# Patient Record
Sex: MALE | Race: WHITE | Employment: OTHER | URBAN - METROPOLITAN AREA
[De-identification: names, ages, dates, MRNs, and addresses within clinical notes are randomized per-mention and may not be internally consistent; named-entity substitution may affect disease eponyms.]

---

## 2017-09-20 PROBLEM — R07.9 CHEST PAIN: Status: ACTIVE | Noted: 2017-09-20

## 2017-09-20 PROBLEM — J45.909 ASTHMA: Chronic | Status: ACTIVE | Noted: 2017-09-20

## 2018-08-28 ENCOUNTER — HOSPITAL ENCOUNTER (EMERGENCY)
Age: 83
Discharge: HOME | End: 2018-08-28
Payer: MEDICARE

## 2018-08-28 VITALS
SYSTOLIC BLOOD PRESSURE: 159 MMHG | BODY MASS INDEX: 33.86 KG/M2 | OXYGEN SATURATION: 92 % | TEMPERATURE: 97.6 F | RESPIRATION RATE: 18 BRPM | HEIGHT: 72 IN | HEART RATE: 88 BPM | DIASTOLIC BLOOD PRESSURE: 80 MMHG | WEIGHT: 250 LBS

## 2018-08-28 DIAGNOSIS — Z23 NEED FOR TDAP VACCINATION: ICD-10-CM

## 2018-08-28 DIAGNOSIS — S50.11XS: ICD-10-CM

## 2018-08-28 DIAGNOSIS — Z79.01 ANTICOAGULATED ON COUMADIN: ICD-10-CM

## 2018-08-28 DIAGNOSIS — S51.819A SKIN TEAR OF FOREARM WITHOUT COMPLICATION, INITIAL ENCOUNTER: Primary | ICD-10-CM

## 2018-08-28 LAB
APTT: 46.2 SEC (ref 24.5–35.1)
HCT VFR BLD CALC: 45.2 % (ref 37–54)
HEMOGLOBIN: 15.1 G/DL (ref 12.5–16.5)
INR BLD: 2.4
MCH RBC QN AUTO: 30 PG (ref 26–35)
MCHC RBC AUTO-ENTMCNC: 33.4 % (ref 32–34.5)
MCV RBC AUTO: 89.9 FL (ref 80–99.9)
PDW BLD-RTO: 14.9 FL (ref 11.5–15)
PLATELET # BLD: 146 E9/L (ref 130–450)
PMV BLD AUTO: 9.5 FL (ref 7–12)
PROTHROMBIN TIME: 27.7 SEC (ref 9.3–12.4)
RBC # BLD: 5.03 E12/L (ref 3.8–5.8)
WBC # BLD: 7.7 E9/L (ref 4.5–11.5)

## 2018-08-28 PROCEDURE — 90471 IMMUNIZATION ADMIN: CPT | Performed by: NURSE PRACTITIONER

## 2018-08-28 PROCEDURE — 85610 PROTHROMBIN TIME: CPT

## 2018-08-28 PROCEDURE — 99283 EMERGENCY DEPT VISIT LOW MDM: CPT

## 2018-08-28 PROCEDURE — 6370000000 HC RX 637 (ALT 250 FOR IP): Performed by: NURSE PRACTITIONER

## 2018-08-28 PROCEDURE — 85027 COMPLETE CBC AUTOMATED: CPT

## 2018-08-28 PROCEDURE — 6360000002 HC RX W HCPCS: Performed by: NURSE PRACTITIONER

## 2018-08-28 PROCEDURE — 90715 TDAP VACCINE 7 YRS/> IM: CPT | Performed by: NURSE PRACTITIONER

## 2018-08-28 PROCEDURE — 85730 THROMBOPLASTIN TIME PARTIAL: CPT

## 2018-08-28 RX ORDER — DIAPER,BRIEF,INFANT-TODD,DISP
EACH MISCELLANEOUS ONCE
Status: DISCONTINUED | OUTPATIENT
Start: 2018-08-28 | End: 2018-08-29 | Stop reason: HOSPADM

## 2018-08-28 RX ORDER — DIAPER,BRIEF,INFANT-TODD,DISP
EACH MISCELLANEOUS ONCE
Status: COMPLETED | OUTPATIENT
Start: 2018-08-28 | End: 2018-08-28

## 2018-08-28 RX ORDER — BACITRACIN ZINC AND POLYMYXIN B SULFATE 500; 1000 [USP'U]/G; [USP'U]/G
OINTMENT TOPICAL
Qty: 30 G | Refills: 0 | Status: SHIPPED | OUTPATIENT
Start: 2018-08-28 | End: 2018-09-04

## 2018-08-28 RX ORDER — CEPHALEXIN 500 MG/1
500 CAPSULE ORAL 4 TIMES DAILY
Qty: 20 CAPSULE | Refills: 0 | Status: SHIPPED | OUTPATIENT
Start: 2018-08-28 | End: 2018-09-02

## 2018-08-28 RX ADMIN — BACITRACIN ZINC: 500 OINTMENT TOPICAL at 22:27

## 2018-08-28 RX ADMIN — TETANUS TOXOID, REDUCED DIPHTHERIA TOXOID AND ACELLULAR PERTUSSIS VACCINE, ADSORBED 0.5 ML: 5; 2.5; 8; 8; 2.5 SUSPENSION INTRAMUSCULAR at 21:18

## 2019-03-23 ENCOUNTER — HOSPITAL ENCOUNTER (INPATIENT)
Age: 84
LOS: 4 days | Discharge: HOME HEALTH | DRG: 193 | End: 2019-03-27
Attending: EMERGENCY MEDICINE | Admitting: INTERNAL MEDICINE
Payer: MEDICARE

## 2019-03-23 ENCOUNTER — APPOINTMENT (OUTPATIENT)
Dept: GENERAL RADIOLOGY | Age: 84
DRG: 193 | End: 2019-03-23
Payer: MEDICARE

## 2019-03-23 DIAGNOSIS — J18.9 COMMUNITY ACQUIRED PNEUMONIA, UNSPECIFIED LATERALITY: Primary | ICD-10-CM

## 2019-03-23 DIAGNOSIS — I48.91 ATRIAL FIBRILLATION WITH RVR (HCC): ICD-10-CM

## 2019-03-23 DIAGNOSIS — J44.1 COPD EXACERBATION (HCC): ICD-10-CM

## 2019-03-23 PROBLEM — J16.0 CAP (COMMUNITY ACQUIRED PNEUMONIA) DUE TO CHLAMYDIA SPECIES: Status: ACTIVE | Noted: 2019-03-23

## 2019-03-23 LAB
ANION GAP SERPL CALCULATED.3IONS-SCNC: 9 MMOL/L (ref 7–16)
BASOPHILS ABSOLUTE: 0.05 E9/L (ref 0–0.2)
BASOPHILS RELATIVE PERCENT: 0.6 % (ref 0–2)
BUN BLDV-MCNC: 22 MG/DL (ref 8–23)
CALCIUM SERPL-MCNC: 9.1 MG/DL (ref 8.6–10.2)
CHLORIDE BLD-SCNC: 103 MMOL/L (ref 98–107)
CO2: 28 MMOL/L (ref 22–29)
CREAT SERPL-MCNC: 1.1 MG/DL (ref 0.7–1.2)
EOSINOPHILS ABSOLUTE: 0.52 E9/L (ref 0.05–0.5)
EOSINOPHILS RELATIVE PERCENT: 6.7 % (ref 0–6)
GFR AFRICAN AMERICAN: >60
GFR NON-AFRICAN AMERICAN: >60 ML/MIN/1.73
GLUCOSE BLD-MCNC: 113 MG/DL (ref 74–99)
HCT VFR BLD CALC: 51.8 % (ref 37–54)
HEMOGLOBIN: 16.2 G/DL (ref 12.5–16.5)
IMMATURE GRANULOCYTES #: 0.03 E9/L
IMMATURE GRANULOCYTES %: 0.4 % (ref 0–5)
INR BLD: 2.9
LACTIC ACID: 1.6 MMOL/L (ref 0.5–2.2)
LYMPHOCYTES ABSOLUTE: 2.44 E9/L (ref 1.5–4)
LYMPHOCYTES RELATIVE PERCENT: 31.3 % (ref 20–42)
MCH RBC QN AUTO: 29.1 PG (ref 26–35)
MCHC RBC AUTO-ENTMCNC: 31.3 % (ref 32–34.5)
MCV RBC AUTO: 93.2 FL (ref 80–99.9)
MONOCYTES ABSOLUTE: 0.66 E9/L (ref 0.1–0.95)
MONOCYTES RELATIVE PERCENT: 8.5 % (ref 2–12)
NEUTROPHILS ABSOLUTE: 4.1 E9/L (ref 1.8–7.3)
NEUTROPHILS RELATIVE PERCENT: 52.5 % (ref 43–80)
PDW BLD-RTO: 14.7 FL (ref 11.5–15)
PLATELET # BLD: 173 E9/L (ref 130–450)
PMV BLD AUTO: 10.1 FL (ref 7–12)
POTASSIUM SERPL-SCNC: 4.3 MMOL/L (ref 3.5–5)
PRO-BNP: 1141 PG/ML (ref 0–450)
PROCALCITONIN: 0.02 NG/ML (ref 0–0.08)
PROTHROMBIN TIME: 31.8 SEC (ref 9.3–12.4)
RBC # BLD: 5.56 E12/L (ref 3.8–5.8)
SODIUM BLD-SCNC: 140 MMOL/L (ref 132–146)
TROPONIN: <0.01 NG/ML (ref 0–0.03)
WBC # BLD: 7.8 E9/L (ref 4.5–11.5)

## 2019-03-23 PROCEDURE — 85610 PROTHROMBIN TIME: CPT

## 2019-03-23 PROCEDURE — 2580000003 HC RX 258: Performed by: EMERGENCY MEDICINE

## 2019-03-23 PROCEDURE — 96375 TX/PRO/DX INJ NEW DRUG ADDON: CPT

## 2019-03-23 PROCEDURE — 84484 ASSAY OF TROPONIN QUANT: CPT

## 2019-03-23 PROCEDURE — 71045 X-RAY EXAM CHEST 1 VIEW: CPT

## 2019-03-23 PROCEDURE — 6370000000 HC RX 637 (ALT 250 FOR IP): Performed by: INTERNAL MEDICINE

## 2019-03-23 PROCEDURE — 85025 COMPLETE CBC W/AUTO DIFF WBC: CPT

## 2019-03-23 PROCEDURE — 2060000000 HC ICU INTERMEDIATE R&B

## 2019-03-23 PROCEDURE — 6370000000 HC RX 637 (ALT 250 FOR IP): Performed by: EMERGENCY MEDICINE

## 2019-03-23 PROCEDURE — 36415 COLL VENOUS BLD VENIPUNCTURE: CPT

## 2019-03-23 PROCEDURE — 6360000002 HC RX W HCPCS: Performed by: INTERNAL MEDICINE

## 2019-03-23 PROCEDURE — 83605 ASSAY OF LACTIC ACID: CPT

## 2019-03-23 PROCEDURE — 96367 TX/PROPH/DG ADDL SEQ IV INF: CPT

## 2019-03-23 PROCEDURE — 80048 BASIC METABOLIC PNL TOTAL CA: CPT

## 2019-03-23 PROCEDURE — 94640 AIRWAY INHALATION TREATMENT: CPT

## 2019-03-23 PROCEDURE — 94664 DEMO&/EVAL PT USE INHALER: CPT

## 2019-03-23 PROCEDURE — 87040 BLOOD CULTURE FOR BACTERIA: CPT

## 2019-03-23 PROCEDURE — 96365 THER/PROPH/DIAG IV INF INIT: CPT

## 2019-03-23 PROCEDURE — 99285 EMERGENCY DEPT VISIT HI MDM: CPT

## 2019-03-23 PROCEDURE — 6360000002 HC RX W HCPCS: Performed by: EMERGENCY MEDICINE

## 2019-03-23 PROCEDURE — 84145 PROCALCITONIN (PCT): CPT

## 2019-03-23 PROCEDURE — 83880 ASSAY OF NATRIURETIC PEPTIDE: CPT

## 2019-03-23 RX ORDER — IPRATROPIUM BROMIDE AND ALBUTEROL SULFATE 2.5; .5 MG/3ML; MG/3ML
1 SOLUTION RESPIRATORY (INHALATION) ONCE
Status: COMPLETED | OUTPATIENT
Start: 2019-03-23 | End: 2019-03-23

## 2019-03-23 RX ORDER — DILTIAZEM HYDROCHLORIDE 5 MG/ML
10 INJECTION INTRAVENOUS ONCE
Status: DISCONTINUED | OUTPATIENT
Start: 2019-03-23 | End: 2019-03-27 | Stop reason: HOSPADM

## 2019-03-23 RX ORDER — GLIMEPIRIDE 2 MG/1
1 TABLET ORAL DAILY
Status: DISCONTINUED | OUTPATIENT
Start: 2019-03-23 | End: 2019-03-23

## 2019-03-23 RX ORDER — WARFARIN SODIUM 2.5 MG/1
2.5 TABLET ORAL
Status: DISCONTINUED | OUTPATIENT
Start: 2019-03-23 | End: 2019-03-23

## 2019-03-23 RX ORDER — WARFARIN SODIUM 1 MG/1
0.5 TABLET ORAL
Status: DISCONTINUED | OUTPATIENT
Start: 2019-03-26 | End: 2019-03-23

## 2019-03-23 RX ORDER — WARFARIN SODIUM 1 MG/1
0.5 TABLET ORAL
Status: DISCONTINUED | OUTPATIENT
Start: 2019-03-23 | End: 2019-03-25

## 2019-03-23 RX ORDER — METHYLPREDNISOLONE SODIUM SUCCINATE 125 MG/2ML
125 INJECTION, POWDER, LYOPHILIZED, FOR SOLUTION INTRAMUSCULAR; INTRAVENOUS ONCE
Status: COMPLETED | OUTPATIENT
Start: 2019-03-23 | End: 2019-03-23

## 2019-03-23 RX ORDER — METHYLPREDNISOLONE SODIUM SUCCINATE 40 MG/ML
40 INJECTION, POWDER, LYOPHILIZED, FOR SOLUTION INTRAMUSCULAR; INTRAVENOUS EVERY 8 HOURS
Status: DISCONTINUED | OUTPATIENT
Start: 2019-03-23 | End: 2019-03-25 | Stop reason: RX

## 2019-03-23 RX ORDER — ASPIRIN 81 MG/1
81 TABLET ORAL DAILY
Status: DISCONTINUED | OUTPATIENT
Start: 2019-03-23 | End: 2019-03-23

## 2019-03-23 RX ORDER — WARFARIN SODIUM 1 MG/1
1 TABLET ORAL
Status: DISCONTINUED | OUTPATIENT
Start: 2019-03-24 | End: 2019-03-25

## 2019-03-23 RX ORDER — TIMOLOL MALEATE 5 MG/ML
1 SOLUTION/ DROPS OPHTHALMIC 2 TIMES DAILY
Status: DISCONTINUED | OUTPATIENT
Start: 2019-03-23 | End: 2019-03-27 | Stop reason: HOSPADM

## 2019-03-23 RX ORDER — LATANOPROST 50 UG/ML
1 SOLUTION/ DROPS OPHTHALMIC DAILY
Status: ON HOLD | COMMUNITY
End: 2020-10-07 | Stop reason: ALTCHOICE

## 2019-03-23 RX ORDER — IPRATROPIUM BROMIDE AND ALBUTEROL SULFATE 2.5; .5 MG/3ML; MG/3ML
1 SOLUTION RESPIRATORY (INHALATION)
Status: DISCONTINUED | OUTPATIENT
Start: 2019-03-23 | End: 2019-03-25 | Stop reason: ALTCHOICE

## 2019-03-23 RX ORDER — VITS A,C,E/LUTEIN/MINERALS 300MCG-200
1 TABLET ORAL 2 TIMES DAILY
Status: DISCONTINUED | OUTPATIENT
Start: 2019-03-23 | End: 2019-03-27 | Stop reason: HOSPADM

## 2019-03-23 RX ORDER — LEVOFLOXACIN 5 MG/ML
750 INJECTION, SOLUTION INTRAVENOUS ONCE
Status: COMPLETED | OUTPATIENT
Start: 2019-03-23 | End: 2019-03-23

## 2019-03-23 RX ORDER — DOCUSATE SODIUM 100 MG/1
200 CAPSULE, LIQUID FILLED ORAL NIGHTLY
Status: DISCONTINUED | OUTPATIENT
Start: 2019-03-23 | End: 2019-03-27 | Stop reason: HOSPADM

## 2019-03-23 RX ORDER — TAMSULOSIN HYDROCHLORIDE 0.4 MG/1
0.4 CAPSULE ORAL DAILY
Status: DISCONTINUED | OUTPATIENT
Start: 2019-03-23 | End: 2019-03-27 | Stop reason: HOSPADM

## 2019-03-23 RX ORDER — LEVOFLOXACIN 5 MG/ML
500 INJECTION, SOLUTION INTRAVENOUS EVERY 24 HOURS
Status: DISCONTINUED | OUTPATIENT
Start: 2019-03-23 | End: 2019-03-26

## 2019-03-23 RX ORDER — LATANOPROST 50 UG/ML
1 SOLUTION/ DROPS OPHTHALMIC NIGHTLY
Status: DISCONTINUED | OUTPATIENT
Start: 2019-03-23 | End: 2019-03-23

## 2019-03-23 RX ORDER — WARFARIN SODIUM 1 MG/1
0.5 TABLET ORAL SEE ADMIN INSTRUCTIONS
Status: ON HOLD | COMMUNITY
End: 2020-10-07 | Stop reason: ALTCHOICE

## 2019-03-23 RX ORDER — MONTELUKAST SODIUM 10 MG/1
10 TABLET ORAL NIGHTLY
Status: DISCONTINUED | OUTPATIENT
Start: 2019-03-23 | End: 2019-03-27 | Stop reason: HOSPADM

## 2019-03-23 RX ORDER — IPRATROPIUM BROMIDE AND ALBUTEROL SULFATE 2.5; .5 MG/3ML; MG/3ML
1 SOLUTION RESPIRATORY (INHALATION)
Status: DISCONTINUED | OUTPATIENT
Start: 2019-03-23 | End: 2019-03-23

## 2019-03-23 RX ORDER — LATANOPROST 50 UG/ML
1 SOLUTION/ DROPS OPHTHALMIC DAILY
Status: DISCONTINUED | OUTPATIENT
Start: 2019-03-23 | End: 2019-03-27 | Stop reason: HOSPADM

## 2019-03-23 RX ORDER — 0.9 % SODIUM CHLORIDE 0.9 %
500 INTRAVENOUS SOLUTION INTRAVENOUS ONCE
Status: COMPLETED | OUTPATIENT
Start: 2019-03-23 | End: 2019-03-23

## 2019-03-23 RX ORDER — ISOSORBIDE MONONITRATE 30 MG/1
30 TABLET, EXTENDED RELEASE ORAL DAILY
Status: DISCONTINUED | OUTPATIENT
Start: 2019-03-23 | End: 2019-03-23

## 2019-03-23 RX ORDER — FLUTICASONE PROPIONATE 50 MCG
1 SPRAY, SUSPENSION (ML) NASAL DAILY
Status: DISCONTINUED | OUTPATIENT
Start: 2019-03-24 | End: 2019-03-24

## 2019-03-23 RX ORDER — WARFARIN SODIUM 1 MG/1
1 TABLET ORAL
Status: DISCONTINUED | OUTPATIENT
Start: 2019-03-23 | End: 2019-03-23

## 2019-03-23 RX ORDER — TIMOLOL MALEATE 6.8 MG/ML
1 SOLUTION/ DROPS OPHTHALMIC 2 TIMES DAILY
Status: ON HOLD | COMMUNITY
End: 2020-10-07 | Stop reason: ALTCHOICE

## 2019-03-23 RX ADMIN — LEVOFLOXACIN 750 MG: 5 INJECTION, SOLUTION INTRAVENOUS at 12:48

## 2019-03-23 RX ADMIN — IPRATROPIUM BROMIDE AND ALBUTEROL SULFATE 1 AMPULE: .5; 3 SOLUTION RESPIRATORY (INHALATION) at 12:10

## 2019-03-23 RX ADMIN — TAMSULOSIN HYDROCHLORIDE 0.4 MG: 0.4 CAPSULE ORAL at 20:18

## 2019-03-23 RX ADMIN — WARFARIN SODIUM 0.5 MG: 1 TABLET ORAL at 18:16

## 2019-03-23 RX ADMIN — METHYLPREDNISOLONE SODIUM SUCCINATE 125 MG: 125 INJECTION, POWDER, FOR SOLUTION INTRAMUSCULAR; INTRAVENOUS at 12:52

## 2019-03-23 RX ADMIN — IPRATROPIUM BROMIDE AND ALBUTEROL SULFATE 1 AMPULE: .5; 3 SOLUTION RESPIRATORY (INHALATION) at 22:32

## 2019-03-23 RX ADMIN — METHYLPREDNISOLONE SODIUM SUCCINATE 40 MG: 40 INJECTION, POWDER, FOR SOLUTION INTRAMUSCULAR; INTRAVENOUS at 18:17

## 2019-03-23 RX ADMIN — SODIUM CHLORIDE 500 ML: 9 INJECTION, SOLUTION INTRAVENOUS at 12:49

## 2019-03-23 RX ADMIN — CEFTRIAXONE 1 G: 1 INJECTION, POWDER, FOR SOLUTION INTRAMUSCULAR; INTRAVENOUS at 12:21

## 2019-03-23 RX ADMIN — LEVOFLOXACIN 500 MG: 5 INJECTION, SOLUTION INTRAVENOUS at 18:22

## 2019-03-23 RX ADMIN — MONTELUKAST SODIUM 10 MG: 10 TABLET, FILM COATED ORAL at 20:18

## 2019-03-23 RX ADMIN — Medication 1 TABLET: at 20:18

## 2019-03-23 RX ADMIN — TIMOLOL MALEATE 1 DROP: 5 SOLUTION OPHTHALMIC at 20:21

## 2019-03-23 RX ADMIN — DOCUSATE SODIUM 200 MG: 100 CAPSULE, LIQUID FILLED ORAL at 20:18

## 2019-03-23 ASSESSMENT — PAIN SCALES - GENERAL
PAINLEVEL_OUTOF10: 0
PAINLEVEL_OUTOF10: 0

## 2019-03-23 NOTE — ED PROVIDER NOTES
secretions, no trismus, no asymmetry of the posterior oropharynx or uvular edema  Neck: Supple, full ROM, no stridor, no meningeal signs  Respiratory: wheezes bilaterally. Not in respiratory distress  Cardiovascular:  Regular rate. Regular rhythm. No murmurs, no aortic murmurs, no gallops, or rubs. 2+ distal pulses. Equal extremity pulses. Chest: No chest wall tenderness  GI:  Abdomen Soft, Non tender, Non distended. +BS. No rebound, guarding, or rigidity. No pulsatile masses. Musculoskeletal: Moves all extremities x 4. Warm and well perfused, no clubbing, cyanosis, or edema. Capillary refill <3 seconds  Integument: skin warm and dry. No rashes. Neurologic: GCS 15, no focal deficits, symmetric strength 5/5 in the upper and lower extremities bilaterally  Psychiatric: Normal Affect          -------------------------------------------------- RESULTS -------------------------------------------------  I have personally reviewed all laboratory and imaging results for this patient. Results are listed below.      LABS:  Results for orders placed or performed during the hospital encounter of 03/23/19   Protime-INR   Result Value Ref Range    Protime 31.8 (H) 9.3 - 12.4 sec    INR 2.9    CBC Auto Differential   Result Value Ref Range    WBC 7.8 4.5 - 11.5 E9/L    RBC 5.56 3.80 - 5.80 E12/L    Hemoglobin 16.2 12.5 - 16.5 g/dL    Hematocrit 51.8 37.0 - 54.0 %    MCV 93.2 80.0 - 99.9 fL    MCH 29.1 26.0 - 35.0 pg    MCHC 31.3 (L) 32.0 - 34.5 %    RDW 14.7 11.5 - 15.0 fL    Platelets 312 095 - 644 E9/L    MPV 10.1 7.0 - 12.0 fL    Neutrophils % 52.5 43.0 - 80.0 %    Immature Granulocytes % 0.4 0.0 - 5.0 %    Lymphocytes % 31.3 20.0 - 42.0 %    Monocytes % 8.5 2.0 - 12.0 %    Eosinophils % 6.7 (H) 0.0 - 6.0 %    Basophils % 0.6 0.0 - 2.0 %    Neutrophils # 4.10 1.80 - 7.30 E9/L    Immature Granulocytes # 0.03 E9/L    Lymphocytes # 2.44 1.50 - 4.00 E9/L    Monocytes # 0.66 0.10 - 0.95 E9/L    Eosinophils # 0.52 (H) 0.05 - 0.50 E9/L    Basophils # 0.05 0.00 - 0.20 Q0/J   Basic Metabolic Panel   Result Value Ref Range    Sodium 140 132 - 146 mmol/L    Potassium 4.3 3.5 - 5.0 mmol/L    Chloride 103 98 - 107 mmol/L    CO2 28 22 - 29 mmol/L    Anion Gap 9 7 - 16 mmol/L    Glucose 113 (H) 74 - 99 mg/dL    BUN 22 8 - 23 mg/dL    CREATININE 1.1 0.7 - 1.2 mg/dL    GFR Non-African American >60 >=60 mL/min/1.73    GFR African American >60     Calcium 9.1 8.6 - 10.2 mg/dL   Brain Natriuretic Peptide   Result Value Ref Range    Pro-BNP 1,141 (H) 0 - 450 pg/mL   Troponin   Result Value Ref Range    Troponin <0.01 0.00 - 0.03 ng/mL   Lactic Acid, Plasma   Result Value Ref Range    Lactic Acid 1.6 0.5 - 2.2 mmol/L   EKG 12 Lead   Result Value Ref Range    Ventricular Rate 121 BPM    Atrial Rate 131 BPM    P-R Interval 132 ms    QRS Duration 144 ms    Q-T Interval 358 ms    QTc Calculation (Bazett) 508 ms    R Axis -72 degrees    T Axis 24 degrees   EKG 12 Lead   Result Value Ref Range    Ventricular Rate 76 BPM    Atrial Rate 76 BPM    P-R Interval 272 ms    QRS Duration 146 ms    Q-T Interval 420 ms    QTc Calculation (Bazett) 472 ms    P Axis 43 degrees    R Axis -62 degrees    T Axis 15 degrees       RADIOLOGY:  Interpreted by Radiologist unless otherwise specified  XR CHEST PORTABLE   Final Result   Tortuous aorta    There are patchy infiltrates seen throughout both the lung fields. Pneumonia could give this appearance. The chest appears to be worse in the interval                        EKG Interpretation  Interpreted by emergency department physician, Dr. Quang Bernal     Date of EKG: 3/23/19  Time: 121    Rhythm: atrial fibrillation - rapid  Rate: tachycardia  Axis: left  Conduction: right bundle branch block (incomplete) and left anterior fasciclar block  ST Segments: no acute change    Clinical Impression: afib rvr with bifasicular block. Comparison to prior EKG: changes compared to previous EKG    EKG:   This EKG is signed by emergency department physician. Rate: 76  Rhythm: Sinus  Interpretation: First-degree AV block, right bundle-branch block, left anterior fascicular block  Comparison: stable as compared to patient's most recent EKG 09/20/17        ------------------------- NURSING NOTES AND VITALS REVIEWED ---------------------------   The nursing notes within the ED encounter and vital signs as below have been reviewed by myself. BP (!) 150/70   Pulse 77   Temp 98.1 °F (36.7 °C) (Oral)   Resp 18   Ht 6' (1.829 m)   Wt 211 lb 12.8 oz (96.1 kg)   SpO2 93%   BMI 28.73 kg/m²   Oxygen Saturation Interpretation: Abnormal    The patients available past medical records and past encounters were reviewed.         ------------------------------ ED COURSE/MEDICAL DECISION MAKING----------------------  Medications   diltiazem injection 10 mg (10 mg Intravenous Not Given 3/23/19 1351)   docusate sodium (COLACE) capsule 200 mg (has no administration in time range)   montelukast (SINGULAIR) tablet 10 mg (has no administration in time range)   antioxidant multivitamin (OCUVITE) tablet (has no administration in time range)   tamsulosin (FLOMAX) capsule 0.4 mg (has no administration in time range)   levofloxacin (LEVAQUIN) 500 MG/100ML infusion 500 mg (0 mg Intravenous Stopped 3/23/19 1959)   ipratropium-albuterol (DUONEB) nebulizer solution 1 ampule (has no administration in time range)   methylPREDNISolone sodium (SOLU-MEDROL) injection 40 mg (40 mg Intravenous Given 3/23/19 1817)   latanoprost (XALATAN) 0.005 % ophthalmic solution 1 drop (1 drop Both Eyes Not Given 3/23/19 1903)   timolol (TIMOPTIC) 0.5 % ophthalmic solution 1 drop (has no administration in time range)   fluticasone (FLONASE) 50 MCG/ACT nasal spray 1 spray (has no administration in time range)   warfarin (COUMADIN) tablet 1 mg (has no administration in time range)   warfarin (COUMADIN) tablet 0.5 mg (0.5 mg Oral Not Given 3/23/19 1901)   ipratropium-albuterol (DUONEB) nebulizer solution 1 ampule (1 ampule Inhalation Given 3/23/19 1210)   cefTRIAXone (ROCEPHIN) 1 g in dextrose 5 % 50 mL IVPB (vial-mate) (0 g Intravenous Stopped 3/23/19 1254)   levofloxacin (LEVAQUIN) 750 MG/150ML infusion 750 mg (750 mg Intravenous New Bag 3/23/19 1248)   methylPREDNISolone sodium (SOLU-MEDROL) injection 125 mg (125 mg Intravenous Given 3/23/19 1252)   0.9 % sodium chloride bolus (0 mLs Intravenous Stopped 3/23/19 1429)              Medical Decision Making:    Patient with history of COPD who presents for evaluation of shortness of breath and chest tightness. He is tachycardic but significant wheezing on exam. Patient found to have bilateral infiltrates on chest x-ray consistent with pneumonia and antibiotics were ordered. Patient giving breathing treatment with some improvement. Patient with history of A. fib on Coumadin but not rate controlled with heart rate bouncing around between 80 and 120, Cardizem was ordered however before administration patient apparently converted back to sinus rhythm. Re-Evaluations:             ED Course as of Mar 23 2013   Sat Mar 23, 2019   1211 Patient with tachycardia, but no other sirs criteria, and pneumonia. Antibiotics ordered. Pt is afib, HR ranging from 80s-120s. [MF]   1310 No change in symptoms or exam.     [MF]   9617 Patient spontaneously converted prior to Cardizem administration. Cardizem held. [MF]   1409 Discussed case with Dr. Justus Gonzales - he was admitted patient. [MF]      ED Course User Index  [MF] Ro Pérez,          This patient's ED course included: a personal history and physicial examination, multiple bedside re-evaluations, IV medications, cardiac monitoring, continuous pulse oximetry and complex medical decision making and emergency management    This patient has remained hemodynamically stable and improved during their ED course. Counseling:    The emergency provider has spoken with the patient and discussed todays results, in addition to providing specific details for the plan of care and counseling regarding the diagnosis and prognosis. Questions are answered at this time and they are agreeable with the plan.       --------------------------------- IMPRESSION AND DISPOSITION ---------------------------------    IMPRESSION  1. Community acquired pneumonia, unspecified laterality New Problem   2. COPD exacerbation ([facility] [address]    3. Atrial fibrillation with RVR ([facility]) Stable       DISPOSITION  Disposition: Admit to telemetry  Patient condition is stable        NOTE: This report was transcribed using voice recognition software.  Every effort was made to ensure accuracy; however, inadvertent computerized transcription errors may be present       Osbaldo West DO  Resident  03/23/19 2013

## 2019-03-24 LAB
ANION GAP SERPL CALCULATED.3IONS-SCNC: 9 MMOL/L (ref 7–16)
BUN BLDV-MCNC: 28 MG/DL (ref 8–23)
CALCIUM SERPL-MCNC: 9 MG/DL (ref 8.6–10.2)
CHLORIDE BLD-SCNC: 108 MMOL/L (ref 98–107)
CO2: 22 MMOL/L (ref 22–29)
CREAT SERPL-MCNC: 1.2 MG/DL (ref 0.7–1.2)
GFR AFRICAN AMERICAN: >60
GFR NON-AFRICAN AMERICAN: 57 ML/MIN/1.73
GLUCOSE BLD-MCNC: 166 MG/DL (ref 74–99)
HCT VFR BLD CALC: 46 % (ref 37–54)
HEMOGLOBIN: 14.7 G/DL (ref 12.5–16.5)
INR BLD: 3.2
MCH RBC QN AUTO: 29.4 PG (ref 26–35)
MCHC RBC AUTO-ENTMCNC: 32 % (ref 32–34.5)
MCV RBC AUTO: 92 FL (ref 80–99.9)
PDW BLD-RTO: 14.5 FL (ref 11.5–15)
PLATELET # BLD: 148 E9/L (ref 130–450)
PMV BLD AUTO: 10.2 FL (ref 7–12)
POTASSIUM SERPL-SCNC: 5 MMOL/L (ref 3.5–5)
PROTHROMBIN TIME: 35.9 SEC (ref 9.3–12.4)
RBC # BLD: 5 E12/L (ref 3.8–5.8)
SODIUM BLD-SCNC: 139 MMOL/L (ref 132–146)
T4 TOTAL: 4.7 MCG/DL (ref 4.5–11.7)
TSH SERPL DL<=0.05 MIU/L-ACNC: 1.31 UIU/ML (ref 0.27–4.2)
WBC # BLD: 5.8 E9/L (ref 4.5–11.5)

## 2019-03-24 PROCEDURE — 94640 AIRWAY INHALATION TREATMENT: CPT

## 2019-03-24 PROCEDURE — 85027 COMPLETE CBC AUTOMATED: CPT

## 2019-03-24 PROCEDURE — 84436 ASSAY OF TOTAL THYROXINE: CPT

## 2019-03-24 PROCEDURE — 6370000000 HC RX 637 (ALT 250 FOR IP): Performed by: INTERNAL MEDICINE

## 2019-03-24 PROCEDURE — 2060000000 HC ICU INTERMEDIATE R&B

## 2019-03-24 PROCEDURE — 6360000002 HC RX W HCPCS: Performed by: INTERNAL MEDICINE

## 2019-03-24 PROCEDURE — 84443 ASSAY THYROID STIM HORMONE: CPT

## 2019-03-24 PROCEDURE — 85610 PROTHROMBIN TIME: CPT

## 2019-03-24 PROCEDURE — 36415 COLL VENOUS BLD VENIPUNCTURE: CPT

## 2019-03-24 PROCEDURE — 80048 BASIC METABOLIC PNL TOTAL CA: CPT

## 2019-03-24 PROCEDURE — 2700000000 HC OXYGEN THERAPY PER DAY

## 2019-03-24 RX ORDER — FLUTICASONE PROPIONATE 50 MCG
1 SPRAY, SUSPENSION (ML) NASAL NIGHTLY
Status: DISCONTINUED | OUTPATIENT
Start: 2019-03-25 | End: 2019-03-27 | Stop reason: HOSPADM

## 2019-03-24 RX ORDER — DOXYCYCLINE HYCLATE 100 MG/1
100 CAPSULE ORAL EVERY 12 HOURS SCHEDULED
Status: DISCONTINUED | OUTPATIENT
Start: 2019-03-24 | End: 2019-03-24

## 2019-03-24 RX ORDER — FUROSEMIDE 10 MG/ML
20 INJECTION INTRAMUSCULAR; INTRAVENOUS ONCE
Status: COMPLETED | OUTPATIENT
Start: 2019-03-24 | End: 2019-03-24

## 2019-03-24 RX ADMIN — IPRATROPIUM BROMIDE AND ALBUTEROL SULFATE 1 AMPULE: .5; 3 SOLUTION RESPIRATORY (INHALATION) at 10:30

## 2019-03-24 RX ADMIN — METHYLPREDNISOLONE SODIUM SUCCINATE 40 MG: 40 INJECTION, POWDER, FOR SOLUTION INTRAMUSCULAR; INTRAVENOUS at 17:37

## 2019-03-24 RX ADMIN — DOCUSATE SODIUM 200 MG: 100 CAPSULE, LIQUID FILLED ORAL at 20:49

## 2019-03-24 RX ADMIN — Medication 1 TABLET: at 20:49

## 2019-03-24 RX ADMIN — TIMOLOL MALEATE 1 DROP: 5 SOLUTION OPHTHALMIC at 20:49

## 2019-03-24 RX ADMIN — FUROSEMIDE 20 MG: 10 INJECTION, SOLUTION INTRAVENOUS at 13:18

## 2019-03-24 RX ADMIN — LEVOFLOXACIN 500 MG: 5 INJECTION, SOLUTION INTRAVENOUS at 17:36

## 2019-03-24 RX ADMIN — IPRATROPIUM BROMIDE AND ALBUTEROL SULFATE 1 AMPULE: .5; 3 SOLUTION RESPIRATORY (INHALATION) at 21:39

## 2019-03-24 RX ADMIN — WARFARIN SODIUM 1 MG: 1 TABLET ORAL at 17:37

## 2019-03-24 RX ADMIN — FLUTICASONE PROPIONATE 1 SPRAY: 50 SPRAY, METERED NASAL at 09:00

## 2019-03-24 RX ADMIN — MONTELUKAST SODIUM 10 MG: 10 TABLET, FILM COATED ORAL at 20:49

## 2019-03-24 RX ADMIN — IPRATROPIUM BROMIDE AND ALBUTEROL SULFATE 1 AMPULE: .5; 3 SOLUTION RESPIRATORY (INHALATION) at 17:13

## 2019-03-24 RX ADMIN — METHYLPREDNISOLONE SODIUM SUCCINATE 40 MG: 40 INJECTION, POWDER, FOR SOLUTION INTRAMUSCULAR; INTRAVENOUS at 01:10

## 2019-03-24 RX ADMIN — TIMOLOL MALEATE 1 DROP: 5 SOLUTION OPHTHALMIC at 09:00

## 2019-03-24 RX ADMIN — IPRATROPIUM BROMIDE AND ALBUTEROL SULFATE 1 AMPULE: .5; 3 SOLUTION RESPIRATORY (INHALATION) at 14:42

## 2019-03-24 RX ADMIN — Medication 1 TABLET: at 08:57

## 2019-03-24 RX ADMIN — LATANOPROST 1 DROP: 50 SOLUTION OPHTHALMIC at 09:00

## 2019-03-24 RX ADMIN — TAMSULOSIN HYDROCHLORIDE 0.4 MG: 0.4 CAPSULE ORAL at 20:49

## 2019-03-24 RX ADMIN — METHYLPREDNISOLONE SODIUM SUCCINATE 40 MG: 40 INJECTION, POWDER, FOR SOLUTION INTRAMUSCULAR; INTRAVENOUS at 08:57

## 2019-03-24 ASSESSMENT — PAIN SCALES - GENERAL
PAINLEVEL_OUTOF10: 0
PAINLEVEL_OUTOF10: 0

## 2019-03-24 NOTE — PROGRESS NOTES
[facility] Quality Flow/Interdisciplinary Rounds Progress Note        Quality Flow Rounds held on March 24, 2019    Disciplines Attending:  Bedside Nurse, ,  and Nursing Unit Leadership    Alexander Valera was admitted on 3/23/2019 11:15 AM    Anticipated Discharge Date:  Expected Discharge Date: 03/26/19    Disposition:    Dwayne Score:  Dwayne Scale Score: 19    Readmission Risk              Risk of Unplanned Readmission:        12           Discussed patient goal for the day, patient clinical progression, and barriers to discharge.   The following Goal(s) of the Day/Commitment(s) have been identified:  new admit      Patrick Torres  March 24, 2019

## 2019-03-24 NOTE — PLAN OF CARE
Problem: Respiratory  Goal: No pulmonary complications  Outcome: Met This Shift  Goal: O2 Sat > 90%  Outcome: Met This Shift     Problem: Discharge Planning:  Goal: Participates in care planning  Description  Participates in care planning  Outcome: Met This Shift     Problem: Airway Clearance - Ineffective:  Goal: Ability to maintain a clear airway will improve  Description  Ability to maintain a clear airway will improve  Outcome: Met This Shift     Problem: Breathing Pattern - Ineffective:  Goal: Ability to achieve and maintain a regular respiratory rate will improve  Description  Ability to achieve and maintain a regular respiratory rate will improve  Outcome: Met This Shift     Problem: Airway Clearance - Ineffective:  Goal: Ability to maintain a clear airway will improve  Description  Ability to maintain a clear airway will improve  Outcome: Met This Shift     Problem: Falls - Risk of:  Goal: Will remain free from falls  Description  Will remain free from falls  Outcome: Met This Shift  Goal: Absence of physical injury  Description  Absence of physical injury  Outcome: Met This Shift

## 2019-03-25 LAB
ANION GAP SERPL CALCULATED.3IONS-SCNC: 8 MMOL/L (ref 7–16)
BUN BLDV-MCNC: 33 MG/DL (ref 8–23)
CALCIUM SERPL-MCNC: 8.8 MG/DL (ref 8.6–10.2)
CHLORIDE BLD-SCNC: 104 MMOL/L (ref 98–107)
CO2: 25 MMOL/L (ref 22–29)
CREAT SERPL-MCNC: 1.2 MG/DL (ref 0.7–1.2)
FILM ARRAY ADENOVIRUS: NORMAL
FILM ARRAY BORDETELLA PERTUSSIS: NORMAL
FILM ARRAY CHLAMYDOPHILIA PNEUMONIAE: NORMAL
FILM ARRAY CORONAVIRUS 229E: NORMAL
FILM ARRAY CORONAVIRUS HKU1: NORMAL
FILM ARRAY CORONAVIRUS NL63: NORMAL
FILM ARRAY CORONAVIRUS OC43: NORMAL
FILM ARRAY INFLUENZA A VIRUS 09H1: NORMAL
FILM ARRAY INFLUENZA A VIRUS H1: NORMAL
FILM ARRAY INFLUENZA A VIRUS H3: NORMAL
FILM ARRAY INFLUENZA A VIRUS: NORMAL
FILM ARRAY INFLUENZA B: NORMAL
FILM ARRAY METAPNEUMOVIRUS: NORMAL
FILM ARRAY MYCOPLASMA PNEUMONIAE: NORMAL
FILM ARRAY PARAINFLUENZA VIRUS 1: NORMAL
FILM ARRAY PARAINFLUENZA VIRUS 2: NORMAL
FILM ARRAY PARAINFLUENZA VIRUS 3: NORMAL
FILM ARRAY PARAINFLUENZA VIRUS 4: NORMAL
FILM ARRAY RESPIRATORY SYNCITIAL VIRUS: NORMAL
FILM ARRAY RHINOVIRUS/ENTEROVIRUS: NORMAL
GFR AFRICAN AMERICAN: >60
GFR NON-AFRICAN AMERICAN: 57 ML/MIN/1.73
GLUCOSE BLD-MCNC: 148 MG/DL (ref 74–99)
HCT VFR BLD CALC: 43.2 % (ref 37–54)
HEMOGLOBIN: 14 G/DL (ref 12.5–16.5)
INR BLD: 3.8
MCH RBC QN AUTO: 29.9 PG (ref 26–35)
MCHC RBC AUTO-ENTMCNC: 32.4 % (ref 32–34.5)
MCV RBC AUTO: 92.1 FL (ref 80–99.9)
PDW BLD-RTO: 14.5 FL (ref 11.5–15)
PLATELET # BLD: 145 E9/L (ref 130–450)
PMV BLD AUTO: 10 FL (ref 7–12)
POTASSIUM SERPL-SCNC: 4.9 MMOL/L (ref 3.5–5)
PROTHROMBIN TIME: 43 SEC (ref 9.3–12.4)
RBC # BLD: 4.69 E12/L (ref 3.8–5.8)
SODIUM BLD-SCNC: 137 MMOL/L (ref 132–146)
WBC # BLD: 11.4 E9/L (ref 4.5–11.5)

## 2019-03-25 PROCEDURE — 87581 M.PNEUMON DNA AMP PROBE: CPT

## 2019-03-25 PROCEDURE — 97165 OT EVAL LOW COMPLEX 30 MIN: CPT

## 2019-03-25 PROCEDURE — 97161 PT EVAL LOW COMPLEX 20 MIN: CPT

## 2019-03-25 PROCEDURE — 87486 CHLMYD PNEUM DNA AMP PROBE: CPT

## 2019-03-25 PROCEDURE — 85027 COMPLETE CBC AUTOMATED: CPT

## 2019-03-25 PROCEDURE — 87798 DETECT AGENT NOS DNA AMP: CPT

## 2019-03-25 PROCEDURE — 6360000002 HC RX W HCPCS: Performed by: INTERNAL MEDICINE

## 2019-03-25 PROCEDURE — 85610 PROTHROMBIN TIME: CPT

## 2019-03-25 PROCEDURE — 6370000000 HC RX 637 (ALT 250 FOR IP): Performed by: INTERNAL MEDICINE

## 2019-03-25 PROCEDURE — 94640 AIRWAY INHALATION TREATMENT: CPT

## 2019-03-25 PROCEDURE — 2700000000 HC OXYGEN THERAPY PER DAY

## 2019-03-25 PROCEDURE — 94760 N-INVAS EAR/PLS OXIMETRY 1: CPT

## 2019-03-25 PROCEDURE — 80048 BASIC METABOLIC PNL TOTAL CA: CPT

## 2019-03-25 PROCEDURE — 36415 COLL VENOUS BLD VENIPUNCTURE: CPT

## 2019-03-25 PROCEDURE — 87633 RESP VIRUS 12-25 TARGETS: CPT

## 2019-03-25 PROCEDURE — 6360000002 HC RX W HCPCS

## 2019-03-25 PROCEDURE — 2060000000 HC ICU INTERMEDIATE R&B

## 2019-03-25 RX ORDER — METHYLPREDNISOLONE SODIUM SUCCINATE 125 MG/2ML
40 INJECTION, POWDER, LYOPHILIZED, FOR SOLUTION INTRAMUSCULAR; INTRAVENOUS EVERY 8 HOURS
Status: DISCONTINUED | OUTPATIENT
Start: 2019-03-25 | End: 2019-03-25

## 2019-03-25 RX ORDER — METHYLPREDNISOLONE SODIUM SUCCINATE 125 MG/2ML
INJECTION, POWDER, LYOPHILIZED, FOR SOLUTION INTRAMUSCULAR; INTRAVENOUS
Status: COMPLETED
Start: 2019-03-25 | End: 2019-03-25

## 2019-03-25 RX ORDER — BISACODYL 10 MG
10 SUPPOSITORY, RECTAL RECTAL DAILY PRN
Status: DISCONTINUED | OUTPATIENT
Start: 2019-03-25 | End: 2019-03-27 | Stop reason: HOSPADM

## 2019-03-25 RX ORDER — LEVALBUTEROL INHALATION SOLUTION 0.63 MG/3ML
0.63 SOLUTION RESPIRATORY (INHALATION) 3 TIMES DAILY
Status: DISCONTINUED | OUTPATIENT
Start: 2019-03-25 | End: 2019-03-27 | Stop reason: HOSPADM

## 2019-03-25 RX ORDER — METHYLPREDNISOLONE SODIUM SUCCINATE 40 MG/ML
40 INJECTION, POWDER, LYOPHILIZED, FOR SOLUTION INTRAMUSCULAR; INTRAVENOUS EVERY 8 HOURS
Status: DISCONTINUED | OUTPATIENT
Start: 2019-03-25 | End: 2019-03-26

## 2019-03-25 RX ADMIN — LEVALBUTEROL HYDROCHLORIDE 0.63 MG: 0.63 SOLUTION RESPIRATORY (INHALATION) at 12:43

## 2019-03-25 RX ADMIN — METHYLPREDNISOLONE SODIUM SUCCINATE 40 MG: 125 INJECTION, POWDER, FOR SOLUTION INTRAMUSCULAR; INTRAVENOUS at 19:59

## 2019-03-25 RX ADMIN — BISACODYL 10 MG: 10 SUPPOSITORY RECTAL at 10:05

## 2019-03-25 RX ADMIN — MONTELUKAST SODIUM 10 MG: 10 TABLET, FILM COATED ORAL at 19:59

## 2019-03-25 RX ADMIN — TAMSULOSIN HYDROCHLORIDE 0.4 MG: 0.4 CAPSULE ORAL at 19:59

## 2019-03-25 RX ADMIN — LEVALBUTEROL HYDROCHLORIDE 0.63 MG: 0.63 SOLUTION RESPIRATORY (INHALATION) at 22:37

## 2019-03-25 RX ADMIN — Medication 1 TABLET: at 09:17

## 2019-03-25 RX ADMIN — TIMOLOL MALEATE 1 DROP: 5 SOLUTION OPHTHALMIC at 09:54

## 2019-03-25 RX ADMIN — Medication 1 SPRAY: at 20:00

## 2019-03-25 RX ADMIN — IPRATROPIUM BROMIDE 0.5 MG: 0.5 SOLUTION RESPIRATORY (INHALATION) at 22:37

## 2019-03-25 RX ADMIN — LEVOFLOXACIN 500 MG: 5 INJECTION, SOLUTION INTRAVENOUS at 18:45

## 2019-03-25 RX ADMIN — METHYLPREDNISOLONE SODIUM SUCCINATE 40 MG: 40 INJECTION, POWDER, LYOPHILIZED, FOR SOLUTION INTRAMUSCULAR; INTRAVENOUS at 11:13

## 2019-03-25 RX ADMIN — DOCUSATE SODIUM 200 MG: 100 CAPSULE, LIQUID FILLED ORAL at 20:00

## 2019-03-25 RX ADMIN — Medication 1 TABLET: at 19:59

## 2019-03-25 RX ADMIN — TIMOLOL MALEATE 1 DROP: 5 SOLUTION OPHTHALMIC at 20:00

## 2019-03-25 RX ADMIN — LEVALBUTEROL HYDROCHLORIDE 0.63 MG: 0.63 SOLUTION RESPIRATORY (INHALATION) at 08:48

## 2019-03-25 RX ADMIN — IPRATROPIUM BROMIDE 0.5 MG: 0.5 SOLUTION RESPIRATORY (INHALATION) at 12:43

## 2019-03-25 RX ADMIN — LATANOPROST 1 DROP: 50 SOLUTION OPHTHALMIC at 09:17

## 2019-03-25 RX ADMIN — METHYLPREDNISOLONE SODIUM SUCCINATE 40 MG: 125 INJECTION, POWDER, FOR SOLUTION INTRAMUSCULAR; INTRAVENOUS at 01:47

## 2019-03-25 RX ADMIN — METHYLPREDNISOLONE SODIUM SUCCINATE 40 MG: 40 INJECTION, POWDER, LYOPHILIZED, FOR SOLUTION INTRAMUSCULAR; INTRAVENOUS at 19:59

## 2019-03-25 ASSESSMENT — PAIN SCALES - GENERAL
PAINLEVEL_OUTOF10: 0

## 2019-03-25 NOTE — PROGRESS NOTES
Subjective: The patient is awake and alert. No problems overnight. Denies chest pain, angina, and dyspnea. Denies abdominal pain. Tolerating diet. No nausea or vomiting. Objective:    BP (!) 150/81   Pulse 70   Temp 97.6 °F (36.4 °C) (Oral)   Resp 16   Ht 6' (1.829 m)   Wt 214 lb 6.4 oz (97.3 kg)   SpO2 96%   BMI 29.08 kg/m²     Heart:  RRR, no murmurs, gallops, or rubs.   Lungs:  CTA bilaterally, no wheeze, rales or rhonchi  Abd: bowel sounds present, nontender, nondistended, no masses  Extrem:  No clubbing, cyanosis, or edema    CBC with Differential:    Lab Results   Component Value Date    WBC 11.4 03/25/2019    RBC 4.69 03/25/2019    HGB 14.0 03/25/2019    HCT 43.2 03/25/2019     03/25/2019    MCV 92.1 03/25/2019    MCH 29.9 03/25/2019    MCHC 32.4 03/25/2019    RDW 14.5 03/25/2019    SEGSPCT 89 03/16/2013    LYMPHOPCT 31.3 03/23/2019    MONOPCT 8.5 03/23/2019    BASOPCT 0.6 03/23/2019    MONOSABS 0.66 03/23/2019    LYMPHSABS 2.44 03/23/2019    EOSABS 0.52 03/23/2019    BASOSABS 0.05 03/23/2019     CMP:    Lab Results   Component Value Date     03/24/2019    K 5.0 03/24/2019     03/24/2019    CO2 22 03/24/2019    BUN 28 03/24/2019    CREATININE 1.2 03/24/2019    GFRAA >60 03/24/2019    LABGLOM 57 03/24/2019    GLUCOSE 166 03/24/2019    GLUCOSE 98 10/14/2010    PROT 6.5 01/03/2015    LABALBU 3.8 01/03/2015    LABALBU 3.6 10/14/2010    CALCIUM 9.0 03/24/2019    BILITOT 0.8 01/03/2015    ALKPHOS 72 01/03/2015    AST 17 01/03/2015    ALT 17 01/03/2015     PT/INR:    Lab Results   Component Value Date    PROTIME 43.0 03/25/2019    PROTIME 20.5 10/14/2010    INR 3.8 03/25/2019     @cktotal:3,ckmb:3,ckmbindex:3,troponini:3@  U/A:    Lab Results   Component Value Date    NITRU Negative 01/03/2015    COLORU Yellow 01/03/2015    PHUR 6.0 01/03/2015    WBCUA NONE 03/15/2013    RBCUA NONE 03/15/2013    BACTERIA NONE 03/15/2013    CLARITYU Clear 01/03/2015    SPECGRAV 1.015 01/03/2015 UROBILINOGEN 0.2 01/03/2015    BILIRUBINUR Negative 01/03/2015    BLOODU Negative 01/03/2015    GLUCOSEU Negative 01/03/2015    KETUA Negative 01/03/2015        Assessment:    Patient Active Problem List   Diagnosis    Atrial fibrillation ([facility] [address]    COPD with exacerbation ([facility] [address]    Chest pain    Asthma    CAP (community acquired pneumonia) due to Chlamydia species       Plan:    Pneumonia-  pulm to see  Respiratory culture  Continue ABT    asthma exacerbation-  Steroids and aerosols      Zenobia Hernandez DO  6:26 AM  3/25/2019

## 2019-03-25 NOTE — PROGRESS NOTES
Pharmacy Consultation Note  (Warfarin Dosing and Monitoring)    Initial consult date: 3/25/19  Consulting physician: Dr. Starlet Osgood    Allergies:  Penicillins    80 y.o. male    Ht Readings from Last 1 Encounters:   03/23/19 6' (1.829 m)     Wt Readings from Last 1 Encounters:   03/25/19 214 lb 6.4 oz (97.3 kg)         Warfarin Indication Target   INR Range Home Dose  (if applicable) Diet/Feeding Tube   (Enteral feeds, nutritional drinks, increased Vitamin K in diet can decrease INR)   Afib 2-3 Warfarin 0.5mg TRS, 1mg SMWF Low sodium diet       x Home Med? Meds Increasing INR x Home Med? Meds Decreasing INR     Allopurinol    Azathioprine     Amiodarone/Propafenone/Dronedarone   Carbamazepine     Androgens   Cholestyramine     Chemotherapy (BBW: Capecitabine)   Estrogen   X  Ciprofloxacin/Levofloxacin   Nafcillin/Dicloxacillin     Clarithromycin/Erythromycin/Azithromycin   Barbiturates      Fluconazole/Itraconazole/Voriconazole/Ketoconazole   Phenytoin (Variable)     Metronidazole   Rifampin     Phenytoin (Variable) X  Steroids (Variable/Dose Dependent)      Statins/Fenofibrate/Gemfibrozil   Sucralfate   X  Steroids (Variable/Dose Dependent)   Other:     Sulfamethoxazole/Trimethoprim        Tramadol         Other:        Comments regarding medication interactions:      x Diseases Affecting INR x Increased Bleeding Risk   X? CHF Exacerbation (Increases)  History GI Bleed/PUD    Liver Disease (Increases)  Chronic NSAID Use    Thyroid: Hyper (Increases)  Hypo (Decreases)  Chronic ASA/Antiplatelet Use (Clopidogrel/ Dipyridamole/Prasugrel/Ticagrelor)      Malignancy (Increases)  Abnormal Renal Function (dialysis, renal transplant, SCr ? 2.3 mg/dL)     History of EtOH Abuse: Acute (Increases)   Chronic (Decreases)  Liver Function (cirrhosis, bilirubin >2x ULN with AST/ALT/AP >3x ULN)    Fever (Increases)  Age > 65 years   X?  Acute infection (Increases)  Hypertension/Uncontrolled BP    Diarrhea/Dehydration (Increases) X History of stroke    Other: __________________  Other:___________________       Vitamin K or Blood product  Administration Date                    TSH:    Lab Results   Component Value Date    TSH 1.310 03/24/2019        Hepatic Function Panel:                            Lab Results   Component Value Date    ALKPHOS 72 01/03/2015    ALT 17 01/03/2015    AST 17 01/03/2015    PROT 6.5 01/03/2015    BILITOT 0.8 01/03/2015    LABALBU 3.8 01/03/2015    LABALBU 3.6 10/14/2010       Date Warfarin Dose INR Heparin or LMWH HGB/HCT PLT Comment   3/23 0.5mg 2.9 -- 16.2/51.8 173    3/24 1mg 3.2 -- 14.7/46 148    3/25 HOLD 3.8 -- 14.0/43.2 145 **pharmacy consulted**                       Assessment and Plan:  · Pt is a 81 yo male with history of 2 prior strokes and Afib. Pt on warfarin PTA (0.5mg TRS, 1mg SMWF).  INR upon admission 2.9 on 3/23  · Patient presents with possible CHF vs COPD vs CAP (can secondarily lead to increase in INR)  · Patient has been initiated on Levaquin which interacts with warfarin and leads to an increased INR  · Goal INR 2-3  · INR 3.8 today; will most likely continue to increase tomorrow 2/2 medications, illness  · HOLD warfarin tonight  · Daily PT/INR until the INR is stable within the therapeutic range  · Pharmacist will follow and monitor/adjust dosing as necessary    Thank you for this consult,    Gabrielle Whitney, [facility] PharmD 3/25/2019 1:30 PM

## 2019-03-25 NOTE — PROGRESS NOTES
bathroom. At end of session, patient seated in chair with call light and phone within reach, all lines and tubes intact. Low Evaluation     Evaluation time includes thorough review of current medical information, gathering information on past medical history/social history and prior level of function, completion of standardized testing/informal observation of tasks and assessment of data.     Radha Tristan OTR/L  [id number]

## 2019-03-25 NOTE — PROGRESS NOTES
P/C to Dr. Ken Medeiros to question coumadin dosing as INR was 3.8. L/M with office nurse. Awaiting response.   Electronically signed by Danial Dunn RN on 3/25/2019 at 1:33 PM

## 2019-03-25 NOTE — PROGRESS NOTES
Dr. Amadou Catalan notified of pt requesting suppository and not having a bowel movement x2 days. Awaiting orders.

## 2019-03-25 NOTE — H&P
not assessed, but he reports  no difficulties. He has 5/5 strength throughout. ASSESSMENT AND PLAN:  1. COPD exacerbation. The patient will continue on O2, aerosols, and  IV Solu-Medrol. Overall, he states he feels much better on _____ O2.  2.  Community acquired pneumonia. Continues on IV Levaquin. The  patient does not have any significant elevation in his white count or  hypoxia. He does admit to some coughing not productive of any _____  sputum. Chest x-ray showed some bilateral patchy infiltrates. 3.  Paroxysmal atrial fibrillation. His INR is 2.9. Will continue on  his usual home dosing of Coumadin. He is presently in normal sinus  rhythm. No CVA or TIA symptoms.         Alena Lopez MD    D: 03/24/2019 13:09:42       T: 03/24/2019 13:58:55     [email or web address]  Job#: [id number]     Doc#: [id number]    CC:  Yennifer Machado DO

## 2019-03-25 NOTE — PROGRESS NOTES
P Quality Flow/Interdisciplinary Rounds Progress Note        Quality Flow Rounds held on March 25, 2019    Disciplines Attending:  Bedside Nurse, ,  and Nursing Unit Leadership    Joni Mackenzie was admitted on 3/23/2019 11:15 AM    Anticipated Discharge Date:  Expected Discharge Date: 03/26/19    Disposition:    Dwayne Score:  Dwayne Scale Score: 20    Readmission Risk              Risk of Unplanned Readmission:        12           Discussed patient goal for the day, patient clinical progression, and barriers to discharge. The following Goal(s) of the Day/Commitment(s) have been identified:  Wean oxygen, PT.OT to see, wean IV steroids, check Pulmonary plan.       Dhara Thacker  March 25, 2019

## 2019-03-25 NOTE — PROGRESS NOTES
Physical Therapy    Facility/Department: [facility]  Initial Assessment    NAME: Adrián Pitts  : 1929  MRN: [id number]    Date of Service: 3/25/2019       REQUIRES PT FOLLOW UP: Yes       Patient Diagnosis(es): The primary encounter diagnosis was Community acquired pneumonia, unspecified laterality. Diagnoses of COPD exacerbation ([facility] [address] and Atrial fibrillation with RVR ([facility] [address] were also pertinent to this visit. has a past medical history of Asthma, COPD (chronic obstructive pulmonary disease) ([facility] [address] and Unspecified cerebral artery occlusion with cerebral infarction. has a past surgical history that includes Appendectomy; hernia repair; joint replacement (12); ECHO Compl W Dop Color Flow (3/16/2013); and Abdomen surgery. Evaluating Therapist: Paulina Sanabria PT        Room #: 450  DIAGNOSIS: CAP   PRECAUTIONS: falls     Social:  Pt lives with wife  in a  1  floor plan  no steps  to enter. Uses basement   Prior to admission pt walked with  [email or web address]      Initial Evaluation  Date:  3/25/19 Treatment      Short Term/ Long Term   Goals   Was pt agreeable to Eval/treatment? yes      Does pt have pain?  none reported      Bed Mobility  Rolling:  Nt   Supine to sit:  NT   Sit to supine:  NT   Scooting:  Independent in sit    independent    Transfers Sit to stand:  Supervision  Stand to sit:  Supervision  Stand pivot:  supervision  Independent    Ambulation     120  feet with cane  with  Supervision   200 feet with  Cane  with  Independent        Stair negotiation: ascended and descended 12  steps with 1 rail with  SBA   12  steps with 1 rail with  Independent    LE ROM  WFL     LE strength  4/5      AM- PAC RAW score   20/ 24            Pt is alert and Oriented x  3      Balance: SBA     Chair alarm: no      ASSESSMENT  Pt displays functional ability as noted in the objective portion of this evaluation.       Comments/Treatment:  Pulse ox on RA 93% at  Rest, 89% with gait, recovered to 91%   RN informed and

## 2019-03-25 NOTE — CONSULTS
[address] [address]                                  CONSULTATION    PATIENT NAME: Jeffry Alvarado                         :        1929  MED REC NO:   [id number]                            ROOM:       3543  ACCOUNT NO:   [de-identified]                           ADMIT DATE: 2019  PROVIDER:     Monae Baez MD    CONSULT DATE:  2019    REASON FOR CONSULTATION:  Pneumonia. IMPRESSION:  1. Mostly right lower lobe pneumonia. The changes on the left base  seem to be fairly chronic in nature, but there is clearly a right lower  lobe infiltrate present. For now,continue the levofloxacin IV, which can  be switched over to p.o. Tomorrow. We need to watch the INR, which is already up  to 3.8 on the Levaquin. Await the respiratory film panel. 2.  COPD acute exacerbation, for which I agree with the Solu-Medrol 40  IV q. 8, Atrovent aerosols, and Xopenex aerosols. He is minimally  hypoxic and can likely come off his oxygen at this point. HISTORY:  This is a 27-year-old white male with known COPD, followed  previously by Dr. Luz Elena Wing, who has been short of breath for the last week  or two. He is short of breath with activity, unrelieved by aerosolized  bronchodilators, but he had no cough or fevers or chills. There was  associated wheezing present. He came into the ED for shortness of  breath and was found to have bilateral lower lobe infiltrates. Again, I  think the left base is old. The new one was clearly present on the  right. He was started on Levaquin, IV steroids, and aerosols and is  starting to feel better. He is known to have COPD. PAST MEDICAL HISTORY:  Otherwise includes emphysema, constipation, AFib,  and stroke. He is blind in the right eye.     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 FAMILY HISTORY:  Father  from stomach cancer. Mother  from  congestive heart failure. SOCIAL HISTORY:  Former 1-pack-a-day smoker, quitting 40 years ago. Most recently worked as a .    MEDICATIONS:  Currently are Dulcolax 10 per rectum daily p.r.n.,  docusate 200 nightly, Flonase one spray in each nostril nightly,  Atrovent aerosols t.i.d., Xalatan eyedrops one drop in both eyes  nightly, Xopenex 0.63 mg t.i.d., Levaquin 500 IV daily, Solu-Medrol 40  IV q. 8, Singulair 10 nightly, Flomax 0.4 daily, Timolol one drop in  each eye b.i.d., and warfarin 0.5 and 1 mg alternating. At home, it  looks like he was on Xopenex inhalers and nebulizer therapy. REVIEW OF SYSTEMS:  REVEALS AN ALLERGY TO PENICILLIN, BUT SHE DID NOT  REPORT. No fevers. He is blind in the right eye. No hearing  difficulties. Had AFib. He is short of breath. He is constipated. No  dysuria. He had itching of his skin. No arthritic complaints  currently. Review of systems is otherwise negative except he is  anticoagulated. PHYSICAL EXAMINATION:  GENERAL:  He is in no acute distress. VITAL SIGNS:  Temp is 36.6, pulse 79, respiratory rate 18, blood  pressure 135/67, 3-liter pulse ox 92%. SKIN:  Had no rashes. HEENT:  Eyes had clear sclerae. Remaining teeth in fair repair. Palate  and gums are normal.  Mucous membranes are moist.  NECK:  Without masses or adenopathy. CHEST:  Symmetric. There was no accessory muscle use. HEART:  Actually fairly regular with a 3/6 systolic murmur heard at the  apex. LUNGS:  Had crackles at the right base with diminished breath sounds at  both bases. No wheezing. ABDOMEN:  Soft and nontender without masses or organomegaly. EXTREMITIES:  Showed no cyanosis or lymphedema. NEUROLOGIC:  He was alert and appropriate. DATA:  White count is 11.4, hemoglobin is 14, platelets of 614,660. INR  is 3.8.   Chemistries:  BUN is 33, creatinine is 1.2, glucose was

## 2019-03-26 LAB
ANION GAP SERPL CALCULATED.3IONS-SCNC: 8 MMOL/L (ref 7–16)
BUN BLDV-MCNC: 41 MG/DL (ref 8–23)
CALCIUM SERPL-MCNC: 9 MG/DL (ref 8.6–10.2)
CHLORIDE BLD-SCNC: 106 MMOL/L (ref 98–107)
CO2: 27 MMOL/L (ref 22–29)
CREAT SERPL-MCNC: 1.1 MG/DL (ref 0.7–1.2)
GFR AFRICAN AMERICAN: >60
GFR NON-AFRICAN AMERICAN: >60 ML/MIN/1.73
GLUCOSE BLD-MCNC: 156 MG/DL (ref 74–99)
HCT VFR BLD CALC: 44.8 % (ref 37–54)
HEMOGLOBIN: 14.3 G/DL (ref 12.5–16.5)
INR BLD: 3.9
MCH RBC QN AUTO: 29.3 PG (ref 26–35)
MCHC RBC AUTO-ENTMCNC: 31.9 % (ref 32–34.5)
MCV RBC AUTO: 91.8 FL (ref 80–99.9)
PDW BLD-RTO: 14.8 FL (ref 11.5–15)
PLATELET # BLD: 162 E9/L (ref 130–450)
PMV BLD AUTO: 10.3 FL (ref 7–12)
POTASSIUM SERPL-SCNC: 4.8 MMOL/L (ref 3.5–5)
PRO-BNP: 369 PG/ML (ref 0–450)
PROCALCITONIN: 0.03 NG/ML (ref 0–0.08)
PROTHROMBIN TIME: 44.5 SEC (ref 9.3–12.4)
RBC # BLD: 4.88 E12/L (ref 3.8–5.8)
SODIUM BLD-SCNC: 141 MMOL/L (ref 132–146)
WBC # BLD: 10.5 E9/L (ref 4.5–11.5)

## 2019-03-26 PROCEDURE — 6360000002 HC RX W HCPCS: Performed by: INTERNAL MEDICINE

## 2019-03-26 PROCEDURE — 6370000000 HC RX 637 (ALT 250 FOR IP): Performed by: INTERNAL MEDICINE

## 2019-03-26 PROCEDURE — 84145 PROCALCITONIN (PCT): CPT

## 2019-03-26 PROCEDURE — 94640 AIRWAY INHALATION TREATMENT: CPT

## 2019-03-26 PROCEDURE — 83880 ASSAY OF NATRIURETIC PEPTIDE: CPT

## 2019-03-26 PROCEDURE — 6360000002 HC RX W HCPCS

## 2019-03-26 PROCEDURE — 80048 BASIC METABOLIC PNL TOTAL CA: CPT

## 2019-03-26 PROCEDURE — 2700000000 HC OXYGEN THERAPY PER DAY

## 2019-03-26 PROCEDURE — 2580000003 HC RX 258

## 2019-03-26 PROCEDURE — 85027 COMPLETE CBC AUTOMATED: CPT

## 2019-03-26 PROCEDURE — 36415 COLL VENOUS BLD VENIPUNCTURE: CPT

## 2019-03-26 PROCEDURE — 2060000000 HC ICU INTERMEDIATE R&B

## 2019-03-26 PROCEDURE — 97530 THERAPEUTIC ACTIVITIES: CPT

## 2019-03-26 PROCEDURE — 85610 PROTHROMBIN TIME: CPT

## 2019-03-26 RX ORDER — METHYLPREDNISOLONE SODIUM SUCCINATE 125 MG/2ML
INJECTION, POWDER, LYOPHILIZED, FOR SOLUTION INTRAMUSCULAR; INTRAVENOUS
Status: COMPLETED
Start: 2019-03-26 | End: 2019-03-26

## 2019-03-26 RX ORDER — LEVOFLOXACIN 500 MG/1
500 TABLET, FILM COATED ORAL DAILY
Status: DISCONTINUED | OUTPATIENT
Start: 2019-03-26 | End: 2019-03-27 | Stop reason: HOSPADM

## 2019-03-26 RX ORDER — METHYLPREDNISOLONE SODIUM SUCCINATE 40 MG/ML
40 INJECTION, POWDER, LYOPHILIZED, FOR SOLUTION INTRAMUSCULAR; INTRAVENOUS EVERY 12 HOURS
Status: COMPLETED | OUTPATIENT
Start: 2019-03-26 | End: 2019-03-27

## 2019-03-26 RX ADMIN — TAMSULOSIN HYDROCHLORIDE 0.4 MG: 0.4 CAPSULE ORAL at 20:20

## 2019-03-26 RX ADMIN — WATER 1 ML: 1 INJECTION INTRAMUSCULAR; INTRAVENOUS; SUBCUTANEOUS at 10:46

## 2019-03-26 RX ADMIN — IPRATROPIUM BROMIDE 0.5 MG: 0.5 SOLUTION RESPIRATORY (INHALATION) at 21:16

## 2019-03-26 RX ADMIN — METHYLPREDNISOLONE SODIUM SUCCINATE 40 MG: 125 INJECTION, POWDER, FOR SOLUTION INTRAMUSCULAR; INTRAVENOUS at 03:24

## 2019-03-26 RX ADMIN — Medication 1 TABLET: at 20:19

## 2019-03-26 RX ADMIN — METHYLPREDNISOLONE SODIUM SUCCINATE 40 MG: 125 INJECTION, POWDER, FOR SOLUTION INTRAMUSCULAR; INTRAVENOUS at 22:15

## 2019-03-26 RX ADMIN — DOCUSATE SODIUM 200 MG: 100 CAPSULE, LIQUID FILLED ORAL at 20:19

## 2019-03-26 RX ADMIN — LEVALBUTEROL HYDROCHLORIDE 0.63 MG: 0.63 SOLUTION RESPIRATORY (INHALATION) at 12:57

## 2019-03-26 RX ADMIN — Medication 1 TABLET: at 08:18

## 2019-03-26 RX ADMIN — BISACODYL 10 MG: 10 SUPPOSITORY RECTAL at 10:53

## 2019-03-26 RX ADMIN — LEVALBUTEROL HYDROCHLORIDE 0.63 MG: 0.63 SOLUTION RESPIRATORY (INHALATION) at 21:16

## 2019-03-26 RX ADMIN — Medication 1 SPRAY: at 22:12

## 2019-03-26 RX ADMIN — TIMOLOL MALEATE 1 DROP: 5 SOLUTION OPHTHALMIC at 08:18

## 2019-03-26 RX ADMIN — TIMOLOL MALEATE 1 DROP: 5 SOLUTION OPHTHALMIC at 20:20

## 2019-03-26 RX ADMIN — METHYLPREDNISOLONE SODIUM SUCCINATE 40 MG: 40 INJECTION, POWDER, LYOPHILIZED, FOR SOLUTION INTRAMUSCULAR; INTRAVENOUS at 10:46

## 2019-03-26 RX ADMIN — LEVOFLOXACIN 500 MG: 500 TABLET, FILM COATED ORAL at 18:31

## 2019-03-26 RX ADMIN — MONTELUKAST SODIUM 10 MG: 10 TABLET, FILM COATED ORAL at 20:19

## 2019-03-26 RX ADMIN — IPRATROPIUM BROMIDE 0.5 MG: 0.5 SOLUTION RESPIRATORY (INHALATION) at 12:57

## 2019-03-26 RX ADMIN — METHYLPREDNISOLONE SODIUM SUCCINATE 40 MG: 40 INJECTION, POWDER, LYOPHILIZED, FOR SOLUTION INTRAMUSCULAR; INTRAVENOUS at 03:24

## 2019-03-26 RX ADMIN — LATANOPROST 1 DROP: 50 SOLUTION OPHTHALMIC at 08:18

## 2019-03-26 ASSESSMENT — PAIN SCALES - GENERAL
PAINLEVEL_OUTOF10: 0
PAINLEVEL_OUTOF10: 0

## 2019-03-26 NOTE — PROGRESS NOTES
Physical Therapy    Facility/Department: [facility] MED SURG  Treatment note    NAME: Alyson Garcia  : 1929  MRN: [id number]    Date of Service: 3/26/2019               Patient Diagnosis(es): The primary encounter diagnosis was Community acquired pneumonia, unspecified laterality. Diagnoses of COPD exacerbation ([facility] [address] and Atrial fibrillation with RVR ([facility] [address] were also pertinent to this visit. has a past medical history of Asthma, COPD (chronic obstructive pulmonary disease) ([facility] [address] and Unspecified cerebral artery occlusion with cerebral infarction. has a past surgical history that includes Appendectomy; hernia repair; joint replacement (12); ECHO Compl W Dop Color Flow (3/16/2013); and Abdomen surgery. Evaluating Therapist: Cruzito Aguiar PT        Room #: 556  DIAGNOSIS: CAP   PRECAUTIONS: falls     Social:  Pt lives with wife  in a  1  floor plan  no steps  to enter. Uses basement   Prior to admission pt walked with  [email or web address]      Initial Evaluation  Date:  3/25/19 Treatment  3/26/19    Short Term/ Long Term   Goals   Was pt agreeable to Eval/treatment? yes  yes    Does pt have pain?  none reported  No complaints    Bed Mobility  Rolling:  Nt   Supine to sit:  NT   Sit to supine:  NT   Scooting:  Independent in sit  Rolling: Independent  Supine to sit:  Independent  Scooting: Independent to EOB    independent    Transfers Sit to stand:  Supervision  Stand to sit:  Supervision  Stand pivot:  supervision Sit to stand: SBA  Stand to sit: SBA  Stand Pivot: NT Independent    Ambulation     120  feet with cane  with  Supervision  350 feet x 1 using standard cane for support SBA/Supervision 200 feet with  Cane  with  Independent        Stair negotiation: ascended and descended 12  steps with 1 rail with  SBA  6 stairs with 1 rail for support SBA, reciprocal pattern used 12  steps with 1 rail with  Independent    LE ROM  WFL     LE strength  4/5      AM- PAC RAW score           Balance: good dynamic using standard cane for support    Additional Comments: Nurse ok with rx. Pt states unhappy wearing 4 L 02 NC today, states did not wear 02 yesterday, agreed to continue wearing 02 for Rx. after encouragement. Zaira consistent, noted good 2 point gait pattern using cane L UE, no loss of balance or shortness of breath noted. 02 saturation 95% after activity. Pt was left in a bedside chair with call light in reach    Treatment time: 20 minutes  Time out: 1007    Pt is making good progress toward established Physical Therapy goals as per increased functional mobility performed. Continue with physical therapy current plan of care.     Chan Romero PTA   License Number: [id number]

## 2019-03-26 NOTE — PLAN OF CARE
Problem: Falls - Risk of:  Goal: Will remain free from falls  Description  Will remain free from falls  3/26/2019 0936 by Alvin Izaguirre RN  Outcome: Ongoing  3/26/2019 0044 by Freda Davis RN  Outcome: Ongoing

## 2019-03-26 NOTE — PROGRESS NOTES
Pharmacy Consultation Note  (Warfarin Dosing and Monitoring)    Initial consult date: 3/25/19  Consulting physician: Dr. Candie Self    Allergies:  Penicillins    80 y.o. male    Ht Readings from Last 1 Encounters:   03/23/19 6' (1.829 m)     Wt Readings from Last 1 Encounters:   03/26/19 211 lb 6.4 oz (95.9 kg)         Warfarin Indication Target   INR [place] Dose  (if applicable) Diet/Feeding Tube   (Enteral feeds, nutritional drinks, increased Vitamin K in diet can decrease INR)   Afib 2-3 Warfarin 0.5mg TRS, 1mg SMWF Low sodium diet       x Home Med? Meds Increasing INR x Home Med? Meds Decreasing INR     Allopurinol    Azathioprine     Amiodarone/Propafenone/Dronedarone   Carbamazepine     Androgens   Cholestyramine     Chemotherapy (BBW: Capecitabine)   Estrogen   X  Ciprofloxacin/Levofloxacin   Nafcillin/Dicloxacillin     Clarithromycin/Erythromycin/Azithromycin   Barbiturates      Fluconazole/Itraconazole/Voriconazole/Ketoconazole   Phenytoin (Variable)     Metronidazole   Rifampin     Phenytoin (Variable) X  Steroids (Variable/Dose Dependent)      Statins/Fenofibrate/Gemfibrozil   Sucralfate   X  Steroids (Variable/Dose Dependent)   Other:     Sulfamethoxazole/Trimethoprim        Tramadol         Other:        Comments regarding medication interactions:      x Diseases Affecting INR x Increased Bleeding Risk   X? CHF Exacerbation (Increases)  History GI Bleed/PUD    Liver Disease (Increases)  Chronic NSAID Use    Thyroid: Hyper (Increases)  Hypo (Decreases)  Chronic ASA/Antiplatelet Use (Clopidogrel/ Dipyridamole/Prasugrel/Ticagrelor)      Malignancy (Increases)  Abnormal Renal Function (dialysis, renal transplant, SCr ? 2.3 mg/dL)     History of EtOH Abuse: Acute (Increases)   Chronic (Decreases)  Liver Function (cirrhosis, bilirubin >2x ULN with AST/ALT/AP >3x ULN)    Fever (Increases)  Age > 65 years   X?  Acute infection (Increases)  Hypertension/Uncontrolled BP    Diarrhea/Dehydration (Increases) X History of stroke    Other: __________________  Other:___________________       Vitamin K or Blood product  Administration Date                    TSH:    Lab Results   Component Value Date    TSH 1.310 03/24/2019        Hepatic Function Panel:                            Lab Results   Component Value Date    ALKPHOS 72 01/03/2015    ALT 17 01/03/2015    AST 17 01/03/2015    PROT 6.5 01/03/2015    BILITOT 0.8 01/03/2015    LABALBU 3.8 01/03/2015    LABALBU 3.6 10/14/2010       Date Warfarin Dose INR Heparin or LMWH HGB/HCT PLT Comment   3/23 0.5mg 2.9 -- 16.2/51.8 173    3/24 1mg 3.2 -- 14.7/46 148    3/25 HOLD 3.8 -- 14.0/43.2 145 **pharmacy consulted**   3/26 HOLD 3.9 -- 14.3/44.8 162               Assessment and Plan:  · Pt is a 81 yo male with history of 2 prior strokes and Afib. Pt on warfarin PTA (0.5mg TRS, 1mg SMWF).  INR upon admission 2.9 on 3/23  · Patient presents with possible CHF vs COPD vs CAP (can secondarily lead to increase in INR)  · Patient has been initiated on Levaquin which interacts with warfarin and leads to an increased INR  · Goal INR 2-3  · INR 3.9 today; will most likely continue to increase tomorrow 2/2 medications, illness  · HOLD warfarin tonight  · Daily PT/INR until the INR is stable within the therapeutic range  · Pharmacist will follow and monitor/adjust dosing as necessary    Thank you for this consult,    FLETCHER Varghese [facility] PharmD 3/26/2019 10:54 AM

## 2019-03-26 NOTE — PROGRESS NOTES
Wheezy earlier better now. Breathing okay. Minimal cough.      Additional Respiratory  Assessments  Pulse: 66  Resp: 20  SpO2: 94 %  Oral Care: Teeth brushed      Current Facility-Administered Medications:     levofloxacin (LEVAQUIN) tablet 500 mg, 500 mg, Oral, Daily, Reddy Canada DO    mometasone-formoterol (DULERA) 200-5 MCG/ACT inhaler 2 puff, 2 puff, Inhalation, BID, Governor Flaquita Canada DO    bisacodyl (DULCOLAX) suppository 10 mg, 10 mg, Rectal, Daily PRN, Red Moses DO, 10 mg at 03/26/19 1053    levalbuterol (XOPENEX) nebulization 0.63 mg, 0.63 mg, Nebulization, TID, Red Moses DO, 0.63 mg at 03/25/19 2237    ipratropium (ATROVENT) 0.02 % nebulizer solution 0.5 mg, 0.5 mg, Nebulization, TID, Red Moses DO, 0.5 mg at 03/25/19 2237    methylPREDNISolone sodium (SOLU-MEDROL) injection 40 mg, 40 mg, Intravenous, Q8H, Maynor Hill MD, 40 mg at 03/26/19 1046    warfarin (COUMADIN) daily dosing (placeholder), , Other, RX Placeholder, Red Moses DO    fluticasone (FLONASE) 50 MCG/ACT nasal spray 1 spray, 1 spray, Each Nare, Nightly, Red Moses DO, 1 spray at 03/25/19 2000    diltiazem injection 10 mg, 10 mg, Intravenous, Once, [facility]    docusate sodium (COLACE) capsule 200 mg, 200 mg, Oral, Nightly, Maynor Hill MD, 200 mg at 03/25/19 2000    montelukast (SINGULAIR) tablet 10 mg, 10 mg, Oral, Nightly, Maynor Hill MD, 10 mg at 03/25/19 1959    antioxidant multivitamin (OCUVITE) tablet, 1 tablet, Oral, BID, Maynor Hill MD, 1 tablet at 03/26/19 0818    tamsulosin (FLOMAX) capsule 0.4 mg, 0.4 mg, Oral, Daily, Maynor Hill MD, 0.4 mg at 03/25/19 1959    latanoprost (XALATAN) 0.005 % ophthalmic solution 1 drop, 1 drop, Both Eyes, Daily, Maynor Hill MD, 1 drop at 03/26/19 0818    timolol (TIMOPTIC) 0.5 % ophthalmic solution 1 drop, 1 drop, Both Eyes, BID, Maynor Hill MD, 1 drop at 03/26/19 0818  BP (!) 128/54   Pulse 66   Temp 97.8 °F (36.6 °C) (Oral)   Resp 20   Ht 6' (1.829 m) Wt 211 lb 6.4 oz (95.9 kg)   SpO2 94%   BMI 28.67 kg/m²     General: No distress  Chest: Symmetric, no accessory use  Heart: Normal S1S2  Lungs: Rales right base, scattered wheezes-mild  Abdomen: Soft, nt  Extremities: No edema    Intake/Output Summary (Last 24 hours) at 3/26/2019 1238  Last data filed at 3/26/2019 1100  Gross per 24 hour   Intake 240 ml   Output 850 ml   Net -610 ml     CBC with Differential:    Lab Results   Component Value Date    WBC 10.5 03/26/2019    RBC 4.88 03/26/2019    HGB 14.3 03/26/2019    HCT 44.8 03/26/2019     03/26/2019    MCV 91.8 03/26/2019    MCH 29.3 03/26/2019    MCHC 31.9 03/26/2019    RDW 14.8 03/26/2019    SEGSPCT 89 03/16/2013    LYMPHOPCT 31.3 03/23/2019    MONOPCT 8.5 03/23/2019    BASOPCT 0.6 03/23/2019    MONOSABS 0.66 03/23/2019    LYMPHSABS 2.44 03/23/2019    EOSABS 0.52 03/23/2019    BASOSABS 0.05 03/23/2019     BMP:    Lab Results   Component Value Date     03/26/2019    K 4.8 03/26/2019     03/26/2019    CO2 27 03/26/2019    BUN 41 03/26/2019    LABALBU 3.8 01/03/2015    LABALBU 3.6 10/14/2010    CREATININE 1.1 03/26/2019    CALCIUM 9.0 03/26/2019    GFRAA >60 03/26/2019    LABGLOM >60 03/26/2019    GLUCOSE 156 03/26/2019    GLUCOSE 98 10/14/2010   procalcitonin 0.03    IMPRESSION:   Patient Active Problem List   Diagnosis    Atrial fibrillation ([address]    COPD with exacerbation (HCC)    Chest pain    Asthma    CAP (community acquired pneumonia) due to Chlamydia species   Improving with just mild wheezing now, will drop steroids to q12. Not convinced of a bacterial pneumonia on Levaquin day 4 will give 5 days then stop. Mild acute hypoxic respiratory failure on low flow O2. Hopefully home tomorrow.     Jamas Neat  3/26/2019  12:38 PM

## 2019-03-26 NOTE — PROGRESS NOTES
Subjective: The patient is awake and alert. No problems overnight. Denies chest pain, angina, and dyspnea. Denies abdominal pain. Tolerating diet. No nausea or vomiting. Objective:    BP (!) 144/73   Pulse 74   Temp 98.1 °F (36.7 °C) (Oral)   Resp 16   Ht 6' (1.829 m)   Wt 211 lb 6.4 oz (95.9 kg)   SpO2 (!) 86%   BMI 28.67 kg/m²     Heart:  RRR, no murmurs, gallops, or rubs.   Lungs:  CTA bilaterally, no wheeze, rales or rhonchi  Abd: bowel sounds present, nontender, nondistended, no masses  Extrem:  No clubbing, cyanosis, or edema    CBC with Differential:    Lab Results   Component Value Date    WBC 10.5 03/26/2019    RBC 4.88 03/26/2019    HGB 14.3 03/26/2019    HCT 44.8 03/26/2019     03/26/2019    MCV 91.8 03/26/2019    MCH 29.3 03/26/2019    MCHC 31.9 03/26/2019    RDW 14.8 03/26/2019    SEGSPCT 89 03/16/2013    LYMPHOPCT 31.3 03/23/2019    MONOPCT 8.5 03/23/2019    BASOPCT 0.6 03/23/2019    MONOSABS 0.66 03/23/2019    LYMPHSABS 2.44 03/23/2019    EOSABS 0.52 03/23/2019    BASOSABS 0.05 03/23/2019     CMP:    Lab Results   Component Value Date     03/26/2019    K 4.8 03/26/2019     03/26/2019    CO2 27 03/26/2019    BUN 41 03/26/2019    CREATININE 1.1 03/26/2019    GFRAA >60 03/26/2019    LABGLOM >60 03/26/2019    GLUCOSE 156 03/26/2019    GLUCOSE 98 10/14/2010    PROT 6.5 01/03/2015    LABALBU 3.8 01/03/2015    LABALBU 3.6 10/14/2010    CALCIUM 9.0 03/26/2019    BILITOT 0.8 01/03/2015    ALKPHOS 72 01/03/2015    AST 17 01/03/2015    ALT 17 01/03/2015     PT/INR:    Lab Results   Component Value Date    PROTIME 44.5 03/26/2019    PROTIME 20.5 10/14/2010    INR 3.9 03/26/2019     @cktotal:3,ckmb:3,ckmbindex:3,troponini:3@  U/A:    Lab Results   Component Value Date    NITRU Negative 01/03/2015    COLORU Yellow 01/03/2015    PHUR 6.0 01/03/2015    WBCUA NONE 03/15/2013    RBCUA NONE 03/15/2013    BACTERIA NONE 03/15/2013    CLARITYU Clear 01/03/2015    SPECGRAV 1.015 01/03/2015    UROBILINOGEN 0.2 01/03/2015    BILIRUBINUR Negative 01/03/2015    BLOODU Negative 01/03/2015    GLUCOSEU Negative 01/03/2015    KETUA Negative 01/03/2015        Assessment:    Patient Active Problem List   Diagnosis    Atrial fibrillation ([facility] [address]    COPD with exacerbation ([facility] [address] [facility]    Chest pain    Asthma    CAP (community acquired pneumonia) due to Chlamydia species       Plan:    Acute exacerbation of COPD/Asthma-  Wheezing today  Hold discharge  Ambulate patient    CAP-  Switch to po levaquin    Atrial fib-  Rate controlled  Check bnp this am with significant wheeze  INR per pharmacy    Acute diastolic CHF-  Repeat bnp may need more [phone] [facility]  6:41 AM  3/26/2019

## 2019-03-26 NOTE — PROGRESS NOTES
[facility] Quality Flow/Interdisciplinary Rounds Progress Note        Quality Flow Rounds held on March 26, 2019    Disciplines Attending:  Bedside Nurse, ,  and Nursing Unit Leadership    Batsheva Tavares was admitted on 3/23/2019 11:15 AM    Anticipated Discharge Date:  Expected Discharge Date: 03/26/19    Disposition:    Dwayne Score:  Dwayne Scale Score: 19    Readmission Risk              Risk of Unplanned Readmission:        12           Discussed patient goal for the day, patient clinical progression, and barriers to discharge. The following Goal(s) of the Day/Commitment(s) have been identified:  Wean IV steroids, transition to PO antibiotics.       [place] [facility]  March 26, 2019

## 2019-03-26 NOTE — FLOWSHEET NOTE
Patient able to make frequent,adequate and  purposeful movements in bed as well as ambulate in room with wheeled walker.  Educated on skin precautions

## 2019-03-27 VITALS
OXYGEN SATURATION: 92 % | WEIGHT: 212.3 LBS | TEMPERATURE: 97.6 F | HEIGHT: 72 IN | RESPIRATION RATE: 18 BRPM | HEART RATE: 69 BPM | SYSTOLIC BLOOD PRESSURE: 154 MMHG | DIASTOLIC BLOOD PRESSURE: 78 MMHG | BODY MASS INDEX: 28.76 KG/M2

## 2019-03-27 LAB
ANION GAP SERPL CALCULATED.3IONS-SCNC: 6 MMOL/L (ref 7–16)
BUN BLDV-MCNC: 37 MG/DL (ref 8–23)
CALCIUM SERPL-MCNC: 8.8 MG/DL (ref 8.6–10.2)
CHLORIDE BLD-SCNC: 105 MMOL/L (ref 98–107)
CO2: 26 MMOL/L (ref 22–29)
CREAT SERPL-MCNC: 1.1 MG/DL (ref 0.7–1.2)
GFR AFRICAN AMERICAN: >60
GFR NON-AFRICAN AMERICAN: >60 ML/MIN/1.73
GLUCOSE BLD-MCNC: 157 MG/DL (ref 74–99)
HCT VFR BLD CALC: 46.4 % (ref 37–54)
HEMOGLOBIN: 14.5 G/DL (ref 12.5–16.5)
INR BLD: 3.5
MCH RBC QN AUTO: 28.9 PG (ref 26–35)
MCHC RBC AUTO-ENTMCNC: 31.3 % (ref 32–34.5)
MCV RBC AUTO: 92.6 FL (ref 80–99.9)
PDW BLD-RTO: 14.8 FL (ref 11.5–15)
PLATELET # BLD: 148 E9/L (ref 130–450)
PMV BLD AUTO: 10 FL (ref 7–12)
POTASSIUM SERPL-SCNC: 5.1 MMOL/L (ref 3.5–5)
PROTHROMBIN TIME: 39.5 SEC (ref 9.3–12.4)
RBC # BLD: 5.01 E12/L (ref 3.8–5.8)
SODIUM BLD-SCNC: 137 MMOL/L (ref 132–146)
WBC # BLD: 9.3 E9/L (ref 4.5–11.5)

## 2019-03-27 PROCEDURE — 6360000002 HC RX W HCPCS: Performed by: INTERNAL MEDICINE

## 2019-03-27 PROCEDURE — 94640 AIRWAY INHALATION TREATMENT: CPT

## 2019-03-27 PROCEDURE — 36415 COLL VENOUS BLD VENIPUNCTURE: CPT

## 2019-03-27 PROCEDURE — 80048 BASIC METABOLIC PNL TOTAL CA: CPT

## 2019-03-27 PROCEDURE — 85027 COMPLETE CBC AUTOMATED: CPT

## 2019-03-27 PROCEDURE — 6370000000 HC RX 637 (ALT 250 FOR IP): Performed by: INTERNAL MEDICINE

## 2019-03-27 PROCEDURE — 6360000002 HC RX W HCPCS: Performed by: CLINICAL NURSE SPECIALIST

## 2019-03-27 PROCEDURE — 85610 PROTHROMBIN TIME: CPT

## 2019-03-27 RX ORDER — PREDNISONE 20 MG/1
TABLET ORAL
Qty: 11 TABLET | Refills: 0 | Status: SHIPPED | OUTPATIENT
Start: 2019-03-27 | End: 2019-04-02

## 2019-03-27 RX ORDER — PREDNISONE 10 MG/1
10 TABLET ORAL DAILY
Qty: 10 TABLET | Refills: 0 | Status: SHIPPED | OUTPATIENT
Start: 2019-03-27 | End: 2019-03-27 | Stop reason: HOSPADM

## 2019-03-27 RX ORDER — PREDNISONE 20 MG/1
TABLET ORAL
Qty: 15 TABLET | Refills: 0 | Status: SHIPPED | OUTPATIENT
Start: 2019-03-27 | End: 2019-03-27 | Stop reason: SDUPTHER

## 2019-03-27 RX ORDER — PREDNISONE 20 MG/1
TABLET ORAL
Qty: 10 TABLET | Refills: 0 | Status: SHIPPED | OUTPATIENT
Start: 2019-04-02 | End: 2019-04-08

## 2019-03-27 RX ADMIN — IPRATROPIUM BROMIDE 0.5 MG: 0.5 SOLUTION RESPIRATORY (INHALATION) at 10:13

## 2019-03-27 RX ADMIN — LATANOPROST 1 DROP: 50 SOLUTION OPHTHALMIC at 07:56

## 2019-03-27 RX ADMIN — LEVALBUTEROL HYDROCHLORIDE 0.63 MG: 0.63 SOLUTION RESPIRATORY (INHALATION) at 10:12

## 2019-03-27 RX ADMIN — Medication 1 TABLET: at 08:55

## 2019-03-27 RX ADMIN — METHYLPREDNISOLONE SODIUM SUCCINATE 40 MG: 125 INJECTION, POWDER, FOR SOLUTION INTRAMUSCULAR; INTRAVENOUS at 10:58

## 2019-03-27 RX ADMIN — LEVOFLOXACIN 500 MG: 500 TABLET, FILM COATED ORAL at 12:56

## 2019-03-27 RX ADMIN — TIMOLOL MALEATE 1 DROP: 5 SOLUTION OPHTHALMIC at 07:56

## 2019-03-27 ASSESSMENT — PAIN SCALES - GENERAL: PAINLEVEL_OUTOF10: 0

## 2019-03-27 NOTE — PLAN OF CARE
Problem: Respiratory  Goal: No pulmonary complications  Outcome: Met This Shift  Goal: O2 Sat > 90%  Outcome: Met This Shift     Problem: Airway Clearance - Ineffective:  Goal: Clear lung sounds  Description  Clear lung sounds  Outcome: Met This Shift     Problem: Fluid Volume - Deficit:  Goal: Achieves intake and output within specified parameters  Description  Achieves intake and output within specified parameters  Outcome: Met This Shift     Problem: Breathing Pattern - Ineffective:  Goal: Ability to achieve and maintain a regular respiratory rate will improve  Description  Ability to achieve and maintain a regular respiratory rate will improve  Outcome: Met This Shift

## 2019-03-27 NOTE — CARE COORDINATION
Social work / Discharge Planning:       Per [facility], patient has been accepted and they will see following discharge.   Electronically signed by SASHA Mayfield on 3/27/2019 at 1:35 PM

## 2019-03-27 NOTE — PROGRESS NOTES
Pulmonary Progress Note    Admit Date: 3/23/2019                            PCP: Stanley Gallegos DO  Active Problems:    CAP (community acquired pneumonia) due to Chlamydia species  Resolved Problems:    * No resolved hospital problems. *      Subjective:  Resting in bed on room air. For dc today. Denies shortness of breath states breathing is Improved    Medications:       levofloxacin  500 mg Oral Daily    mometasone-formoterol  2 puff Inhalation BID    methylPREDNISolone  40 mg Intravenous Q12H    levalbuterol  0.63 mg Nebulization TID    ipratropium  0.5 mg Nebulization TID    warfarin (COUMADIN) daily dosing (placeholder)   Other RX Placeholder    fluticasone  1 spray Each Nare Nightly    diltiazem  10 mg Intravenous Once    docusate sodium  200 mg Oral Nightly    montelukast  10 mg Oral Nightly    ocuvite-lutein  1 tablet Oral BID    tamsulosin  0.4 mg Oral Daily    latanoprost  1 drop Both Eyes Daily    timolol  1 drop Both Eyes BID       Vitals:  VITALS:  BP (!) 154/78   Pulse 69   Temp 97.6 °F (36.4 °C) (Oral)   Resp 18   Ht 6' (1.829 m)   Wt 212 lb 4.8 oz (96.3 kg)   SpO2 92%   BMI 28.79 kg/m²   24HR INTAKE/OUTPUT:      Intake/Output Summary (Last 24 hours) at 3/27/2019 1027  Last data filed at 3/27/2019 0458  Gross per 24 hour   Intake 180 ml   Output 1175 ml   Net -995 ml     CURRENT PULSE OXIMETRY:  SpO2: 92 %  24HR PULSE OXIMETRY RANGE:  SpO2  Av.6 %  Min: 92 %  Max: 97 %  CVP:    VENT SETTINGS:      Additional Respiratory  Assessments  Pulse: 69  Resp: 18  SpO2: 92 %  Oral Care: Teeth brushed      EXAM:  General: No distress. Alert. ENT: No discharge. Pharynx clear. Neck: Supple, Trachea midline. Resp: No accessory muscle use. lungs diminished with few scattered wheezes heard   CV: Regular rate. Regular rhythm. No mumur  No edema. ABD: Non-tender. Non-distended. Soft round. Normal bowel sounds. Skin: Warm and dry. .  M/S: No cyanosis. No joint deformity.  No clubbing. Neuro: Awake. Follows commands. O x 3    I/O: I/O last 3 completed shifts: In: 180 [P.O.:180]  Out: 1175 [Urine:1175]  No intake/output data recorded. Results:  CBC:   Recent Labs     19   WBC 11.4 10.5 9.3   HGB 14.0 14.3 14.5   HCT 43.2 44.8 46.4   MCV 92.1 91.8 92.6    162 148     BMP:   Recent Labs     19    141 137   K 4.9 4.8 5.1*    106 105   CO2 25    BUN 33* 41* 37*   CREATININE 1.2 1.1 1.1     LFT: No results for input(s): ALKPHOS, ALT, AST, PROT, BILITOT, BILIDIR, LABALBU in the last 72 hours. PT/INR:   Recent Labs     19   PROTIME 43.0* 44.5* 39.5*   INR 3.8 3.9 3.5     Cultures:  Respiratory panel-Negative    ABG:   No results for input(s): PH, PO2, PCO2, HCO3, BE, O2SAT in the last 72 hours. Films:  Xr Chest Portable     Result Date: 3/23/2019  Patient MRN: [id number] : 1929 Age:  80 years Gender: Male Order Date: 3/23/2019 11:30 AM Exam: XR CHEST PORTABLE Number of Images: 1 view Indication:   cp cp Comparison: 17 Findings: The heart is unremarkable Lung fields demonstrate patchy bilateral infiltrates which could be related to pneumonia. The aorta is tortuous and ectatic     Tortuous aorta There are patchy infiltrates seen throughout both the lung fields. Pneumonia could give this appearance. The chest appears to be worse in the interval       Assessment:  · CAP-RLL  · AE COPD      Plan:  · Will need to continue oral Levaquin  · IV steroid this am then Oral taper starting tomorrow, 40mg x 3 days, 30,g x 3 days, 20mg x 3 days, 10mg x 3 days  · Can use nebulizer at home every 4-6 hours as needed  · F/U at [facility] in 1-2 weeks with Dr. Jagruti Gallagher        Electronically signed by PABLO Silvestre on 3/27/2019 at 10:27 AM    Seen and examined, agree with above.  Pneumonia and copd exacerbation are better and he is okay to discharge on prednisone taper, Dulera and albuterol as needed. FU with Dr. Ramos Laguerre.

## 2019-03-27 NOTE — PROGRESS NOTES
Subjective: The patient is awake and alert. No problems overnight. Denies chest pain, angina, and dyspnea. Denies abdominal pain. Tolerating diet. No nausea or vomiting. Objective:    BP (!) 146/72   Pulse 78   Temp 97.6 °F (36.4 °C)   Resp 16   Ht 6' (1.829 m)   Wt 212 lb 4.8 oz (96.3 kg)   SpO2 92%   BMI 28.79 kg/m²     Heart:  RRR, no murmurs, gallops, or rubs.   Lungs:  CTA bilaterally, no wheeze, rales or rhonchi  Abd: bowel sounds present, nontender, nondistended, no masses  Extrem:  No clubbing, cyanosis, or edema    CBC with Differential:    Lab Results   Component Value Date    WBC 9.3 03/27/2019    RBC 5.01 03/27/2019    HGB 14.5 03/27/2019    HCT 46.4 03/27/2019     03/27/2019    MCV 92.6 03/27/2019    MCH 28.9 03/27/2019    MCHC 31.3 03/27/2019    RDW 14.8 03/27/2019    SEGSPCT 89 03/16/2013    LYMPHOPCT 31.3 03/23/2019    MONOPCT 8.5 03/23/2019    BASOPCT 0.6 03/23/2019    MONOSABS 0.66 03/23/2019    LYMPHSABS 2.44 03/23/2019    EOSABS 0.52 03/23/2019    BASOSABS 0.05 03/23/2019     CMP:    Lab Results   Component Value Date     03/27/2019    K 5.1 03/27/2019     03/27/2019    CO2 26 03/27/2019    BUN 37 03/27/2019    CREATININE 1.1 03/27/2019    GFRAA >60 03/27/2019    LABGLOM >60 03/27/2019    GLUCOSE 157 03/27/2019    GLUCOSE 98 10/14/2010    PROT 6.5 01/03/2015    LABALBU 3.8 01/03/2015    LABALBU 3.6 10/14/2010    CALCIUM 8.8 03/27/2019    BILITOT 0.8 01/03/2015    ALKPHOS 72 01/03/2015    AST 17 01/03/2015    ALT 17 01/03/2015     PT/INR:    Lab Results   Component Value Date    PROTIME 39.5 03/27/2019    PROTIME 20.5 10/14/2010    INR 3.5 03/27/2019     @cktotal:3,ckmb:3,ckmbindex:3,troponini:3@  U/A:    Lab Results   Component Value Date    NITRU Negative 01/03/2015    COLORU Yellow 01/03/2015    PHUR 6.0 01/03/2015    WBCUA NONE 03/15/2013    RBCUA NONE 03/15/2013    BACTERIA NONE 03/15/2013    CLARITYU Clear 01/03/2015    SPECGRAV 1.015 01/03/2015 UROBILINOGEN 0.2 01/03/2015    BILIRUBINUR Negative 01/03/2015    BLOODU Negative 01/03/2015    GLUCOSEU Negative 01/03/2015    KETUA Negative 01/03/2015        Assessment:    Patient Active Problem List   Diagnosis    Atrial fibrillation ([facility] [address]    COPD with exacerbation ([facility] [address]    Chest pain    Asthma    CAP (community acquired pneumonia) due to Chlamydia species       Plan:    CAP with asthma exacerbation and acute respiratory failure(hypoxic)-    Home today on steroids  Last dose of ABT here  Will need to follow INR      Renee Arias DO  6:33 AM  3/27/2019

## 2019-03-27 NOTE — PLAN OF CARE
Problem: Respiratory  Goal: No pulmonary complications  0/99/6454 0932 by Johanna Garcia RN  Outcome: Met This Shift  3/27/2019 0023 by Melo Dozier RN  Outcome: Met This Shift  Goal: O2 Sat > 90%  3/27/2019 0932 by Johanna Garcia RN  Outcome: Met This Shift  3/27/2019 0023 by Melo Dozier RN  Outcome: Met This Shift     Problem: Airway Clearance - Ineffective:  Goal: Clear lung sounds  Description  Clear lung sounds  3/27/2019 0023 by Melo Dozier RN  Outcome: Met This Shift     Problem: Fluid Volume - Deficit:  Goal: Achieves intake and output within specified parameters  Description  Achieves intake and output within specified parameters  3/27/2019 0023 by Melo Dozier RN  Outcome: Met This Shift     Problem: Breathing Pattern - Ineffective:  Goal: Ability to achieve and maintain a regular respiratory rate will improve  Description  Ability to achieve and maintain a regular respiratory rate will improve  3/27/2019 0023 by Melo Dozier RN  Outcome: Met This Shift     Problem: Falls - Risk of:  Goal: Will remain free from falls  Description  Will remain free from falls  Outcome: Met This Shift  Goal: Absence of physical injury  Description  Absence of physical injury  Outcome: Met This Shift

## 2019-03-27 NOTE — PROGRESS NOTES
Pharmacy Consultation Note  (Warfarin Dosing and Monitoring)    Initial consult date: 3/25/19  Consulting physician: Dr. Ken Medeiros    Allergies:  Penicillins    80 y.o. male    Ht Readings from Last 1 Encounters:   03/23/19 6' (1.829 m)     Wt Readings from Last 1 Encounters:   03/27/19 212 lb 4.8 oz (96.3 kg)         Warfarin Indication Target   INR Range Home Dose  (if applicable) Diet/Feeding Tube   (Enteral feeds, nutritional drinks, increased Vitamin K in diet can decrease INR)   Afib 2-3 Warfarin 0.5mg TRS, 1mg SMWF Low sodium diet       x Home Med? Meds Increasing INR x Home Med? Meds Decreasing INR     Allopurinol    Azathioprine     Amiodarone/Propafenone/Dronedarone   Carbamazepine     Androgens   Cholestyramine     Chemotherapy (BBW: Capecitabine)   Estrogen   X  Ciprofloxacin/Levofloxacin   Nafcillin/Dicloxacillin     Clarithromycin/Erythromycin/Azithromycin   Barbiturates      Fluconazole/Itraconazole/Voriconazole/Ketoconazole   Phenytoin (Variable)     Metronidazole   Rifampin     Phenytoin (Variable) X  Steroids (Variable/Dose Dependent)      Statins/Fenofibrate/Gemfibrozil   Sucralfate   X  Steroids (Variable/Dose Dependent)   Other:     Sulfamethoxazole/Trimethoprim        Tramadol         Other:        Comments regarding medication interactions:      x Diseases Affecting INR x Increased Bleeding Risk   X? CHF Exacerbation (Increases)  History GI Bleed/PUD    Liver Disease (Increases)  Chronic NSAID Use    Thyroid: Hyper (Increases)  Hypo (Decreases)  Chronic ASA/Antiplatelet Use (Clopidogrel/ Dipyridamole/Prasugrel/Ticagrelor)      Malignancy (Increases)  Abnormal Renal Function (dialysis, renal transplant, SCr ? 2.3 mg/dL)     History of EtOH Abuse: Acute (Increases)   Chronic (Decreases)  Liver Function (cirrhosis, bilirubin >2x ULN with AST/ALT/AP >3x ULN)    Fever (Increases)  Age > 65 years   X?  Acute infection (Increases)  Hypertension/Uncontrolled BP    Diarrhea/Dehydration (Increases) X History of stroke    Other: __________________  Other:___________________       Vitamin K or Blood product  Administration Date                    TSH:    Lab Results   Component Value Date    TSH 1.310 03/24/2019        Hepatic Function Panel:                            Lab Results   Component Value Date    ALKPHOS 72 01/03/2015    ALT 17 01/03/2015    AST 17 01/03/2015    PROT 6.5 01/03/2015    BILITOT 0.8 01/03/2015    LABALBU 3.8 01/03/2015    LABALBU 3.6 10/14/2010       Date Warfarin Dose INR Heparin or LMWH HGB/HCT PLT Comment   3/23 0.5mg 2.9 -- 16.2/51.8 173    3/24 1mg 3.2 -- 14.7/46 148    3/25 HOLD 3.8 -- 14.0/43.2 145 **pharmacy consulted**   3/26 HOLD 3.9 -- 14.3/44.8 162    3/27 HOLD 3.5 -- 14.5/46.4 148      Assessment and Plan:  · Pt is a 79 yo male with history of 2 prior strokes and Afib. Pt on warfarin PTA (0.5mg TRS, 1mg SMWF).  INR upon admission 2.9 on 3/23  · Patient presents with possible CHF vs COPD vs CAP (can secondarily lead to increase in INR)  · Patient has been initiated on Levaquin which interacts with warfarin and leads to an increased INR  · Goal INR 2-3  · INR 3.5 today, INR starting to come down  · HOLD warfarin tonight, would recommend re-initiating warfarin therapy tomorrow  · Daily PT/INR until the INR is stable within the therapeutic range  · Pharmacist will follow and monitor/adjust dosing as necessary    Thank you for this consult,    Calbe Cat, [facility] PharmD 3/27/2019 12:36 PM

## 2019-03-27 NOTE — CARE COORDINATION
Social work / Discharge planning:       Social work met with patient and his wife regarding discharge plan. The option of home care was discussed. They are agreeable to use home care but cannot recall which agency they used in the past and prefer to use the agency that Dr. Sharda Hyde recommends. RN contacted Dr. Sharda Hyde and he requested [facility]. Referral called to liaison.    Electronically signed by SASHA Narayan on 3/27/2019 at 11:25 AM

## 2019-03-27 NOTE — PROGRESS NOTES
OK to give Levaquin early around 13:00 per pharmacist.  Dr. Alejandro Carnes would like the patient to have his last dose prior to discharge.

## 2019-03-28 LAB
BLOOD CULTURE, ROUTINE: NORMAL
CULTURE, BLOOD 2: NORMAL
EKG ATRIAL RATE: 131 BPM
EKG ATRIAL RATE: 76 BPM
EKG P AXIS: 43 DEGREES
EKG P-R INTERVAL: 132 MS
EKG P-R INTERVAL: 272 MS
EKG Q-T INTERVAL: 358 MS
EKG Q-T INTERVAL: 420 MS
EKG QRS DURATION: 144 MS
EKG QRS DURATION: 146 MS
EKG QTC CALCULATION (BAZETT): 472 MS
EKG QTC CALCULATION (BAZETT): 508 MS
EKG R AXIS: -62 DEGREES
EKG R AXIS: -72 DEGREES
EKG T AXIS: 15 DEGREES
EKG T AXIS: 24 DEGREES
EKG VENTRICULAR RATE: 121 BPM
EKG VENTRICULAR RATE: 76 BPM

## 2019-04-30 PROBLEM — J18.9 CAP (COMMUNITY ACQUIRED PNEUMONIA): Status: ACTIVE | Noted: 2019-03-23

## 2019-04-30 NOTE — DISCHARGE SUMMARY
Patient ID:  Yolanda Harris  [id number]  30 y.o.  2/17/1929    Admit date: 3/23/2019    Discharge date and time: 3/27/2019  1:40 PM     Admission Diagnoses: CAP (community acquired pneumonia) due to Chlamydia species [J16.0]  CAP (community acquired pneumonia) due to Chlamydia species [J16.0]    Discharge Diagnoses:Principal Problem:    CAP (community acquired pneumonia)  Active Problems:    COPD with exacerbation ([address]  Resolved Problems:    * No resolved hospital problems. *          Consults:pulmonary/intensive care    Procedures: none    Hospital Course: Admitted with community acquired left lower lobe pneumonia. He has an exacerbation of his asthma. He was started on steroids and levofloxacin. He slowly improved. Steroids and Levaquin changed to oral med's. He was discharged to home in stable condition. Discharge Exam:  See progress note from today    Disposition: home    Patient Instructions:   Discharge Medication List as of 3/27/2019 12:47 PM      START taking these medications    Details   predniSONE (DELTASONE) 20 MG tablet Take 2 tablets by mouth daily for 3 days, THEN 1.5 tablets daily for 3 days, THEN 1 tablet daily for 3 days, THEN 0.5 tablets daily for 3 days. , Disp-15 tablet, R-0Normal         CONTINUE these medications which have NOT CHANGED    Details   latanoprost (XALATAN) 0.005 % ophthalmic solution Place 1 drop into both eyes dailyHistorical Med      !! warfarin (COUMADIN) 1 MG tablet Take 0.5 mg by mouth See Admin Instructions Indications: Cerebral Infarction Take 0.5 mg every Tuesday, Thursday and SaturdayHistorical Med      Timolol (TIMOPTIC) 0.5 % (DAILY) SOLN ophthalmic solution 1 drop 2 times daily Give 1 hour after Lantanoprost eye drops in the morning and give at bedtimeHistorical Med      !! warfarin (COUMADIN) 1 MG tablet Skip today's dose, resume 9/21/2017, Disp-30 tablet, R-3Normal      Multiple Vitamins-Minerals (PRESERVISION AREDS 2) CAPS Take 1 capsule by mouth 2 times dailyHistorical Med      docusate sodium (COLACE) 100 MG capsule Take 200 mg by mouth nightlyHistorical Med      tamsulosin (FLOMAX) 0.4 MG capsule Take 0.4 mg by mouth daily      montelukast (SINGULAIR) 10 MG tablet Take 10 mg by mouth nightly. fluticasone-salmeterol (ADVAIR) 500-50 MCG/DOSE diskus inhaler Inhale 1 puff into the lungs 2 times daily. !! - Potential duplicate medications found. Please discuss with provider. STOP taking these medications       levalbuterol (XOPENEX) 0.63 MG/3ML nebulization Comments:   Reason for Stopping:         travoprost, benzalkonium, (TRAVATAN) 0.004 % ophthalmic solution Comments:   Reason for Stopping:             Activity: activity as tolerated  Diet: cardiac diet    Follow-up with [facility] in 1 week.     Note that over 30 minutes was spent in preparing discharge papers, discussing discharge with patient, medication review, etc.    Signed:  Mohinder Rankin  4/30/2019  12:32 PM

## 2019-06-22 ENCOUNTER — OFFICE VISIT (OUTPATIENT)
Dept: FAMILY MEDICINE CLINIC | Age: 84
End: 2019-06-22
Payer: MEDICARE

## 2019-06-22 VITALS
WEIGHT: 214 LBS | HEART RATE: 73 BPM | DIASTOLIC BLOOD PRESSURE: 80 MMHG | SYSTOLIC BLOOD PRESSURE: 124 MMHG | HEIGHT: 72 IN | BODY MASS INDEX: 28.99 KG/M2 | TEMPERATURE: 97.1 F | OXYGEN SATURATION: 100 %

## 2019-06-22 DIAGNOSIS — H61.21 IMPACTED CERUMEN OF RIGHT EAR: ICD-10-CM

## 2019-06-22 DIAGNOSIS — H92.01 ACUTE OTALGIA, RIGHT: Primary | ICD-10-CM

## 2019-06-22 PROCEDURE — 69210 REMOVE IMPACTED EAR WAX UNI: CPT | Performed by: INTERNAL MEDICINE

## 2019-06-22 PROCEDURE — 99213 OFFICE O/P EST LOW 20 MIN: CPT | Performed by: INTERNAL MEDICINE

## 2019-06-22 RX ORDER — DEXAMETHASONE 4 MG/1
TABLET ORAL
Refills: 3 | Status: ON HOLD | COMMUNITY
Start: 2019-05-14 | End: 2020-10-11 | Stop reason: HOSPADM

## 2019-06-22 RX ORDER — FLUTICASONE PROPIONATE 50 MCG
SPRAY, SUSPENSION (ML) NASAL
Refills: 3 | Status: ON HOLD | COMMUNITY
Start: 2019-05-31 | End: 2021-05-07

## 2019-06-22 RX ORDER — TIMOLOL MALEATE 5 MG/ML
SOLUTION/ DROPS OPHTHALMIC
Status: ON HOLD | COMMUNITY
Start: 2019-06-18 | End: 2020-10-07

## 2019-06-22 RX ORDER — BRIMONIDINE TARTRATE 0.1 %
1 DROPS OPHTHALMIC (EYE) 2 TIMES DAILY
Refills: 4 | Status: ON HOLD | COMMUNITY
Start: 2019-06-12 | End: 2021-05-07

## 2019-06-22 RX ORDER — PREDNISOLONE ACETATE 10 MG/ML
SUSPENSION/ DROPS OPHTHALMIC
Status: ON HOLD | COMMUNITY
Start: 2019-06-21 | End: 2020-10-07 | Stop reason: ALTCHOICE

## 2019-06-22 RX ORDER — PREDNISONE 10 MG/1
TABLET ORAL
Refills: 0 | Status: ON HOLD | COMMUNITY
Start: 2019-06-07 | End: 2020-10-07 | Stop reason: ALTCHOICE

## 2019-06-22 RX ORDER — DORZOLAMIDE HCL 20 MG/ML
SOLUTION/ DROPS OPHTHALMIC
Status: ON HOLD | COMMUNITY
Start: 2019-06-21 | End: 2020-10-07 | Stop reason: ALTCHOICE

## 2019-06-22 NOTE — PROGRESS NOTES
19  Erin Shepherd : 1929 Sex: male  Age: 80 y.o. Chief Complaint   Patient presents with    Otalgia     R ear - has been on going a few days - sharp pain, accidentally put eye glasses  in his ear this morning        HPI:  Pt presents with right ear pain for the past 3-4 days. Patient states pain is a 6/10 on the pain scale. States pain is sharp in nature for 2-3 seconds then eases off. States happens 3-4 times per day. The patient denies any trauma or injury to the ear. Denies any discharge from the ear. Patient denies fever, chills. States has chronic cough and runny nose and nasal congestion - not new. Denies any rashes, tinnitus, dizziness, vomiting, diarrhea, abdominal pain, HA and or lethargy. Wife states saw patient trying to put drops in ear. Patient has vision impairment and was placing eye  in ear and she is concerned. Also because patient is to have eye procedure this upcoming week. ROS:  Const: Positives and pertinent negatives as per HPI. All others reviewed and are negative.         Current Outpatient Medications:     neomycin-polymyxin-hydrocortisone (CORTISPORIN) 3.5-19044-7 otic solution, Place 4 drops into the right ear 3 times daily for 7 days Instill into right Ear, Disp: 1 Bottle, Rfl: 0    latanoprost (XALATAN) 0.005 % ophthalmic solution, Place 1 drop into both eyes daily, Disp: , Rfl:     warfarin (COUMADIN) 1 MG tablet, Take 0.5 mg by mouth See Admin Instructions Indications: Cerebral Infarction Take 0.5 mg every Tuesday, Thursday and Saturday, Disp: , Rfl:     Timolol (TIMOPTIC) 0.5 % (DAILY) SOLN ophthalmic solution, 1 drop 2 times daily Give 1 hour after Lantanoprost eye drops in the morning and give at bedtime, Disp: , Rfl:     warfarin (COUMADIN) 1 MG tablet, Skip today's dose, resume 2017 (Patient taking differently: Take 1 mg by mouth See Admin Instructions Indications: Cerebral Infarction take 1 mg every , Monday, Wednesday and Friday), Disp: 30 tablet, Rfl: 3    Multiple Vitamins-Minerals (PRESERVISION AREDS 2) CAPS, Take 1 capsule by mouth 2 times daily, Disp: , Rfl:     docusate sodium (COLACE) 100 MG capsule, Take 200 mg by mouth nightly, Disp: , Rfl:     tamsulosin (FLOMAX) 0.4 MG capsule, Take 0.4 mg by mouth daily, Disp: , Rfl:     montelukast (SINGULAIR) 10 MG tablet, Take 10 mg by mouth nightly., Disp: , Rfl:     fluticasone-salmeterol (ADVAIR) 500-50 MCG/DOSE diskus inhaler, Inhale 1 puff into the lungs 2 times daily. , Disp: , Rfl:     ALPHAGAN P 0.1 % SOLN, INSTILL 1 DROP INTO LEFT EYE 3 TIMES A DAY, Disp: , Rfl: 4    dorzolamide (TRUSOPT) 2 % ophthalmic solution, , Disp: , Rfl:     fluticasone (FLONASE) 50 MCG/ACT nasal spray, INSTILL 2 PUFFS IN EACH NOSTRIL DAILY, Disp: , Rfl: 3    FLOVENT  MCG/ACT inhaler, TAKE 1 PUFF BY MOUTH TWICE A DAY, Disp: , Rfl: 3    prednisoLONE acetate (PRED FORTE) 1 % ophthalmic suspension, , Disp: , Rfl:     predniSONE (DELTASONE) 10 MG tablet, PLEASE SEE ATTACHED FOR DETAILED DIRECTIONS, Disp: , Rfl: 0    timolol (TIMOPTIC) 0.5 % ophthalmic solution, , Disp: , Rfl:     TOBRADEX 0.3-0.1 % ophthalmic ointment, , Disp: , Rfl:   No Known Allergies    Past Medical History:   Diagnosis Date    Asthma     COPD (chronic obstructive pulmonary disease) (HCC)     Unspecified cerebral artery occlusion with cerebral infarction      Past Surgical History:   Procedure Laterality Date    ABDOMEN SURGERY      multiple; intestinal abscess; hernia repair;     APPENDECTOMY      ECHO COMPL W DOP COLOR FLOW  3/16/2013         HERNIA REPAIR      JOINT REPLACEMENT  6-11-12     History reviewed. No pertinent family history.   Social History     Socioeconomic History    Marital status:      Spouse name: Not on file    Number of children: Not on file    Years of education: Not on file    Highest education level: Not on file   Occupational History    Not on file   Social Needs    murmur heard. Pulmonary/Chest: Effort normal and breath sounds normal. He has no wheezes. He has no rales. Abdominal: Soft. Bowel sounds are normal. There is no tenderness. There is no rebound and no guarding. Musculoskeletal: Normal range of motion. He exhibits no edema. Lymphadenopathy:     He has no cervical adenopathy. Neurological: He is alert and oriented to person, place, and time. No cranial nerve deficit. Skin: Skin is warm and dry. Psychiatric: He has a normal mood and affect. Judgment normal.       Assessment and Plan:   Ramos Golden was seen today for otalgia. Diagnoses and all orders for this visit:    Acute otalgia, right  Comments:  uncertain etiology at present   cerumen removed - mild truama noted post removal   otic drops   follow up with pcp next week   Orders:  -     neomycin-polymyxin-hydrocortisone (CORTISPORIN) 3.5-60418-6 otic solution; Place 4 drops into the right ear 3 times daily for 7 days Instill into right Ear    Impacted cerumen of right ear  -     neomycin-polymyxin-hydrocortisone (CORTISPORIN) 3.5-25657-9 otic solution; Place 4 drops into the right ear 3 times daily for 7 days Instill into right Ear  -     ID REMOVAL IMPACTED CERUMEN INSTRUMENTATION UNILAT    Follow-up with your primary care provider in 7-10 days for re-evaluation and further management. Return  sooner or go to the Emergency Department immediately if your condition changes or worsens. Return in about 1 week (around 6/29/2019) for check up and review.       Shruthi Tovar MD

## 2019-07-29 ENCOUNTER — APPOINTMENT (OUTPATIENT)
Dept: GENERAL RADIOLOGY | Age: 84
End: 2019-07-29
Payer: MEDICARE

## 2019-07-29 ENCOUNTER — HOSPITAL ENCOUNTER (EMERGENCY)
Age: 84
Discharge: HOME | End: 2019-07-29
Attending: EMERGENCY MEDICINE
Payer: MEDICARE

## 2019-07-29 VITALS
RESPIRATION RATE: 16 BRPM | DIASTOLIC BLOOD PRESSURE: 66 MMHG | WEIGHT: 217 LBS | OXYGEN SATURATION: 95 % | HEIGHT: 72 IN | SYSTOLIC BLOOD PRESSURE: 116 MMHG | TEMPERATURE: 98.4 F | BODY MASS INDEX: 29.39 KG/M2 | HEART RATE: 67 BPM

## 2019-07-29 DIAGNOSIS — R06.00 DYSPNEA, UNSPECIFIED TYPE: Primary | ICD-10-CM

## 2019-07-29 LAB
ALBUMIN SERPL-MCNC: 3.5 G/DL (ref 3.5–5.2)
ALP BLD-CCNC: 75 U/L (ref 40–129)
ALT SERPL-CCNC: 15 U/L (ref 0–40)
ANION GAP SERPL CALCULATED.3IONS-SCNC: 10 MMOL/L (ref 7–16)
AST SERPL-CCNC: 16 U/L (ref 0–39)
BACTERIA: ABNORMAL /HPF
BASOPHILS ABSOLUTE: 0.04 E9/L (ref 0–0.2)
BASOPHILS RELATIVE PERCENT: 0.5 % (ref 0–2)
BILIRUB SERPL-MCNC: 0.7 MG/DL (ref 0–1.2)
BILIRUBIN URINE: NEGATIVE
BLOOD, URINE: NEGATIVE
BUN BLDV-MCNC: 27 MG/DL (ref 8–23)
CALCIUM SERPL-MCNC: 9 MG/DL (ref 8.6–10.2)
CHLORIDE BLD-SCNC: 104 MMOL/L (ref 98–107)
CLARITY: CLEAR
CO2: 24 MMOL/L (ref 22–29)
COLOR: YELLOW
CREAT SERPL-MCNC: 1.1 MG/DL (ref 0.7–1.2)
EOSINOPHILS ABSOLUTE: 0.36 E9/L (ref 0.05–0.5)
EOSINOPHILS RELATIVE PERCENT: 4.1 % (ref 0–6)
GFR AFRICAN AMERICAN: >60
GFR NON-AFRICAN AMERICAN: >60 ML/MIN/1.73
GLUCOSE BLD-MCNC: 173 MG/DL (ref 74–99)
GLUCOSE URINE: NEGATIVE MG/DL
HCT VFR BLD CALC: 49.1 % (ref 37–54)
HEMOGLOBIN: 15.6 G/DL (ref 12.5–16.5)
IMMATURE GRANULOCYTES #: 0.03 E9/L
IMMATURE GRANULOCYTES %: 0.3 % (ref 0–5)
INR BLD: 2.8
KETONES, URINE: NEGATIVE MG/DL
LACTIC ACID: 2.2 MMOL/L (ref 0.5–2.2)
LEUKOCYTE ESTERASE, URINE: ABNORMAL
LYMPHOCYTES ABSOLUTE: 2.29 E9/L (ref 1.5–4)
LYMPHOCYTES RELATIVE PERCENT: 25.9 % (ref 20–42)
MCH RBC QN AUTO: 29.4 PG (ref 26–35)
MCHC RBC AUTO-ENTMCNC: 31.8 % (ref 32–34.5)
MCV RBC AUTO: 92.6 FL (ref 80–99.9)
MONOCYTES ABSOLUTE: 0.86 E9/L (ref 0.1–0.95)
MONOCYTES RELATIVE PERCENT: 9.7 % (ref 2–12)
NEUTROPHILS ABSOLUTE: 5.27 E9/L (ref 1.8–7.3)
NEUTROPHILS RELATIVE PERCENT: 59.5 % (ref 43–80)
NITRITE, URINE: NEGATIVE
PDW BLD-RTO: 15.2 FL (ref 11.5–15)
PH UA: 6 (ref 5–9)
PLATELET # BLD: 163 E9/L (ref 130–450)
PMV BLD AUTO: 10.1 FL (ref 7–12)
POTASSIUM SERPL-SCNC: 3.9 MMOL/L (ref 3.5–5)
PRO-BNP: 668 PG/ML (ref 0–450)
PROTEIN UA: NEGATIVE MG/DL
PROTHROMBIN TIME: 30.8 SEC (ref 9.3–12.4)
RBC # BLD: 5.3 E12/L (ref 3.8–5.8)
RBC UA: ABNORMAL /HPF (ref 0–2)
SODIUM BLD-SCNC: 138 MMOL/L (ref 132–146)
SPECIFIC GRAVITY UA: 1.01 (ref 1–1.03)
TOTAL PROTEIN: 6.2 G/DL (ref 6.4–8.3)
TROPONIN: <0.01 NG/ML (ref 0–0.03)
UROBILINOGEN, URINE: 1 E.U./DL
WBC # BLD: 8.9 E9/L (ref 4.5–11.5)
WBC UA: ABNORMAL /HPF (ref 0–5)

## 2019-07-29 PROCEDURE — 71045 X-RAY EXAM CHEST 1 VIEW: CPT

## 2019-07-29 PROCEDURE — 80053 COMPREHEN METABOLIC PANEL: CPT

## 2019-07-29 PROCEDURE — 85025 COMPLETE CBC W/AUTO DIFF WBC: CPT

## 2019-07-29 PROCEDURE — 81001 URINALYSIS AUTO W/SCOPE: CPT

## 2019-07-29 PROCEDURE — 6370000000 HC RX 637 (ALT 250 FOR IP): Performed by: EMERGENCY MEDICINE

## 2019-07-29 PROCEDURE — 99285 EMERGENCY DEPT VISIT HI MDM: CPT

## 2019-07-29 PROCEDURE — 93005 ELECTROCARDIOGRAM TRACING: CPT | Performed by: EMERGENCY MEDICINE

## 2019-07-29 PROCEDURE — 2580000003 HC RX 258: Performed by: EMERGENCY MEDICINE

## 2019-07-29 PROCEDURE — 87088 URINE BACTERIA CULTURE: CPT

## 2019-07-29 PROCEDURE — 94640 AIRWAY INHALATION TREATMENT: CPT

## 2019-07-29 PROCEDURE — 83880 ASSAY OF NATRIURETIC PEPTIDE: CPT

## 2019-07-29 PROCEDURE — 85610 PROTHROMBIN TIME: CPT

## 2019-07-29 PROCEDURE — 36415 COLL VENOUS BLD VENIPUNCTURE: CPT

## 2019-07-29 PROCEDURE — 83605 ASSAY OF LACTIC ACID: CPT

## 2019-07-29 PROCEDURE — 84484 ASSAY OF TROPONIN QUANT: CPT

## 2019-07-29 RX ORDER — 0.9 % SODIUM CHLORIDE 0.9 %
500 INTRAVENOUS SOLUTION INTRAVENOUS ONCE
Status: COMPLETED | OUTPATIENT
Start: 2019-07-29 | End: 2019-07-29

## 2019-07-29 RX ORDER — IPRATROPIUM BROMIDE AND ALBUTEROL SULFATE 2.5; .5 MG/3ML; MG/3ML
1 SOLUTION RESPIRATORY (INHALATION) ONCE
Status: COMPLETED | OUTPATIENT
Start: 2019-07-29 | End: 2019-07-29

## 2019-07-29 RX ADMIN — IPRATROPIUM BROMIDE AND ALBUTEROL SULFATE 1 AMPULE: .5; 3 SOLUTION RESPIRATORY (INHALATION) at 11:29

## 2019-07-29 RX ADMIN — SODIUM CHLORIDE 500 ML: 9 INJECTION, SOLUTION INTRAVENOUS at 13:30

## 2019-07-29 ASSESSMENT — ENCOUNTER SYMPTOMS
SINUS PRESSURE: 0
SINUS PAIN: 0
SHORTNESS OF BREATH: 1
ABDOMINAL PAIN: 0
NAUSEA: 0
BACK PAIN: 0

## 2019-07-29 NOTE — ED PROVIDER NOTES
distress. He has no wheezes. Abdominal: Soft. He exhibits no mass. There is no tenderness. There is no guarding. Musculoskeletal:   Trace pitting edema bilaterally   Neurological: He is alert and oriented to person, place, and time. No cranial nerve deficit. Skin: Skin is warm and dry. Capillary refill takes less than 2 seconds. Psychiatric: He has a normal mood and affect. His behavior is normal.   Nursing note and vitals reviewed. Procedures    MDM  Number of Diagnoses or Management Options  Dyspnea, unspecified type:   Diagnosis management comments: This is a 22-year-old male who presented to the ED for evaluation of ability to catch his breath when he awoke this morning. Patient was in no distress whatsoever treated with 1 DuoNeb treatment here in the department. 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 Denies any chest pain on repeat examination the patient stated that he felt well and wanted to go home. I had a long discussion with the patient and did offer inpatient admission for further evaluation of this vised him to follow-up first thing tomorrow with his primary care provider. He was walked in the department without any difficulties and had normal oxygen saturation. Patient was advised to follow-up first thing in the morning and return to the ED if he develop any recent shortness of breath, chest pain or any concerns whatsoever.             --------------------------------------------- PAST HISTORY ---------------------------------------------  Past Medical History:  has a past medical history of Asthma, COPD (chronic obstructive pulmonary disease) ([facility] [address] and Unspecified cerebral artery occlusion with cerebral infarction. Past Surgical History:  has a past surgical history that includes Appendectomy; hernia repair; joint replacement (6-11-12); ECHO Compl W Dop Color Flow (3/16/2013); and Abdomen surgery.     Social History:  reports that he quit smoking about 37 years ago. He started smoking about 73 years ago. He has never used smokeless tobacco. He reports that he does not drink alcohol or use drugs. Family History: family history is not on file. The patients home medications have been reviewed. Allergies: Patient has no known allergies.     -------------------------------------------------- RESULTS -------------------------------------------------  Labs:  Results for orders placed or performed during the hospital encounter of 07/29/19   Comprehensive Metabolic Panel   Result Value Ref Range    Sodium 138 132 - 146 mmol/L    Potassium 3.9 3.5 - 5.0 mmol/L    Chloride 104 98 - 107 mmol/L    CO2 24 22 - 29 mmol/L    Anion Gap 10 7 - 16 mmol/L    Glucose 173 (H) 74 - 99 mg/dL    BUN 27 (H) 8 - 23 mg/dL    CREATININE 1.1 0.7 - 1.2 mg/dL    GFR Non-African American >60 >=60 mL/min/1.73    GFR African American >60     Calcium 9.0 8.6 - 10.2 mg/dL    Total Protein 6.2 (L) 6.4 - 8.3 g/dL    Alb 3.5 3.5 - 5.2 g/dL    Total Bilirubin 0.7 0.0 - 1.2 mg/dL    Alkaline Phosphatase 75 40 - 129 U/L    ALT 15 0 - 40 U/L    AST 16 0 - 39 U/L   CBC Auto Differential   Result Value Ref Range    WBC 8.9 4.5 - 11.5 E9/L    RBC 5.30 3.80 - 5.80 E12/L    Hemoglobin 15.6 12.5 - 16.5 g/dL    Hematocrit 49.1 37.0 - 54.0 %    MCV 92.6 80.0 - 99.9 fL    MCH 29.4 26.0 - 35.0 pg    MCHC 31.8 (L) 32.0 - 34.5 %    RDW 15.2 (H) 11.5 - 15.0 fL    Platelets 247 127 - 765 E9/L    MPV 10.1 7.0 - 12.0 fL    Neutrophils % 59.5 43.0 - 80.0 %    Immature Granulocytes % 0.3 0.0 - 5.0 %    Lymphocytes % 25.9 20.0 - 42.0 %    Monocytes % 9.7 2.0 - 12.0 %    Eosinophils % 4.1 0.0 - 6.0 %    Basophils % 0.5 0.0 - 2.0 %    Neutrophils # 5.27 1.80 - 7.30 E9/L    Immature Granulocytes # 0.03 E9/L    Lymphocytes # 2.29 1.50 - 4.00 E9/L    Monocytes # 0.86 0.10 - 0.95 E9/L    Eosinophils # 0.36 0.05 - 0.50 E9/L    Basophils # 0.04 0.00 - 0.20 E9/L   Troponin   Result Value Ref Range Resp 16   Ht 6' (1.829 m)   Wt 217 lb (98.4 kg)   SpO2 95%   BMI 29.43 kg/m²   Oxygen Saturation Interpretation: Normal      ------------------------------------------ PROGRESS NOTES ------------------------------------------  8:24 PM  I have spoken with the patient and discussed todays results, in addition to providing specific details for the plan of care and counseling regarding the diagnosis and prognosis. Their questions are answered at this time and they are agreeable with the plan. I discussed at length with them reasons for immediate return here for re evaluation. They will followup with their and the on call primary care physician, general surgeon and orthopedic physician by calling their office tomorrow and on Monday.      --------------------------------- ADDITIONAL PROVIDER NOTES ---------------------------------  At this time the patient is without objective evidence of an acute process requiring hospitalization or inpatient management. They have remained hemodynamically stable throughout their entire ED visit and are stable for discharge with outpatient follow-up. The plan has been discussed in detail and they are aware of the specific conditions for emergent return, as well as the importance of follow-up. Discharge Medication List as of 7/29/2019  1:38 PM          Diagnosis:  1. Dyspnea, unspecified type        Disposition:  Patient's disposition: Discharge to home  Patient's condition is stable.           Raimundo Chavez DO  Resident  07/29/19 2026

## 2019-07-30 LAB
EKG ATRIAL RATE: 100 BPM
EKG P AXIS: 67 DEGREES
EKG Q-T INTERVAL: 392 MS
EKG QRS DURATION: 146 MS
EKG QTC CALCULATION (BAZETT): 500 MS
EKG R AXIS: -63 DEGREES
EKG T AXIS: 25 DEGREES
EKG VENTRICULAR RATE: 98 BPM

## 2019-07-30 PROCEDURE — 93010 ELECTROCARDIOGRAM REPORT: CPT | Performed by: INTERNAL MEDICINE

## 2019-07-31 LAB — URINE CULTURE, ROUTINE: NORMAL

## 2019-10-29 ENCOUNTER — HOSPITAL ENCOUNTER (OUTPATIENT)
Age: 84
Discharge: HOME | End: 2019-10-31
Payer: MEDICARE

## 2019-10-29 PROCEDURE — 87088 URINE BACTERIA CULTURE: CPT

## 2019-11-01 LAB — URINE CULTURE, ROUTINE: NORMAL

## 2019-11-29 ENCOUNTER — APPOINTMENT (OUTPATIENT)
Dept: CT IMAGING | Age: 84
End: 2019-11-29
Payer: MEDICARE

## 2019-11-29 ENCOUNTER — HOSPITAL ENCOUNTER (EMERGENCY)
Age: 84
Discharge: HOME | End: 2019-11-30
Attending: EMERGENCY MEDICINE
Payer: MEDICARE

## 2019-11-29 ENCOUNTER — APPOINTMENT (OUTPATIENT)
Dept: GENERAL RADIOLOGY | Age: 84
End: 2019-11-29
Payer: MEDICARE
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
## 2019-11-29 DIAGNOSIS — S42.214A CLOSED NONDISPLACED FRACTURE OF SURGICAL NECK OF RIGHT HUMERUS, UNSPECIFIED FRACTURE MORPHOLOGY, INITIAL ENCOUNTER: Primary | ICD-10-CM

## 2019-11-29 LAB
BASOPHILS ABSOLUTE: 0.01 E9/L (ref 0–0.2)
BASOPHILS RELATIVE PERCENT: 0.1 % (ref 0–2)
EOSINOPHILS ABSOLUTE: 0 E9/L (ref 0.05–0.5)
EOSINOPHILS RELATIVE PERCENT: 0 % (ref 0–6)
HCT VFR BLD CALC: 45 % (ref 37–54)
HEMOGLOBIN: 14.1 G/DL (ref 12.5–16.5)
IMMATURE GRANULOCYTES #: 0.05 E9/L
IMMATURE GRANULOCYTES %: 0.6 % (ref 0–5)
LYMPHOCYTES ABSOLUTE: 1.29 E9/L (ref 1.5–4)
LYMPHOCYTES RELATIVE PERCENT: 14.8 % (ref 20–42)
MCH RBC QN AUTO: 29.3 PG (ref 26–35)
MCHC RBC AUTO-ENTMCNC: 31.3 % (ref 32–34.5)
MCV RBC AUTO: 93.6 FL (ref 80–99.9)
MONOCYTES ABSOLUTE: 0.89 E9/L (ref 0.1–0.95)
MONOCYTES RELATIVE PERCENT: 10.2 % (ref 2–12)
NEUTROPHILS ABSOLUTE: 6.48 E9/L (ref 1.8–7.3)
NEUTROPHILS RELATIVE PERCENT: 74.3 % (ref 43–80)
PDW BLD-RTO: 15.5 FL (ref 11.5–15)
PLATELET # BLD: 163 E9/L (ref 130–450)
PMV BLD AUTO: 10.4 FL (ref 7–12)
RBC # BLD: 4.81 E12/L (ref 3.8–5.8)
WBC # BLD: 8.7 E9/L (ref 4.5–11.5)

## 2019-11-29 PROCEDURE — 6370000000 HC RX 637 (ALT 250 FOR IP): Performed by: EMERGENCY MEDICINE

## 2019-11-29 PROCEDURE — 85610 PROTHROMBIN TIME: CPT

## 2019-11-29 PROCEDURE — 85025 COMPLETE CBC W/AUTO DIFF WBC: CPT

## 2019-11-29 PROCEDURE — 80053 COMPREHEN METABOLIC PANEL: CPT

## 2019-11-29 PROCEDURE — 99284 EMERGENCY DEPT VISIT MOD MDM: CPT

## 2019-11-29 PROCEDURE — 93005 ELECTROCARDIOGRAM TRACING: CPT | Performed by: EMERGENCY MEDICINE

## 2019-11-29 PROCEDURE — 70450 CT HEAD/BRAIN W/O DYE: CPT

## 2019-11-29 PROCEDURE — 73030 X-RAY EXAM OF SHOULDER: CPT

## 2019-11-29 RX ORDER — SODIUM CHLORIDE 0.9 % (FLUSH) 0.9 %
SYRINGE (ML) INJECTION
Status: DISCONTINUED
Start: 2019-11-29 | End: 2019-11-30 | Stop reason: HOSPADM

## 2019-11-29 RX ORDER — HYDROCODONE BITARTRATE AND ACETAMINOPHEN 5; 325 MG/1; MG/1
1 TABLET ORAL ONCE
Status: COMPLETED | OUTPATIENT
Start: 2019-11-29 | End: 2019-11-29

## 2019-11-29 RX ADMIN — HYDROCODONE BITARTRATE AND ACETAMINOPHEN 1 TABLET: 5; 325 TABLET ORAL at 23:56

## 2019-11-29 ASSESSMENT — PAIN DESCRIPTION - PAIN TYPE: TYPE: ACUTE PAIN

## 2019-11-29 ASSESSMENT — PAIN SCALES - GENERAL
PAINLEVEL_OUTOF10: 9
PAINLEVEL_OUTOF10: 4

## 2019-11-29 ASSESSMENT — PAIN DESCRIPTION - LOCATION: LOCATION: ARM

## 2019-11-29 ASSESSMENT — PAIN DESCRIPTION - ORIENTATION: ORIENTATION: RIGHT

## 2019-11-30 VITALS
RESPIRATION RATE: 16 BRPM | TEMPERATURE: 96.7 F | HEIGHT: 72 IN | WEIGHT: 215 LBS | HEART RATE: 59 BPM | BODY MASS INDEX: 29.12 KG/M2 | OXYGEN SATURATION: 95 % | SYSTOLIC BLOOD PRESSURE: 180 MMHG | DIASTOLIC BLOOD PRESSURE: 83 MMHG

## 2019-11-30 LAB
ALBUMIN SERPL-MCNC: 3.9 G/DL (ref 3.5–5.2)
ALP BLD-CCNC: 100 U/L (ref 40–129)
ALT SERPL-CCNC: 20 U/L (ref 0–40)
ANION GAP SERPL CALCULATED.3IONS-SCNC: 8 MMOL/L (ref 7–16)
AST SERPL-CCNC: 15 U/L (ref 0–39)
BILIRUB SERPL-MCNC: 0.4 MG/DL (ref 0–1.2)
BUN BLDV-MCNC: 36 MG/DL (ref 8–23)
CALCIUM SERPL-MCNC: 9 MG/DL (ref 8.6–10.2)
CHLORIDE BLD-SCNC: 106 MMOL/L (ref 98–107)
CHP ED QC CHECK: NORMAL
CO2: 26 MMOL/L (ref 22–29)
CREAT SERPL-MCNC: 1.7 MG/DL (ref 0.7–1.2)
EKG ATRIAL RATE: 62 BPM
EKG P AXIS: 30 DEGREES
EKG P-R INTERVAL: 224 MS
EKG Q-T INTERVAL: 452 MS
EKG QRS DURATION: 150 MS
EKG QTC CALCULATION (BAZETT): 458 MS
EKG R AXIS: -55 DEGREES
EKG T AXIS: 17 DEGREES
EKG VENTRICULAR RATE: 62 BPM
GFR AFRICAN AMERICAN: 46
GFR NON-AFRICAN AMERICAN: 38 ML/MIN/1.73
GLUCOSE BLD-MCNC: 103 MG/DL
GLUCOSE BLD-MCNC: 134 MG/DL (ref 74–99)
INR BLD: 3.3
METER GLUCOSE: 103 MG/DL (ref 74–99)
POTASSIUM REFLEX MAGNESIUM: 4.3 MMOL/L (ref 3.5–5)
PROTHROMBIN TIME: 40.3 SEC (ref 9.3–12.4)
SODIUM BLD-SCNC: 140 MMOL/L (ref 132–146)
TOTAL PROTEIN: 6.5 G/DL (ref 6.4–8.3)

## 2019-11-30 PROCEDURE — 96374 THER/PROPH/DIAG INJ IV PUSH: CPT

## 2019-11-30 PROCEDURE — 82962 GLUCOSE BLOOD TEST: CPT

## 2019-11-30 PROCEDURE — 6360000002 HC RX W HCPCS: Performed by: EMERGENCY MEDICINE

## 2019-11-30 PROCEDURE — 93010 ELECTROCARDIOGRAM REPORT: CPT | Performed by: INTERNAL MEDICINE

## 2019-11-30 RX ORDER — HYDROCODONE BITARTRATE AND ACETAMINOPHEN 5; 325 MG/1; MG/1
1 TABLET ORAL EVERY 6 HOURS PRN
Qty: 12 TABLET | Refills: 0 | Status: SHIPPED | OUTPATIENT
Start: 2019-11-30 | End: 2019-12-03

## 2019-11-30 RX ORDER — FENTANYL CITRATE 50 UG/ML
50 INJECTION, SOLUTION INTRAMUSCULAR; INTRAVENOUS ONCE
Status: COMPLETED | OUTPATIENT
Start: 2019-11-30 | End: 2019-11-30

## 2019-11-30 RX ADMIN — FENTANYL CITRATE 50 MCG: 50 INJECTION, SOLUTION INTRAMUSCULAR; INTRAVENOUS at 00:35

## 2019-11-30 ASSESSMENT — PAIN DESCRIPTION - LOCATION: LOCATION: SHOULDER

## 2019-11-30 ASSESSMENT — ENCOUNTER SYMPTOMS
DIARRHEA: 0
VOMITING: 0
COUGH: 0
COLOR CHANGE: 0
BACK PAIN: 0
SORE THROAT: 0
RHINORRHEA: 0
SHORTNESS OF BREATH: 0
WHEEZING: 0
SINUS PRESSURE: 0
NAUSEA: 0
ABDOMINAL PAIN: 0

## 2019-11-30 ASSESSMENT — PAIN DESCRIPTION - ORIENTATION: ORIENTATION: RIGHT

## 2019-11-30 ASSESSMENT — PAIN SCALES - GENERAL
PAINLEVEL_OUTOF10: 5
PAINLEVEL_OUTOF10: 6

## 2019-11-30 ASSESSMENT — PAIN - FUNCTIONAL ASSESSMENT: PAIN_FUNCTIONAL_ASSESSMENT: PREVENTS OR INTERFERES SOME ACTIVE ACTIVITIES AND ADLS

## 2020-10-07 ENCOUNTER — APPOINTMENT (OUTPATIENT)
Dept: CT IMAGING | Age: 85
DRG: 378 | End: 2020-10-07
Payer: MEDICARE

## 2020-10-07 ENCOUNTER — HOSPITAL ENCOUNTER (INPATIENT)
Age: 85
LOS: 4 days | Discharge: HOME | DRG: 378 | End: 2020-10-11
Attending: EMERGENCY MEDICINE | Admitting: INTERNAL MEDICINE
Payer: MEDICARE

## 2020-10-07 ENCOUNTER — APPOINTMENT (OUTPATIENT)
Dept: GENERAL RADIOLOGY | Age: 85
DRG: 378 | End: 2020-10-07
Payer: MEDICARE

## 2020-10-07 PROBLEM — K92.2 GI BLEEDING: Status: ACTIVE | Noted: 2020-10-07

## 2020-10-07 LAB
ABO/RH: NORMAL
ALBUMIN SERPL-MCNC: 3.3 G/DL (ref 3.5–5.2)
ALP BLD-CCNC: 83 U/L (ref 40–129)
ALT SERPL-CCNC: 10 U/L (ref 0–40)
ANION GAP SERPL CALCULATED.3IONS-SCNC: 9 MMOL/L (ref 7–16)
ANTIBODY SCREEN: NORMAL
APTT: 38.2 SEC (ref 24.5–35.1)
AST SERPL-CCNC: 12 U/L (ref 0–39)
BASOPHILS ABSOLUTE: 0.03 E9/L (ref 0–0.2)
BASOPHILS RELATIVE PERCENT: 0.3 % (ref 0–2)
BILIRUB SERPL-MCNC: 0.4 MG/DL (ref 0–1.2)
BUN BLDV-MCNC: 68 MG/DL (ref 8–23)
CALCIUM SERPL-MCNC: 8.9 MG/DL (ref 8.6–10.2)
CHLORIDE BLD-SCNC: 109 MMOL/L (ref 98–107)
CO2: 22 MMOL/L (ref 22–29)
CREAT SERPL-MCNC: 1.2 MG/DL (ref 0.7–1.2)
EKG ATRIAL RATE: 100 BPM
EKG P AXIS: 8 DEGREES
EKG P-R INTERVAL: 224 MS
EKG Q-T INTERVAL: 360 MS
EKG QRS DURATION: 148 MS
EKG QTC CALCULATION (BAZETT): 464 MS
EKG R AXIS: -62 DEGREES
EKG T AXIS: 46 DEGREES
EKG VENTRICULAR RATE: 100 BPM
EOSINOPHILS ABSOLUTE: 0.1 E9/L (ref 0.05–0.5)
EOSINOPHILS RELATIVE PERCENT: 1.1 % (ref 0–6)
GFR AFRICAN AMERICAN: >60
GFR NON-AFRICAN AMERICAN: 57 ML/MIN/1.73
GLUCOSE BLD-MCNC: 143 MG/DL (ref 74–99)
HCT VFR BLD CALC: 33.4 % (ref 37–54)
HCT VFR BLD CALC: 35.1 % (ref 37–54)
HEMOGLOBIN: 10.7 G/DL (ref 12.5–16.5)
HEMOGLOBIN: 10.9 G/DL (ref 12.5–16.5)
IMMATURE GRANULOCYTES #: 0.03 E9/L
IMMATURE GRANULOCYTES %: 0.3 % (ref 0–5)
INR BLD: 2.5
LACTIC ACID: 3.2 MMOL/L (ref 0.5–2.2)
LYMPHOCYTES ABSOLUTE: 2.33 E9/L (ref 1.5–4)
LYMPHOCYTES RELATIVE PERCENT: 26.3 % (ref 20–42)
MCH RBC QN AUTO: 29.4 PG (ref 26–35)
MCHC RBC AUTO-ENTMCNC: 31.1 % (ref 32–34.5)
MCV RBC AUTO: 94.6 FL (ref 80–99.9)
MONOCYTES ABSOLUTE: 0.73 E9/L (ref 0.1–0.95)
MONOCYTES RELATIVE PERCENT: 8.2 % (ref 2–12)
NEUTROPHILS ABSOLUTE: 5.64 E9/L (ref 1.8–7.3)
NEUTROPHILS RELATIVE PERCENT: 63.8 % (ref 43–80)
PDW BLD-RTO: 15 FL (ref 11.5–15)
PLATELET # BLD: 164 E9/L (ref 130–450)
PMV BLD AUTO: 10.3 FL (ref 7–12)
POTASSIUM SERPL-SCNC: 5.8 MMOL/L (ref 3.5–5)
PROTHROMBIN TIME: 28.7 SEC (ref 9.3–12.4)
RBC # BLD: 3.71 E12/L (ref 3.8–5.8)
SODIUM BLD-SCNC: 140 MMOL/L (ref 132–146)
TOTAL PROTEIN: 5.8 G/DL (ref 6.4–8.3)
TROPONIN: <0.01 NG/ML (ref 0–0.03)
WBC # BLD: 8.9 E9/L (ref 4.5–11.5)

## 2020-10-07 PROCEDURE — 94664 DEMO&/EVAL PT USE INHALER: CPT

## 2020-10-07 PROCEDURE — 85610 PROTHROMBIN TIME: CPT

## 2020-10-07 PROCEDURE — 83605 ASSAY OF LACTIC ACID: CPT

## 2020-10-07 PROCEDURE — 85018 HEMOGLOBIN: CPT

## 2020-10-07 PROCEDURE — 6370000000 HC RX 637 (ALT 250 FOR IP): Performed by: INTERNAL MEDICINE

## 2020-10-07 PROCEDURE — 6360000002 HC RX W HCPCS: Performed by: STUDENT IN AN ORGANIZED HEALTH CARE EDUCATION/TRAINING PROGRAM

## 2020-10-07 PROCEDURE — 6360000004 HC RX CONTRAST MEDICATION: Performed by: RADIOLOGY

## 2020-10-07 PROCEDURE — 86901 BLOOD TYPING SEROLOGIC RH(D): CPT

## 2020-10-07 PROCEDURE — 99285 EMERGENCY DEPT VISIT HI MDM: CPT

## 2020-10-07 PROCEDURE — 85025 COMPLETE CBC W/AUTO DIFF WBC: CPT

## 2020-10-07 PROCEDURE — 71045 X-RAY EXAM CHEST 1 VIEW: CPT

## 2020-10-07 PROCEDURE — 99284 EMERGENCY DEPT VISIT MOD MDM: CPT

## 2020-10-07 PROCEDURE — 86900 BLOOD TYPING SEROLOGIC ABO: CPT

## 2020-10-07 PROCEDURE — 74177 CT ABD & PELVIS W/CONTRAST: CPT

## 2020-10-07 PROCEDURE — 85730 THROMBOPLASTIN TIME PARTIAL: CPT

## 2020-10-07 PROCEDURE — 86850 RBC ANTIBODY SCREEN: CPT

## 2020-10-07 PROCEDURE — 96374 THER/PROPH/DIAG INJ IV PUSH: CPT

## 2020-10-07 PROCEDURE — 84484 ASSAY OF TROPONIN QUANT: CPT

## 2020-10-07 PROCEDURE — 85014 HEMATOCRIT: CPT

## 2020-10-07 PROCEDURE — 2580000003 HC RX 258: Performed by: STUDENT IN AN ORGANIZED HEALTH CARE EDUCATION/TRAINING PROGRAM

## 2020-10-07 PROCEDURE — 93005 ELECTROCARDIOGRAM TRACING: CPT | Performed by: EMERGENCY MEDICINE

## 2020-10-07 PROCEDURE — 80053 COMPREHEN METABOLIC PANEL: CPT

## 2020-10-07 PROCEDURE — 2060000000 HC ICU INTERMEDIATE R&B

## 2020-10-07 PROCEDURE — 36415 COLL VENOUS BLD VENIPUNCTURE: CPT

## 2020-10-07 RX ORDER — BRIMONIDINE TARTRATE 2 MG/ML
1 SOLUTION/ DROPS OPHTHALMIC 2 TIMES DAILY
Status: DISCONTINUED | OUTPATIENT
Start: 2020-10-07 | End: 2020-10-11 | Stop reason: HOSPADM

## 2020-10-07 RX ORDER — 0.9 % SODIUM CHLORIDE 0.9 %
1000 INTRAVENOUS SOLUTION INTRAVENOUS ONCE
Status: COMPLETED | OUTPATIENT
Start: 2020-10-07 | End: 2020-10-07

## 2020-10-07 RX ORDER — FLUTICASONE PROPIONATE 50 MCG
2 SPRAY, SUSPENSION (ML) NASAL DAILY
Status: DISCONTINUED | OUTPATIENT
Start: 2020-10-07 | End: 2020-10-11 | Stop reason: HOSPADM

## 2020-10-07 RX ORDER — VITS A,C,E/LUTEIN/MINERALS 300MCG-200
1 TABLET ORAL 2 TIMES DAILY
Status: DISCONTINUED | OUTPATIENT
Start: 2020-10-07 | End: 2020-10-11 | Stop reason: HOSPADM

## 2020-10-07 RX ORDER — TAMSULOSIN HYDROCHLORIDE 0.4 MG/1
0.4 CAPSULE ORAL DAILY
Status: DISCONTINUED | OUTPATIENT
Start: 2020-10-07 | End: 2020-10-11 | Stop reason: HOSPADM

## 2020-10-07 RX ORDER — FLUTICASONE PROPIONATE 110 UG/1
1 AEROSOL, METERED RESPIRATORY (INHALATION) 2 TIMES DAILY
Status: DISCONTINUED | OUTPATIENT
Start: 2020-10-07 | End: 2020-10-11 | Stop reason: HOSPADM

## 2020-10-07 RX ORDER — MONTELUKAST SODIUM 10 MG/1
10 TABLET ORAL NIGHTLY
Status: DISCONTINUED | OUTPATIENT
Start: 2020-10-07 | End: 2020-10-11 | Stop reason: HOSPADM

## 2020-10-07 RX ORDER — GLYCERIN .002; .002; .01 MG/MG; MG/MG; MG/MG
1 SOLUTION/ DROPS OPHTHALMIC 2 TIMES DAILY PRN
COMMUNITY

## 2020-10-07 RX ORDER — DOCUSATE SODIUM 100 MG/1
200 CAPSULE, LIQUID FILLED ORAL NIGHTLY
Status: DISCONTINUED | OUTPATIENT
Start: 2020-10-07 | End: 2020-10-11 | Stop reason: HOSPADM

## 2020-10-07 RX ORDER — CALCIUM GLUCONATE 94 MG/ML
1 INJECTION, SOLUTION INTRAVENOUS ONCE
Status: COMPLETED | OUTPATIENT
Start: 2020-10-07 | End: 2020-10-07

## 2020-10-07 RX ADMIN — IOPAMIDOL 80 ML: 755 INJECTION, SOLUTION INTRAVENOUS at 12:14

## 2020-10-07 RX ADMIN — SODIUM CHLORIDE 1000 ML: 9 INJECTION, SOLUTION INTRAVENOUS at 10:23

## 2020-10-07 RX ADMIN — CALCIUM GLUCONATE 1 G: 98 INJECTION, SOLUTION INTRAVENOUS at 10:56

## 2020-10-07 RX ADMIN — Medication 1 TABLET: at 21:08

## 2020-10-07 RX ADMIN — DOCUSATE SODIUM 200 MG: 100 CAPSULE, LIQUID FILLED ORAL at 21:08

## 2020-10-07 RX ADMIN — FLUTICASONE PROPIONATE 1 PUFF: 110 AEROSOL, METERED RESPIRATORY (INHALATION) at 21:15

## 2020-10-07 RX ADMIN — BRIMONIDINE TARTRATE 1 DROP: 2 SOLUTION OPHTHALMIC at 21:08

## 2020-10-07 RX ADMIN — MONTELUKAST SODIUM 10 MG: 10 TABLET, FILM COATED ORAL at 21:08

## 2020-10-07 ASSESSMENT — ENCOUNTER SYMPTOMS
CONSTIPATION: 0
VOMITING: 1
NAUSEA: 0
ABDOMINAL PAIN: 0
ABDOMINAL DISTENTION: 0
TROUBLE SWALLOWING: 0
DIARRHEA: 0
COUGH: 0
BACK PAIN: 0
BLOOD IN STOOL: 1
SHORTNESS OF BREATH: 0
RECTAL PAIN: 0

## 2020-10-07 ASSESSMENT — PAIN SCALES - GENERAL: PAINLEVEL_OUTOF10: 0

## 2020-10-07 NOTE — CONSULTS
GENERAL SURGERY  CONSULT NOTE  10/7/2020    Physician Consulted: Dr. Arvind Patel  Reason for Consult: Hematochezia  Referring Physician: Dr. Naveen Lutz    HPI  Oracio Quintanilla is a 80 y.o. male with past medical history of A. fib on Coumadin, last dose 10/6, COPD, CVA, and a past surgical history of an appendectomy, hernia repair, small bowel resection in 1974, and a partial colectomy for colo-vesicular fistula presents with 1 day of hematochezia. Patient has been constipated over the past 2 days and took a laxative. He started using the restroom multiple times which culminated in large-volume bright red blood per rectum. Denies any preceding melena. Denies any hematemesis. Minimal abdominal pain. Denies significant NSAID usage. No EGD history. Last colonoscopy was 5 to 6 years ago which showed some polyps. Patient was borderline hypotensive in the emergency department upon presentation- blood pressures 96/57. Patient came up nicely after 1 L fluid and has been maintaining his systolic pressures in the 120s. INR was 2.5. Lactic acid 3.2. Past Medical History:   Diagnosis Date    Asthma     COPD (chronic obstructive pulmonary disease) ([facility] [address]     Unspecified cerebral artery occlusion with cerebral infarction        Past Surgical History:   Procedure Laterality Date    ABDOMEN SURGERY      multiple; intestinal abscess; hernia repair;     APPENDECTOMY      ECHO COMPL W DOP COLOR FLOW  3/16/2013         HERNIA REPAIR      JOINT REPLACEMENT  6-11-12       Medications Prior to Admission:    Prior to Admission medications    Medication Sig Start Date End Date Taking?  Authorizing Provider   glycerin-hypromellose- (ARTIFICIAL TEARS) 0.2-0.2-1 % SOLN opthalmic solution Place 1 drop into both eyes 2 times daily   Yes Historical Provider, MD   ALPHAGAN P 0.1 % SOLN 1 drop 2 times daily BOTH EYES bid 6/12/19  Yes Historical Provider, MD   fluticasone (FLONASE) 50 MCG/ACT nasal spray INSTILL 2 PUFFS IN [facility] abnormalities. Lungs:  No chest wall tenderness. Heart:  Heart regular rate and rhythm  Abdomen:  Soft, non-tendernon disnteded  Extremities: Extremities warm to touch, pink, with no edema. Male Rectal:  Stool: Guiac positive    LABS:    CBC  Recent Labs     10/07/20  1010   WBC 8.9   HGB 10.9*   HCT 35.1*        BMP  Recent Labs     10/07/20  1010      K 5.8*   *   CO2 22   BUN 68*   CREATININE 1.2   CALCIUM 8.9     Liver Function  Recent Labs     10/07/20  1010   BILITOT 0.4   AST 12   ALT 10   ALKPHOS 83   PROT 5.8*   LABALBU 3.3*     No results for input(s): LACTATE in the last 72 hours. Recent Labs     10/07/20  1010   INR 2.5       RADIOLOGY    Ct Abdomen Pelvis W Iv Contrast Additional Contrast? None    Result Date: 10/7/2020  EXAMINATION: CT OF THE ABDOMEN AND PELVIS WITH CONTRAST 10/7/2020 12:15 pm TECHNIQUE: CT of the abdomen and pelvis was performed with the administration of intravenous contrast. Multiplanar reformatted images are provided for review. Dose modulation, iterative reconstruction, and/or weight based adjustment of the mA/kV was utilized to reduce the radiation dose to as low as reasonably achievable. COMPARISON: 03/14/2008 HISTORY: ORDERING SYSTEM PROVIDED HISTORY: rectal bleeding, vomiting, on anticoagulation. evaluate for perforation TECHNOLOGIST PROVIDED HISTORY: Reason for exam:->rectal bleeding, vomiting, on anticoagulation. evaluate for perforation Additional Contrast?->None Rectal bleeding FINDINGS: Lower Chest: There is a small patchy infiltrate seen within the lingula. There are emphysematous changes noted within the lung bases. There is very minimal dependent atelectasis seen within the lung bases. Organs:  Liver enhances normally without evidence of intrahepatic biliary ductal dilatation. The spleen is not enlarged. There is a 1 cm cyst seen within the spleen. There is no pancreatic mass. Bilateral renal cortical thinning is noted.   There are bilateral

## 2020-10-07 NOTE — ED PROVIDER NOTES
This is a 57-year-old male with history of atrial fibrillation, COPD, presenting to the emergency department for weakness, lightheadedness, hematochezia. He states last night he had an episode of emesis, and this morning at 6 AM he had a grossly bloody bowel movement. He is not sure whether he has had melena prior to this as he is colorblind. He is not complaining of any abdominal pain, but is complaining of severe lightheadedness without syncope, generalized weakness and fatigue. He is on anticoagulation, takes warfarin. He is denying any shortness of breath above his baseline related to COPD, denies any chest pain, fever, chills. He has a remote history of abdominal surgery, hemicolectomy. He has no fever, chills. Review of Systems   Constitutional: Positive for fatigue. Negative for chills and fever. HENT: Negative for trouble swallowing. Eyes: Negative for visual disturbance. Respiratory: Negative for cough and shortness of breath. Cardiovascular: Negative for chest pain and leg swelling. Gastrointestinal: Positive for blood in stool and vomiting. Negative for abdominal distention, abdominal pain, constipation, diarrhea, nausea and rectal pain. Genitourinary: Negative for dysuria. Musculoskeletal: Negative for back pain and neck pain. Skin: Negative for rash. Neurological: Positive for light-headedness. Negative for syncope, weakness and numbness. Physical Exam  Vitals signs and nursing note reviewed. Constitutional:       Appearance: He is not diaphoretic. Comments: Elderly male sitting in bed comfortably   HENT:      Head: Normocephalic and atraumatic. Right Ear: External ear normal.      Left Ear: External ear normal.      Nose: Nose normal.      Mouth/Throat:      Mouth: Mucous membranes are moist.      Pharynx: Oropharynx is clear. Eyes:      General:         Right eye: No discharge. Left eye: No discharge.       Extraocular Movements: Extraocular movements intact. Comments: Left pupil irregular, 4 mm, right pupil 3 mm. Patient states he is blind in his right eye. Conjunctivae mildly pale   Neck:      Musculoskeletal: No neck rigidity. Cardiovascular:      Rate and Rhythm: Regular rhythm. Tachycardia present. Pulses: Normal pulses. Heart sounds: Normal heart sounds. Pulmonary:      Effort: Pulmonary effort is normal. No respiratory distress. Breath sounds: Wheezing present. No rhonchi or rales. Comments: Scattered wheezes throughout bilateral lung fields  Abdominal:      Tenderness: There is no guarding. Comments: Round, soft, very mildly tender to deep palpation in the periumbilical region. Midline surgical scar noted   Genitourinary:     Rectum: Guaiac result positive. Comments: Grossly bloody, Hemoccult positive  Musculoskeletal:      Right lower leg: No edema. Left lower leg: No edema. Skin:     General: Skin is warm and dry. Capillary Refill: Capillary refill takes less than 2 seconds. Coloration: Skin is not jaundiced. Neurological:      General: No focal deficit present. Mental Status: He is alert. Comments: 5 out of 5 strength bilateral UE   Psychiatric:         Mood and Affect: Mood normal.          Procedures     MDM  Number of Diagnoses or Management Options  Anemia due to acute blood loss:   Fatigue, unspecified type:   Hyperkalemia:   Lower GI bleed:   Diagnosis management comments: This is a 66-year-old male presents emergency department for fatigue, lightheadedness, bleeding. Patient is initially mildly hypotensive, mildly tachycardic. Jennifer Valdes Patient is on anticoagulation, will obtain coags, type and screen, and give initial fluid resuscitation, obtain lab work to evaluate for severity of anemia. Given generalized fatigue, lightheadedness will also obtain EKG, troponin. Work-up remarkable for drop in hemoglobin from previous of 14.1 to 10.9 today.   Patient's INR is therapeutic at 2.5. Patient was initially mildly hypotensive, mildly tachycardic, this improved with 1 L of fluid. Patient also had mild hyperkalemia, he was given calcium to prevent any adverse cardiac effects. Patient has elevated BUN consistent with GI bleed, patient has not had any additional bloody stools here in the emergency department. Given patient's symptomatic anemia, extreme fatigue and lightheadedness, the fact that he is on anticoagulation with an unknown site of GI bleed, patient will be admitted to the hospital for further monitoring, observation, possible work-up into the etiology of the GI bleed. Patient was initially hesitant to be admitted to the hospital, after in-depth discussion with the patient, his wife, he is agreeable to admission. ED Course as of Oct 07 1437   Wed Oct 07, 2020   1121 Elevated lactate, will obtain CT imaging of the abdomen. Patient's INR is therapeutic, blood pressure is improved with fluids    [RH]   1330 Patient reevaluated, is lying in bed comfortably. After in-depth discussion patient is agreeable to admission to the hospital. He is still feeling weaker than his baseline, but feels well at this time. He has not had any additional episodes of bloody bowel movement here in the emergency department. [RH]   4828 Discussed case with Dr. Deidre Adame, he agreed to admit the patient to intermediate floor with telemetry. [RH]      ED Course User Index  [RH] [address],         ED Course as of Oct 07 1437   Wed Oct 07, 2020   1121 Elevated lactate, will obtain CT imaging of the abdomen. Patient's INR is therapeutic, blood pressure is improved with fluids    [RH]   1330 Patient reevaluated, is lying in bed comfortably. After in-depth discussion patient is agreeable to admission to the hospital. He is still feeling weaker than his baseline, but feels well at this time.   He has not had any additional episodes of bloody bowel movement here in the emergency department. [RH]   6008 Discussed case with Dr. Eliane Loera, he agreed to admit the patient to intermediate floor with telemetry. [RH]      ED Course User Index  [RH] [address], DO       --------------------------------------------- PAST HISTORY ---------------------------------------------  Past Medical History:  has a past medical history of Asthma, COPD (chronic obstructive pulmonary disease) ([facility] [address] and Unspecified cerebral artery occlusion with cerebral infarction. Past Surgical History:  has a past surgical history that includes Appendectomy; hernia repair; joint replacement (6-11-12); ECHO Compl W Dop Color Flow (3/16/2013); and Abdomen surgery. Social History:  reports that he quit smoking about 38 years ago. He started smoking about 75 years ago. He has never used smokeless tobacco. He reports that he does not drink alcohol or use drugs. Family History: family history is not on file. The patients home medications have been reviewed. Allergies: Patient has no known allergies.     -------------------------------------------------- RESULTS -------------------------------------------------    LABS:  Results for orders placed or performed during the hospital encounter of 10/07/20   CBC Auto Differential   Result Value Ref Range    WBC 8.9 4.5 - 11.5 E9/L    RBC 3.71 (L) 3.80 - 5.80 E12/L    Hemoglobin 10.9 (L) 12.5 - 16.5 g/dL    Hematocrit 35.1 (L) 37.0 - 54.0 %    MCV 94.6 80.0 - 99.9 fL    MCH 29.4 26.0 - 35.0 pg    MCHC 31.1 (L) 32.0 - 34.5 %    RDW 15.0 11.5 - 15.0 fL    Platelets 773 348 - 296 E9/L    MPV 10.3 7.0 - 12.0 fL    Neutrophils % 63.8 43.0 - 80.0 %    Immature Granulocytes % 0.3 0.0 - 5.0 %    Lymphocytes % 26.3 20.0 - 42.0 %    Monocytes % 8.2 2.0 - 12.0 %    Eosinophils % 1.1 0.0 - 6.0 %    Basophils % 0.3 0.0 - 2.0 %    Neutrophils Absolute 5.64 1.80 - 7.30 E9/L    Immature Granulocytes # 0.03 E9/L    Lymphocytes Absolute 2.33 1.50 - 4.00 E9/L changes  Comparison: stable as compared to patient's most recent EKG, no change from 11/29/19      ------------------------- NURSING NOTES AND VITALS REVIEWED ---------------------------  Date / Time Roomed:  10/7/2020  9:25 AM  ED Bed Assignment:  21/21    The nursing notes within the ED encounter and vital signs as below have been reviewed. Patient Vitals for the past 24 hrs:   BP Temp Temp src Pulse Resp SpO2 Height Weight   10/07/20 1434 (!) 113/56 -- -- 70 18 94 % -- --   10/07/20 1059 (!) 125/56 -- -- 95 16 92 % -- --   10/07/20 0951 (!) 96/57 98 °F (36.7 °C) Oral 104 16 93 % 6' (1.829 m) 215 lb (97.5 kg)       Oxygen Saturation Interpretation: Abnormal - but at baseline    ------------------------------------------ PROGRESS NOTES ------------------------------------------  Re-evaluation(s):  See ED Course    Counseling:  I have spoken with the patient and wife and discussed todays results, in addition to providing specific details for the plan of care and counseling regarding the diagnosis and prognosis. Their questions are answered at this time and they are agreeable with the plan of admission.    --------------------------------- ADDITIONAL PROVIDER NOTES ---------------------------------  Consultations:  See ED Course   This patient's ED course included: a personal history and physicial examination, re-evaluation prior to disposition, multiple bedside re-evaluations, IV medications, cardiac monitoring and continuous pulse oximetry    This patient has remained hemodynamically stable during their ED course. Diagnosis:  1. Lower GI bleed    2. Anemia due to acute blood loss    3. Fatigue, unspecified type    4. Hyperkalemia        Disposition:  Patient's disposition: Admit to intermediate with telemetry  Patient's condition is stable.          [address] DO  Resident  10/07/20 8619

## 2020-10-07 NOTE — PROGRESS NOTES
Call returned from Dr. Kenny Martin, aware of consult. Message sent to gen surg resident via [employer] regarding new consult per Dr. Audi Kern request. Maria E Carranza at [phone].

## 2020-10-08 LAB
HCT VFR BLD CALC: 27.8 % (ref 37–54)
HCT VFR BLD CALC: 29.6 % (ref 37–54)
HEMOGLOBIN: 9.1 G/DL (ref 12.5–16.5)
HEMOGLOBIN: 9.3 G/DL (ref 12.5–16.5)
INR BLD: 2.4
PROTHROMBIN TIME: 28.4 SEC (ref 9.3–12.4)

## 2020-10-08 PROCEDURE — 36415 COLL VENOUS BLD VENIPUNCTURE: CPT

## 2020-10-08 PROCEDURE — 97165 OT EVAL LOW COMPLEX 30 MIN: CPT

## 2020-10-08 PROCEDURE — 6370000000 HC RX 637 (ALT 250 FOR IP): Performed by: INTERNAL MEDICINE

## 2020-10-08 PROCEDURE — 97161 PT EVAL LOW COMPLEX 20 MIN: CPT

## 2020-10-08 PROCEDURE — 6360000002 HC RX W HCPCS: Performed by: INTERNAL MEDICINE

## 2020-10-08 PROCEDURE — 85018 HEMOGLOBIN: CPT

## 2020-10-08 PROCEDURE — 94664 DEMO&/EVAL PT USE INHALER: CPT

## 2020-10-08 PROCEDURE — 85014 HEMATOCRIT: CPT

## 2020-10-08 PROCEDURE — 94640 AIRWAY INHALATION TREATMENT: CPT

## 2020-10-08 PROCEDURE — 85610 PROTHROMBIN TIME: CPT

## 2020-10-08 PROCEDURE — 2060000000 HC ICU INTERMEDIATE R&B

## 2020-10-08 RX ORDER — ALBUTEROL SULFATE 2.5 MG/3ML
2.5 SOLUTION RESPIRATORY (INHALATION) 4 TIMES DAILY
Status: DISCONTINUED | OUTPATIENT
Start: 2020-10-08 | End: 2020-10-11 | Stop reason: HOSPADM

## 2020-10-08 RX ADMIN — FLUTICASONE PROPIONATE 1 PUFF: 110 AEROSOL, METERED RESPIRATORY (INHALATION) at 20:40

## 2020-10-08 RX ADMIN — TAMSULOSIN HYDROCHLORIDE 0.4 MG: 0.4 CAPSULE ORAL at 09:23

## 2020-10-08 RX ADMIN — ALBUTEROL SULFATE 2.5 MG: 2.5 SOLUTION RESPIRATORY (INHALATION) at 09:28

## 2020-10-08 RX ADMIN — MONTELUKAST SODIUM 10 MG: 10 TABLET, FILM COATED ORAL at 21:49

## 2020-10-08 RX ADMIN — ALBUTEROL SULFATE 2.5 MG: 2.5 SOLUTION RESPIRATORY (INHALATION) at 17:00

## 2020-10-08 RX ADMIN — Medication 1 TABLET: at 09:23

## 2020-10-08 RX ADMIN — ALBUTEROL SULFATE 2.5 MG: 2.5 SOLUTION RESPIRATORY (INHALATION) at 13:04

## 2020-10-08 RX ADMIN — BRIMONIDINE TARTRATE 1 DROP: 2 SOLUTION OPHTHALMIC at 21:45

## 2020-10-08 RX ADMIN — FLUTICASONE PROPIONATE 1 PUFF: 110 AEROSOL, METERED RESPIRATORY (INHALATION) at 09:28

## 2020-10-08 RX ADMIN — DOCUSATE SODIUM 200 MG: 100 CAPSULE, LIQUID FILLED ORAL at 21:50

## 2020-10-08 RX ADMIN — ALBUTEROL SULFATE 2.5 MG: 2.5 SOLUTION RESPIRATORY (INHALATION) at 20:30

## 2020-10-08 RX ADMIN — BRIMONIDINE TARTRATE 1 DROP: 2 SOLUTION OPHTHALMIC at 09:23

## 2020-10-08 RX ADMIN — Medication 1 TABLET: at 21:42

## 2020-10-08 ASSESSMENT — PAIN SCALES - GENERAL: PAINLEVEL_OUTOF10: 0

## 2020-10-08 NOTE — PROGRESS NOTES
Physical Therapy    Facility/Department: [address] INTERMEDIATE  Initial Assessment    NAME: Tamela Mathew  : 1929  MRN: [id number]    Date of Service: 10/8/2020       REQUIRES PT FOLLOW UP: Yes       Patient Diagnosis(es): The primary encounter diagnosis was Lower GI bleed. Diagnoses of Anemia due to acute blood loss, Fatigue, unspecified type, and Hyperkalemia were also pertinent to this visit. has a past medical history of Asthma, COPD (chronic obstructive pulmonary disease) ([address] and Unspecified cerebral artery occlusion with cerebral infarction. has a past surgical history that includes Appendectomy; hernia repair; joint replacement (12); ECHO Compl W Dop Color Flow (3/16/2013); and Abdomen surgery. Evaluating Therapist: Jasmin Oshea PT         Room #: 985  DIAGNOSIS: GIB   PRECAUTIONS: falls , decreased vision      Social:  Pt lives with wife  in a  1  floor plan  no steps  to enter. Uses basement   Prior to admission pt walked with  Bobbye Brace or ww        Initial Evaluation  Date:  10/8/2020 Treatment      Short Term/ Long Term   Goals   Was pt agreeable to Eval/treatment? yes        Does pt have pain?  none reported        Bed Mobility  Rolling:  Nt   Supine to sit:  SBA   Sit to supine:  NT   Scooting:  Independent in sit     independent    Transfers Sit to stand:  SBA  Stand to sit:  SBA  Stand pivot:  SBA    Independent    Ambulation     140  feet with ww  with  Supervision /SBA   200 feet with  Cane/ww   with  Independent          Stair negotiation: ascended and descended NT   12  steps with 1 rail with  Independent    LE ROM  WFL       LE strength  4/5        AM- PAC RAW score   18/ 24                 Pt is alert and Oriented x  3       Balance: SBA . Fall risk due to decreased vision      Chair alarm: no      ASSESSMENT    Pt displays functional ability as noted in the objective portion of this evaluation.       Comments/Treatment:  Mobility as above.    Pt left sitting in chair with call light in reach       Patient education  Pt educated on falls, safe and proper technique with all mobiltiy and ww use     Patient response to education:   Pt verbalized understanding Pt demonstrated skill Pt requires further education in this area   x   x      Rehab potential is Good for reaching above PT goals.       Pts/ family goals     1. Home      Patient and or family understand(s) diagnosis, prognosis, and plan of care. -  Yes      PLAN    PT care will be provided in accordance with the objectives noted above. Whenever appropriate, clear delegation orders will be provided for nursing staff. Exercises and functional mobility practice will be used as well as appropriate assistive devices or modalities to obtain goals.  Patient and family education will also be administered as needed.     Frequency of treatments will be 2-5 x/week x  5 days.     Time in:  1019   Time out: [address] [place] [facility] number:  PT [phone]

## 2020-10-08 NOTE — PROGRESS NOTES
Paged Dr Yolis Vásquez to see if patient is able to have a diet today.  Awaiting call back    [phone] Caitlyn Neal for a diet per surgery if okay with Dr Brooke Walter

## 2020-10-08 NOTE — PROGRESS NOTES
Occupational Therapy  OCCUPATIONAL THERAPY INITIAL EVALUATION      Date:10/8/2020  Patient Name: Marianna Deleon  MRN: [id number]  : 1929  Room: [address] [place] [address]    Referring Provider: Enrique Patel DO     Evaluating OT: Stefan Tripp OTR/L [id number]    AM-PAC Daily Activity Raw Score:     Recommended Adaptive Equipment: TBD    Diagnosis: GI bleed. Pertinent Medical History: CVA, COPD, asthma   Precautions:  Falls, legally blind     Home Living: Pt lives with wife in a single story home with useable basement. Bathroom setup: walk in shower in basement, standard commode     Prior Level of Function: Independent with ADLs, wife assists with IADLs; completed functional mobility with SPC on 1st floor, WW in basement    Pain Level: no reported pain    Cognition: A&O: 4/4. Pleasant and cooperative throughout   Problem solving:  WFL   Judgement/safety:  WFL     Functional Assessment:   Initial Eval Status  Date: 10/8/20 Treatment session:  Short Term Goals  Treatment frequency: 2-5x/wk PRN x1-3 wks     Feeding Independent     Grooming Set up  Independent   UB Dressing Set up  Independent   LB Dressing SBA  Donning/doffing socks  Mod I    Bathing SBA  Mod I   Toileting SBA   Mod I   Bed Mobility  Supine to sit: Independent     Functional Transfers STS: SBA  Independent   Functional Mobility SBA with WW  Hallway distance  Mod I during ADLs   Balance Sitting: fair plus    Standing: fair at [employer]     Activity Tolerance Fair plus  standing rubi x6-7 min with fair plus balance during self care tasks             Treatment: Patient educated on techniques for completion of ADL, safe functional transfers and functional mobility. Patient required cues for follow through with proper hand/foot placement, pacing, safety and technique in bed mobility, functional transfers, functional mobility and LB dressing in preparation for maximum independence in all self care tasks.      Hand Dominance: Right []  Left []   Strength ROM Additional Info:    RUE  4/5 WFL good  and FMC/dexterity noted during ADL tasks     LUE 4/5 WFL good  and FMC/dexterity noted during ADL tasks         Hearing: [place]  Vision: limited vision  Sensation:  No c/o numbness or tingling   Tone: WFL   Edema: mild B LEs                             Long Term Goal (1-3 wks): Pt will maximize functional performance in all self care tasks/functional transfers with good follow through of all trained techniques for safe transition to next level of care    Eval Complexity: Low    Assessment of current deficits   Functional mobility [x]  ADLs [x] Strength [x]  Cognition []  Functional transfers  [x] IADLs [x] Safety Awareness [x]  Endurance [x]  Fine Motor Coordination [] Balance [x] Vision/perception [] Sensation []   Gross Motor Coordination [] ROM [] Delirium []                  Motor Control []    Plan of Care:   [x] ADL retraining/AE recommendations specific to decreased vision, weakness  [x] Energy Conservation Techniques/Strategies      [] Neuromuscular Re-Education      [x] Functional Transfer Training         [x] Functional Mobility Training          [] Cognitive Re-Training         [] Splinting/Positioning Needs           [x] Therapeutic Activity   [x]Therapeutic Exercise   [] Visual/Perceptual  [x] Delirium Prevention/Treatment   [x] Positioning to Improve Functional Prentiss, Safety, and Skin Integrity   [x] Patient and/or Family Education to Increase Safety and Functional Prentiss   [] Other:     Rehab Potential: Good for established goals     Patient / Family Goal: To get home. Patient and/or family were instructed on functional diagnosis, prognosis/goals and OT plan of care. Pt and wife verbalized understanding. Upon arrival, patient supine in bed. At end of session, patient seated in armchair with call light and phone within reach, all lines and tubes intact.   Pt would benefit from continued skilled OT to increase safety and independence with completion of ADL/IADL tasks for functional independence and quality of life.      Low Evaluation  Time In: 1020   Time Out: 1031      Evaluation time includes thorough review of current medical information, gathering information on past medical history/social history and prior level of function, completion of standardized testing/informal observation of tasks, assessment of data, and development of POC/Goals    Daja Hazard OTR/L  [id number]

## 2020-10-08 NOTE — PROGRESS NOTES
[facility] Quality Flow/Interdisciplinary Rounds Progress Note        Quality Flow Rounds held on October 8, 2020    Disciplines Attending:  Bedside Nurse,  and     Beth Reeder was admitted on 10/7/2020  9:25 AM    Anticipated Discharge Date:  Expected Discharge Date: 10/09/20    Disposition:    Dwayne Score:  Dwayne Scale Score: 19    Readmission Risk              Risk of Unplanned Readmission:        13           Discussed patient goal for the day, patient clinical progression, and barriers to discharge. The following Goal(s) of the Day/Commitment(s) have been identified:  monitor H&H values.       Ambrosio Quiñonez  October 8, 2020

## 2020-10-08 NOTE — PROGRESS NOTES
GENERAL SURGERY  DAILY PROGRESS NOTE  10/8/2020    Subjective:  Admits to bloody BM overnight. Denies abdominal pain, nausea, vomiting    Objective:  BP (!) 130/58   Pulse 88   Temp 97.7 °F (36.5 °C) (Oral)   Resp 18   Ht 6' (1.829 m)   Wt 200 lb 8 oz (90.9 kg)   SpO2 93%   BMI 27.19 kg/m²     GENERAL:  Laying in bed, awake, alert, cooperative, no apparent distress  LUNGS:  No increased work of breathing  CARDIOVASCULAR:  RR  ABDOMEN:  Soft, non-tender, non-distended  EXTREMITIES: No edema or swelling    Assessment/Plan:  80 y.o. male with hematochezia    - Q8 hr H/H, transfuse prn hgb <7  - Continue to monitor for bleeding  - May need colonoscopy tomorrow if he continues to bleed    Electronically signed by Radha Roberts MD on 10/8/2020 at 5:04 AM     ATTENDING PHYSICIAN PROGRESS NOTE      I have examined the patient, reviewed the record, and discussed the case with the Resident. I have reviewed all relevant labs and imaging data. Please refer to the resident's note. I agree with the assessment and plan with the following corrections/ additions. The following summarizes my clinical findings and independent assessment.      Alpa Walters

## 2020-10-08 NOTE — H&P
CHIEF COMPLAINT:  Blood in stool      HISTORY OF PRESENT ILLNESS:      The patient is a 80 y.o. male patient presents with increased rectal bleeding. He has no abdominal pain nor fever. 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 He is elderly but in relative good shape, except extremely hard of hearing. He is on chronic anticoagulation due to atrial fib with prior CVA. Past Medical History:    Past Medical History:   Diagnosis Date    Asthma     COPD (chronic obstructive pulmonary disease) ([address]     Unspecified cerebral artery occlusion with cerebral infarction        Past Surgical History:    Past Surgical History:   Procedure Laterality Date    ABDOMEN SURGERY      multiple; intestinal abscess; hernia repair;     APPENDECTOMY      ECHO COMPL W DOP COLOR FLOW  3/16/2013         HERNIA REPAIR      JOINT REPLACEMENT  6-11-12       Medications Prior to Admission:    Medications Prior to Admission: glycerin-hypromellose- (ARTIFICIAL TEARS) 0.2-0.2-1 % SOLN opthalmic solution, Place 1 drop into both eyes 2 times daily  ALPHAGAN P 0.1 % SOLN, 1 drop 2 times daily BOTH EYES bid  fluticasone (FLONASE) 50 MCG/ACT nasal spray, INSTILL 2 PUFFS IN EACH NOSTRIL DAILY  FLOVENT  MCG/ACT inhaler, TAKE 1 PUFF BY MOUTH TWICE A DAY  warfarin (COUMADIN) 1 MG tablet, Skip today's dose, resume 9/21/2017 (Patient taking differently: Take 1 mg by mouth See Admin Instructions Indications: Cerebral Infarction )  Multiple Vitamins-Minerals (PRESERVISION AREDS 2) CAPS, Take 1 capsule by mouth 2 times daily  docusate sodium (COLACE) 100 MG capsule, Take 200 mg by mouth nightly  tamsulosin (FLOMAX) 0.4 MG capsule, Take 0.4 mg by mouth daily  montelukast (SINGULAIR) 10 MG tablet, Take 10 mg by mouth nightly. fluticasone-salmeterol (ADVAIR) 500-50 MCG/DOSE diskus inhaler, Inhale 1 puff into the lungs 2 times daily.   [DISCONTINUED] timolol (TIMOPTIC) 0.5 % ophthalmic solution,     Allergies:    Patient has no known allergies. Social History:    reports that he quit smoking about 38 years ago. He started smoking about 75 years ago. He has never used smokeless tobacco. He reports that he does not drink alcohol or use drugs. Family History:   family history is not on file. REVIEW OF SYSTEMS:  As above in the HPI, otherwise negative    PHYSICAL EXAM:    Vitals:  BP (!) 130/58   Pulse 88   Temp 97.7 °F (36.5 °C) (Oral)   Resp 18   Ht 6' (1.829 m)   Wt 200 lb 8 oz (90.9 kg)   SpO2 93%   BMI 27.19 kg/m²     General:  Awake, alert, oriented X 3. Well developed, well nourished, well groomed. No apparent distress. HEENT:  Normocephalic, atraumatic. Pupils equal, round, reactive to light. No scleral icterus. No conjunctival injection. Normal lips, teeth, and gums. No nasal discharge. Neck:  Supple  Heart:  RRR, no murmurs, gallops, rubs  Lungs:  CTA bilaterally, bilat symmetrical expansion, no wheeze, rales, or rhonchi  Abdomen:   Bowel sounds present, soft, nontender, no masses, no organomegaly, no peritoneal signs  Extremities:  No clubbing, cyanosis, or edema  Skin:  Warm and dry, no open lesions or rash  Neuro:  Cranial nerves 2-12 intact, no focal deficits  Breast: deferred  Rectal: deferred  Genitalia:  deferred    LABS:  CBC with Differential:    Lab Results   Component Value Date    WBC 8.9 10/07/2020    RBC 3.71 10/07/2020    HGB 9.1 10/08/2020    HCT 27.8 10/08/2020     10/07/2020    MCV 94.6 10/07/2020    MCH 29.4 10/07/2020    MCHC 31.1 10/07/2020    RDW 15.0 10/07/2020    SEGSPCT 89 03/16/2013    LYMPHOPCT 26.3 10/07/2020    MONOPCT 8.2 10/07/2020    BASOPCT 0.3 10/07/2020    MONOSABS 0.73 10/07/2020    LYMPHSABS 2.33 10/07/2020    EOSABS 0.10 10/07/2020    BASOSABS 0.03 10/07/2020     CMP:    Lab Results   Component Value Date     10/07/2020    K 5.8 10/07/2020    K 4.3 11/29/2019     10/07/2020    CO2 22 10/07/2020    BUN 68 10/07/2020    CREATININE 1.2 10/07/2020    GFRAA >60 10/07/2020    LABGLOM 57 10/07/2020    GLUCOSE 143 10/07/2020    GLUCOSE 98 10/14/2010    PROT 5.8 10/07/2020    LABALBU 3.3 10/07/2020    LABALBU 3.6 10/14/2010    CALCIUM 8.9 10/07/2020    BILITOT 0.4 10/07/2020    ALKPHOS 83 10/07/2020    AST 12 10/07/2020    ALT 10 10/07/2020     PT/INR:    Lab Results   Component Value Date    PROTIME 28.7 10/07/2020    PROTIME 20.5 10/14/2010    INR 2.5 10/07/2020     @cktotal:3,ckmb:3,ckmbindex:3,troponini:3@  U/A:    Lab Results   Component Value Date    NITRU Negative 07/29/2019    COLORU Yellow 07/29/2019    PHUR 6.0 07/29/2019    WBCUA 1-3 07/29/2019    RBCUA NONE 07/29/2019    RBCUA NONE 03/15/2013    BACTERIA RARE 07/29/2019    CLARITYU Clear 07/29/2019    SPECGRAV 1.015 07/29/2019    UROBILINOGEN 1.0 07/29/2019    BILIRUBINUR Negative 07/29/2019    BLOODU Negative 07/29/2019    GLUCOSEU Negative 07/29/2019    KETUA Negative 07/29/2019          ASSESSMENT:      Acute blood loss anemia      PLAN:    Acute blood loss anemia-  Hb down 3 grams or more from baseline  It appears stable on last draw   no BM this am  Family elects conservative approach, this is reasonable if bleeding self limited  He needs coumadin-(prior CVA off in fact)  Will hold today and monitor    Asthma  Wheezing this am  Add aerosols    Atrial fib with old CVA  Hold coumadin today   Follow counts   Will d/w surgery      Alice Lawrence DO  6:46 AM  10/8/2020

## 2020-10-08 NOTE — CARE COORDINATION
CM NOTE: Per QFR--- admitted with GIB. States having dark tarry stools & intermittent bright red stools. +IVF. NPO. Pt is from home with wife. Plan is colonoscopy 10/9/20. Therapy evals pending.

## 2020-10-09 LAB
ANION GAP SERPL CALCULATED.3IONS-SCNC: 8 MMOL/L (ref 7–16)
BUN BLDV-MCNC: 51 MG/DL (ref 8–23)
CALCIUM SERPL-MCNC: 8.5 MG/DL (ref 8.6–10.2)
CHLORIDE BLD-SCNC: 112 MMOL/L (ref 98–107)
CO2: 23 MMOL/L (ref 22–29)
CREAT SERPL-MCNC: 1.3 MG/DL (ref 0.7–1.2)
GFR AFRICAN AMERICAN: >60
GFR NON-AFRICAN AMERICAN: 52 ML/MIN/1.73
GLUCOSE BLD-MCNC: 127 MG/DL (ref 74–99)
HCT VFR BLD CALC: 26.4 % (ref 37–54)
HEMOGLOBIN: 8.4 G/DL (ref 12.5–16.5)
MCH RBC QN AUTO: 29.6 PG (ref 26–35)
MCHC RBC AUTO-ENTMCNC: 31.8 % (ref 32–34.5)
MCV RBC AUTO: 93 FL (ref 80–99.9)
PDW BLD-RTO: 15.7 FL (ref 11.5–15)
PLATELET # BLD: 132 E9/L (ref 130–450)
PMV BLD AUTO: 9.6 FL (ref 7–12)
POTASSIUM SERPL-SCNC: 4 MMOL/L (ref 3.5–5)
RBC # BLD: 2.84 E12/L (ref 3.8–5.8)
SODIUM BLD-SCNC: 143 MMOL/L (ref 132–146)
WBC # BLD: 6.2 E9/L (ref 4.5–11.5)

## 2020-10-09 PROCEDURE — 2580000003 HC RX 258: Performed by: INTERNAL MEDICINE

## 2020-10-09 PROCEDURE — 2060000000 HC ICU INTERMEDIATE R&B

## 2020-10-09 PROCEDURE — 6370000000 HC RX 637 (ALT 250 FOR IP): Performed by: INTERNAL MEDICINE

## 2020-10-09 PROCEDURE — 80048 BASIC METABOLIC PNL TOTAL CA: CPT

## 2020-10-09 PROCEDURE — 6360000002 HC RX W HCPCS: Performed by: INTERNAL MEDICINE

## 2020-10-09 PROCEDURE — 97530 THERAPEUTIC ACTIVITIES: CPT

## 2020-10-09 PROCEDURE — 36415 COLL VENOUS BLD VENIPUNCTURE: CPT

## 2020-10-09 PROCEDURE — 97535 SELF CARE MNGMENT TRAINING: CPT

## 2020-10-09 PROCEDURE — 85027 COMPLETE CBC AUTOMATED: CPT

## 2020-10-09 PROCEDURE — 94640 AIRWAY INHALATION TREATMENT: CPT

## 2020-10-09 RX ORDER — SODIUM CHLORIDE 450 MG/100ML
INJECTION, SOLUTION INTRAVENOUS CONTINUOUS
Status: DISCONTINUED | OUTPATIENT
Start: 2020-10-09 | End: 2020-10-11 | Stop reason: HOSPADM

## 2020-10-09 RX ADMIN — ALBUTEROL SULFATE 2.5 MG: 2.5 SOLUTION RESPIRATORY (INHALATION) at 12:26

## 2020-10-09 RX ADMIN — FLUTICASONE PROPIONATE 1 PUFF: 110 AEROSOL, METERED RESPIRATORY (INHALATION) at 20:26

## 2020-10-09 RX ADMIN — FLUTICASONE PROPIONATE 2 SPRAY: 50 SPRAY, METERED NASAL at 10:41

## 2020-10-09 RX ADMIN — SODIUM CHLORIDE: 4.5 INJECTION, SOLUTION INTRAVENOUS at 08:48

## 2020-10-09 RX ADMIN — BRIMONIDINE TARTRATE 1 DROP: 2 SOLUTION OPHTHALMIC at 08:47

## 2020-10-09 RX ADMIN — Medication 1 TABLET: at 08:46

## 2020-10-09 RX ADMIN — ALBUTEROL SULFATE 2.5 MG: 2.5 SOLUTION RESPIRATORY (INHALATION) at 20:26

## 2020-10-09 RX ADMIN — BRIMONIDINE TARTRATE 1 DROP: 2 SOLUTION OPHTHALMIC at 22:44

## 2020-10-09 RX ADMIN — SODIUM CHLORIDE: 4.5 INJECTION, SOLUTION INTRAVENOUS at 19:51

## 2020-10-09 RX ADMIN — FLUTICASONE PROPIONATE 1 PUFF: 110 AEROSOL, METERED RESPIRATORY (INHALATION) at 09:35

## 2020-10-09 RX ADMIN — ALBUTEROL SULFATE 2.5 MG: 2.5 SOLUTION RESPIRATORY (INHALATION) at 16:37

## 2020-10-09 RX ADMIN — MONTELUKAST SODIUM 10 MG: 10 TABLET, FILM COATED ORAL at 22:44

## 2020-10-09 RX ADMIN — TAMSULOSIN HYDROCHLORIDE 0.4 MG: 0.4 CAPSULE ORAL at 08:46

## 2020-10-09 RX ADMIN — ALBUTEROL SULFATE 2.5 MG: 2.5 SOLUTION RESPIRATORY (INHALATION) at 09:35

## 2020-10-09 ASSESSMENT — PAIN - FUNCTIONAL ASSESSMENT: PAIN_FUNCTIONAL_ASSESSMENT: ACTIVITIES ARE NOT PREVENTED

## 2020-10-09 ASSESSMENT — PAIN SCALES - GENERAL: PAINLEVEL_OUTOF10: 0

## 2020-10-09 NOTE — PLAN OF CARE
Problem: Falls - Risk of:  Goal: Will remain free from falls  Description: Will remain free from falls  10/9/2020 1032 by Yvonne Pineda RN  Outcome: Met This Shift  10/9/2020 0701 by Cherelle Groves RN  Outcome: Met This Shift  Goal: Absence of physical injury  Description: Absence of physical injury  10/9/2020 1032 by Yvonne Pineda RN  Outcome: Met This Shift  10/9/2020 0701 by Cherelle Groves RN  Outcome: Met This Shift

## 2020-10-09 NOTE — PROGRESS NOTES
Occupational Therapy  OT BEDSIDE TREATMENT NOTE      Date:10/9/2020  Patient Name: Yonas Peterson  MRN: [id number]  : 1929  Room: [address] [place] [address]     Referring Provider: Bam Jorge DO      Evaluating OT: Miguelito Mcdermott OTR/L [id number]     AM-PAC Daily Activity Raw Score:      Recommended Adaptive Equipment: TBD     Diagnosis: GI bleed. Pertinent Medical History: CVA, COPD, asthma   Precautions:  Falls, legally blind     Home Living: Pt lives with wife in a single story home with useable basement. Bathroom setup: walk in shower in basement, standard commode      Prior Level of Function: Independent with ADLs, wife assists with IADLs; completed functional mobility with SPC on 1st floor, WW in basement     Pain Level: No c/o pain     Cognition: A&O: 4/4 with ability to follow multi- step commands. Functional Assessment:    Initial Eval Status  Date: 10/8/20 Treatment session: 10/9/20 Short Term Goals  Treatment frequency: 2-5x/wk PRN x1-3 wks      Feeding Independent       Grooming Set up   Independent   UB Dressing Set up   Independent   LB Dressing SBA  Donning/doffing socks SBA to manage B socks  Mod I    Bathing SBA   Mod I   Toileting SBA  SBA standing with use of  Mod I   Bed Mobility  Supine to sit: Independent       Functional Transfers STS: SBA STS: SBA from arm chair  Independent   Functional Mobility SBA with [employer]  Hallway distance  SBA using ww household plus distance. Mod I during ADLs   Balance Sitting: fair plus     Standing: fair at [employer]  Sitting: Independent  Standing: SBA     Activity Tolerance Fair plus Fair+  standing rubi x6-7 min with fair plus balance during self care tasks                    B UE were WFL during tasks. Comments: Upon arrival pt was sitting in arm chair. At end of session was transferred to chair with wife present, alarm on, all lines and tubes intact and call light within reach.      Treatment: Instructed pt on safe transfer training along with AD

## 2020-10-09 NOTE — PROGRESS NOTES
[facility] Quality Flow/Interdisciplinary Rounds Progress Note        Quality Flow Rounds held on October 9, 2020    Disciplines Attending:  Bedside Nurse,  and     Bunny Stein was admitted on 10/7/2020  9:25 AM    Anticipated Discharge Date:  Expected Discharge Date: 10/09/20    Disposition:    Dwayne Score:  Dwayne Scale Score: 20    Readmission Risk              Risk of Unplanned Readmission:        16           Discussed patient goal for the day, patient clinical progression, and barriers to discharge. The following Goal(s) of the Day/Commitment(s) have been identified:  monitor H/H levels.       Lola Dozier  October 9, 2020

## 2020-10-09 NOTE — PROGRESS NOTES
07/29/2019    BLOODU Negative 07/29/2019    GLUCOSEU Negative 07/29/2019    KETUA Negative 07/29/2019        Assessment:    Patient Active Problem List   Diagnosis    [facility]    COPD with exacerbation ([facility]    Chest pain    Asthma    CAP (community acquired pneumonia)    GI bleeding       Plan:    Acute GI blood loss  Down to 8.4 this am  No further bleeding with him   He is dry  Will give gentle liter of fluid  Hold coumadin one more day  If HB remains stable , restart warfarin  If not will have to revisit scope  Expect with fluid to have further drop      Autumn Walton DO  6:39 AM  10/9/2020

## 2020-10-09 NOTE — CARE COORDINATION
CM NOTE: Per QFR--- admitted with GIB having dark tarry stools & now bright red bloody stools. Decreased H&H on IVF. Uses cane & WW to get around at home as needed. Does not use O2 & is not diabetic. Hx [facility] following knee surgery. HX HHC through ortho office---unable to recall name of agency. Discussed dx, testing, possible scope. . Pt states plan is \"absolutely home\" when ready for discharge. Declines HHC unless \"it's absolutely necessary\". CM & SW will continue to follow pt.

## 2020-10-09 NOTE — PROGRESS NOTES
GENERAL SURGERY  DAILY PROGRESS NOTE  10/9/2020    Subjective:  Denies abdominal pain, nausea, vomiting. No further bloody BMs O/N.      Objective:  BP (!) 121/58   Pulse 94   Temp 97.8 °F (36.6 °C) (Oral)   Resp 18   Ht 6' (1.829 m)   Wt 200 lb 8 oz (90.9 kg)   SpO2 96%   BMI 27.19 kg/m²     GENERAL:  Laying in bed, awake, alert, cooperative, no apparent distress  LUNGS:  No increased work of breathing  CARDIOVASCULAR:  RR  ABDOMEN:  Soft, non-tender, non-distended  EXTREMITIES: No edema or swelling    Assessment/Plan:  80 y.o. male with hematochezia    - hgb daily, transfuse prn hgb <7  - Bleeding seems to have stopped  - Can discharge if H/H stable    Electronically signed by Catrachito Pantoja MD on 10/9/2020 at 5:50 AM

## 2020-10-10 LAB
ANION GAP SERPL CALCULATED.3IONS-SCNC: 5 MMOL/L (ref 7–16)
BUN BLDV-MCNC: 35 MG/DL (ref 8–23)
CALCIUM SERPL-MCNC: 8 MG/DL (ref 8.6–10.2)
CHLORIDE BLD-SCNC: 111 MMOL/L (ref 98–107)
CO2: 22 MMOL/L (ref 22–29)
CREAT SERPL-MCNC: 1.1 MG/DL (ref 0.7–1.2)
GFR AFRICAN AMERICAN: >60
GFR NON-AFRICAN AMERICAN: >60 ML/MIN/1.73
GLUCOSE BLD-MCNC: 125 MG/DL (ref 74–99)
HCT VFR BLD CALC: 24.4 % (ref 37–54)
HEMOGLOBIN: 7.9 G/DL (ref 12.5–16.5)
HEMOGLOBIN: 8.8 G/DL (ref 12.5–16.5)
MCH RBC QN AUTO: 29.9 PG (ref 26–35)
MCHC RBC AUTO-ENTMCNC: 32.4 % (ref 32–34.5)
MCV RBC AUTO: 92.4 FL (ref 80–99.9)
PDW BLD-RTO: 15.6 FL (ref 11.5–15)
PLATELET # BLD: 120 E9/L (ref 130–450)
PMV BLD AUTO: 9.5 FL (ref 7–12)
POTASSIUM SERPL-SCNC: 3.8 MMOL/L (ref 3.5–5)
RBC # BLD: 2.64 E12/L (ref 3.8–5.8)
SODIUM BLD-SCNC: 138 MMOL/L (ref 132–146)
WBC # BLD: 6.3 E9/L (ref 4.5–11.5)

## 2020-10-10 PROCEDURE — 36415 COLL VENOUS BLD VENIPUNCTURE: CPT

## 2020-10-10 PROCEDURE — 85027 COMPLETE CBC AUTOMATED: CPT

## 2020-10-10 PROCEDURE — 80048 BASIC METABOLIC PNL TOTAL CA: CPT

## 2020-10-10 PROCEDURE — 2060000000 HC ICU INTERMEDIATE R&B

## 2020-10-10 PROCEDURE — 94640 AIRWAY INHALATION TREATMENT: CPT

## 2020-10-10 PROCEDURE — 2580000003 HC RX 258: Performed by: INTERNAL MEDICINE

## 2020-10-10 PROCEDURE — 6360000002 HC RX W HCPCS: Performed by: INTERNAL MEDICINE

## 2020-10-10 PROCEDURE — 85018 HEMOGLOBIN: CPT

## 2020-10-10 PROCEDURE — 6370000000 HC RX 637 (ALT 250 FOR IP): Performed by: INTERNAL MEDICINE

## 2020-10-10 RX ADMIN — ALBUTEROL SULFATE 2.5 MG: 2.5 SOLUTION RESPIRATORY (INHALATION) at 19:50

## 2020-10-10 RX ADMIN — BRIMONIDINE TARTRATE 1 DROP: 2 SOLUTION OPHTHALMIC at 21:06

## 2020-10-10 RX ADMIN — Medication 1 TABLET: at 21:05

## 2020-10-10 RX ADMIN — Medication 1 TABLET: at 09:37

## 2020-10-10 RX ADMIN — DOCUSATE SODIUM 200 MG: 100 CAPSULE, LIQUID FILLED ORAL at 21:05

## 2020-10-10 RX ADMIN — SODIUM CHLORIDE: 4.5 INJECTION, SOLUTION INTRAVENOUS at 12:31

## 2020-10-10 RX ADMIN — FLUTICASONE PROPIONATE 1 PUFF: 110 AEROSOL, METERED RESPIRATORY (INHALATION) at 09:22

## 2020-10-10 RX ADMIN — MONTELUKAST SODIUM 10 MG: 10 TABLET, FILM COATED ORAL at 21:05

## 2020-10-10 RX ADMIN — TAMSULOSIN HYDROCHLORIDE 0.4 MG: 0.4 CAPSULE ORAL at 09:37

## 2020-10-10 RX ADMIN — ALBUTEROL SULFATE 2.5 MG: 2.5 SOLUTION RESPIRATORY (INHALATION) at 12:22

## 2020-10-10 RX ADMIN — FLUTICASONE PROPIONATE 2 SPRAY: 50 SPRAY, METERED NASAL at 09:37

## 2020-10-10 RX ADMIN — ALBUTEROL SULFATE 2.5 MG: 2.5 SOLUTION RESPIRATORY (INHALATION) at 16:24

## 2020-10-10 RX ADMIN — FLUTICASONE PROPIONATE 1 PUFF: 110 AEROSOL, METERED RESPIRATORY (INHALATION) at 19:50

## 2020-10-10 RX ADMIN — ALBUTEROL SULFATE 2.5 MG: 2.5 SOLUTION RESPIRATORY (INHALATION) at 09:22

## 2020-10-10 RX ADMIN — BRIMONIDINE TARTRATE 1 DROP: 2 SOLUTION OPHTHALMIC at 09:37

## 2020-10-10 ASSESSMENT — PAIN SCALES - GENERAL: PAINLEVEL_OUTOF10: 0

## 2020-10-10 NOTE — PLAN OF CARE
Problem: Falls - Risk of:  Goal: Will remain free from falls  Description: Will remain free from falls  Outcome: Met This Shift  Goal: Absence of physical injury  Description: Absence of physical injury  Outcome: Met This Shift     Problem: Bleeding:  Goal: Will show no signs and symptoms of excessive bleeding  Description: Will show no signs and symptoms of excessive bleeding  Outcome: Met This Shift

## 2020-10-10 NOTE — PROGRESS NOTES
GENERAL SURGERY  DAILY PROGRESS NOTE  10/10/2020    Subjective:  Yessi Ruba yesterday per RN staff.     Objective:  /60   Pulse 68   Temp 97.7 °F (36.5 °C) (Oral)   Resp 20   Ht 6' (1.829 m)   Wt 200 lb 8 oz (90.9 kg)   SpO2 93%   BMI 27.19 kg/m²     GENERAL:  Laying in bed, awake, alert, cooperative, no apparent distress  LUNGS:  No increased work of breathing  CARDIOVASCULAR:  RR  ABDOMEN:  Soft, non-tender, non-distended  EXTREMITIES: No edema or swelling    Hgb 7.9 from 8.4    Assessment/Plan:  80 y.o. male with hematochezia    - hgb daily, transfuse prn hgb <7  - Repeat Hgb later today  - Can discharge if H/H stable    Electronically signed by Hero Pacheco MD on 10/10/2020 at 7:43 AM

## 2020-10-11 VITALS
SYSTOLIC BLOOD PRESSURE: 132 MMHG | HEIGHT: 72 IN | BODY MASS INDEX: 28.5 KG/M2 | TEMPERATURE: 97.6 F | RESPIRATION RATE: 16 BRPM | HEART RATE: 76 BPM | WEIGHT: 210.4 LBS | DIASTOLIC BLOOD PRESSURE: 60 MMHG | OXYGEN SATURATION: 93 %

## 2020-10-11 LAB
ANION GAP SERPL CALCULATED.3IONS-SCNC: 8 MMOL/L (ref 7–16)
BUN BLDV-MCNC: 25 MG/DL (ref 8–23)
CALCIUM SERPL-MCNC: 8.6 MG/DL (ref 8.6–10.2)
CHLORIDE BLD-SCNC: 106 MMOL/L (ref 98–107)
CO2: 21 MMOL/L (ref 22–29)
CREAT SERPL-MCNC: 1.2 MG/DL (ref 0.7–1.2)
GFR AFRICAN AMERICAN: >60
GFR NON-AFRICAN AMERICAN: 57 ML/MIN/1.73
GLUCOSE BLD-MCNC: 120 MG/DL (ref 74–99)
HCT VFR BLD CALC: 27.4 % (ref 37–54)
HEMOGLOBIN: 8.7 G/DL (ref 12.5–16.5)
INR BLD: 2
MCH RBC QN AUTO: 30 PG (ref 26–35)
MCHC RBC AUTO-ENTMCNC: 31.8 % (ref 32–34.5)
MCV RBC AUTO: 94.5 FL (ref 80–99.9)
PDW BLD-RTO: 15.6 FL (ref 11.5–15)
PLATELET # BLD: 149 E9/L (ref 130–450)
PMV BLD AUTO: 10 FL (ref 7–12)
POTASSIUM SERPL-SCNC: 3.8 MMOL/L (ref 3.5–5)
PROTHROMBIN TIME: 24.4 SEC (ref 9.3–12.4)
RBC # BLD: 2.9 E12/L (ref 3.8–5.8)
SODIUM BLD-SCNC: 135 MMOL/L (ref 132–146)
WBC # BLD: 6.8 E9/L (ref 4.5–11.5)

## 2020-10-11 PROCEDURE — 80048 BASIC METABOLIC PNL TOTAL CA: CPT

## 2020-10-11 PROCEDURE — 94640 AIRWAY INHALATION TREATMENT: CPT

## 2020-10-11 PROCEDURE — 36415 COLL VENOUS BLD VENIPUNCTURE: CPT

## 2020-10-11 PROCEDURE — 6370000000 HC RX 637 (ALT 250 FOR IP): Performed by: INTERNAL MEDICINE

## 2020-10-11 PROCEDURE — 85610 PROTHROMBIN TIME: CPT

## 2020-10-11 PROCEDURE — 85027 COMPLETE CBC AUTOMATED: CPT

## 2020-10-11 PROCEDURE — 6360000002 HC RX W HCPCS: Performed by: INTERNAL MEDICINE

## 2020-10-11 RX ADMIN — FLUTICASONE PROPIONATE 1 PUFF: 110 AEROSOL, METERED RESPIRATORY (INHALATION) at 08:54

## 2020-10-11 RX ADMIN — ALBUTEROL SULFATE 2.5 MG: 2.5 SOLUTION RESPIRATORY (INHALATION) at 08:54

## 2020-10-11 RX ADMIN — BRIMONIDINE TARTRATE 1 DROP: 2 SOLUTION OPHTHALMIC at 09:38

## 2020-10-11 RX ADMIN — Medication 1 TABLET: at 09:37

## 2020-10-11 RX ADMIN — TAMSULOSIN HYDROCHLORIDE 0.4 MG: 0.4 CAPSULE ORAL at 09:37

## 2020-10-11 RX ADMIN — FLUTICASONE PROPIONATE 2 SPRAY: 50 SPRAY, METERED NASAL at 09:38

## 2020-10-11 ASSESSMENT — PAIN SCALES - GENERAL: PAINLEVEL_OUTOF10: 0

## 2020-10-11 NOTE — DISCHARGE SUMMARY
[address] [address]                               DISCHARGE SUMMARY    PATIENT NAME: Ras Dooley                         :        1929  MED REC NO:   [id number]                            ROOM:       6460  ACCOUNT NO:   [de-identified]                           ADMIT DATE: 10/07/2020  PROVIDER:     Rashad Carranza MD                  [address] [facility] DATE: 10/11/2020    ADMISSION DATE:  10/07/2020    DISCHARGE DATE:  10/11/2020    FINAL DIAGNOSES:  1. Acute blood loss anemia. 2.  GI bleed. 3.  Acute renal failure. 4.  Paroxysmal atrial fibrillation. 5.  Asthma. COURSE OF ILLNESS:  This is a 35-year-old gentleman who was admitted  with a what appeared to be lower GI rectal bleeding. This was thought  to be likely diverticular. Surgery was consulted, but urgent  colonoscopy was not pursued. The patient was on Coumadin for his  underlying atrial fibrillation and this was held throughout the  admission. The patient ultimately dropped his hemoglobin down to a  nikos of 7.4. Subsequent to that, he did not drop any further. There  was no evidence of any ongoing GI bleed for the last 48 hours of the  admission. His hemoglobin at the time of discharge had improved up to  8.7. The patient did not require any blood transfusions during this  admission. He did have a little bit of rise in his creatinine that was  treated with IV fluid. His asthma was adequately controlled with only  some faint expiratory wheezes at times and this was managed with the  aerosol treatments. At this point, there was no further intervention  planned. His hemoglobin has been stable and actually improving. There  has been no evidence of any ongoing GI bleed for at least 48 hours. He  is otherwise stable and thought to be ready to be discharged. He will  require close followup with Dr. Sky Marroquin. He will resume his Coumadin  starting today.   His discharge medications include Advair puffer one  puff b.i.d., Singulair 10 mg daily, Coumadin 1 mg daily, tamsulosin 0.4  mg q. p.m., and is on some eye drops for his glaucoma.         Daisy Gutiérrez MD    D: 10/11/2020 9:49:55       T: 10/11/2020 9:52:45     [id number]  Job#: [id number]     Doc#: [id number]    CC:  Marita Willis DO

## 2020-10-11 NOTE — PROGRESS NOTES
GENERAL SURGERY  DAILY PROGRESS NOTE  10/11/2020    Subjective:  No issues overnight    Objective:  /60   Pulse 76   Temp 97.6 °F (36.4 °C) (Oral)   Resp 16   Ht 6' (1.829 m)   Wt 210 lb 6.4 oz (95.4 kg)   SpO2 93%   BMI 28.54 kg/m²     GENERAL:  Laying in bed, awake, alert, cooperative, no apparent distress  LUNGS:  No increased work of breathing  CARDIOVASCULAR:  RR  ABDOMEN:  Soft, non-tender, non-distended  EXTREMITIES: No edema or swelling    Hgb 8.7 from 8.8    Assessment/Plan:  80 y.o. male with hematochezia    - hgb stable  - No scope needed at this time    Electronically signed by Sang Sanabria MD on 10/11/2020 at 11:07 AM

## 2020-10-11 NOTE — PROGRESS NOTES
Subjective: The patient is awake and alert. No problems overnight. Denies chest pain, angina, and dyspnea. Denies abdominal pain. Tolerating diet. No nausea or vomiting. No further evidence of bleeding. Hb increased to 8.7. Objective:    /64   Pulse 65   Temp 97 °F (36.1 °C) (Oral)   Resp 14   Ht 6' (1.829 m)   Wt 210 lb 6.4 oz (95.4 kg)   SpO2 96%   BMI 28.54 kg/m²   Neck: No goiter, bruit, or LA  Heart:  RRR, no murmurs, gallops, or rubs.   Lungs:  CTA bilaterally, no wheeze, rales or rhonchi  Abd: bowel sounds present, nontender, nondistended, no masses  Extrem:  No clubbing, cyanosis, trace lower leg edema b/l, 2+ peripheral pulses, FROM    CBC:   Lab Results   Component Value Date    WBC 6.8 10/11/2020    RBC 2.90 10/11/2020    HGB 8.7 10/11/2020    HCT 27.4 10/11/2020    MCV 94.5 10/11/2020    MCH 30.0 10/11/2020    MCHC 31.8 10/11/2020    RDW 15.6 10/11/2020     10/11/2020    MPV 10.0 10/11/2020     CMP:    Lab Results   Component Value Date     10/11/2020    K 3.8 10/11/2020    K 4.3 11/29/2019     10/11/2020    CO2 21 10/11/2020    BUN 25 10/11/2020    CREATININE 1.2 10/11/2020    GFRAA >60 10/11/2020    LABGLOM 57 10/11/2020    GLUCOSE 120 10/11/2020    GLUCOSE 98 10/14/2010    PROT 5.8 10/07/2020    LABALBU 3.3 10/07/2020    LABALBU 3.6 10/14/2010    CALCIUM 8.6 10/11/2020    BILITOT 0.4 10/07/2020    ALKPHOS 83 10/07/2020    AST 12 10/07/2020    ALT 10 10/07/2020          Current Facility-Administered Medications:     0.45 % sodium chloride infusion, , Intravenous, Continuous, Enrique Patel DO, Last Rate: 75 mL/hr at 10/10/20 1231    albuterol (PROVENTIL) nebulizer solution 2.5 mg, 2.5 mg, Nebulization, 4x daily, Enrique Figueroat, DO, 2.5 mg at 10/11/20 0854    docusate sodium (COLACE) capsule 200 mg, 200 mg, Oral, Nightly, [place] Dread, DO, 200 mg at 10/10/20 2105    fluticasone (FLOVENT HFA) 110 MCG/ACT inhaler 1 puff, 1 puff, Inhalation, BID, [place] Dread, DO, 1 puff at 10/11/20 0854    fluticasone (FLONASE) 50 MCG/ACT nasal spray 2 spray, 2 spray, Each Nostril, Daily, [place], DO, 2 spray at 10/10/20 0937    glycerin-hypromellose- 0.2-0.2-1 % opthalmic solution 1 drop, 1 drop, Both Eyes, BID, [place], DO, 1 drop at 10/10/20 6179    montelukast (SINGULAIR) tablet 10 mg, 10 mg, Oral, Nightly, [place], DO, 10 mg at 10/10/20 2105    antioxidant multivitamin (OCUVITE) tablet, 1 tablet, Oral, BID, [place], DO, 1 tablet at 10/10/20 2105    tamsulosin (FLOMAX) capsule 0.4 mg, 0.4 mg, Oral, Daily, [place], DO, 0.4 mg at 10/10/20 2460    brimonidine (ALPHAGAN) 0.2 % ophthalmic solution 1 drop, 1 drop, Both Eyes, BID, [place], DO, 1 drop at 10/10/20 2106    Assessment:    Patient Active Problem List   Diagnosis    At[facility] ([facility]    COPD with exacerbation ([facility]    Chest pain    Asthma    CAP (community acquired pneumonia)    GI bleeding       Plan:  1. Acute blood loss anemia- stable to improved. No intervention planned  2. GI bleed- appears resolved  3. PAF- resume coumadin, currently in NSR  4. Acute renal insuff- improved with IVF. 5. Asthma - stable  6. D/c home.  Close f/u with Dr. Feroz Maurice  9:01 AM  10/11/2020

## 2021-01-20 ENCOUNTER — IMMUNIZATION (OUTPATIENT)
Dept: PRIMARY CARE CLINIC | Age: 86
End: 2021-01-20
Payer: MEDICARE

## 2021-01-20 PROCEDURE — 0001A COVID-19, PFIZER VACCINE 30MCG/0.3ML DOSE: CPT | Performed by: NURSE PRACTITIONER

## 2021-01-20 PROCEDURE — 91300 COVID-19, PFIZER VACCINE 30MCG/0.3ML DOSE: CPT | Performed by: NURSE PRACTITIONER

## 2021-02-10 ENCOUNTER — IMMUNIZATION (OUTPATIENT)
Dept: PRIMARY CARE CLINIC | Age: 86
End: 2021-02-10
Payer: MEDICARE

## 2021-02-10 PROCEDURE — 0002A COVID-19, PFIZER VACCINE 30MCG/0.3ML DOSE: CPT | Performed by: NURSE PRACTITIONER

## 2021-02-10 PROCEDURE — 91300 COVID-19, PFIZER VACCINE 30MCG/0.3ML DOSE: CPT | Performed by: NURSE PRACTITIONER

## 2021-04-27 ENCOUNTER — HOSPITAL ENCOUNTER (EMERGENCY)
Age: 86
Discharge: HOME | End: 2021-04-27
Payer: MEDICARE

## 2021-04-27 VITALS
SYSTOLIC BLOOD PRESSURE: 135 MMHG | WEIGHT: 217 LBS | OXYGEN SATURATION: 94 % | HEART RATE: 73 BPM | TEMPERATURE: 96.7 F | BODY MASS INDEX: 29.39 KG/M2 | DIASTOLIC BLOOD PRESSURE: 63 MMHG | RESPIRATION RATE: 16 BRPM | HEIGHT: 72 IN

## 2021-04-27 DIAGNOSIS — S61.419A SKIN TEAR OF HAND WITHOUT COMPLICATION, INITIAL ENCOUNTER: ICD-10-CM

## 2021-04-27 DIAGNOSIS — S51.019A SKIN TEAR OF ELBOW WITHOUT COMPLICATION, INITIAL ENCOUNTER: Primary | ICD-10-CM

## 2021-04-27 PROCEDURE — 99283 EMERGENCY DEPT VISIT LOW MDM: CPT

## 2021-04-28 NOTE — ED NOTES
Pt given d/c instructions. Pt to f.u with pcp in 3 days. Pt given instructions on when to return to ED. Pt verbalized understanding of instructions. Pt left in stable and ambulatory condition.      Wilda Conte RN  04/27/21 [id number]

## 2021-04-28 NOTE — ED PROVIDER NOTES
[facility]  Department of Emergency Medicine   ED  Encounter Note  Admit Date/RoomTime: 2021  9:30 PM  ED Room: /    NAME: Katie Jamil  : 1929  MRN: [id number]     Chief Complaint:  Elbow Injury (large skin tear; denies blood thinners), Hand Injury (skin tear), and Fall (denies hitting head/LOC)    History of Present Illness       Katie Jamil is a 80 y.o. old male presenting to the emergency department by private vehicle, for a laceration to the Right hand and upper arm, caused by falling, which occurred at home approximately 1 hour(s) prior to arrival.  There is not a possibility of retained foreign body in the affected area. Bleeding is  controlled. He takes no blood thinning agents. There is no pain at injury site. Patient denies he just lost his balance as he is a little unstable on his feet and he he toppled over and hitting his arm on the edge of furniture. Denies hitting his head. Tetanus Status:  up to date. ROS   Pertinent positives and negatives are stated within HPI, all other systems reviewed and are negative. Past Medical History:  has a past medical history of Asthma, COPD (chronic obstructive pulmonary disease) ([address] and Unspecified cerebral artery occlusion with cerebral infarction. Surgical History:  has a past surgical history that includes Appendectomy; hernia repair; joint replacement (12); ECHO Compl W Dop Color Flow (3/16/2013); and Abdomen surgery. Social History:  reports that he quit smoking about 39 years ago. He started smoking about 75 years ago. He has never used smokeless tobacco. He reports that he does not drink alcohol or use drugs. Family History: family history is not on file. Allergies: Patient has no known allergies.     Physical Exam   Oxygen Saturation Interpretation: Normal.        ED Triage Vitals [21]   BP Temp Temp Source Pulse Resp SpO2 Height Weight   135/63 96.7 °F (35.9 °C) Temporal 73 16 94 % 6' (1.829 m) 217 lb (98.4 kg)         Constitutional:  Alert, development consistent with age. Neck:  Normal ROM. Supple. Extremity(s):  Right: posterior distal upper arm. Tenderness:  none. Swelling: None. Deformity: No.               ROM: full range of motion. Skin: skin tear ( large multidirectional superficial, minimal bleeding,); small curvilinear skin tear over the distal first metacarpal area. .       Neurovascular: Motor deficit: none. Sensory deficit: none. Pulse deficit: none. Capillary refill: normal.  Lymphatics: No lymphangitis or adenopathy noted. Neurological:  Oriented. Motor functions intact. Lab / Imaging Results   (All laboratory and radiology results have been personally reviewed by myself)  Labs:  No results found for this visit on 04/27/21. Imaging: All Radiology results interpreted by Radiologist unless otherwise noted. No orders to display       ED Course / Medical Decision Making   Medications - No data to display     Consult(s):   None    Procedure(s):   SEe Ed note     MDM:   Imaging was not obtained based on low suspicion for fracture / bony abnormalityas per history/physical findings. Patient had superficial skin tears which were unable to be sewn with sutures due to the fragile nature of patient's skin. Flaps were realigned as best possible and repaired with Steri-Strips and dressed in department. I did talk with his wife about dressing and wound care and provided extra dressings for home. Follow-up with primary care for wound check 2 to 3 days. Plan of Care/Counseling:  I reviewed today's visit with the patient and wife  in addition to providing specific details for the plan of care and counseling regarding the diagnosis and prognosis. Questions are answered at this time and are agreeable with the plan. Assessment      1.  Skin tear of elbow without complication, initial encounter    2. Skin tear of hand without complication, initial encounter      Plan   Discharge home. Patient condition is good    New Medications     Discharge Medication List as of 4/27/2021 10:07 PM        Electronically signed by Kyrie Goldstein PA-C   DD: 4/28/21  **This report was transcribed using voice recognition software. Every effort was made to ensure accuracy; however, inadvertent computerized transcription errors may be present.   END OF ED PROVIDER NOTE       Kyrie Goldstein PA-C  04/28/21 0153

## 2021-04-28 NOTE — ED NOTES
PROCEDURE NOTE  4/28/21         LACERATION REPAIR  Risks, benefits and alternatives (for applicable procedures below) described. Performed By: Santino Davila PA-C. Laceration #: 1. Location: posterior upper arm distal  Length:  Several cm multidirectional.  The wound area was irrigated with sterile saline and draped in a sterile fashion. Local Anesthesia:  not required. .  Debridement: None. Undermining: None. Wound Margins Revised: None. Flaps Aligned: yes. The wound was closed with steri strips. Dressing: Nonstick dressing with gauze roll. .    There was a second laceration of hand, measuring  3 cm . The wound area was irrigated with sterile saline and draped in a sterile fashion. Local anesthesia not required. The wound as closed with steri strips. Total number steri-strips:  8.             Santino Davila PA-C  04/28/21 0155

## 2021-05-07 ENCOUNTER — APPOINTMENT (OUTPATIENT)
Dept: GENERAL RADIOLOGY | Age: 86
DRG: 190 | End: 2021-05-07
Payer: MEDICARE

## 2021-05-07 ENCOUNTER — HOSPITAL ENCOUNTER (INPATIENT)
Age: 86
LOS: 4 days | Discharge: NURSING, REHAB OR LONG-TERM CARE | DRG: 190 | End: 2021-05-13
Attending: EMERGENCY MEDICINE | Admitting: INTERNAL MEDICINE
Payer: MEDICARE

## 2021-05-07 ENCOUNTER — APPOINTMENT (OUTPATIENT)
Dept: CT IMAGING | Age: 86
DRG: 190 | End: 2021-05-07
Payer: MEDICARE

## 2021-05-07 ENCOUNTER — APPOINTMENT (OUTPATIENT)
Dept: ULTRASOUND IMAGING | Age: 86
DRG: 190 | End: 2021-05-07
Payer: MEDICARE

## 2021-05-07 DIAGNOSIS — R60.0 LOWER EXTREMITY EDEMA: ICD-10-CM

## 2021-05-07 DIAGNOSIS — J44.1 COPD EXACERBATION (HCC): Primary | ICD-10-CM
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
## 2021-05-07 LAB
ALBUMIN SERPL-MCNC: 3.9 G/DL (ref 3.5–5.2)
ALP BLD-CCNC: 89 U/L (ref 40–129)
ALT SERPL-CCNC: 12 U/L (ref 0–40)
ANION GAP SERPL CALCULATED.3IONS-SCNC: 9 MMOL/L (ref 7–16)
APTT: 39.9 SEC (ref 24.5–35.1)
AST SERPL-CCNC: 16 U/L (ref 0–39)
B.E.: 1.7 MMOL/L (ref -3–3)
BACTERIA: ABNORMAL /HPF
BASOPHILS ABSOLUTE: 0.05 E9/L (ref 0–0.2)
BASOPHILS RELATIVE PERCENT: 0.6 % (ref 0–2)
BILIRUB SERPL-MCNC: 0.6 MG/DL (ref 0–1.2)
BILIRUBIN URINE: NEGATIVE
BLOOD, URINE: ABNORMAL
BUN BLDV-MCNC: 29 MG/DL (ref 6–23)
CALCIUM SERPL-MCNC: 9 MG/DL (ref 8.6–10.2)
CHLORIDE BLD-SCNC: 104 MMOL/L (ref 98–107)
CLARITY: CLEAR
CO2: 30 MMOL/L (ref 22–29)
COHB: 1.1 % (ref 0–1.5)
COLOR: YELLOW
CREAT SERPL-MCNC: 1.5 MG/DL (ref 0.7–1.2)
CRITICAL: ABNORMAL
DATE ANALYZED: ABNORMAL
DATE OF COLLECTION: ABNORMAL
EOSINOPHILS ABSOLUTE: 0.42 E9/L (ref 0.05–0.5)
EOSINOPHILS RELATIVE PERCENT: 5.1 % (ref 0–6)
GFR AFRICAN AMERICAN: 53
GFR NON-AFRICAN AMERICAN: 44 ML/MIN/1.73
GLUCOSE BLD-MCNC: 172 MG/DL (ref 74–99)
GLUCOSE URINE: NEGATIVE MG/DL
HCO3: 26.3 MMOL/L (ref 22–26)
HCT VFR BLD CALC: 44 % (ref 37–54)
HEMOGLOBIN: 13.7 G/DL (ref 12.5–16.5)
HHB: 4.2 % (ref 0–5)
IMMATURE GRANULOCYTES #: 0.03 E9/L
IMMATURE GRANULOCYTES %: 0.4 % (ref 0–5)
INR BLD: 2.7
KETONES, URINE: NEGATIVE MG/DL
LAB: ABNORMAL
LEUKOCYTE ESTERASE, URINE: NEGATIVE
LYMPHOCYTES ABSOLUTE: 2.78 E9/L (ref 1.5–4)
LYMPHOCYTES RELATIVE PERCENT: 34 % (ref 20–42)
Lab: ABNORMAL
MCH RBC QN AUTO: 28.4 PG (ref 26–35)
MCHC RBC AUTO-ENTMCNC: 31.1 % (ref 32–34.5)
MCV RBC AUTO: 91.3 FL (ref 80–99.9)
METHB: 0.1 % (ref 0–1.5)
MODE: ABNORMAL
MONOCYTES ABSOLUTE: 0.78 E9/L (ref 0.1–0.95)
MONOCYTES RELATIVE PERCENT: 9.5 % (ref 2–12)
NEUTROPHILS ABSOLUTE: 4.11 E9/L (ref 1.8–7.3)
NEUTROPHILS RELATIVE PERCENT: 50.4 % (ref 43–80)
NITRITE, URINE: NEGATIVE
O2 CONTENT: 19 ML/DL
O2 SATURATION: 95.7 % (ref 92–98.5)
O2HB: 94.6 % (ref 94–97)
OPERATOR ID: 1893
PATIENT TEMP: 37 C
PCO2: 41.1 MMHG (ref 35–45)
PDW BLD-RTO: 16.8 FL (ref 11.5–15)
PH BLOOD GAS: 7.42 (ref 7.35–7.45)
PH UA: 6 (ref 5–9)
PLATELET # BLD: 196 E9/L (ref 130–450)
PMV BLD AUTO: 9.6 FL (ref 7–12)
PO2: 78.2 MMHG (ref 75–100)
POTASSIUM REFLEX MAGNESIUM: 3.9 MMOL/L (ref 3.5–5)
PRO-BNP: 524 PG/ML (ref 0–450)
PROTEIN UA: NEGATIVE MG/DL
PROTHROMBIN TIME: 29.5 SEC (ref 9.3–12.4)
RBC # BLD: 4.82 E12/L (ref 3.8–5.8)
RBC UA: ABNORMAL /HPF (ref 0–2)
SARS-COV-2, NAAT: NOT DETECTED
SODIUM BLD-SCNC: 143 MMOL/L (ref 132–146)
SOURCE, BLOOD GAS: ABNORMAL
SPECIFIC GRAVITY UA: 1.02 (ref 1–1.03)
THB: 14.3 G/DL (ref 11.5–16.5)
TIME ANALYZED: 1349
TOTAL PROTEIN: 6.6 G/DL (ref 6.4–8.3)
TROPONIN: 0.01 NG/ML (ref 0–0.03)
UROBILINOGEN, URINE: 0.2 E.U./DL
WBC # BLD: 8.2 E9/L (ref 4.5–11.5)
WBC UA: ABNORMAL /HPF (ref 0–5)

## 2021-05-07 PROCEDURE — 96374 THER/PROPH/DIAG INJ IV PUSH: CPT

## 2021-05-07 PROCEDURE — 6360000002 HC RX W HCPCS: Performed by: INTERNAL MEDICINE

## 2021-05-07 PROCEDURE — 6360000004 HC RX CONTRAST MEDICATION: Performed by: RADIOLOGY

## 2021-05-07 PROCEDURE — 94664 DEMO&/EVAL PT USE INHALER: CPT

## 2021-05-07 PROCEDURE — 2580000003 HC RX 258: Performed by: INTERNAL MEDICINE

## 2021-05-07 PROCEDURE — G0378 HOSPITAL OBSERVATION PER HR: HCPCS

## 2021-05-07 PROCEDURE — 93970 EXTREMITY STUDY: CPT

## 2021-05-07 PROCEDURE — 2500000003 HC RX 250 WO HCPCS: Performed by: INTERNAL MEDICINE

## 2021-05-07 PROCEDURE — 36415 COLL VENOUS BLD VENIPUNCTURE: CPT

## 2021-05-07 PROCEDURE — 71045 X-RAY EXAM CHEST 1 VIEW: CPT

## 2021-05-07 PROCEDURE — 6370000000 HC RX 637 (ALT 250 FOR IP): Performed by: STUDENT IN AN ORGANIZED HEALTH CARE EDUCATION/TRAINING PROGRAM

## 2021-05-07 PROCEDURE — 82805 BLOOD GASES W/O2 SATURATION: CPT

## 2021-05-07 PROCEDURE — 73070 X-RAY EXAM OF ELBOW: CPT

## 2021-05-07 PROCEDURE — 93005 ELECTROCARDIOGRAM TRACING: CPT | Performed by: STUDENT IN AN ORGANIZED HEALTH CARE EDUCATION/TRAINING PROGRAM

## 2021-05-07 PROCEDURE — 85610 PROTHROMBIN TIME: CPT

## 2021-05-07 PROCEDURE — 84484 ASSAY OF TROPONIN QUANT: CPT

## 2021-05-07 PROCEDURE — 85025 COMPLETE CBC W/AUTO DIFF WBC: CPT

## 2021-05-07 PROCEDURE — 99284 EMERGENCY DEPT VISIT MOD MDM: CPT

## 2021-05-07 PROCEDURE — 72125 CT NECK SPINE W/O DYE: CPT

## 2021-05-07 PROCEDURE — 81001 URINALYSIS AUTO W/SCOPE: CPT

## 2021-05-07 PROCEDURE — 70450 CT HEAD/BRAIN W/O DYE: CPT

## 2021-05-07 PROCEDURE — 94640 AIRWAY INHALATION TREATMENT: CPT

## 2021-05-07 PROCEDURE — 74177 CT ABD & PELVIS W/CONTRAST: CPT

## 2021-05-07 PROCEDURE — 87635 SARS-COV-2 COVID-19 AMP PRB: CPT

## 2021-05-07 PROCEDURE — 85730 THROMBOPLASTIN TIME PARTIAL: CPT

## 2021-05-07 PROCEDURE — 6370000000 HC RX 637 (ALT 250 FOR IP): Performed by: INTERNAL MEDICINE

## 2021-05-07 PROCEDURE — 80053 COMPREHEN METABOLIC PANEL: CPT

## 2021-05-07 PROCEDURE — 71275 CT ANGIOGRAPHY CHEST: CPT

## 2021-05-07 PROCEDURE — 6360000002 HC RX W HCPCS: Performed by: STUDENT IN AN ORGANIZED HEALTH CARE EDUCATION/TRAINING PROGRAM

## 2021-05-07 PROCEDURE — 83880 ASSAY OF NATRIURETIC PEPTIDE: CPT

## 2021-05-07 RX ORDER — ACETAMINOPHEN 650 MG/1
650 SUPPOSITORY RECTAL EVERY 6 HOURS PRN
Status: DISCONTINUED | OUTPATIENT
Start: 2021-05-07 | End: 2021-05-13 | Stop reason: HOSPADM

## 2021-05-07 RX ORDER — PROMETHAZINE HYDROCHLORIDE 25 MG/1
12.5 TABLET ORAL EVERY 6 HOURS PRN
Status: DISCONTINUED | OUTPATIENT
Start: 2021-05-07 | End: 2021-05-13 | Stop reason: HOSPADM

## 2021-05-07 RX ORDER — SODIUM CHLORIDE 0.9 % (FLUSH) 0.9 %
5-40 SYRINGE (ML) INJECTION EVERY 12 HOURS SCHEDULED
Status: DISCONTINUED | OUTPATIENT
Start: 2021-05-07 | End: 2021-05-13 | Stop reason: HOSPADM

## 2021-05-07 RX ORDER — WARFARIN SODIUM 1 MG/1
1 TABLET ORAL DAILY
Status: DISCONTINUED | OUTPATIENT
Start: 2021-05-07 | End: 2021-05-09

## 2021-05-07 RX ORDER — FUROSEMIDE 40 MG/1
40 TABLET ORAL DAILY PRN
Status: ON HOLD | COMMUNITY
End: 2021-07-14 | Stop reason: HOSPADM

## 2021-05-07 RX ORDER — METHYLPREDNISOLONE SODIUM SUCCINATE 40 MG/ML
40 INJECTION, POWDER, LYOPHILIZED, FOR SOLUTION INTRAMUSCULAR; INTRAVENOUS EVERY 12 HOURS
Status: DISCONTINUED | OUTPATIENT
Start: 2021-05-08 | End: 2021-05-11

## 2021-05-07 RX ORDER — IPRATROPIUM BROMIDE AND ALBUTEROL SULFATE 2.5; .5 MG/3ML; MG/3ML
1 SOLUTION RESPIRATORY (INHALATION)
Status: DISCONTINUED | OUTPATIENT
Start: 2021-05-07 | End: 2021-05-13 | Stop reason: HOSPADM

## 2021-05-07 RX ORDER — IPRATROPIUM BROMIDE AND ALBUTEROL SULFATE 2.5; .5 MG/3ML; MG/3ML
3 SOLUTION RESPIRATORY (INHALATION) ONCE
Status: COMPLETED | OUTPATIENT
Start: 2021-05-07 | End: 2021-05-07

## 2021-05-07 RX ORDER — METHYLPREDNISOLONE SODIUM SUCCINATE 125 MG/2ML
125 INJECTION, POWDER, LYOPHILIZED, FOR SOLUTION INTRAMUSCULAR; INTRAVENOUS ONCE
Status: COMPLETED | OUTPATIENT
Start: 2021-05-07 | End: 2021-05-07

## 2021-05-07 RX ORDER — ACETAMINOPHEN 325 MG/1
650 TABLET ORAL EVERY 6 HOURS PRN
Status: DISCONTINUED | OUTPATIENT
Start: 2021-05-07 | End: 2021-05-13 | Stop reason: HOSPADM

## 2021-05-07 RX ORDER — ONDANSETRON 2 MG/ML
4 INJECTION INTRAMUSCULAR; INTRAVENOUS EVERY 6 HOURS PRN
Status: DISCONTINUED | OUTPATIENT
Start: 2021-05-07 | End: 2021-05-13 | Stop reason: HOSPADM

## 2021-05-07 RX ORDER — DOCUSATE SODIUM 100 MG/1
200 CAPSULE, LIQUID FILLED ORAL NIGHTLY
Status: DISCONTINUED | OUTPATIENT
Start: 2021-05-07 | End: 2021-05-13 | Stop reason: HOSPADM

## 2021-05-07 RX ORDER — FLUTICASONE PROPIONATE 110 UG/1
1 AEROSOL, METERED RESPIRATORY (INHALATION) DAILY PRN
COMMUNITY

## 2021-05-07 RX ORDER — BUDESONIDE 0.5 MG/2ML
0.5 INHALANT ORAL 2 TIMES DAILY
Status: DISCONTINUED | OUTPATIENT
Start: 2021-05-07 | End: 2021-05-13 | Stop reason: HOSPADM

## 2021-05-07 RX ORDER — BUDESONIDE AND FORMOTEROL FUMARATE DIHYDRATE 160; 4.5 UG/1; UG/1
2 AEROSOL RESPIRATORY (INHALATION) 2 TIMES DAILY
Status: DISCONTINUED | OUTPATIENT
Start: 2021-05-07 | End: 2021-05-07 | Stop reason: CLARIF

## 2021-05-07 RX ORDER — TAMSULOSIN HYDROCHLORIDE 0.4 MG/1
0.4 CAPSULE ORAL DAILY
Status: DISCONTINUED | OUTPATIENT
Start: 2021-05-07 | End: 2021-05-13 | Stop reason: HOSPADM

## 2021-05-07 RX ORDER — ARFORMOTEROL TARTRATE 15 UG/2ML
15 SOLUTION RESPIRATORY (INHALATION) 2 TIMES DAILY
Status: DISCONTINUED | OUTPATIENT
Start: 2021-05-07 | End: 2021-05-13 | Stop reason: HOSPADM

## 2021-05-07 RX ORDER — CEPHALEXIN 500 MG/1
500 CAPSULE ORAL 2 TIMES DAILY
Status: ON HOLD | COMMUNITY
Start: 2021-05-03 | End: 2021-07-14 | Stop reason: HOSPADM

## 2021-05-07 RX ORDER — SODIUM CHLORIDE 9 MG/ML
25 INJECTION, SOLUTION INTRAVENOUS PRN
Status: DISCONTINUED | OUTPATIENT
Start: 2021-05-07 | End: 2021-05-13 | Stop reason: HOSPADM

## 2021-05-07 RX ORDER — SODIUM CHLORIDE 0.9 % (FLUSH) 0.9 %
5-40 SYRINGE (ML) INJECTION PRN
Status: DISCONTINUED | OUTPATIENT
Start: 2021-05-07 | End: 2021-05-13 | Stop reason: HOSPADM

## 2021-05-07 RX ORDER — POLYETHYLENE GLYCOL 3350 17 G/17G
17 POWDER, FOR SOLUTION ORAL DAILY PRN
Status: DISCONTINUED | OUTPATIENT
Start: 2021-05-07 | End: 2021-05-13 | Stop reason: HOSPADM

## 2021-05-07 RX ORDER — BRIMONIDINE TARTRATE 2 MG/ML
1 SOLUTION/ DROPS OPHTHALMIC 2 TIMES DAILY
Status: DISCONTINUED | OUTPATIENT
Start: 2021-05-07 | End: 2021-05-13 | Stop reason: HOSPADM

## 2021-05-07 RX ORDER — POLYVINYL ALCOHOL 14 MG/ML
1 SOLUTION/ DROPS OPHTHALMIC 2 TIMES DAILY
Status: DISCONTINUED | OUTPATIENT
Start: 2021-05-07 | End: 2021-05-13 | Stop reason: HOSPADM

## 2021-05-07 RX ORDER — MONTELUKAST SODIUM 10 MG/1
10 TABLET ORAL NIGHTLY
Status: DISCONTINUED | OUTPATIENT
Start: 2021-05-07 | End: 2021-05-13 | Stop reason: HOSPADM

## 2021-05-07 RX ORDER — SODIUM CHLORIDE 9 MG/ML
INJECTION, SOLUTION INTRAVENOUS CONTINUOUS
Status: DISCONTINUED | OUTPATIENT
Start: 2021-05-07 | End: 2021-05-10

## 2021-05-07 RX ORDER — POTASSIUM CHLORIDE 750 MG/1
20 TABLET, EXTENDED RELEASE ORAL DAILY PRN
COMMUNITY

## 2021-05-07 RX ADMIN — BRIMONIDINE TARTRATE 1 DROP: 2 SOLUTION OPHTHALMIC at 19:50

## 2021-05-07 RX ADMIN — DOCUSATE SODIUM 200 MG: 100 CAPSULE ORAL at 19:50

## 2021-05-07 RX ADMIN — IPRATROPIUM BROMIDE AND ALBUTEROL SULFATE 3 AMPULE: .5; 3 SOLUTION RESPIRATORY (INHALATION) at 13:55

## 2021-05-07 RX ADMIN — WARFARIN SODIUM 1 MG: 1 TABLET ORAL at 19:50

## 2021-05-07 RX ADMIN — ARFORMOTEROL TARTRATE 15 MCG: 15 SOLUTION RESPIRATORY (INHALATION) at 21:27

## 2021-05-07 RX ADMIN — METHYLPREDNISOLONE SODIUM SUCCINATE 125 MG: 125 INJECTION, POWDER, FOR SOLUTION INTRAMUSCULAR; INTRAVENOUS at 14:08

## 2021-05-07 RX ADMIN — IOPAMIDOL 75 ML: 755 INJECTION, SOLUTION INTRAVENOUS at 14:44

## 2021-05-07 RX ADMIN — MONTELUKAST SODIUM 10 MG: 10 TABLET, FILM COATED ORAL at 19:50

## 2021-05-07 RX ADMIN — Medication 10 ML: at 19:51

## 2021-05-07 RX ADMIN — MICONAZOLE NITRATE: 20 POWDER TOPICAL at 19:50

## 2021-05-07 RX ADMIN — TAMSULOSIN HYDROCHLORIDE 0.4 MG: 0.4 CAPSULE ORAL at 19:50

## 2021-05-07 RX ADMIN — BUDESONIDE 500 MCG: 0.5 SUSPENSION RESPIRATORY (INHALATION) at 21:27

## 2021-05-07 RX ADMIN — SODIUM CHLORIDE: 9 INJECTION, SOLUTION INTRAVENOUS at 19:51

## 2021-05-07 RX ADMIN — IPRATROPIUM BROMIDE AND ALBUTEROL SULFATE 1 AMPULE: 2.5; .5 SOLUTION RESPIRATORY (INHALATION) at 21:26

## 2021-05-07 RX ADMIN — POLYVINYL ALCOHOL 1 DROP: 14 SOLUTION/ DROPS OPHTHALMIC at 19:50

## 2021-05-07 ASSESSMENT — PAIN DESCRIPTION - LOCATION: LOCATION: RIB CAGE

## 2021-05-07 ASSESSMENT — PAIN DESCRIPTION - ORIENTATION: ORIENTATION: LEFT

## 2021-05-07 ASSESSMENT — ENCOUNTER SYMPTOMS
NAUSEA: 0
COUGH: 0
ABDOMINAL DISTENTION: 1
SINUS PRESSURE: 0
BACK PAIN: 0
VOMITING: 0
DIARRHEA: 0
ABDOMINAL PAIN: 0
SORE THROAT: 0
WHEEZING: 1
SHORTNESS OF BREATH: 1

## 2021-05-07 ASSESSMENT — PAIN SCALES - GENERAL: PAINLEVEL_OUTOF10: 2

## 2021-05-07 ASSESSMENT — PAIN DESCRIPTION - DESCRIPTORS: DESCRIPTORS: DISCOMFORT

## 2021-05-07 ASSESSMENT — PAIN DESCRIPTION - PAIN TYPE: TYPE: ACUTE PAIN

## 2021-05-07 ASSESSMENT — PAIN - FUNCTIONAL ASSESSMENT: PAIN_FUNCTIONAL_ASSESSMENT: PREVENTS OR INTERFERES SOME ACTIVE ACTIVITIES AND ADLS

## 2021-05-07 NOTE — PROGRESS NOTES
Database complete. Medications reconciled. Care plans and education initiated. Blind in right eye. Hearing impaired. Uses cane for mobility. Has [facility] coming into the home(PT/OT/Nurse). 2 falls in past 2 weeks. Wounds to both arms from falls. Dental soft diet please. Cardiologist is Dr. Vladimir Eller. Pulmonologist was Dr. Marques Nicholson. Urologist is Dr. Fausto Whitaker. Payam. Pt currently unable to afford Budesonide or Formoterol. Also pt states he has taken his prn lasix for past 5 days in a row.

## 2021-05-07 NOTE — ED NOTES
Bed: 17  Expected date:   Expected time:   Means of arrival:   Comments:  EMS     Jens Suazo RN  05/07/21 7312

## 2021-05-07 NOTE — ED PROVIDER NOTES
[facility]  Department of Emergency Medicine     Written by: Jasmin Gan DO  Patient Name: Yolis Giordano  Attending Provider: Alice Lawrence DO  Admit Date: 2021  1:22 PM  MRN: [id number]                   : 1929        Chief Complaint   Patient presents with   [place] Self Fall     fell hit corner of dresser, laceration to left rib area, skin tear on left arm    Shortness of Breath    - Chief complaint    Patient is a 70-year-old male past medical history of CVA and COPD. Patient presents after suffering fall at home. Per EMS patient had a ground-level fall and he landed onto his left side, he states his left side struck a dresser. He notes a small wound to his left flank area bleeding well controlled, in addition he notes a skin tear to his left arm just above his elbow. Patient states that pain is a sharp aching sensation currently rates pain a 3 out of 10. He denies any exacerbating or relieving factors. In addition patient notes gradually increasing shortness of breath for the last couple of days. He also notes that he has been wheezing. Patient notes that symptoms have been constant since onset. Patient states that he has been taking his warfarin. He denies hitting his head, he denies any loss of consciousness. Patient denies any fevers, chills, nausea, vomiting, chest pain, cough, nausea or vomiting. The history is provided by the patient. No  was used. Review of Systems   Constitutional: Negative for chills and fever. HENT: Negative for ear pain, sinus pressure and sore throat. Respiratory: Positive for shortness of breath and wheezing. Negative for cough. Cardiovascular: Negative for chest pain. Gastrointestinal: Positive for abdominal distention. Negative for abdominal pain, diarrhea, nausea and vomiting. Genitourinary: Negative for dysuria and frequency. Musculoskeletal: Positive for arthralgias (Mild left elbow pain). infarction. Past Surgical History:  has a past surgical history that includes Appendectomy; hernia repair; joint replacement (6-11-12); ECHO Compl W Dop Color Flow (3/16/2013); and Abdomen surgery. Social History:  reports that he quit smoking about 39 years ago. His smoking use included cigarettes. He started smoking about 75 years ago. He smoked 1.00 pack per day. He has never used smokeless tobacco. He reports that he does not drink alcohol or use drugs. Family History: family history includes Cancer in his father and mother; No Known Problems in his sister. The patients home medications have been reviewed. Allergies: Patient has no known allergies.     -------------------------------------------------- RESULTS -------------------------------------------------    LABS:  Results for orders placed or performed during the hospital encounter of 05/07/21   COVID-19, Rapid    Specimen: Nasopharyngeal Swab   Result Value Ref Range    SARS-CoV-2, NAAT Not Detected Not Detected   CBC Auto Differential   Result Value Ref Range    WBC 8.2 4.5 - 11.5 E9/L    RBC 4.82 3.80 - 5.80 E12/L    Hemoglobin 13.7 12.5 - 16.5 g/dL    Hematocrit 44.0 37.0 - 54.0 %    MCV 91.3 80.0 - 99.9 fL    MCH 28.4 26.0 - 35.0 pg    MCHC 31.1 (L) 32.0 - 34.5 %    RDW 16.8 (H) 11.5 - 15.0 fL    Platelets 105 612 - 150 E9/L    MPV 9.6 7.0 - 12.0 fL    Neutrophils % 50.4 43.0 - 80.0 %    Immature Granulocytes % 0.4 0.0 - 5.0 %    Lymphocytes % 34.0 20.0 - 42.0 %    Monocytes % 9.5 2.0 - 12.0 %    Eosinophils % 5.1 0.0 - 6.0 %    Basophils % 0.6 0.0 - 2.0 %    Neutrophils Absolute 4.11 1.80 - 7.30 E9/L    Immature Granulocytes # 0.03 E9/L    Lymphocytes Absolute 2.78 1.50 - 4.00 E9/L    Monocytes Absolute 0.78 0.10 - 0.95 E9/L    Eosinophils Absolute 0.42 0.05 - 0.50 E9/L    Basophils Absolute 0.05 0.00 - 0.20 E9/L   Comprehensive Metabolic Panel w/ Reflex to MG   Result Value Ref Range    Sodium 143 132 - 146 mmol/L    Potassium reflex Magnesium 3.9 3.5 - 5.0 mmol/L    Chloride 104 98 - 107 mmol/L    CO2 30 (H) 22 - 29 mmol/L    Anion Gap 9 7 - 16 mmol/L    Glucose 172 (H) 74 - 99 mg/dL    BUN 29 (H) 6 - 23 mg/dL    CREATININE 1.5 (H) 0.7 - 1.2 mg/dL    GFR Non-African American 44 >=60 mL/min/1.73    GFR African American 53     Calcium 9.0 8.6 - 10.2 mg/dL    Total Protein 6.6 6.4 - 8.3 g/dL    Albumin 3.9 3.5 - 5.2 g/dL    Total Bilirubin 0.6 0.0 - 1.2 mg/dL    Alkaline Phosphatase 89 40 - 129 U/L    ALT 12 0 - 40 U/L    AST 16 0 - 39 U/L   Troponin   Result Value Ref Range    Troponin 0.01 0.00 - 0.03 ng/mL   Brain Natriuretic Peptide   Result Value Ref Range    Pro- (H) 0 - 450 pg/mL   Urinalysis, reflex to microscopic   Result Value Ref Range    Color, UA Yellow Straw/Yellow    Clarity, UA Clear Clear    Glucose, Ur Negative Negative mg/dL    Bilirubin Urine Negative Negative    Ketones, Urine Negative Negative mg/dL    Specific Gravity, UA 1.020 1.005 - 1.030    Blood, Urine MODERATE (A) Negative    pH, UA 6.0 5.0 - 9.0    Protein, UA Negative Negative mg/dL    Urobilinogen, Urine 0.2 <2.0 E.U./dL    Nitrite, Urine Negative Negative    Leukocyte Esterase, Urine Negative Negative   APTT   Result Value Ref Range    aPTT 39.9 (H) 24.5 - 35.1 sec   Protime-INR   Result Value Ref Range    Protime 29.5 (H) 9.3 - 12.4 sec    INR 2.7    Blood Gas, Arterial   Result Value Ref Range    Date Analyzed 20210507     Time Analyzed 1349     Source: Blood Arterial     pH, Blood Gas 7.424 7.350 - 7.450    PCO2 41.1 35.0 - 45.0 mmHg    PO2 78.2 75.0 - 100.0 mmHg    HCO3 26.3 (H) 22.0 - 26.0 mmol/L    B.E. 1.7 -3.0 - 3.0 mmol/L    O2 Sat 95.7 92.0 - 98.5 %    O2Hb 94.6 94.0 - 97.0 %    COHb 1.1 0.0 - 1.5 %    MetHb 0.1 0.0 - 1.5 %    O2 Content 19.0 mL/dL    HHb 4.2 0.0 - 5.0 %    tHb (est) 14.3 11.5 - 16.5 g/dL    Mode RA     Date Of Collection      Time Collected      Pt Temp 37.0 C     ID 1893     Lab [id number]     Critical(s) Notified .  No Critical Values    Microscopic Urinalysis   Result Value Ref Range    WBC, UA 2-5 0 - 5 /HPF    RBC, UA 10-20 (A) 0 - 2 /HPF    Bacteria, UA RARE (A) None Seen /HPF   EKG 12 Lead   Result Value Ref Range    Ventricular Rate 72 BPM    Atrial Rate 72 BPM    P-R Interval 236 ms    QRS Duration 154 ms    Q-T Interval 434 ms    QTc Calculation (Bazett) 475 ms    P Axis 56 degrees    R Axis -65 degrees    T Axis 32 degrees       RADIOLOGY:  US DUP LOWER EXTREMITIES BILATERAL VENOUS   Final Result   No evidence of DVT in either lower extremity. XR ELBOW LEFT (2 VIEWS)   Final Result   1. There is no fracture or dislocation of the left elbow   2. No soft tissue foreign bodies noted. CT Head WO Contrast   Final Result   No acute intracranial abnormality. Bilateral maxillary and ethmoid sinusitis. Stable old lacunar infarct, right thalamus. CT Cervical Spine WO Contrast   Final Result   No acute abnormality of the cervical spine. Severe multilevel degenerative changes. CTA PULMONARY W CONTRAST   Final Result   No evidence of pulmonary embolism or acute pulmonary abnormality. CT ABDOMEN PELVIS W IV CONTRAST Additional Contrast? None   Final Result   No evidence of acute intra-abdominal or pelvic injury. XR CHEST PORTABLE   Final Result   Bibasilar airspace opacities which may be on the basis of chronic fibrotic   changes, similar appearance to the examination from October 2020. EKG:  This EKG is signed and interpreted by me. Rate: 72  Rhythm: Sinus  Interpretation: KG reviewed demonstrated normal sinus rhythm first-degree AV block with PVCs, rate 72, left axis, , no acute ST segment changes.   Comparison: stable as compared to patient's most recent EKG      ------------------------- NURSING NOTES AND VITALS REVIEWED ---------------------------  Date / Time Roomed:  5/7/2021  1:22 PM  ED Bed Assignment:  [id number]    The nursing notes within the ED encounter and vital signs as below have been reviewed. Patient Vitals for the past 24 hrs:   BP Temp Temp src Pulse Resp SpO2 Height Weight   05/07/21 1941 137/86 98.5 °F (36.9 °C) Oral 92 18 92 % -- --   05/07/21 1808 (!) 145/83 97.5 °F (36.4 °C) Oral 86 19 93 % 6' (1.829 m) 210 lb 1.6 oz (95.3 kg)   05/07/21 1626 (!) 160/76 97.8 °F (36.6 °C) Oral 91 20 99 % -- --   05/07/21 1428 (!) 149/67 -- -- 81 24 100 % -- --   05/07/21 1356 -- -- -- -- (!) 32 99 % -- --   05/07/21 1355 -- -- -- -- 29 97 % -- --   05/07/21 1354 -- -- -- -- 25 98 % -- --   05/07/21 1328 (!) 156/73 97.9 °F (36.6 °C) Oral 74 (!) 32 94 % 6' (1.829 m) 217 lb (98.4 kg)       Oxygen Saturation Interpretation: Abnormal and Improved after treatment    ------------------------------------------ PROGRESS NOTES ------------------------------------------  Re-evaluation(s):  Time: 0221  Patients symptoms show no change  Repeat physical examination is not changed    Counseling:  I have spoken with the patient and discussed todays results, in addition to providing specific details for the plan of care and counseling regarding the diagnosis and prognosis. Their questions are answered at this time and they are agreeable with the plan of admission.    --------------------------------- ADDITIONAL PROVIDER NOTES ---------------------------------  Consultations:  Time: 3582. Spoke with hospitalist.  Discussed case. They will admit the patient. This patient's ED course included: a personal history and physicial examination, re-evaluation prior to disposition, cardiac monitoring and continuous pulse oximetry    This patient has remained hemodynamically stable during their ED course. Diagnosis:  1. COPD exacerbation ([address]    2. Fall, initial encounter    3. Lower extremity edema        Disposition:  Patient's disposition: Admit to telemetry  Patient's condition is stable.         Patient was seen and evaluated by myself and my attending Leno Vivas

## 2021-05-07 NOTE — ED NOTES
Pt arrived with skin tear left arm inner aspect at elbow. Also skin tear right arm back above elbow.  adrian Quiñonez RN  05/07/21 1121

## 2021-05-08 LAB
ANION GAP SERPL CALCULATED.3IONS-SCNC: 10 MMOL/L (ref 7–16)
BUN BLDV-MCNC: 32 MG/DL (ref 6–23)
CALCIUM SERPL-MCNC: 8.6 MG/DL (ref 8.6–10.2)
CHLORIDE BLD-SCNC: 102 MMOL/L (ref 98–107)
CO2: 26 MMOL/L (ref 22–29)
CREAT SERPL-MCNC: 1.4 MG/DL (ref 0.7–1.2)
EKG ATRIAL RATE: 72 BPM
EKG P AXIS: 56 DEGREES
EKG P-R INTERVAL: 236 MS
EKG Q-T INTERVAL: 434 MS
EKG QRS DURATION: 154 MS
EKG QTC CALCULATION (BAZETT): 475 MS
EKG R AXIS: -65 DEGREES
EKG T AXIS: 32 DEGREES
EKG VENTRICULAR RATE: 72 BPM
GFR AFRICAN AMERICAN: 57
GFR NON-AFRICAN AMERICAN: 47 ML/MIN/1.73
GLUCOSE BLD-MCNC: 241 MG/DL (ref 74–99)
HCT VFR BLD CALC: 40.9 % (ref 37–54)
HEMOGLOBIN: 13 G/DL (ref 12.5–16.5)
INR BLD: 3
MCH RBC QN AUTO: 28.7 PG (ref 26–35)
MCHC RBC AUTO-ENTMCNC: 31.8 % (ref 32–34.5)
MCV RBC AUTO: 90.3 FL (ref 80–99.9)
METER GLUCOSE: 163 MG/DL (ref 74–99)
METER GLUCOSE: 176 MG/DL (ref 74–99)
METER GLUCOSE: 182 MG/DL (ref 74–99)
METER GLUCOSE: 323 MG/DL (ref 74–99)
PDW BLD-RTO: 16.7 FL (ref 11.5–15)
PLATELET # BLD: 186 E9/L (ref 130–450)
PMV BLD AUTO: 9.8 FL (ref 7–12)
POTASSIUM SERPL-SCNC: 4.1 MMOL/L (ref 3.5–5)
PROTHROMBIN TIME: 32.7 SEC (ref 9.3–12.4)
RBC # BLD: 4.53 E12/L (ref 3.8–5.8)
SODIUM BLD-SCNC: 138 MMOL/L (ref 132–146)
WBC # BLD: 5.9 E9/L (ref 4.5–11.5)

## 2021-05-08 PROCEDURE — 2580000003 HC RX 258: Performed by: INTERNAL MEDICINE

## 2021-05-08 PROCEDURE — 85027 COMPLETE CBC AUTOMATED: CPT

## 2021-05-08 PROCEDURE — 6370000000 HC RX 637 (ALT 250 FOR IP): Performed by: INTERNAL MEDICINE

## 2021-05-08 PROCEDURE — 6360000002 HC RX W HCPCS: Performed by: INTERNAL MEDICINE

## 2021-05-08 PROCEDURE — 85610 PROTHROMBIN TIME: CPT

## 2021-05-08 PROCEDURE — 94640 AIRWAY INHALATION TREATMENT: CPT

## 2021-05-08 PROCEDURE — G0378 HOSPITAL OBSERVATION PER HR: HCPCS

## 2021-05-08 PROCEDURE — 96376 TX/PRO/DX INJ SAME DRUG ADON: CPT

## 2021-05-08 PROCEDURE — 36415 COLL VENOUS BLD VENIPUNCTURE: CPT

## 2021-05-08 PROCEDURE — 97165 OT EVAL LOW COMPLEX 30 MIN: CPT

## 2021-05-08 PROCEDURE — 2700000000 HC OXYGEN THERAPY PER DAY

## 2021-05-08 PROCEDURE — 82962 GLUCOSE BLOOD TEST: CPT

## 2021-05-08 PROCEDURE — 97161 PT EVAL LOW COMPLEX 20 MIN: CPT

## 2021-05-08 PROCEDURE — 80048 BASIC METABOLIC PNL TOTAL CA: CPT

## 2021-05-08 RX ORDER — DEXTROSE MONOHYDRATE 25 G/50ML
12.5 INJECTION, SOLUTION INTRAVENOUS PRN
Status: DISCONTINUED | OUTPATIENT
Start: 2021-05-08 | End: 2021-05-13 | Stop reason: HOSPADM

## 2021-05-08 RX ORDER — DEXTROSE MONOHYDRATE 50 MG/ML
100 INJECTION, SOLUTION INTRAVENOUS PRN
Status: DISCONTINUED | OUTPATIENT
Start: 2021-05-08 | End: 2021-05-13 | Stop reason: HOSPADM

## 2021-05-08 RX ORDER — NICOTINE POLACRILEX 4 MG
15 LOZENGE BUCCAL PRN
Status: DISCONTINUED | OUTPATIENT
Start: 2021-05-08 | End: 2021-05-13 | Stop reason: HOSPADM

## 2021-05-08 RX ADMIN — METHYLPREDNISOLONE SODIUM SUCCINATE 40 MG: 40 INJECTION, POWDER, LYOPHILIZED, FOR SOLUTION INTRAMUSCULAR; INTRAVENOUS at 14:06

## 2021-05-08 RX ADMIN — ARFORMOTEROL TARTRATE 15 MCG: 15 SOLUTION RESPIRATORY (INHALATION) at 08:10

## 2021-05-08 RX ADMIN — POLYVINYL ALCOHOL 1 DROP: 14 SOLUTION/ DROPS OPHTHALMIC at 21:32

## 2021-05-08 RX ADMIN — TAMSULOSIN HYDROCHLORIDE 0.4 MG: 0.4 CAPSULE ORAL at 08:30

## 2021-05-08 RX ADMIN — ACETAMINOPHEN 650 MG: 325 TABLET ORAL at 08:30

## 2021-05-08 RX ADMIN — BUDESONIDE 500 MCG: 0.5 SUSPENSION RESPIRATORY (INHALATION) at 19:45

## 2021-05-08 RX ADMIN — DOCUSATE SODIUM 200 MG: 100 CAPSULE ORAL at 21:32

## 2021-05-08 RX ADMIN — WARFARIN SODIUM 1 MG: 1 TABLET ORAL at 16:54

## 2021-05-08 RX ADMIN — ACETAMINOPHEN 650 MG: 325 TABLET ORAL at 16:56

## 2021-05-08 RX ADMIN — BRIMONIDINE TARTRATE 1 DROP: 2 SOLUTION OPHTHALMIC at 21:32

## 2021-05-08 RX ADMIN — IPRATROPIUM BROMIDE AND ALBUTEROL SULFATE 1 AMPULE: 2.5; .5 SOLUTION RESPIRATORY (INHALATION) at 19:45

## 2021-05-08 RX ADMIN — METHYLPREDNISOLONE SODIUM SUCCINATE 40 MG: 40 INJECTION, POWDER, LYOPHILIZED, FOR SOLUTION INTRAMUSCULAR; INTRAVENOUS at 02:19

## 2021-05-08 RX ADMIN — SODIUM CHLORIDE, PRESERVATIVE FREE 10 ML: 5 INJECTION INTRAVENOUS at 14:06

## 2021-05-08 RX ADMIN — BRIMONIDINE TARTRATE 1 DROP: 2 SOLUTION OPHTHALMIC at 08:30

## 2021-05-08 RX ADMIN — INSULIN LISPRO 1 UNITS: 100 INJECTION, SOLUTION INTRAVENOUS; SUBCUTANEOUS at 21:42

## 2021-05-08 RX ADMIN — INSULIN LISPRO 4 UNITS: 100 INJECTION, SOLUTION INTRAVENOUS; SUBCUTANEOUS at 11:24

## 2021-05-08 RX ADMIN — IPRATROPIUM BROMIDE AND ALBUTEROL SULFATE 1 AMPULE: 2.5; .5 SOLUTION RESPIRATORY (INHALATION) at 16:30

## 2021-05-08 RX ADMIN — IPRATROPIUM BROMIDE AND ALBUTEROL SULFATE 1 AMPULE: 2.5; .5 SOLUTION RESPIRATORY (INHALATION) at 08:10

## 2021-05-08 RX ADMIN — MICONAZOLE NITRATE: 20 POWDER TOPICAL at 21:32

## 2021-05-08 RX ADMIN — Medication 10 ML: at 08:37

## 2021-05-08 RX ADMIN — BUDESONIDE 500 MCG: 0.5 SUSPENSION RESPIRATORY (INHALATION) at 08:10

## 2021-05-08 RX ADMIN — MONTELUKAST SODIUM 10 MG: 10 TABLET, FILM COATED ORAL at 21:32

## 2021-05-08 RX ADMIN — ARFORMOTEROL TARTRATE 15 MCG: 15 SOLUTION RESPIRATORY (INHALATION) at 19:45

## 2021-05-08 RX ADMIN — POLYVINYL ALCOHOL 1 DROP: 14 SOLUTION/ DROPS OPHTHALMIC at 08:30

## 2021-05-08 RX ADMIN — IPRATROPIUM BROMIDE AND ALBUTEROL SULFATE 1 AMPULE: 2.5; .5 SOLUTION RESPIRATORY (INHALATION) at 12:04

## 2021-05-08 RX ADMIN — MICONAZOLE NITRATE: 20 POWDER TOPICAL at 08:37

## 2021-05-08 RX ADMIN — INSULIN LISPRO 1 UNITS: 100 INJECTION, SOLUTION INTRAVENOUS; SUBCUTANEOUS at 08:31

## 2021-05-08 RX ADMIN — SODIUM CHLORIDE: 9 INJECTION, SOLUTION INTRAVENOUS at 11:24

## 2021-05-08 RX ADMIN — INSULIN LISPRO 1 UNITS: 100 INJECTION, SOLUTION INTRAVENOUS; SUBCUTANEOUS at 16:54

## 2021-05-08 ASSESSMENT — PAIN DESCRIPTION - LOCATION: LOCATION: RIB CAGE

## 2021-05-08 ASSESSMENT — PAIN SCALES - GENERAL
PAINLEVEL_OUTOF10: 3
PAINLEVEL_OUTOF10: 2
PAINLEVEL_OUTOF10: 0
PAINLEVEL_OUTOF10: 3
PAINLEVEL_OUTOF10: 0

## 2021-05-08 ASSESSMENT — PAIN DESCRIPTION - ORIENTATION: ORIENTATION: LEFT

## 2021-05-08 NOTE — H&P
[id number] [address]                              HISTORY AND PHYSICAL    PATIENT NAME: Mohinder Gresham                         :        1929  MED REC NO:   [id number]                            ROOM:       5317  ACCOUNT NO:   [de-identified]                           ADMIT DATE: 2021  PROVIDER:     Jaz Harrison MD    CHIEF COMPLAINT:  Fall. HISTORY OF PRESENT ILLNESS:  This is a 59-year-old gentleman, who has  actually had several falls recently. He was in the emergency room  approximately one week ago with a fall. He had some skin tears, but no  major injuries and was sent home. He had another fall on this occasion. The patient states that he had his left side of his chest on an end  table and is complaining of some of left-sided chest wall pain. He was  thoroughly scanned in the emergency room with a series of x-rays and CAT  scans that did not reveal any significant abnormalities, injuries, or  fractures. He was noted to have increasing shortness of breath and was  wheezing. He was felt to be in COPD exacerbation and was admitted for  that reason. At the time of my evaluation, the patient was lying in bed  comfortably. He was in no acute distress. He did complain of some left  lateral chest wall pain. His breathing was not labored, though he did  have some audible expiratory wheezes. PAST MEDICAL HISTORY:  Significant for asthma, COPD, paroxysmal atrial  fibrillation, chronic kidney disease stage III-A, and BPH. MEDICATIONS:  Include Advair 500/50 one puff b.i.d., Singulair 10 mg  daily, Coumadin 1 mg daily, and tamsulosin 0.4 mg at bedtime. PAST SURGICAL HISTORY:  Includes appendectomy, hernia repair, and joint  replacement. FAMILY MEDICAL HISTORY:  Noncontributory. SOCIAL HISTORY:  He is an ex-smoker having quit many years ago. Does  not drink any significant amounts of alcohol. He lives at home with his  wife. REVIEW OF SYSTEMS:  SKIN:  Reveals no new or changing moles, rashes or lesions, though he  does have couple of skin tears from recent falls including one over his  left elbow. lungs:  As above. CARDIOVASCULAR:  No chest pain, palpitations, or worsening leg swelling. GI:  No nausea, vomiting, diarrhea, constipation, melena, on  hematochezia. :  No dysuria, hematuria, frequency, or problems recurrent UTIs. MUSCULOSKELETAL:  He does have a history of osteoarthritis with previous  joint replacement. No vascular claudication. No recent fractures. NEUROLOGIC:  No CVA or TIA symptoms. No paresthesias. No headaches. PHYSICAL EXAMINATION:  GENERAL:  This is an elderly gentleman lying in bed comfortably, in no  acute distress. VITAL SIGNS:  Stable. He is afebrile. HEENT:  Head:  Normocephalic and atraumatic. Eyes:  PERRLA. Extraocular movements intact without nystagmus. Throat:  Oral and  buccal mucosa are moist without oral ulcer, exudate, or lesion. NECK:  No goiter, bruits, or lymphadenopathy. CHEST:  Symmetrical excursions with inspiration. BACK:  No CVA or spine tenderness. HEART:  Has a regular rate and rhythm without murmur, rub, gallop, or  JVD. LUNGS:  Have some scattered expiratory wheezes. No use of accessory  respiratory muscles. No rales or rhonchi appreciated. ABDOMEN:  Soft and nontender. Positive bowel sounds in all four  quadrants with no hepatosplenomegaly or other masses appreciated. EXTREMITIES:  No clubbing or cyanosis. He has trace to 1+ lower leg  edema bilaterally. He has a full range of motion of all extremities. NEUROLOGIC:  Cranial nerves II through XII are grossly intact. He has a  grossly normal sensory exam and 5/5 strength throughout. His gait was  not assessed at this time. ASSESSMENT AND PLAN:  1. COPD exacerbation:  The patient has been placed on aerosols, oxygen,  and steroids.   We will follow him and see how he

## 2021-05-08 NOTE — PLAN OF CARE
Problem: Falls - Risk of:  Goal: Will remain free from falls  Description: Will remain free from falls  Outcome: Ongoing  Goal: Absence of physical injury  Description: Absence of physical injury  Outcome: Ongoing     Problem: Pain:  Goal: Pain level will decrease  Description: Pain level will decrease  Outcome: Ongoing  Goal: Control of acute pain  Description: Control of acute pain  Outcome: Ongoing  Goal: Control of chronic pain  Description: Control of chronic pain  Outcome: Ongoing     Problem: FLUID AND ELECTROLYTE IMBALANCE  Goal: Fluid and electrolyte balance are achieved/maintained  Outcome: Ongoing     Problem: HH FLUID RETENTION-CHF  Goal: Absence of fluid overload signs and symptoms  Outcome: Ongoing

## 2021-05-08 NOTE — PROGRESS NOTES
Physical Therapy    Facility/Department: [address] MED SURG/TELE  Initial Assessment    NAME: Jazlyn Robins  : 1929  MRN: [id number]    Date of Service: 2021      Patient Diagnosis(es): The primary encounter diagnosis was COPD exacerbation ([facility] [address] Diagnoses of Fall, initial encounter and Lower extremity edema were also pertinent to this visit. has a past medical history of Asthma, COPD (chronic obstructive pulmonary disease) ([facility] [address] and Unspecified cerebral artery occlusion with cerebral infarction. has a past surgical history that includes Appendectomy; hernia repair; joint replacement (12); ECHO Compl W Dop Color Flow (3/16/2013); and Abdomen surgery. Referring Provider:  Denia Wolff MD      Evaluating Therapist: Leticia PT      Room #:528  DIAGNOSIS: COPD  S/p falls at [facility]  Additional Pertinent History:CVA, COPD  PRECAUTIONS: falls, alarm, O2    Social:  Pt lives with wife in a 1 floor plan 0 steps  to enter. Uses basement to walk for exercise  Prior to admission independent with cane or ww     Initial Evaluation  Date: 21 Treatment      Short Term/ Long Term   Goals   Was pt agreeable to Eval/treatment? yes     Does pt have pain? L side ribs     Bed Mobility  Rolling: SBA  Supine to sit: SBA  Sit to supine: NT  Scooting: SBA  independent   Transfers Sit to stand: min assist  Stand to sit: min assist  Stand pivot: min assist  supervision   Ambulation    25 feet with ww with min assist  75 feet with ww with supervison   Stair Negotiation  Ascended and descended  NT   N/A   LE strength     4-/5    4/5   balance      Fair with ww     AM-PAC Raw score               16/24         Pt is alert and Oriented   LE ROM: WFL  Sensation: intact  Edema: B LEs  Endurance: fair-  Chair alarm: yes     ASSESSMENT  Pt displays functional ability as noted in the objective portion of this evaluation.       Patient education  Pt educated on PT objectives    Patient response to education:   Pt verbalized understanding Pt demonstrated skill Pt requires further education in this area   yes         ASSESSMENT:    Comments:  Pt instructed on safety with transfers and walker safety. Pt short of breath after ,mobility. SpO2 on 3L after ambulation 85%. Recovered to 97  % with seated rest.         Patient and or family understand(s) diagnosis, prognosis, and plan of care. PLAN OF CARE:    Current Treatment Recommendations     [x] Strengthening     [] ROM   [x] Balance Training   [x] Endurance Training   [x] Transfer Training   [x] Gait Training   [] Stair Training   [] Positioning   [x] Safety and Education Training   [x] Patient/Caregiver Education   [] HEP  [] Other     Frequency of treatments: 2-5x/week x 5.days    Time in  1050  Time out  1105        Evaluation Time includes thorough review of current medical information, gathering information on past medical history/social history and prior level of function, completion of standardized testing/informal observation of tasks, assessment of data and education on plan of care and goals.     CPT codes:  [x] Low Complexity PT evaluation 03677  [] Moderate Complexity PT evaluation 41340  [] High Complexity PT evaluation 39481  [] PT Re-evaluation 16460  [] Gait training 32027 minutes  [] Manual therapy 88464 minutes  [] Therapeutic activities 45092 minutes  [] Therapeutic exercises 26942 minutes  [] Neuromuscular reeducation 41685 minutes     [facility] PT [id number]

## 2021-05-08 NOTE — CARE COORDINATION
Social work / Discharge Planning:       Social work consult noted regarding \"d/c planning, possible ECF/[facility]\"       Social work met with patient for initial assessment. He lives with his wife and uses a cane and ww at home. Patient has a home nebulizer. Currently on room air. He had a past stay at [facility] following knee surgery and is active with [facility]. Per patient, services started with [facility] on the day of admission. Awaiting PT/OT evaluations to assist in determining discharge needs. Electronically signed by SASHA Tavarez on 5/8/2021 at 9:34 AM          RN contacted social work stating patient's son is now visiting and requested referral to [facility] [place]. Message sent to liaison requesting call if she is able to take a referral today. Electronically signed by SASHA Tavarez on 5/8/2021 at 9:38 AM          Return call from [facility] EM[facility]. Facility cannot accept weekend referral.   The assigned SW / CM will need to make referral on Monday. RN updated.   Electronically signed by SASHA Tavarez on 5/8/2021 at 10:07 AM

## 2021-05-08 NOTE — PROGRESS NOTES
Supine-to-Sit: SBA      Functional Transfers Sit-to-Stand: Min A   from EOB  Independent   Functional Mobility Min A   (with walker) within patient's room. Mod I with functional mobility (with device, as needed/appropriate) in order to maximize independence with ADLs/IADLs and other functional tasks. Balance Sitting: Good  (at EOB)  Standing: Fair-  (with walker)  Fair+ dynamic standing balance during completion of ADLs/IADLs and other functional tasks. Activity Tolerance Fair-  Mild shortness of breath noted with functional mobility within patient's room; nursing notified that patient's O2 saturations decreased to 85% (on 2L of O2 via nasal cannula) with this activity. Patient's O2 saturations recovered quickly above 90% with seated rest.  Patient will demonstrate Good understanding and consistent implementation of energy conservation techniques and work simplification techniques into ADL routines. 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 N/A   B UE Strength 3+/5  Patient will demonstrate 4/5 B UE strength in order to maximize independence with ADLs and functional transfers. Long-Term Goal: Patient will increase functional independence to PLOF in order to allow patient to live in least restrictive environment. ROM: Additional Information:    R UE  AROM WFL    L UE AROM WFL    Hand Dominance: Right    Hearing: WFL grossly  Sensation: Patient denied experiencing numbness/tingling in B UEs. Tone: WFL  Edema: No    Comments: RN approved patient's participation in [address] [place] [address] activities. Upon arrival, patient supine in bed. At end of session, patient seated in bedside chair with call light and phone within reach, chair alarm activated, waffle cushion in place, and all lines and tubes intact.  Patient would benefit from continued skilled OT to increase safety and independence with completion of ADL/IADL tasks for functional independence and quality of life. Assessment of Current Deficits:   Functional Mobility [x]  ADLs [x] Strength [x]  Cognition []  Functional Transfers  [x] IADLs [x] Safety Awareness [x]  Endurance [x]  Fine Motor Coordination [] Balance [x] Vision/Perception [] Sensation []   Gross Motor Coordination [] ROM [] Delirium []                  Motor Control []    Plan of Care: 2-5 days/week for 1-2 weeks PRN  [x]ADL retraining/adaptive techniques and AE recommendations to increase functional independence within precautions  [x]Energy conservation techniques to improve tolerance for ADLs/IADLs  [x]Functional transfer/mobility training/DME recommendations for increased independence, safety and fall prevention  [x]Patient/family education to increase safety and functional independence  [x]Environmental modifications for safe mobility and completion of ADLs/IADLs  []Cognitive retraining exercises to improve problem solving skills & safe participation in ADLs/IADLs   []Sensory re-education techniques to improve extremity awareness, maintain skin integrity and improve hand function   []Visual/Perceptual retraining  to improve body awareness and safety during transfers and ADLs   []Splinting/positioning needs to maintain joint/skin integrity and prevent contractures   [x]Therapeutic activity to improve functional performance during ADLs/IADLs  [x]Therapeutic exercise to improve tolerance and functional strength for ADLs/IADLs  [x]Balance retraining/tolerance tasks for facilitation of postural control with dynamic challenges during ADLs   [x]Neuromuscular re-education: facilitate righting/equilibrium reactions, midline orientation, scapular stability/mobility, normalize muscle tone, and facilitate active functional movement/attention  []Delirium prevention/treatment   []Positioning to improve functional independence and prevent skin breakdown   []Other:     Rehab Potential: Good for established goals.   Patient / Family Goal: Patient did not state a goal.  Patient and/or family were instructed on functional diagnosis, prognosis/goals, and OT plan of care. Demonstrated Fair+ understanding. Eval Complexity: Low    Time In: 1105  Time Out: 1120  Total Treatment Time: 0 minutes      Minutes Units   OT Eval Low 98687 15 1   OT Eval Medium 84853     OT Eval High 83131     OT Re-Eval [id number]     Therapeutic Ex 30925     Therapeutic Activities 97774     ADL/Self Care 43420     Orthotic Management 21489     Neuro Re-Ed 07792     Non-Billable Time N/A ---     Evaluation time includes thorough review of current medical information, gathering information on past medical history/social history and prior level of function, completion of standardized testing/informal observation of tasks, assessment of data, and education on plan of care and goals. Jessica Jean OTR/L  License Number: [id number]

## 2021-05-09 LAB
ANION GAP SERPL CALCULATED.3IONS-SCNC: 8 MMOL/L (ref 7–16)
BUN BLDV-MCNC: 40 MG/DL (ref 6–23)
CALCIUM SERPL-MCNC: 8.6 MG/DL (ref 8.6–10.2)
CHLORIDE BLD-SCNC: 106 MMOL/L (ref 98–107)
CO2: 28 MMOL/L (ref 22–29)
CREAT SERPL-MCNC: 1.2 MG/DL (ref 0.7–1.2)
GFR AFRICAN AMERICAN: >60
GFR NON-AFRICAN AMERICAN: 57 ML/MIN/1.73
GLUCOSE BLD-MCNC: 154 MG/DL (ref 74–99)
HCT VFR BLD CALC: 40.6 % (ref 37–54)
HEMOGLOBIN: 13.1 G/DL (ref 12.5–16.5)
INR BLD: 4.8
MCH RBC QN AUTO: 29.5 PG (ref 26–35)
MCHC RBC AUTO-ENTMCNC: 32.3 % (ref 32–34.5)
MCV RBC AUTO: 91.4 FL (ref 80–99.9)
METER GLUCOSE: 141 MG/DL (ref 74–99)
METER GLUCOSE: 166 MG/DL (ref 74–99)
METER GLUCOSE: 192 MG/DL (ref 74–99)
METER GLUCOSE: 198 MG/DL (ref 74–99)
PDW BLD-RTO: 17.1 FL (ref 11.5–15)
PLATELET # BLD: 184 E9/L (ref 130–450)
PMV BLD AUTO: 9.9 FL (ref 7–12)
POTASSIUM SERPL-SCNC: 4.3 MMOL/L (ref 3.5–5)
PROTHROMBIN TIME: 52 SEC (ref 9.3–12.4)
RBC # BLD: 4.44 E12/L (ref 3.8–5.8)
SODIUM BLD-SCNC: 142 MMOL/L (ref 132–146)
WBC # BLD: 11.4 E9/L (ref 4.5–11.5)

## 2021-05-09 PROCEDURE — 82962 GLUCOSE BLOOD TEST: CPT

## 2021-05-09 PROCEDURE — 6370000000 HC RX 637 (ALT 250 FOR IP): Performed by: INTERNAL MEDICINE

## 2021-05-09 PROCEDURE — 94640 AIRWAY INHALATION TREATMENT: CPT

## 2021-05-09 PROCEDURE — 36415 COLL VENOUS BLD VENIPUNCTURE: CPT

## 2021-05-09 PROCEDURE — 80048 BASIC METABOLIC PNL TOTAL CA: CPT

## 2021-05-09 PROCEDURE — 1200000000 HC SEMI PRIVATE

## 2021-05-09 PROCEDURE — 2580000003 HC RX 258: Performed by: INTERNAL MEDICINE

## 2021-05-09 PROCEDURE — 2700000000 HC OXYGEN THERAPY PER DAY

## 2021-05-09 PROCEDURE — 6360000002 HC RX W HCPCS: Performed by: INTERNAL MEDICINE

## 2021-05-09 PROCEDURE — 85610 PROTHROMBIN TIME: CPT

## 2021-05-09 PROCEDURE — 96376 TX/PRO/DX INJ SAME DRUG ADON: CPT

## 2021-05-09 PROCEDURE — 85027 COMPLETE CBC AUTOMATED: CPT

## 2021-05-09 PROCEDURE — G0378 HOSPITAL OBSERVATION PER HR: HCPCS

## 2021-05-09 RX ORDER — ASPIRIN 81 MG/1
81 TABLET ORAL DAILY
Status: DISCONTINUED | OUTPATIENT
Start: 2021-05-09 | End: 2021-05-13 | Stop reason: HOSPADM

## 2021-05-09 RX ADMIN — POLYVINYL ALCOHOL 1 DROP: 14 SOLUTION/ DROPS OPHTHALMIC at 21:39

## 2021-05-09 RX ADMIN — MICONAZOLE NITRATE: 20 POWDER TOPICAL at 21:39

## 2021-05-09 RX ADMIN — MONTELUKAST SODIUM 10 MG: 10 TABLET, FILM COATED ORAL at 21:39

## 2021-05-09 RX ADMIN — POLYVINYL ALCOHOL 1 DROP: 14 SOLUTION/ DROPS OPHTHALMIC at 09:34

## 2021-05-09 RX ADMIN — INSULIN LISPRO 1 UNITS: 100 INJECTION, SOLUTION INTRAVENOUS; SUBCUTANEOUS at 09:35

## 2021-05-09 RX ADMIN — TAMSULOSIN HYDROCHLORIDE 0.4 MG: 0.4 CAPSULE ORAL at 09:33

## 2021-05-09 RX ADMIN — IPRATROPIUM BROMIDE AND ALBUTEROL SULFATE 1 AMPULE: 2.5; .5 SOLUTION RESPIRATORY (INHALATION) at 07:46

## 2021-05-09 RX ADMIN — INSULIN LISPRO 1 UNITS: 100 INJECTION, SOLUTION INTRAVENOUS; SUBCUTANEOUS at 21:41

## 2021-05-09 RX ADMIN — BUDESONIDE 500 MCG: 0.5 SUSPENSION RESPIRATORY (INHALATION) at 07:45

## 2021-05-09 RX ADMIN — BRIMONIDINE TARTRATE 1 DROP: 2 SOLUTION OPHTHALMIC at 21:39

## 2021-05-09 RX ADMIN — IPRATROPIUM BROMIDE AND ALBUTEROL SULFATE 1 AMPULE: 2.5; .5 SOLUTION RESPIRATORY (INHALATION) at 11:52

## 2021-05-09 RX ADMIN — IPRATROPIUM BROMIDE AND ALBUTEROL SULFATE 1 AMPULE: 2.5; .5 SOLUTION RESPIRATORY (INHALATION) at 15:37

## 2021-05-09 RX ADMIN — ASPIRIN 81 MG: 81 TABLET, COATED ORAL at 09:33

## 2021-05-09 RX ADMIN — ACETAMINOPHEN 650 MG: 325 TABLET ORAL at 21:44

## 2021-05-09 RX ADMIN — BRIMONIDINE TARTRATE 1 DROP: 2 SOLUTION OPHTHALMIC at 09:33

## 2021-05-09 RX ADMIN — METHYLPREDNISOLONE SODIUM SUCCINATE 40 MG: 40 INJECTION, POWDER, LYOPHILIZED, FOR SOLUTION INTRAMUSCULAR; INTRAVENOUS at 03:09

## 2021-05-09 RX ADMIN — INSULIN LISPRO 1 UNITS: 100 INJECTION, SOLUTION INTRAVENOUS; SUBCUTANEOUS at 16:50

## 2021-05-09 RX ADMIN — MICONAZOLE NITRATE: 20 POWDER TOPICAL at 09:33

## 2021-05-09 RX ADMIN — METHYLPREDNISOLONE SODIUM SUCCINATE 40 MG: 40 INJECTION, POWDER, LYOPHILIZED, FOR SOLUTION INTRAMUSCULAR; INTRAVENOUS at 17:03

## 2021-05-09 RX ADMIN — DOCUSATE SODIUM 200 MG: 100 CAPSULE ORAL at 21:39

## 2021-05-09 RX ADMIN — IPRATROPIUM BROMIDE AND ALBUTEROL SULFATE 1 AMPULE: 2.5; .5 SOLUTION RESPIRATORY (INHALATION) at 19:27

## 2021-05-09 RX ADMIN — Medication 10 ML: at 09:34

## 2021-05-09 RX ADMIN — ARFORMOTEROL TARTRATE 15 MCG: 15 SOLUTION RESPIRATORY (INHALATION) at 19:27

## 2021-05-09 RX ADMIN — ARFORMOTEROL TARTRATE 15 MCG: 15 SOLUTION RESPIRATORY (INHALATION) at 07:45

## 2021-05-09 RX ADMIN — INSULIN LISPRO 1 UNITS: 100 INJECTION, SOLUTION INTRAVENOUS; SUBCUTANEOUS at 12:26

## 2021-05-09 RX ADMIN — BUDESONIDE 500 MCG: 0.5 SUSPENSION RESPIRATORY (INHALATION) at 19:27

## 2021-05-09 RX ADMIN — ACETAMINOPHEN 650 MG: 325 TABLET ORAL at 12:26

## 2021-05-09 ASSESSMENT — PAIN SCALES - GENERAL
PAINLEVEL_OUTOF10: 3
PAINLEVEL_OUTOF10: 0
PAINLEVEL_OUTOF10: 4
PAINLEVEL_OUTOF10: 0

## 2021-05-09 ASSESSMENT — PULMONARY FUNCTION TESTS: PEFR_L/MIN: 18

## 2021-05-09 NOTE — PLAN OF CARE
Problem: Falls - Risk of:  Goal: Will remain free from falls  Description: Will remain free from falls  Outcome: Ongoing  Goal: Absence of physical injury  Description: Absence of physical injury  Outcome: Ongoing     Problem: Pain:  Description: Pain management should include both nonpharmacologic and pharmacologic interventions.   Goal: Pain level will decrease  Description: Pain level will decrease  Outcome: Ongoing  Goal: Control of acute pain  Description: Control of acute pain  Outcome: Ongoing  Goal: Control of chronic pain  Description: Control of chronic pain  Outcome: Ongoing     Problem: FLUID AND ELECTROLYTE IMBALANCE  Goal: Fluid and electrolyte balance are achieved/maintained  Outcome: Ongoing     Problem: HH FLUID RETENTION-CHF  Goal: Absence of fluid overload signs and symptoms  Outcome: Ongoing     Problem: Gas Exchange - Impaired  Goal: Able to breathe comfortably  Description: Able to breathe comfortably  Outcome: Ongoing     Problem: Mobility - Impaired  Goal: Mobility/activity is maintained at optimum level for patient  Outcome: Ongoing

## 2021-05-09 NOTE — PLAN OF CARE
Problem: Falls - Risk of:  Goal: Will remain free from falls  Description: Will remain free from falls  5/1/7622 6105 by Sincere Carver RN  Outcome: Ongoing  5/9/2021 0428 by Allyssa Morrison RN  Outcome: Ongoing  Goal: Absence of physical injury  Description: Absence of physical injury  3/4/2681 3496 by Sincere Carver RN  Outcome: Ongoing  5/9/2021 0428 by Allyssa Morrison RN  Outcome: Ongoing     Problem: Pain:  Goal: Pain level will decrease  Description: Pain level will decrease  0/9/8043 3339 by Sincere Carver RN  Outcome: Ongoing  5/9/2021 0428 by Allyssa Morrison RN  Outcome: Ongoing  Goal: Control of acute pain  Description: Control of acute pain  8/1/6819 1760 by Sincere Carver RN  Outcome: Ongoing  5/9/2021 0428 by Allyssa Morrison RN  Outcome: Ongoing  Goal: Control of chronic pain  Description: Control of chronic pain  7/2/4773 9820 by Sincere Carver RN  Outcome: Ongoing  5/9/2021 0428 by Allyssa Morrison RN  Outcome: Ongoing     Problem: FLUID AND ELECTROLYTE IMBALANCE  Goal: Fluid and electrolyte balance are achieved/maintained  5/9/2021 0428 by Allyssa Morrison RN  Outcome: Ongoing     Problem: HH FLUID RETENTION-CHF  Goal: Absence of fluid overload signs and symptoms  5/9/2021 0428 by Allyssa Morrison RN  Outcome: Ongoing     Problem: Gas Exchange - Impaired  Goal: Able to breathe comfortably  Description: Able to breathe comfortably  5/9/2021 0428 by Allyssa Morrison RN  Outcome: Ongoing     Problem: Mobility - Impaired  Goal: Mobility/activity is maintained at optimum level for patient  5/9/2021 0428 by Allyssa Morrison RN  Outcome: Ongoing

## 2021-05-10 ENCOUNTER — APPOINTMENT (OUTPATIENT)
Dept: GENERAL RADIOLOGY | Age: 86
DRG: 190 | End: 2021-05-10
Payer: MEDICARE

## 2021-05-10 LAB
ANION GAP SERPL CALCULATED.3IONS-SCNC: 8 MMOL/L (ref 7–16)
BUN BLDV-MCNC: 33 MG/DL (ref 6–23)
CALCIUM SERPL-MCNC: 8.5 MG/DL (ref 8.6–10.2)
CHLORIDE BLD-SCNC: 105 MMOL/L (ref 98–107)
CO2: 25 MMOL/L (ref 22–29)
CREAT SERPL-MCNC: 1.1 MG/DL (ref 0.7–1.2)
GFR AFRICAN AMERICAN: >60
GFR NON-AFRICAN AMERICAN: >60 ML/MIN/1.73
GLUCOSE BLD-MCNC: 151 MG/DL (ref 74–99)
HCT VFR BLD CALC: 41.3 % (ref 37–54)
HEMOGLOBIN: 12.7 G/DL (ref 12.5–16.5)
INR BLD: 5.2
MCH RBC QN AUTO: 28.5 PG (ref 26–35)
MCHC RBC AUTO-ENTMCNC: 30.8 % (ref 32–34.5)
MCV RBC AUTO: 92.8 FL (ref 80–99.9)
METER GLUCOSE: 132 MG/DL (ref 74–99)
METER GLUCOSE: 135 MG/DL (ref 74–99)
METER GLUCOSE: 142 MG/DL (ref 74–99)
METER GLUCOSE: 204 MG/DL (ref 74–99)
PDW BLD-RTO: 17.2 FL (ref 11.5–15)
PLATELET # BLD: 192 E9/L (ref 130–450)
PMV BLD AUTO: 9.8 FL (ref 7–12)
POTASSIUM SERPL-SCNC: 4.3 MMOL/L (ref 3.5–5)
PRO-BNP: 843 PG/ML (ref 0–450)
PROTHROMBIN TIME: 57.3 SEC (ref 9.3–12.4)
RBC # BLD: 4.45 E12/L (ref 3.8–5.8)
SODIUM BLD-SCNC: 138 MMOL/L (ref 132–146)
WBC # BLD: 10.1 E9/L (ref 4.5–11.5)

## 2021-05-10 PROCEDURE — 36415 COLL VENOUS BLD VENIPUNCTURE: CPT

## 2021-05-10 PROCEDURE — 6360000002 HC RX W HCPCS: Performed by: INTERNAL MEDICINE

## 2021-05-10 PROCEDURE — 6370000000 HC RX 637 (ALT 250 FOR IP): Performed by: INTERNAL MEDICINE

## 2021-05-10 PROCEDURE — 94640 AIRWAY INHALATION TREATMENT: CPT

## 2021-05-10 PROCEDURE — 1200000000 HC SEMI PRIVATE

## 2021-05-10 PROCEDURE — 80048 BASIC METABOLIC PNL TOTAL CA: CPT

## 2021-05-10 PROCEDURE — 85027 COMPLETE CBC AUTOMATED: CPT

## 2021-05-10 PROCEDURE — 2580000003 HC RX 258: Performed by: INTERNAL MEDICINE

## 2021-05-10 PROCEDURE — 85610 PROTHROMBIN TIME: CPT

## 2021-05-10 PROCEDURE — 83880 ASSAY OF NATRIURETIC PEPTIDE: CPT

## 2021-05-10 PROCEDURE — 97530 THERAPEUTIC ACTIVITIES: CPT

## 2021-05-10 PROCEDURE — 97535 SELF CARE MNGMENT TRAINING: CPT

## 2021-05-10 PROCEDURE — 2700000000 HC OXYGEN THERAPY PER DAY

## 2021-05-10 PROCEDURE — 2500000003 HC RX 250 WO HCPCS: Performed by: INTERNAL MEDICINE

## 2021-05-10 PROCEDURE — 82962 GLUCOSE BLOOD TEST: CPT

## 2021-05-10 PROCEDURE — 71046 X-RAY EXAM CHEST 2 VIEWS: CPT

## 2021-05-10 RX ORDER — DOCUSATE SODIUM 100 MG/1
100 CAPSULE, LIQUID FILLED ORAL DAILY
Status: DISCONTINUED | OUTPATIENT
Start: 2021-05-10 | End: 2021-05-10

## 2021-05-10 RX ORDER — BISACODYL 10 MG
10 SUPPOSITORY, RECTAL RECTAL ONCE
Status: COMPLETED | OUTPATIENT
Start: 2021-05-10 | End: 2021-05-10

## 2021-05-10 RX ORDER — PHYTONADIONE 5 MG/1
5 TABLET ORAL ONCE
Status: COMPLETED | OUTPATIENT
Start: 2021-05-10 | End: 2021-05-10

## 2021-05-10 RX ADMIN — MICONAZOLE NITRATE: 20 POWDER TOPICAL at 21:15

## 2021-05-10 RX ADMIN — BUDESONIDE 500 MCG: 0.5 SUSPENSION RESPIRATORY (INHALATION) at 20:41

## 2021-05-10 RX ADMIN — PHYTONADIONE 5 MG: 5 TABLET ORAL at 06:31

## 2021-05-10 RX ADMIN — MAGNESIUM HYDROXIDE 30 ML: 400 SUSPENSION ORAL at 08:40

## 2021-05-10 RX ADMIN — TAMSULOSIN HYDROCHLORIDE 0.4 MG: 0.4 CAPSULE ORAL at 08:40

## 2021-05-10 RX ADMIN — INSULIN LISPRO 1 UNITS: 100 INJECTION, SOLUTION INTRAVENOUS; SUBCUTANEOUS at 21:22

## 2021-05-10 RX ADMIN — PETROLATUM: 420 OINTMENT TOPICAL at 21:15

## 2021-05-10 RX ADMIN — POLYETHYLENE GLYCOL 3350 17 G: 17 POWDER, FOR SOLUTION ORAL at 15:54

## 2021-05-10 RX ADMIN — Medication 5 ML: at 08:41

## 2021-05-10 RX ADMIN — MICONAZOLE NITRATE: 20 POWDER TOPICAL at 08:40

## 2021-05-10 RX ADMIN — ARFORMOTEROL TARTRATE 15 MCG: 15 SOLUTION RESPIRATORY (INHALATION) at 20:41

## 2021-05-10 RX ADMIN — ASPIRIN 81 MG: 81 TABLET, COATED ORAL at 08:40

## 2021-05-10 RX ADMIN — IPRATROPIUM BROMIDE AND ALBUTEROL SULFATE 1 AMPULE: 2.5; .5 SOLUTION RESPIRATORY (INHALATION) at 16:28

## 2021-05-10 RX ADMIN — BUDESONIDE 500 MCG: 0.5 SUSPENSION RESPIRATORY (INHALATION) at 08:54

## 2021-05-10 RX ADMIN — BRIMONIDINE TARTRATE 1 DROP: 2 SOLUTION OPHTHALMIC at 21:15

## 2021-05-10 RX ADMIN — PETROLATUM: 420 OINTMENT TOPICAL at 15:54

## 2021-05-10 RX ADMIN — POLYVINYL ALCOHOL 1 DROP: 14 SOLUTION/ DROPS OPHTHALMIC at 08:40

## 2021-05-10 RX ADMIN — Medication 10 ML: at 21:15

## 2021-05-10 RX ADMIN — ACETAMINOPHEN 650 MG: 325 TABLET ORAL at 21:22

## 2021-05-10 RX ADMIN — IPRATROPIUM BROMIDE AND ALBUTEROL SULFATE 1 AMPULE: 2.5; .5 SOLUTION RESPIRATORY (INHALATION) at 08:54

## 2021-05-10 RX ADMIN — ARFORMOTEROL TARTRATE 15 MCG: 15 SOLUTION RESPIRATORY (INHALATION) at 08:54

## 2021-05-10 RX ADMIN — INSULIN LISPRO 1 UNITS: 100 INJECTION, SOLUTION INTRAVENOUS; SUBCUTANEOUS at 13:01

## 2021-05-10 RX ADMIN — POLYVINYL ALCOHOL 1 DROP: 14 SOLUTION/ DROPS OPHTHALMIC at 21:15

## 2021-05-10 RX ADMIN — BISACODYL 10 MG: 10 SUPPOSITORY RECTAL at 08:40

## 2021-05-10 RX ADMIN — IPRATROPIUM BROMIDE AND ALBUTEROL SULFATE 1 AMPULE: 2.5; .5 SOLUTION RESPIRATORY (INHALATION) at 11:49

## 2021-05-10 RX ADMIN — BRIMONIDINE TARTRATE 1 DROP: 2 SOLUTION OPHTHALMIC at 08:40

## 2021-05-10 RX ADMIN — METHYLPREDNISOLONE SODIUM SUCCINATE 40 MG: 40 INJECTION, POWDER, LYOPHILIZED, FOR SOLUTION INTRAMUSCULAR; INTRAVENOUS at 03:10

## 2021-05-10 RX ADMIN — IPRATROPIUM BROMIDE AND ALBUTEROL SULFATE 1 AMPULE: 2.5; .5 SOLUTION RESPIRATORY (INHALATION) at 20:41

## 2021-05-10 RX ADMIN — METHYLPREDNISOLONE SODIUM SUCCINATE 40 MG: 40 INJECTION, POWDER, LYOPHILIZED, FOR SOLUTION INTRAMUSCULAR; INTRAVENOUS at 14:22

## 2021-05-10 RX ADMIN — DOCUSATE SODIUM 200 MG: 100 CAPSULE ORAL at 21:14

## 2021-05-10 RX ADMIN — MONTELUKAST SODIUM 10 MG: 10 TABLET, FILM COATED ORAL at 21:14

## 2021-05-10 ASSESSMENT — PAIN DESCRIPTION - PAIN TYPE: TYPE: ACUTE PAIN

## 2021-05-10 ASSESSMENT — PAIN DESCRIPTION - DESCRIPTORS: DESCRIPTORS: DISCOMFORT

## 2021-05-10 ASSESSMENT — PAIN DESCRIPTION - ORIENTATION: ORIENTATION: LEFT

## 2021-05-10 ASSESSMENT — PAIN SCALES - GENERAL
PAINLEVEL_OUTOF10: 0
PAINLEVEL_OUTOF10: 0
PAINLEVEL_OUTOF10: 4

## 2021-05-10 ASSESSMENT — PAIN - FUNCTIONAL ASSESSMENT: PAIN_FUNCTIONAL_ASSESSMENT: PREVENTS OR INTERFERES SOME ACTIVE ACTIVITIES AND ADLS

## 2021-05-10 ASSESSMENT — PAIN DESCRIPTION - LOCATION: LOCATION: RIB CAGE

## 2021-05-10 NOTE — PROGRESS NOTES
Notified Dr. Olamide Martin of critical INR of 5.2    6:01 AM--per Dr. Olamide Martin give vitamin k po 5 mg x1

## 2021-05-10 NOTE — PROGRESS NOTES
Consult called to dr. Eliceo Whitaker via answering service     Electronically signed by Wan Ernst RN on 5/10/2021 at 5:00 PM

## 2021-05-10 NOTE — PLAN OF CARE
Problem: Falls - Risk of:  Goal: Will remain free from falls  Description: Will remain free from falls  5/10/2021 0040 by Shyann Steele RN  Outcome: Met This Shift  0/7/8587 8787 by Ronnie Green RN  Outcome: Ongoing  Goal: Absence of physical injury  Description: Absence of physical injury  5/10/2021 0040 by Shyann Steele RN  Outcome: Met This Shift  1/8/9258 4807 by Ronnie Green RN  Outcome: Ongoing     Problem: Pain:  Description: Pain management should include both nonpharmacologic and pharmacologic interventions.   Goal: Pain level will decrease  Description: Pain level will decrease  5/10/2021 0040 by Shyann Steele RN  Outcome: Met This Shift  6/3/9537 2162 by Ronnie Green RN  Outcome: Ongoing  Goal: Control of acute pain  Description: Control of acute pain  5/10/2021 0040 by Shyann Steele RN  Outcome: Met This Shift  5/1/9548 0834 by Ronnie Green RN  Outcome: Ongoing  Goal: Control of chronic pain  Description: Control of chronic pain  5/10/2021 0040 by Shyann Steele RN  Outcome: Met This Shift  4/4/1609 6919 by Ronnie Green RN  Outcome: Ongoing     Problem: Skin Integrity:  Goal: Will show no infection signs and symptoms  Description: Will show no infection signs and symptoms  Outcome: Met This Shift  Goal: Absence of new skin breakdown  Description: Absence of new skin breakdown  Outcome: Met This Shift

## 2021-05-10 NOTE — FLOWSHEET NOTE
Inpatient Wound Care    Admit Date: 5/7/2021  1:22 PM    Reason for consult:  Arms    Significant history:  Admitted with COPD exacerbation. History includes: CVA, COPD. Wound history:  POA    Findings:       05/10/21 1413   Wound 04/30/21 Brachial Anterior; Left   Date First Assessed/Time First Assessed: 04/30/21 1813   Present on Hospital Admission: Yes  Primary Wound Type: Skin Tear  Location: Brachial  Wound Location Orientation: Anterior; Left   Wound Etiology Skin Tear   Dressing Status New dressing applied   Wound Cleansed Cleansed with saline   Dressing/Treatment   (adaptic, woundgel, oticell, ABD and kerlix)   Dressing Change Due 05/12/21   Wound Length (cm) 6 cm   Wound Width (cm) 1 cm   Wound Depth (cm) 0.3 cm   Wound Surface Area (cm^2) 6 cm^2   Change in Wound Size % (l*w) 81.54   Wound Volume (cm^3) 1.8 cm^3   Wound Healing % 72   Wound Assessment   (red)   Drainage Amount Small   Drainage Description Serosanguinous   Odor None   Wound 05/07/21 Arm Right   Date First Assessed/Time First Assessed: 05/07/21 1817   Present on Hospital Admission: Yes  Location: Arm  Wound Location Orientation: Right   Wound Etiology Skin Tear   Dressing Status New dressing applied   Wound Cleansed Cleansed with saline   Dressing/Treatment   (versatel)   Dressing Change Due 05/12/21   Wound Length (cm) 3 cm   Wound Width (cm) 3.5 cm   Wound Surface Area (cm^2) 10.5 cm^2   Change in Wound Size % (l*w) 65   Wound Assessment Dry   Drainage Amount None   Rand-wound Assessment Dry/flaky       Impression:  Skin tears both arms from recent fall. Gluteal crease red. Interventions in place:  Wounds cleansed with NSS. R arm wound dry. Versatel applied. L arm wound moist. Adaptic, wound gel and opticell applied. Covered with ABD then kerlix. Gluteal crease red. Heels intact. Plan: Dressing changes, Aquaphor to buttocks, SOS precautions.        Cheyenne Hamper 5/10/2021 2:15 PM

## 2021-05-10 NOTE — CARE COORDINATION
Social work / Discharge Planning:       COVID NEGATIVE 5/7/21. Referral made to [facility] liaison per family voiced request on 5/8/21. Awaiting response.   Electronically signed by SASHA Anne on 5/10/2021 at 8:27 AM

## 2021-05-10 NOTE — DISCHARGE INSTR - COC
Continuity of Care Form    Patient Name: Yolis Giordano   :  1929  MRN:  [id number]    Admit date:  2021  Discharge date:  ***    Code Status Order: Full Code   Advance Directives:   Advance Care Flowsheet Documentation       Date/Time Healthcare Directive Type of Healthcare Directive Copy in [address] Agent's Name Healthcare Agent's Phone Number    21 1814  No, patient does not have an advance directive for healthcare treatment -- -- -- -- --            Admitting Physician:  Javon Connor MD  PCP: Alice Lawrence DO    Discharging Nurse: [facility] Unit/Room#: 0721/5295-G  Discharging Unit Phone Number: ***    Emergency Contact:   Extended Emergency Contact Information  Primary Emergency Contact: [facility]  Address: [address] Phone: [phone]  Relation: Spouse  Secondary Emergency Contact: [address] [place] [address] Phone: [phone]  Mobile Phone: [phone]  Relation: Child   needed?  No    Past Surgical History:  Past Surgical History:   Procedure Laterality Date    ABDOMEN SURGERY      multiple; intestinal abscess; hernia repair;     APPENDECTOMY      ECHO COMPL W DOP COLOR FLOW  3/16/2013         HERNIA REPAIR      JOINT REPLACEMENT  12       Immunization History:   Immunization History   Administered Date(s) Administered    COVID-19, Finnegan Peter, PF, 30mcg/0.3mL 2021, 02/10/2021    Influenza Virus Vaccine 2016, 2020    Influenza, High Dose (Fluzone 65 yrs and older) 2019    Influenza, High-dose, Quadv, 72 yrs +, IM (Fluzone) 2020    Pneumococcal Conjugate Vaccine 2013    Tdap (Boostrix, Adacel) 2017, 2018       Active Problems:  Patient Active Problem List   Diagnosis Code    Atrial fibrillation (HCC) I48.91    COPD with exacerbation ([facility] [address] J44.1    Chest pain R07.9    Asthma J45.909    CAP ([facility] acquired pneumonia) J18.9    GI bleeding K92.2    COPD exacerbation ([facility] [address] J44.1       Isolation/Infection:   Isolation            No Isolation          Patient Infection Status       Infection Onset Added Last Indicated Last Indicated By Review Planned Expiration Resolved Resolved By    None active    Resolved    COVID-19 Rule Out 05/07/21 05/07/21 05/07/21 COVID-19, Rapid (Ordered)   05/07/21 Rule-Out Test Resulted            Nurse Assessment:  Last Vital Signs: BP (!) 141/79   Pulse 96   Temp 97.5 °F (36.4 °C) (Oral)   Resp 18   Ht 6' (1.829 m)   Wt 221 lb 3.2 oz (100.3 kg)   SpO2 96%   BMI 30.00 kg/m²     Last documented pain score (0-10 scale): Pain Level: 0  Last Weight:   Wt Readings from Last 1 Encounters:   05/10/21 221 lb 3.2 oz (100.3 kg)     Mental Status:  {IP PT MENTAL STATUS:20030:::0}    IV Access:  { ELPIDIO IV ACCESS:672677642:::0}    Nursing Mobility/ADLs:  Walking   {CHP DME ADLs:167702234:::0}  Transfer  {CHP DME ADLs:655431788:::0}  Bathing  {CHP DME ADLs:170893930:::0}  Dressing  {CHP DME ADLs:864887850:::0}  Toileting  {CHP DME ADLs:251232343:::0}  Feeding  {CHP DME ADLs:925540642:::0}  Med Admin  {P DME ADLs:536974710:::0}  Med Delivery   { ELPIDIO MED Delivery:402602419:::0}    Wound Care Documentation and Therapy:  Wound 03/23/19 Buttocks Right; Inner 1.0 x 1.0 white area surrounded by reddened periwound that is blanchable (Active)   Number of days: 778       Wound 04/30/21 Brachial Anterior; Left (Active)   Wound Image   05/07/21 1817   Wound Etiology Skin Tear 05/09/21 2000   Dressing Status Dry; Intact; Old drainage noted 05/09/21 2000   Wound Cleansed Cleansed with saline 05/09/21 1347   Dressing/Treatment Other (comment) 05/09/21 1347   Wound Length (cm) 6.5 cm 05/07/21 1814   Wound Width (cm) 5 cm 05/07/21 1814   Wound Depth (cm) 0.2 cm 05/07/21 1814   Wound Surface Area (cm^2) 32.5 cm^2 05/07/21 1814   Wound Volume (cm^3) 6.5 cm^3 05/07/21 1814   Wound Assessment Bleeding;Pink/red;Slough;Superficial 05/09/21 1347   Drainage Amount Small 05/09/21 1347   Drainage Description Sanguinous 05/09/21 1347   Number of days: 9       Wound 05/07/21 Arm Right (Active)   Wound Image   05/07/21 1820   Dressing Status Clean;Dry; Intact 05/09/21 2000   Wound Cleansed Irrigated with saline 05/09/21 1347   Dressing/Treatment Xeroform 05/09/21 2000   Wound Length (cm) 6 cm 05/07/21 1814   Wound Width (cm) 5 cm 05/07/21 1814   Wound Surface Area (cm^2) 30 cm^2 05/07/21 1814   Wound Assessment Pink/red; Other (Comment) 05/09/21 1347   Drainage Amount Large 05/09/21 1347   Drainage Description Sanguinous 05/09/21 1347   Rand-wound Assessment Fragile;Dry/flaky; Other (Comment) 05/09/21 1347   Number of days: 2        Elimination:  Continence:   · Bowel: {YES / LF:12430}  · Bladder: {YES / SB:98053}  Urinary Catheter: {Urinary Catheter:339410505:::0}   Colostomy/Ileostomy/Ileal Conduit: {YES / PL:69012}       Date of Last BM: ***    Intake/Output Summary (Last 24 hours) at 5/10/2021 0647  Last data filed at 5/10/2021 0636  Gross per 24 hour   Intake 2089 ml   Output 1300 ml   Net 789 ml     I/O last 3 completed shifts:   In: 7941 [I.V.:1347]  Out: 1150 [Urine:1150]    Safety Concerns:     [employer] Safety Concerns:836314925:::0}    Impairments/Disabilities:      [employer] Impairments/Disabilities:670912072:::0}    Nutrition Therapy:  Current Nutrition Therapy:   [employer] Diet List:022143446:::0}    Routes of Feeding: {CHP DME Other Feedings:330903988:::0}  Liquids: {Slp liquid thickness:86828}  Daily Fluid Restriction: {CHP DME Yes amt example:248033918:::0}  Last Modified Barium Swallow with Video (Video Swallowing Test): {Done Not Done SYXF:812178848:::8}    Treatments at the Time of Hospital Discharge:   Respiratory Treatments: ***  Oxygen Therapy:  {Therapy; copd oxygen:50787:::0}  Ventilator:    { CC Vent List:198259165:::0}    Rehab Therapies: Physical Therapy and Occupational Therapy  Weight Bearing Status/Restrictions: [address] CC Weight Bearin:::0}  Other Medical Equipment (for information only, NOT a DME order):  {EQUIPMENT:552804744}  Other Treatments: ***    Patient's personal belongings (please select all that are sent with patient):  {CHP DME Belongings:920031908:::0}    RN SIGNATURE:  {Esignature:032846951:::0}    CASE MANAGEMENT/SOCIAL WORK SECTION    Inpatient Status Date: ***    Readmission Risk Assessment Score:  Readmission Risk              Risk of Unplanned Readmission:        20           Discharging to Facility/ Agency   · Name: [facility]  · [facility], [address]  · [phone]  · Fax:[phone]    Dialysis Facility (if applicable)   · Name:  · Address:  · Dialysis Schedule:  · Phone:  · Fax:    / signature: Electronically signed by SASHA Munoz on 5/10/21 at 1:49 PM EDT    PHYSICIAN SECTION    Prognosis: {Prognosis:6627047100:::0}    Condition at Discharge: Stable    Rehab Potential (if transferring to Rehab): {Prognosis:0145676446:::0}    Recommended Labs or Other Treatments After Discharge: ***    Physician Certification: I certify the above information and transfer of Bunny Stein  is necessary for the continuing treatment of the diagnosis listed and that he requires [facility] for less 30 days.      Update Admission H&P: {CHP DME Changes in HandP:198744628:::0}    PHYSICIAN SIGNATURE:  Electronically signed by Jessie Segundo DO on 5/10/2021 at 6:47 AM

## 2021-05-10 NOTE — PROGRESS NOTES
Physical Therapy    Facility/Department: [address] MED SURG/TELE  Treatment Note  NAME: Suzan Lung  : 1929  MRN: [id number]    Date of Service: 5/10/2021      Referring Provider:  Anali Che MD    Evaluating Therapist: MATT FLANAGAN [facility] PT    Room #: 65  DIAGNOSIS: COPD, S/p falls at home  Additional Pertinent History:CVA, COPD  PRECAUTIONS: falls, alarm, O2    Social:  Pt lives with wife in a 1 floor plan 0 steps  to enter. Uses basement to walk for exercise  Prior to admission independent with cane or ww     Initial Evaluation  Date: 21 Treatment      Short Term/ Long Term   Goals   Was pt agreeable to Eval/treatment? yes yes    Does pt have pain? L side ribs C/o L rib pain since fall at home    Bed Mobility  Rolling: SBA  Supine to sit: SBA  Sit to supine: NT  Scooting: SBA Rolling: SBA  Supine to sit: SBA  Sit to supine: NA  Scooting: SBA independent   Transfers Sit to stand: min assist  Stand to sit: min assist  Stand pivot: min assist Sit to stand: SBA  Stand to sit: SBA  Stand pivot: MIN A with ww supervision   Ambulation    25 feet with ww with min assist 50 feet with ww MIN A 75 feet with ww with supervision   Stair Negotiation  Ascended and descended  NT NA N/A   AM-PAC Raw score                17      BLE ROM is WFL  BLE Strength is grossly 4/5  Balance: sitting EOB supervision and standing with ww SBA    Patient education  Pt educated on hand placement during transfers. Patient response to education:   Pt verbalized understanding Pt demonstrated skill Pt requires further education in this area   yes yes yes     ASSESSMENT:    Comments:  Pt has decreased strength and endurance along with wheezing and a drop in O2 sat with activity that is limiting functional mobility at this time. SOB and wheezing limited amb distance. Vitals:   Pt was on 2L O2 throughout PT session. O2 sat at rest lying in bed was 94-95%. After amb he was SOB and wheezing and O2 sat dropped to 85%.   He sat in chair and after 2 min O2 sat was 94-95% with VCs for proper breathing technique with NC. Treatment:  Patient practiced and was instructed in the following treatment:     Bed mobility, transfers, and gait with ww to improve functional strength and endurance. Pt was left sitting up in recliner chair with BLE elevated and call light left by patient. Chair/bed alarm: chair alarm was activated. PLAN:    Pt is making good progress toward established Physical Therapy goals. Continue with physical therapy current plan of care. Time in  07:50  Time out  08:10    Total Treatment Time 20 minutes     Evaluation Time includes thorough review of current medical information, gathering information on past medical history/social history and prior level of function, completion of standardized testing/informal observation of tasks, assessment of data and education on plan of care and goals. CPT codes:  [] Low Complexity PT evaluation 80034  [] Moderate Complexity PT evaluation 69368  [] High Complexity PT evaluation 39414  [] PT Re-evaluation 38837  [] Gait training 02459 ** minutes  [] Manual therapy 10708 ** minutes  [x] Therapeutic activities 21570 20 minutes  [] Therapeutic exercises 57814 ** minutes  [] Neuromuscular reeducation 19798 ** minutes     Efren Lucas, P.T.   License Number: [id number]

## 2021-05-10 NOTE — PROGRESS NOTES
Wound care consulted via [employer].     Electronically signed by Isabel Melendez RN on 5/10/2021 at 9:35 AM

## 2021-05-10 NOTE — PROGRESS NOTES
Subjective: The patient is awake and alert. Still SOB  constipation    Objective:    BP (!) 141/79   Pulse 96   Temp 97.5 °F (36.4 °C) (Oral)   Resp 18   Ht 6' (1.829 m)   Wt 221 lb 3.2 oz (100.3 kg)   SpO2 96%   BMI 30.00 kg/m²     Heart:  RRR, no murmurs, gallops, or rubs.   Lungs:  CTA bilaterally, no wheeze, rales or rhonchi  Abd: bowel sounds present, nontender, nondistended, no masses  Extrem:  No clubbing, cyanosis, or edema    CBC with Differential:    Lab Results   Component Value Date    WBC 10.1 05/10/2021    RBC 4.45 05/10/2021    HGB 12.7 05/10/2021    HCT 41.3 05/10/2021     05/10/2021    MCV 92.8 05/10/2021    MCH 28.5 05/10/2021    MCHC 30.8 05/10/2021    RDW 17.2 05/10/2021    SEGSPCT 89 03/16/2013    LYMPHOPCT 34.0 05/07/2021    MONOPCT 9.5 05/07/2021    BASOPCT 0.6 05/07/2021    MONOSABS 0.78 05/07/2021    LYMPHSABS 2.78 05/07/2021    EOSABS 0.42 05/07/2021    BASOSABS 0.05 05/07/2021     CMP:    Lab Results   Component Value Date     05/10/2021    K 4.3 05/10/2021    K 3.9 05/07/2021     05/10/2021    CO2 25 05/10/2021    BUN 33 05/10/2021    CREATININE 1.1 05/10/2021    GFRAA >60 05/10/2021    LABGLOM >60 05/10/2021    GLUCOSE 151 05/10/2021    GLUCOSE 98 10/14/2010    PROT 6.6 05/07/2021    LABALBU 3.9 05/07/2021    LABALBU 3.6 10/14/2010    CALCIUM 8.5 05/10/2021    BILITOT 0.6 05/07/2021    ALKPHOS 89 05/07/2021    AST 16 05/07/2021    ALT 12 05/07/2021     PT/INR:    Lab Results   Component Value Date    PROTIME 57.3 05/10/2021    PROTIME 20.5 10/14/2010    INR 5.2 05/10/2021     @cktotal:3,ckmb:3,ckmbindex:3,troponini:3@  U/A:    Lab Results   Component Value Date    NITRU Negative 05/07/2021    COLORU Yellow 05/07/2021    PHUR 6.0 05/07/2021    WBCUA 2-5 05/07/2021    RBCUA 10-20 05/07/2021    RBCUA NONE 03/15/2013    BACTERIA RARE 05/07/2021    CLARITYU Clear 05/07/2021    SPECGRAV 1.020 05/07/2021    UROBILINOGEN 0.2 05/07/2021    BILIRUBINUR Negative 05/07/2021    BLOODU MODERATE 05/07/2021    GLUCOSEU Negative 05/07/2021    KETUA Negative 05/07/2021        Assessment:    Patient Active Problem List   Diagnosis    Atrial fibrillation ([address]    [facility]    Chest pain    Asthma    CAP (community acquired pneumonia)    GI bleeding    [facility] exacerbation [facility]       Plan:    Copd/asthma exacerbation-  Continue aerosols and breathing treatments  Steroids  If any fluid overload  Stop IVF  check bnp  Repeat cxr today    Atrial fib-  Pharmacy to manage coumadin    Disposition  Likely will need subacute rehab on discharge  Not ready for discharge likely for several days      Rosaura Kelley DO  6:31 AM  5/10/2021

## 2021-05-10 NOTE — CARE COORDINATION
Social work / Discharge Planning:       COVID NEGATIVE 5/7/21. Patient accepted by [facility]. No precert needed. N-17, 20074 and ambulette form completed.    Electronically signed by SASHA Kingston on 5/10/2021 at 1:42 PM

## 2021-05-10 NOTE — CARE COORDINATION
05/10/21 0824   IMM Letter   IMM Letter given to Patient/Family/Significant other/Guardian/POA/by: Darrick Cintron RN   IMM Letter date given: 05/10/21   IMM Letter time given: 0820   Important message from Medicare delivered to patient. A signed copy is placed in patient's lite chart. Another copy left with patient.  Darrick Cintron RN

## 2021-05-10 NOTE — PROGRESS NOTES
Occupational Therapy  OT BEDSIDE TREATMENT NOTE      Date:5/10/2021  Patient Name: Tamela Mathew  MRN: [id number]  : 1929  Room: [address] [place] [address]     Referring Provider: Devin Wynne MD  Evaluating OT: Sukhjinder Goldsmith. Edgardo OTR/L - [id number]     AM-PAC Daily Activity Raw Score:      Recommended Adaptive Equipment: Continue to assess.      Diagnosis: COPD exacerbation (HCC) [J44.1]     Pertinent Medical History: asthma, CVA, COPD      Precautions: falls, visual impairment, hearing impairment, O2 via nasal cannula, bed/chair alarms, waffle cushion     Home Living: Patient lives with his wife in a one-floor home; patient noted that he accesses the basement daily to walk around as exercise. Bathroom Setup: walk-in shower (with seat, grab bar, and handheld shower head) and elevated toilet seat  Equipment Owned: cane (used on the main living level), walker (used in the basement)     Prior Level of Function (PLOF): Patient and family member reported that he was independent with ADLs, but needed assistance with IADLs. Patient was independent with functional mobility (with cane or walker) prior to this hospitalization.    Driving: No     Pain Level: L side- Moderate with movement  Cognition: Patient alert and oriented grossly with cues for safety provided     Functional Assessment:    Initial Eval Status  Date: 2021 Treatment Status  Date: 5/10/21 Short Term Goals   Feeding SBA  Tremor noted in R hand; patient uses L hand mostly for self-feeding tasks.   Setup   Grooming Mod A   SBA  (seated/standing)   UB Dressing Min A   Setup   LB Dressing Max A to don socks   Min A - with use of AE, as needed/appropriate   Bathing Max A   Min A - with use of AE/DME, as needed/appropriate   Toileting Max A CGA   Pt performed hygiene care seated  Min A   Bed Mobility  Supine-to-Sit: SBA        Functional Transfers Sit-to-Stand: Min A   from EOB STS: CGA from arm chair and commode  Independent   Functional Mobility Min A   (with walker) within patient's room. CGA using ww in room  Mod I with functional mobility (with device, as needed/appropriate) in order to maximize independence with ADLs/IADLs and other functional tasks. Balance Sitting: Good  (at EOB)  Standing: Fair-  (with walker) Sitting: Independent  Standing: CGA  Fair+ dynamic standing balance during completion of ADLs/IADLs and other functional tasks. Activity Tolerance Fair-  Mild shortness of breath noted with functional mobility within patient's room; nursing notified that patient's O2 saturations decreased to 85% (on 2L of O2 via nasal cannula) with this activity. Patient's O2 saturations recovered quickly above 90% with seated rest. Fair  Patient will demonstrate Good understanding and consistent implementation of energy conservation techniques and work simplification techniques into ADL routines. 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   N/A   B UE Strength 3+/5 Functional use of B UE demoed during tx.  Patient will demonstrate 4/5 B UE strength in order to maximize independence with ADLs and functional transfers.      Treatment: Upon arrival pt was sitting in chair. Cues for safety during toileting/ commode transfer. At end of session pt transferred to chair with alarm on, all lines and tubes intact and call light within reach. Education: Transfer training, balance training and AD management all to improve independence with ADLs    · Pt has made good progress towards set goals. Plan of Care: 2-5 days/week for 1-2 weeks PRN  [x]? ?ADL retraining/adaptive techniques and AE recommendations to increase functional independence within precautions  [x]? ? Energy conservation techniques to improve tolerance for ADLs/IADLs  [x]? ? Functional transfer/mobility training/DME recommendations for increased independence, safety and fall prevention  [x]? ?Patient/family education to increase safety and functional independence  [x]? ? Environmental modifications for safe mobility and completion of ADLs/IADLs  []? ?Cognitive retraining exercises to improve problem solving skills & safe participation in ADLs/IADLs   []? ?Sensory re-education techniques to improve extremity awareness, maintain skin integrity and improve hand function   []? ? Visual/Perceptual retraining  to improve body awareness and safety during transfers and ADLs   []? ? Splinting/positioning needs to maintain joint/skin integrity and prevent contractures   [x]? ? Therapeutic activity to improve functional performance during ADLs/IADLs  [x]? ? Therapeutic exercise to improve tolerance and functional strength for ADLs/IADLs  [x]? ? Balance retraining/tolerance tasks for facilitation of postural control with dynamic challenges during ADLs   [x]? ? Neuromuscular re-education: facilitate righting/equilibrium reactions, midline orientation, scapular stability/mobility, normalize muscle tone, and facilitate active functional movement/attention  []? ? Delirium prevention/treatment   []? Positioning to improve functional independence and prevent skin breakdown   []? ?Other:     Treatment Charges: Mins Units   Ther Ex  [id number]     Manual Therapy Christy Hughes [phone] [id number] [phone]   ADL/Home Mgt [id number] 10 1   Neuro Re-ed [id number]     Group Therapy      Orthotic manage/training  [id number]     Non-Billable Time       Time In: 1:05  Time Out: 1:20  Total Time: [address] FARRELL/L [id number]

## 2021-05-11 LAB
ADENOVIRUS BY PCR: NOT DETECTED
ANION GAP SERPL CALCULATED.3IONS-SCNC: 5 MMOL/L (ref 7–16)
BORDETELLA PARAPERTUSSIS BY PCR: NOT DETECTED
BORDETELLA PERTUSSIS BY PCR: NOT DETECTED
BUN BLDV-MCNC: 34 MG/DL (ref 6–23)
CALCIUM SERPL-MCNC: 9.1 MG/DL (ref 8.6–10.2)
CHLAMYDOPHILIA PNEUMONIAE BY PCR: NOT DETECTED
CHLORIDE BLD-SCNC: 105 MMOL/L (ref 98–107)
CO2: 29 MMOL/L (ref 22–29)
CORONAVIRUS 229E BY PCR: NOT DETECTED
CORONAVIRUS HKU1 BY PCR: NOT DETECTED
CORONAVIRUS NL63 BY PCR: NOT DETECTED
CORONAVIRUS OC43 BY PCR: NOT DETECTED
CREAT SERPL-MCNC: 1.1 MG/DL (ref 0.7–1.2)
GFR AFRICAN AMERICAN: >60
GFR NON-AFRICAN AMERICAN: >60 ML/MIN/1.73
GLUCOSE BLD-MCNC: 118 MG/DL (ref 74–99)
HCT VFR BLD CALC: 39.2 % (ref 37–54)
HEMOGLOBIN: 12.2 G/DL (ref 12.5–16.5)
HUMAN METAPNEUMOVIRUS BY PCR: NOT DETECTED
HUMAN RHINOVIRUS/ENTEROVIRUS BY PCR: NOT DETECTED
INFLUENZA A BY PCR: NOT DETECTED
INFLUENZA B BY PCR: NOT DETECTED
INR BLD: 2.2
MCH RBC QN AUTO: 28.6 PG (ref 26–35)
MCHC RBC AUTO-ENTMCNC: 31.1 % (ref 32–34.5)
MCV RBC AUTO: 92 FL (ref 80–99.9)
METER GLUCOSE: 122 MG/DL (ref 74–99)
METER GLUCOSE: 132 MG/DL (ref 74–99)
METER GLUCOSE: 133 MG/DL (ref 74–99)
METER GLUCOSE: 169 MG/DL (ref 74–99)
MYCOPLASMA PNEUMONIAE BY PCR: NOT DETECTED
PARAINFLUENZA VIRUS 1 BY PCR: NOT DETECTED
PARAINFLUENZA VIRUS 2 BY PCR: NOT DETECTED
PARAINFLUENZA VIRUS 3 BY PCR: NOT DETECTED
PARAINFLUENZA VIRUS 4 BY PCR: NOT DETECTED
PDW BLD-RTO: 17.2 FL (ref 11.5–15)
PLATELET # BLD: 192 E9/L (ref 130–450)
PMV BLD AUTO: 10 FL (ref 7–12)
POTASSIUM SERPL-SCNC: 4.5 MMOL/L (ref 3.5–5)
PROTHROMBIN TIME: 23.9 SEC (ref 9.3–12.4)
RBC # BLD: 4.26 E12/L (ref 3.8–5.8)
RESPIRATORY SYNCYTIAL VIRUS BY PCR: NOT DETECTED
SARS-COV-2, PCR: NOT DETECTED
SODIUM BLD-SCNC: 139 MMOL/L (ref 132–146)
WBC # BLD: 9.1 E9/L (ref 4.5–11.5)

## 2021-05-11 PROCEDURE — 80048 BASIC METABOLIC PNL TOTAL CA: CPT

## 2021-05-11 PROCEDURE — 6360000002 HC RX W HCPCS: Performed by: INTERNAL MEDICINE

## 2021-05-11 PROCEDURE — 85610 PROTHROMBIN TIME: CPT

## 2021-05-11 PROCEDURE — 6370000000 HC RX 637 (ALT 250 FOR IP): Performed by: INTERNAL MEDICINE

## 2021-05-11 PROCEDURE — 82962 GLUCOSE BLOOD TEST: CPT

## 2021-05-11 PROCEDURE — 97530 THERAPEUTIC ACTIVITIES: CPT

## 2021-05-11 PROCEDURE — 0202U NFCT DS 22 TRGT SARS-COV-2: CPT

## 2021-05-11 PROCEDURE — 1200000000 HC SEMI PRIVATE

## 2021-05-11 PROCEDURE — 85027 COMPLETE CBC AUTOMATED: CPT

## 2021-05-11 PROCEDURE — 36415 COLL VENOUS BLD VENIPUNCTURE: CPT

## 2021-05-11 PROCEDURE — 2580000003 HC RX 258: Performed by: INTERNAL MEDICINE

## 2021-05-11 PROCEDURE — 2700000000 HC OXYGEN THERAPY PER DAY

## 2021-05-11 PROCEDURE — 97535 SELF CARE MNGMENT TRAINING: CPT

## 2021-05-11 PROCEDURE — 94640 AIRWAY INHALATION TREATMENT: CPT

## 2021-05-11 RX ORDER — WARFARIN SODIUM 1 MG/1
1 TABLET ORAL
Status: COMPLETED | OUTPATIENT
Start: 2021-05-11 | End: 2021-05-11

## 2021-05-11 RX ORDER — METHYLPREDNISOLONE SODIUM SUCCINATE 40 MG/ML
40 INJECTION, POWDER, LYOPHILIZED, FOR SOLUTION INTRAMUSCULAR; INTRAVENOUS EVERY 8 HOURS
Status: DISCONTINUED | OUTPATIENT
Start: 2021-05-11 | End: 2021-05-13

## 2021-05-11 RX ADMIN — ASPIRIN 81 MG: 81 TABLET, COATED ORAL at 09:00

## 2021-05-11 RX ADMIN — ARFORMOTEROL TARTRATE 15 MCG: 15 SOLUTION RESPIRATORY (INHALATION) at 20:31

## 2021-05-11 RX ADMIN — WARFARIN SODIUM 1 MG: 1 TABLET ORAL at 17:31

## 2021-05-11 RX ADMIN — IPRATROPIUM BROMIDE AND ALBUTEROL SULFATE 1 AMPULE: 2.5; .5 SOLUTION RESPIRATORY (INHALATION) at 16:22

## 2021-05-11 RX ADMIN — POLYVINYL ALCOHOL 1 DROP: 14 SOLUTION/ DROPS OPHTHALMIC at 20:56

## 2021-05-11 RX ADMIN — METHYLPREDNISOLONE SODIUM SUCCINATE 40 MG: 40 INJECTION, POWDER, LYOPHILIZED, FOR SOLUTION INTRAMUSCULAR; INTRAVENOUS at 03:52

## 2021-05-11 RX ADMIN — POLYVINYL ALCOHOL 1 DROP: 14 SOLUTION/ DROPS OPHTHALMIC at 09:02

## 2021-05-11 RX ADMIN — DOCUSATE SODIUM 200 MG: 100 CAPSULE ORAL at 20:56

## 2021-05-11 RX ADMIN — MAGNESIUM SULFATE: 1 CRYSTAL ORAL; TOPICAL at 12:47

## 2021-05-11 RX ADMIN — BRIMONIDINE TARTRATE 1 DROP: 2 SOLUTION OPHTHALMIC at 09:02

## 2021-05-11 RX ADMIN — PETROLATUM: 420 OINTMENT TOPICAL at 20:56

## 2021-05-11 RX ADMIN — ARFORMOTEROL TARTRATE 15 MCG: 15 SOLUTION RESPIRATORY (INHALATION) at 08:39

## 2021-05-11 RX ADMIN — BRIMONIDINE TARTRATE 1 DROP: 2 SOLUTION OPHTHALMIC at 20:56

## 2021-05-11 RX ADMIN — Medication 10 ML: at 20:56

## 2021-05-11 RX ADMIN — BUDESONIDE 500 MCG: 0.5 SUSPENSION RESPIRATORY (INHALATION) at 20:32

## 2021-05-11 RX ADMIN — MICONAZOLE NITRATE: 20 POWDER TOPICAL at 09:01

## 2021-05-11 RX ADMIN — Medication 5 ML: at 09:02

## 2021-05-11 RX ADMIN — IPRATROPIUM BROMIDE AND ALBUTEROL SULFATE 1 AMPULE: 2.5; .5 SOLUTION RESPIRATORY (INHALATION) at 20:31

## 2021-05-11 RX ADMIN — BUDESONIDE 500 MCG: 0.5 SUSPENSION RESPIRATORY (INHALATION) at 08:39

## 2021-05-11 RX ADMIN — IPRATROPIUM BROMIDE AND ALBUTEROL SULFATE 1 AMPULE: 2.5; .5 SOLUTION RESPIRATORY (INHALATION) at 08:39

## 2021-05-11 RX ADMIN — PETROLATUM: 420 OINTMENT TOPICAL at 09:01

## 2021-05-11 RX ADMIN — METHYLPREDNISOLONE SODIUM SUCCINATE 40 MG: 40 INJECTION, POWDER, LYOPHILIZED, FOR SOLUTION INTRAMUSCULAR; INTRAVENOUS at 20:56

## 2021-05-11 RX ADMIN — TAMSULOSIN HYDROCHLORIDE 0.4 MG: 0.4 CAPSULE ORAL at 09:00

## 2021-05-11 RX ADMIN — MICONAZOLE NITRATE: 20 POWDER TOPICAL at 20:56

## 2021-05-11 RX ADMIN — INSULIN LISPRO 1 UNITS: 100 INJECTION, SOLUTION INTRAVENOUS; SUBCUTANEOUS at 17:31

## 2021-05-11 RX ADMIN — METHYLPREDNISOLONE SODIUM SUCCINATE 40 MG: 40 INJECTION, POWDER, LYOPHILIZED, FOR SOLUTION INTRAMUSCULAR; INTRAVENOUS at 12:15

## 2021-05-11 RX ADMIN — MAGNESIUM HYDROXIDE 30 ML: 400 SUSPENSION ORAL at 09:00

## 2021-05-11 RX ADMIN — IPRATROPIUM BROMIDE AND ALBUTEROL SULFATE 1 AMPULE: 2.5; .5 SOLUTION RESPIRATORY (INHALATION) at 13:58

## 2021-05-11 RX ADMIN — MONTELUKAST SODIUM 10 MG: 10 TABLET, FILM COATED ORAL at 20:56

## 2021-05-11 ASSESSMENT — PAIN SCALES - GENERAL: PAINLEVEL_OUTOF10: 0

## 2021-05-11 NOTE — PROGRESS NOTES
Pharmacy Consultation Note  (Anticoagulant Dosing and Monitoring)    Initial consult date: 5/10/2021  Consulting physician: Dr. Carlos Wood    Allergies:  Patient has no known allergies. Ht Readings from Last 1 Encounters:   05/07/21 6' (1.829 m)     Wt Readings from Last 1 Encounters:   05/11/21 221 lb 4 oz (100.4 kg)       Warfarin Indication Target   INR [place] Dose  (if applicable) Diet/Feeding Tube   Atrial fibrillation 2-3 Warfarin 1 mg daily      Vitamin K or Blood product  Administration Date    Vitamin K 5 mg PO x 1 5/10         Warfarin drug-drug interactions  Start  Stop Home Med?  Comments    Aspirin 5/9  No May increase bleeding risk   Methylprednisolone 5/7  No May increase INR                  Hepatic Function Panel:                          Lab Results   Component Value Date    ALKPHOS 89 05/07/2021    ALT 12 05/07/2021    AST 16 05/07/2021    PROT 6.6 05/07/2021    BILITOT 0.6 05/07/2021    LABALBU 3.9 05/07/2021    LABALBU 3.6 10/14/2010       Date Warfarin Dose INR Heparin or LMWH HBG/HCT PLT Comment   5/7 1 mg 2.7 --------- 13.7/44 196    5/8 1 mg 3 --------- 13/40.9 186    5/9 No dose 4.8 --------- 13.1/40.6 184    5/10 No dose 5.2 --------- 12.7/41.3 192 Pharmacy consulted; vitamin K 5 mg PO x 1   5/11 1 mg 2.2 --------- 12.2/39.2 192      Assessment:  · Patient is a 80year old male with atrial fibrillation on warfarin  · Per documentation, patient takes warfarin 1 mg daily  · Received vitamin K 5 mg PO x 1 dose on 5/10  · INR goal = 2-3; INR today = 2.2 (therapeutic, significantly decreased from 5.2 yesterday)    Plan:  · Will give warfarin 1 mg tonight  · Daily PT/INR until the INR is stable within the therapeutic range  · Pharmacist will follow and monitor/adjust dosing as necessary    Delicia Venegas PharmD, [facility]  5/11/2021  8:39 AM

## 2021-05-11 NOTE — PLAN OF CARE
Problem: Falls - Risk of:  Goal: Absence of physical injury  Description: Absence of physical injury  Outcome: Met This Shift     Problem: Pain:  Goal: Control of acute pain  Description: Control of acute pain  Outcome: Met This Shift     Problem: Skin Integrity:  Goal: Absence of new skin breakdown  Description: Absence of new skin breakdown  Outcome: Met This Shift

## 2021-05-11 NOTE — PROGRESS NOTES
Occupational Therapy  OT BEDSIDE TREATMENT NOTE      Date:2021  Patient Name: Ethel Berg  MRN: [id number]  : 1929  Room: [address] [place] [address]     Referring Hermes Grant MD  Evaluating OT: Cherelle Fernandez. Edgardo OTR/L - [id number]     AM-PAC Daily Activity Raw Score: 16     Recommended Adaptive Equipment: Continue to assess.      Diagnosis: COPD exacerbation (HCC) [J44.1]     Pertinent Medical History: asthma, CVA, COPD      Precautions: falls, visual impairment, hearing impairment, O2      Home Living: Patient lives with his wife in a one-floor home; patient noted that he accesses the basement daily to walk around as exercise. Bathroom Setup: walk-in shower (with seat, grab bar, and handheld shower head) and elevated toilet seat  Equipment Owned: cane (used on the main living level), walker (used in the basement)     Prior Level of Function (PLOF): Patient and family member reported that he was independent with ADLs, but needed assistance with IADLs. Patient was independent with functional mobility (with cane or walker) prior to this hospitalization. Driving: No     Pain Level: Pt did not report pain during activity. Cognition: Patient alert and grossly oriented. Min cues for safety.      Functional Assessment:    Initial Eval Status  Date: 2021 Treatment Status   Short Term Goals   Feeding SBA  Tremor noted in R hand; patient uses L hand mostly for self-feeding tasks.   Setup   Grooming Mod A   SBA  (seated/standing)   UB Dressing Min A Min A to change gown while seated on the side of the bed.   Setup   LB Dressing Max A to don socks Mod A   Min A - with use of AE, as needed/appropriate   Bathing Max A   Min A - with use of AE/DME, as needed/appropriate   Toileting Max A Catheter. Pt denied need to have bowel movement. Min A   Bed Mobility  Supine-to-Sit: SBA  SBA sit to supine       Functional Transfers Sit-to-Stand: Min A   from EOB SBA from chair and bed surfaces.   Independent

## 2021-05-11 NOTE — PROGRESS NOTES
Consult placed to dr Jerrica Mcknight for asthma exacerbation, ? Pneumonia - originally to [place], deferred to [place].

## 2021-05-11 NOTE — PROGRESS NOTES
Comprehensive Nutrition Assessment    Type and Reason for Visit:  Initial, Positive Nutrition Screen, Consult    Nutrition Recommendations/Plan: Continue diet and start ONS to help with wound healing and meeting nutritional needs. Nutrition Assessment:  Pt adm for a fall. He was in the emergency room approximately one week ago with a fall. He had some skin tears, but nomajor injuries and was sent home. Dx with COPD Exacerbation. He also has multiple wounds. PMHx of Asthma, COPD (chronic obstructive pulmonary disease) ([address] and unspecified cerebral artery occlusion with cerebral infarction. Pt at risk d/t to mutliple wounds and COPD will start ONS to help with wound healing. Malnutrition Assessment:  Malnutrition Status:       Context:  Chronic Illness     Findings of the 6 clinical characteristics of malnutrition:  Energy Intake:  No significant decrease in energy intake  Weight Loss:  No significant weight loss     Body Fat Loss:  1 - Mild body fat loss Orbital, Triceps   Muscle Mass Loss:  1 - Mild muscle mass loss Temples (temporalis), Clavicles (pectoralis & deltoids)  Fluid Accumulation:  No significant fluid accumulation     Strength:  Not Performed    Estimated Daily Nutrient Needs:  Energy (kcal):  1216-4452; Weight Used for Energy Requirements:  Current     Protein (g):  105-120g (1.3-1.5g/kg IBW); Weight Used for Protein Requirements:  Ideal        Fluid (ml/day):  4566-6762; Method Used for Fluid Requirements:  1 ml/kcal      Nutrition Related Findings:  A&Ox4, BS active, Abd distended, -I/Os, Edema BLE +3 pitting      Wounds:  Multiple, Wound Consult Pending, Skin Tears(x2)       Current Nutrition Therapies:    DIET GENERAL;     Anthropometric Measures:  · Height: 6' (182.9 cm)  · Current Body Weight: 221 lb 4 oz (100.4 kg)(5/11 bedscale)   · Admission Body Weight: 215 lb 3 oz (97.6 kg)(5/11 bedscale (first measured))    · Usual Body Weight:       · Ideal Body Weight: 178 lbs; % Ideal Body Weight 124.3 %   · BMI: 30      · BMI Categories: Obese Class 1 (BMI 30.0-34. 9)       Nutrition Diagnosis:   · Increased nutrient needs related to increase demand for energy/nutrients as evidenced by wounds    Nutrition Interventions:   Food and/or Nutrient Delivery:  Continue Current Diet, Start Oral Nutrition Supplement(Omar BID)  Nutrition Education/Counseling:  Education initiated(Discussed w/ pt importance of ONS for wound healing)   Coordination of Nutrition Care:  Continue to monitor while inpatient    Goals:  >75% of meals and ONS consumed       Nutrition Monitoring and Evaluation:   Behavioral-Environmental Outcomes:  None Identified   Food/Nutrient Intake Outcomes:  Food and Nutrient Intake, Supplement Intake  Physical Signs/Symptoms Outcomes:  Biochemical Data, Fluid Status or Edema, Nutrition Focused Physical Findings, Skin, Weight     Discharge Planning:     Too soon to determine     Electronically signed by Bharati Naidu RD, LD on 5/11/21 at 11:24 AM EDT    Contact: [phone]

## 2021-05-11 NOTE — CARE COORDINATION
Sl worsening wheeze;incr steroids to q8 hrs. plmonary to see; plan is  For Comfort  No precert; transport form on chart. Flakito Waddell.

## 2021-05-11 NOTE — PROGRESS NOTES
Subjective: The patient is awake and alert. constipation  Still with shortness of breath  Objective:    BP (!) 178/79   Pulse 90   Temp 97.5 °F (36.4 °C) (Oral)   Resp 20   Ht 6' (1.829 m)   Wt 221 lb 4 oz (100.4 kg)   SpO2 95%   BMI 30.01 kg/m²     Heart:  RRR, no murmurs, gallops, or rubs.   Lungs:  CTA bilaterally, no wheeze, rales or rhonchi  Abd: bowel sounds present, nontender, nondistended, no masses  Extrem:  No clubbing, cyanosis, or edema    CBC with Differential:    Lab Results   Component Value Date    WBC 9.1 05/11/2021    RBC 4.26 05/11/2021    HGB 12.2 05/11/2021    HCT 39.2 05/11/2021     05/11/2021    MCV 92.0 05/11/2021    MCH 28.6 05/11/2021    MCHC 31.1 05/11/2021    RDW 17.2 05/11/2021    SEGSPCT 89 03/16/2013    LYMPHOPCT 34.0 05/07/2021    MONOPCT 9.5 05/07/2021    BASOPCT 0.6 05/07/2021    MONOSABS 0.78 05/07/2021    LYMPHSABS 2.78 05/07/2021    EOSABS 0.42 05/07/2021    BASOSABS 0.05 05/07/2021     CMP:    Lab Results   Component Value Date     05/11/2021    K 4.5 05/11/2021    K 3.9 05/07/2021     05/11/2021    CO2 29 05/11/2021    BUN 34 05/11/2021    CREATININE 1.1 05/11/2021    GFRAA >60 05/11/2021    LABGLOM >60 05/11/2021    GLUCOSE 118 05/11/2021    GLUCOSE 98 10/14/2010    PROT 6.6 05/07/2021    LABALBU 3.9 05/07/2021    LABALBU 3.6 10/14/2010    CALCIUM 9.1 05/11/2021    BILITOT 0.6 05/07/2021    ALKPHOS 89 05/07/2021    AST 16 05/07/2021    ALT 12 05/07/2021     PT/INR:    Lab Results   Component Value Date    PROTIME 23.9 05/11/2021    PROTIME 20.5 10/14/2010    INR 2.2 05/11/2021     @cktotal:3,ckmb:3,ckmbindex:3,troponini:3@  U/A:    Lab Results   Component Value Date    NITRU Negative 05/07/2021    COLORU Yellow 05/07/2021    PHUR 6.0 05/07/2021    WBCUA 2-5 05/07/2021    RBCUA 10-20 05/07/2021    RBCUA NONE 03/15/2013    BACTERIA RARE 05/07/2021    CLARITYU Clear 05/07/2021    SPECGRAV 1.020 05/07/2021    UROBILINOGEN 0.2 05/07/2021

## 2021-05-11 NOTE — PROGRESS NOTES
Physical Therapy    Facility/Department: [address] MED SURG/TELE  Treatment Note  NAME: Trevor Ross  : 1929  MRN: [id number]    Date of Service: 2021      Referring Provider:  Jose Mendoza MD    Evaluating Therapist: Bre Pimentel PT    Room #: 65  DIAGNOSIS: COPD, S/p falls at home  Additional Pertinent History:CVA, COPD  PRECAUTIONS: falls, alarm, O2    Social:  Pt lives with wife in a 1 floor plan 0 steps  to enter. Uses basement to walk for exercise  Prior to admission independent with cane or ww     Initial Evaluation  Date: 21 Treatment      Short Term/ Long Term   Goals   Was pt agreeable to Eval/treatment? yes yes    Does pt have pain? L side ribs C/o L rib pain and L side LBP today    Bed Mobility  Rolling: SBA  Supine to sit: SBA  Sit to supine: NT  Scooting: SBA Rolling: supervision  Supine to sit: supervision  Sit to supine: NA  Scooting: supervision independent   Transfers Sit to stand: min assist  Stand to sit: min assist  Stand pivot: min assist Sit to stand: SBA  Stand to sit: SBA  Stand pivot: MIN A with ww supervision   Ambulation    25 feet with ww with min assist 125 feet with ww MIN A  200 feet with ww with supervision   Stair Negotiation  Ascended and descended  NT NA N/A   AM-PAC Raw score                     BLE ROM is WFL  BLE Strength is grossly 4/5  Balance: sitting EOB supervision and standing with ww SBA    Patient education  Pt educated on hand placement during transfers. Patient response to education:   Pt verbalized understanding Pt demonstrated skill Pt requires further education in this area   yes yes yes     ASSESSMENT:    Comments:  Pt has decreased strength and endurance, but it is improving daily as seen by less assist required with functional mobility and increasing amb distance. He was noted to have less edema BLE and less wheezing with activity and O2 sat did not drop as much today during amb.   Pt moved quicker today and was able to increase his amb distance today. Vitals:   Pt was on 2L O2 throughout PT session. O2 sat at rest lying in bed was 94%. After amb he had mild SOB and a mild wheeze and O2 sat dropped to 88-90%. After sitting in chair for 1 min O2 sat was 94% with VCs for proper breathing technique with NC. Treatment:  Patient practiced and was instructed in the following treatment:     Bed mobility, transfers, and gait with ww to improve functional strength and endurance. Pt was left sitting up in recliner chair with BLE elevated and call light left by patient. Chair/bed alarm: chair alarm was activated. PLAN:    Pt is making good progress toward established Physical Therapy goals. Continue with physical therapy current plan of care. Time in  10:05  Time out  10:20    Total Treatment Time 15 minutes     Evaluation Time includes thorough review of current medical information, gathering information on past medical history/social history and prior level of function, completion of standardized testing/informal observation of tasks, assessment of data and education on plan of care and goals. CPT codes:  [] Low Complexity PT evaluation 24309  [] Moderate Complexity PT evaluation 73747  [] High Complexity PT evaluation 28641  [] PT Re-evaluation 40513  [] Gait training 40960 ** minutes  [] Manual therapy 65165 ** minutes  [x] Therapeutic activities 65915 15 minutes  [] Therapeutic exercises 99498 ** minutes  [] Neuromuscular reeducation 12563 ** minutes     Efren Porter., P.T.   License Number: [id number]

## 2021-05-12 LAB
ANION GAP SERPL CALCULATED.3IONS-SCNC: 5 MMOL/L (ref 7–16)
BUN BLDV-MCNC: 34 MG/DL (ref 6–23)
CALCIUM SERPL-MCNC: 8.8 MG/DL (ref 8.6–10.2)
CHLORIDE BLD-SCNC: 104 MMOL/L (ref 98–107)
CO2: 28 MMOL/L (ref 22–29)
CREAT SERPL-MCNC: 1 MG/DL (ref 0.7–1.2)
GFR AFRICAN AMERICAN: >60
GFR NON-AFRICAN AMERICAN: >60 ML/MIN/1.73
GLUCOSE BLD-MCNC: 146 MG/DL (ref 74–99)
HCT VFR BLD CALC: 40.7 % (ref 37–54)
HEMOGLOBIN: 12.7 G/DL (ref 12.5–16.5)
INR BLD: 1.5
MCH RBC QN AUTO: 28.4 PG (ref 26–35)
MCHC RBC AUTO-ENTMCNC: 31.2 % (ref 32–34.5)
MCV RBC AUTO: 91.1 FL (ref 80–99.9)
METER GLUCOSE: 127 MG/DL (ref 74–99)
METER GLUCOSE: 155 MG/DL (ref 74–99)
METER GLUCOSE: 178 MG/DL (ref 74–99)
METER GLUCOSE: 250 MG/DL (ref 74–99)
METER GLUCOSE: 383 MG/DL (ref 74–99)
PDW BLD-RTO: 16.8 FL (ref 11.5–15)
PLATELET # BLD: 181 E9/L (ref 130–450)
PMV BLD AUTO: 10.1 FL (ref 7–12)
POTASSIUM SERPL-SCNC: 5.2 MMOL/L (ref 3.5–5)
PROTHROMBIN TIME: 16.5 SEC (ref 9.3–12.4)
RBC # BLD: 4.47 E12/L (ref 3.8–5.8)
SODIUM BLD-SCNC: 137 MMOL/L (ref 132–146)
WBC # BLD: 8.6 E9/L (ref 4.5–11.5)

## 2021-05-12 PROCEDURE — 85027 COMPLETE CBC AUTOMATED: CPT

## 2021-05-12 PROCEDURE — 6370000000 HC RX 637 (ALT 250 FOR IP): Performed by: INTERNAL MEDICINE

## 2021-05-12 PROCEDURE — 94640 AIRWAY INHALATION TREATMENT: CPT

## 2021-05-12 PROCEDURE — 2500000003 HC RX 250 WO HCPCS: Performed by: INTERNAL MEDICINE

## 2021-05-12 PROCEDURE — 80048 BASIC METABOLIC PNL TOTAL CA: CPT

## 2021-05-12 PROCEDURE — 6360000002 HC RX W HCPCS: Performed by: INTERNAL MEDICINE

## 2021-05-12 PROCEDURE — 2580000003 HC RX 258: Performed by: INTERNAL MEDICINE

## 2021-05-12 PROCEDURE — 82962 GLUCOSE BLOOD TEST: CPT

## 2021-05-12 PROCEDURE — 1200000000 HC SEMI PRIVATE

## 2021-05-12 PROCEDURE — 97530 THERAPEUTIC ACTIVITIES: CPT

## 2021-05-12 PROCEDURE — 85610 PROTHROMBIN TIME: CPT

## 2021-05-12 PROCEDURE — 36415 COLL VENOUS BLD VENIPUNCTURE: CPT

## 2021-05-12 PROCEDURE — 2700000000 HC OXYGEN THERAPY PER DAY

## 2021-05-12 RX ORDER — WARFARIN SODIUM 1 MG/1
1 TABLET ORAL
Status: COMPLETED | OUTPATIENT
Start: 2021-05-12 | End: 2021-05-12

## 2021-05-12 RX ADMIN — PETROLATUM: 420 OINTMENT TOPICAL at 08:43

## 2021-05-12 RX ADMIN — BUDESONIDE 500 MCG: 0.5 SUSPENSION RESPIRATORY (INHALATION) at 08:06

## 2021-05-12 RX ADMIN — Medication 10 ML: at 08:42

## 2021-05-12 RX ADMIN — POLYVINYL ALCOHOL 1 DROP: 14 SOLUTION/ DROPS OPHTHALMIC at 08:43

## 2021-05-12 RX ADMIN — MAGNESIUM CITRATE 296 ML: 1.75 LIQUID ORAL at 07:00

## 2021-05-12 RX ADMIN — METHYLPREDNISOLONE SODIUM SUCCINATE 40 MG: 40 INJECTION, POWDER, LYOPHILIZED, FOR SOLUTION INTRAMUSCULAR; INTRAVENOUS at 21:37

## 2021-05-12 RX ADMIN — BRIMONIDINE TARTRATE 1 DROP: 2 SOLUTION OPHTHALMIC at 08:43

## 2021-05-12 RX ADMIN — INSULIN LISPRO 5 UNITS: 100 INJECTION, SOLUTION INTRAVENOUS; SUBCUTANEOUS at 12:25

## 2021-05-12 RX ADMIN — INSULIN LISPRO 1 UNITS: 100 INJECTION, SOLUTION INTRAVENOUS; SUBCUTANEOUS at 08:41

## 2021-05-12 RX ADMIN — DOCUSATE SODIUM 200 MG: 100 CAPSULE ORAL at 21:38

## 2021-05-12 RX ADMIN — MICONAZOLE NITRATE: 20 POWDER TOPICAL at 21:38

## 2021-05-12 RX ADMIN — IPRATROPIUM BROMIDE AND ALBUTEROL SULFATE 1 AMPULE: 2.5; .5 SOLUTION RESPIRATORY (INHALATION) at 16:36

## 2021-05-12 RX ADMIN — IPRATROPIUM BROMIDE AND ALBUTEROL SULFATE 1 AMPULE: 2.5; .5 SOLUTION RESPIRATORY (INHALATION) at 12:46

## 2021-05-12 RX ADMIN — PETROLATUM: 420 OINTMENT TOPICAL at 21:38

## 2021-05-12 RX ADMIN — INSULIN LISPRO 1 UNITS: 100 INJECTION, SOLUTION INTRAVENOUS; SUBCUTANEOUS at 21:38

## 2021-05-12 RX ADMIN — MONTELUKAST SODIUM 10 MG: 10 TABLET, FILM COATED ORAL at 21:38

## 2021-05-12 RX ADMIN — Medication 10 ML: at 21:38

## 2021-05-12 RX ADMIN — MICONAZOLE NITRATE: 20 POWDER TOPICAL at 08:41

## 2021-05-12 RX ADMIN — METHYLPREDNISOLONE SODIUM SUCCINATE 40 MG: 40 INJECTION, POWDER, LYOPHILIZED, FOR SOLUTION INTRAMUSCULAR; INTRAVENOUS at 12:26

## 2021-05-12 RX ADMIN — METHYLPREDNISOLONE SODIUM SUCCINATE 40 MG: 40 INJECTION, POWDER, LYOPHILIZED, FOR SOLUTION INTRAMUSCULAR; INTRAVENOUS at 04:18

## 2021-05-12 RX ADMIN — POLYVINYL ALCOHOL 1 DROP: 14 SOLUTION/ DROPS OPHTHALMIC at 21:38

## 2021-05-12 RX ADMIN — SODIUM CHLORIDE, PRESERVATIVE FREE 10 ML: 5 INJECTION INTRAVENOUS at 04:19

## 2021-05-12 RX ADMIN — WARFARIN SODIUM 1 MG: 1 TABLET ORAL at 18:00

## 2021-05-12 RX ADMIN — INSULIN LISPRO 3 UNITS: 100 INJECTION, SOLUTION INTRAVENOUS; SUBCUTANEOUS at 17:22

## 2021-05-12 RX ADMIN — BRIMONIDINE TARTRATE 1 DROP: 2 SOLUTION OPHTHALMIC at 21:38

## 2021-05-12 RX ADMIN — ASPIRIN 81 MG: 81 TABLET, COATED ORAL at 08:42

## 2021-05-12 RX ADMIN — IPRATROPIUM BROMIDE AND ALBUTEROL SULFATE 1 AMPULE: 2.5; .5 SOLUTION RESPIRATORY (INHALATION) at 08:06

## 2021-05-12 RX ADMIN — TAMSULOSIN HYDROCHLORIDE 0.4 MG: 0.4 CAPSULE ORAL at 08:46

## 2021-05-12 RX ADMIN — ARFORMOTEROL TARTRATE 15 MCG: 15 SOLUTION RESPIRATORY (INHALATION) at 08:06

## 2021-05-12 ASSESSMENT — PAIN SCALES - GENERAL
PAINLEVEL_OUTOF10: 0
PAINLEVEL_OUTOF10: 0

## 2021-05-12 NOTE — CARE COORDINATION
Social work / Discharge Planning:       COVID NEGATIVE 5/7/21. Discharge plan is for [facility]. No precert needed. N-79, 49677 and ambulette form completed. Patient remains on IV steroids.    Electronically signed by SASHA Romero on 5/12/2021 at 8:10 AM

## 2021-05-12 NOTE — PROGRESS NOTES
instructed in the following treatment:     Bed mobility, transfers, and gait with ww to improve functional strength and endurance. Pt was left sitting up in recliner chair and call light left by patient. Chair/bed alarm: chair alarm was activated and pt's wife and son were present. PLAN:    Pt is making good progress toward established Physical Therapy goals. Continue with physical therapy current plan of care. Time in  14:10  Time out  14:35    Total Treatment Time 25 minutes     Evaluation Time includes thorough review of current medical information, gathering information on past medical history/social history and prior level of function, completion of standardized testing/informal observation of tasks, assessment of data and education on plan of care and goals. CPT codes:  [] Low Complexity PT evaluation 98318  [] Moderate Complexity PT evaluation 52324  [] High Complexity PT evaluation 03401  [] PT Re-evaluation 34875  [] Gait training 40487 ** minutes  [] Manual therapy 95732 ** minutes  [x] Therapeutic activities 55903 25 minutes  [] Therapeutic exercises 35153 ** minutes  [] Neuromuscular reeducation 74798 ** minutes     Efren Tuttle., P.T.   License Number: [id number]

## 2021-05-12 NOTE — PROGRESS NOTES
Pharmacy Consultation Note  (Anticoagulant Dosing and Monitoring)    Initial consult date: 5/10/2021  Consulting physician: Dr. Carlos Wood    Allergies:  Patient has no known allergies. Ht Readings from Last 1 Encounters:   05/11/21 6' (1.829 m)     Wt Readings from Last 1 Encounters:   05/11/21 221 lb 4 oz (100.4 kg)       Warfarin Indication Target   INR [place] Dose  (if applicable) Diet/Feeding Tube   Atrial fibrillation 2-3 Warfarin 1 mg daily      Vitamin K or Blood product  Administration Date    Vitamin K 5 mg PO x 1 5/10         Warfarin drug-drug interactions  Start  Stop Home Med?  Comments    Aspirin 5/9  No May increase bleeding risk   Methylprednisolone 5/7  No May increase INR                  Hepatic Function Panel:                          Lab Results   Component Value Date    ALKPHOS 89 05/07/2021    ALT 12 05/07/2021    AST 16 05/07/2021    PROT 6.6 05/07/2021    BILITOT 0.6 05/07/2021    LABALBU 3.9 05/07/2021    LABALBU 3.6 10/14/2010       Date Warfarin Dose INR Heparin or LMWH HBG/HCT PLT Comment   5/7 1 mg 2.7 --------- 13.7/44 196    5/8 1 mg 3 --------- 13/40.9 186    5/9 No dose 4.8 --------- 13.1/40.6 184    5/10 No dose 5.2 --------- 12.7/41.3 192 Pharmacy consulted; vitamin K 5 mg PO x 1   5/11 1 mg 2.2 --------- 12.2/39.2 192    5/12 1 mg 1.5 --------- 12.7/40.7 181                        Assessment:  · Patient is a 80year old male with atrial fibrillation on warfarin  · Per documentation, patient takes warfarin 1 mg daily  · Received vitamin K 5 mg PO x 1 dose on 5/10  · INR goal = 2-3; INR today = 1.5 (subtherapeutic, decreased from 2.2 yesterday)    Plan:  · Will give warfarin 1 mg again tonight  · Daily PT/INR until the INR is stable within the therapeutic range  · Pharmacist will follow and monitor/adjust dosing as necessary    Delicia Venegas PharmD, BCCCP  5/12/2021  10:06 AM

## 2021-05-12 NOTE — PROGRESS NOTES
[facility]    PULMONARY/CRITICAL CARE PROGRESS NOTE    Patient: Dean Wagner  MRN: [id number]  : 1929    Encounter Date: 2021  Encounter Time: 12:32 PM     Date of Admission: .2021  1:22 PM    Consulting Physician:  Primary Care Physician:      Sarai Mcallister [place]     [phone]     [phone]    PROBLEM LIST:  Patient Active Problem List   Diagnosis    Atrial fibrillation ([facility]    COPD with exacerbation ([address]    Chest pain    Asthma    CAP (community acquired pneumonia)    GI bleeding    COPD exacerbation ([address]     SUBJECTIVE:   Feels better but still dyspneic  Occ cough but can't get sputum up  On 2L nc - not on home O2    PAST MEDICAL HISTORY:     Past Medical History:   Diagnosis Date    Asthma     COPD (chronic obstructive pulmonary disease) ([facility]     Unspecified cerebral artery occlusion with cerebral infarction        PAST SURGICAL HISTORY:   Past Surgical History:   Procedure Laterality Date    ABDOMEN SURGERY      multiple; intestinal abscess; hernia repair;     APPENDECTOMY      ECHO COMPL W DOP COLOR FLOW  3/16/2013         HERNIA REPAIR      JOINT REPLACEMENT  12       CURRENT MEDICATIONS:  Current Facility-Administered Medications: warfarin (COUMADIN) tablet 1 mg, 1 mg, Oral, Once  methylPREDNISolone sodium (SOLU-MEDROL) injection 40 mg, 40 mg, Intravenous, Q8H  warfarin (COUMADIN) daily dosing (placeholder), , Other, Daily  white petrolatum ointment, , Topical, BID  aspirin EC tablet 81 mg, 81 mg, Oral, Daily  insulin lispro (HUMALOG) injection vial 0-6 Units, 0-6 Units, Subcutaneous, TID WC  insulin lispro (HUMALOG) injection vial 0-3 Units, 0-3 Units, Subcutaneous, Nightly  glucose (GLUTOSE) 40 % oral gel 15 g, 15 g, Oral, PRN  dextrose 50 % IV solution, 12.5 g, Intravenous, PRN  glucagon (rDNA) injection 1 mg, 1 mg, Intramuscular, PRN  dextrose 5 % solution, 100 mL/hr, Intravenous, PRN  docusate sodium (COLACE) capsule 200 mg, 200 mg, Oral, Nightly  montelukast (SINGULAIR) tablet 10 mg, 10 mg, Oral, Nightly  tamsulosin (FLOMAX) capsule 0.4 mg, 0.4 mg, Oral, Daily  ipratropium-albuterol (DUONEB) nebulizer solution 1 ampule, 1 ampule, Inhalation, Q4H WA  sodium chloride flush 0.9 % injection 5-40 mL, 5-40 mL, Intravenous, 2 times per day  sodium chloride flush 0.9 % injection 5-40 mL, 5-40 mL, Intravenous, PRN  0.9 % sodium chloride infusion, 25 mL, Intravenous, PRN  promethazine (PHENERGAN) tablet 12.5 mg, 12.5 mg, Oral, Q6H PRN **OR** ondansetron (ZOFRAN) injection 4 mg, 4 mg, Intravenous, Q6H PRN  polyethylene glycol (GLYCOLAX) packet 17 g, 17 g, Oral, Daily PRN  acetaminophen (TYLENOL) tablet 650 mg, 650 mg, Oral, Q6H PRN **OR** acetaminophen (TYLENOL) suppository 650 mg, 650 mg, Rectal, Q6H PRN  brimonidine (ALPHAGAN) 0.2 % ophthalmic solution 1 drop, 1 drop, Both Eyes, BID  Arformoterol Tartrate (BROVANA) nebulizer solution 15 mcg, 15 mcg, Nebulization, BID **AND** budesonide (PULMICORT) nebulizer suspension 500 mcg, 0.5 mg, Nebulization, BID **AND** Nebulizer tx intermittent, , , BID  polyvinyl alcohol (LIQUIFILM TEARS) 1.4 % ophthalmic solution 1 drop, 1 drop, Both Eyes, BID  miconazole (MICOTIN) 2 % powder, , Topical, BID    IV MEDICATIONS:   dextrose      sodium chloride         ALLERGIES:  No Known Allergies      PHYSICAL EXAMINATION:     VITAL SIGNS:  BP (!) 186/84   Pulse 79   Temp 97.4 °F (36.3 °C) (Oral)   Resp 20   Ht 6' (1.829 m)   Wt 221 lb 4 oz (100.4 kg)   SpO2 94%   BMI 30.01 kg/m²   Wt Readings from Last 3 Encounters:   05/11/21 221 lb 4 oz (100.4 kg)   04/27/21 217 lb (98.4 kg)   10/11/20 210 lb 6.4 oz (95.4 kg)     Temp Readings from Last 3 Encounters:   05/12/21 97.4 °F (36.3 °C) (Oral)   04/27/21 96.7 °F (35.9 °C) (Temporal)   10/11/20 97.6 °F (36.4 °C) (Oral)     TMAX:  BP Readings from Last 3 Encounters:   05/12/21 (!) 186/84   04/27/21 135/63   10/11/20 132/60     Pulse Readings from Last 3 Encounters:   05/12/21 79 21 73   10/11/20 76       CURRENT PULSE OXIMETRY: SpO2: 94 %  24HR PULSE OXIMETRY RANGE: SpO2  Av.8 %  Min: 93 %  Max: 96 %    Intake/Output Summary (Last 24 hours) at 2021 1232  Last data filed at 2021 1005  Gross per 24 hour   Intake --   Output 2250 ml   Net -2250 ml     General Appearance: alert and oriented to person, place and time, well-developed and well-nourished, in no acute distress   Eyes: L pupil distorted, R pupil RRLA  Neck: neck supple and non tender without mass, no thyromegaly, no thyroid nodules and no cervical adenopathy   Pulmonary/Chest: decreased breath sounds >R, no accessory muscles of inspiration, faint exp wheeze bilaterally  Cardiovascular: normal rate, regular rhythm, normal S1 and S2, no murmurs, rubs, clicks or gallops, distal pulses intact, no carotid bruits and no JVD   Abdomen: soft, non-tender, non-distended, normal bowel sounds, no masses or organomegaly   Extremities: no cyanosis, no clubbing, BLE edema  Musculoskeletal: normal range of motion, no joint swelling, deformity or tenderness   Neurologic: appropriate    LABS/IMAGING:    CBC:  Lab Results   Component Value Date    WBC 8.6 2021    HGB 12.7 2021    HCT 40.7 2021    MCV 91.1 2021     2021    LYMPHOPCT 34.0 2021    RBC 4.47 2021    MCH 28.4 2021    MCHC 31.2 (L) 2021    RDW 16.8 (H) 2021    NEUTOPHILPCT 50.4 2021    MONOPCT 9.5 2021    BASOPCT 0.6 2021    NEUTROABS 4.11 2021    LYMPHSABS 2.78 2021    MONOSABS 0.78 2021    EOSABS 0.42 2021    BASOSABS 0.05 2021       Recent Labs     21  0425 21  0410 05/10/21  0310   WBC 8.6 9.1 10.1   HGB 12.7 12.2* 12.7   HCT 40.7 39.2 41.3   MCV 91.1 92.0 92.8    192 192       BMP:   Recent Labs     05/10/21  0310 21  0410 21  0425    139 137   K 4.3 4.5 5.2*    105 104   CO2 25 29 28   BUN 33* 34* 34* CREATININE 1.1 1.1 1.0       MG: No results found for: MG  Ca/Phos:   Lab Results   Component Value Date    CALCIUM 8.8 05/12/2021         PT/INR:   Recent Labs     05/10/21  0310 05/11/21  0410 05/12/21  0425   PROTIME 57.3* 23.9* 16.5*   INR 5.2* 2.2 1.5     APTT: No results for input(s): APTT in the last 72 hours. Cardiac Enzymes:  Lab Results   Component Value Date    CKTOTAL 59 09/20/2017    CKMB 2.2 09/20/2017    TROPONINI 0.01 05/07/2021     D Dimer:   Lab Results   Component Value Date    DDIMER <250 10/13/2010     Folate and B12: No results found for: Patricia Raya, No results found for: FOLATE    Lactic Acid:   Lab Results   Component Value Date    LACTA 3.2 (H) 10/07/2020     Ammonia:   Cortisol:  Thyroid Studies:  Lab Results   Component Value Date    TSH 1.310 03/24/2019    V5MFBEP 4.7 03/24/2019     Results for Debra Gipson (MRN [id number]) as of 5/12/2021 12:31   Ref.  Range 5/11/2021 16:10   Influenza A by PCR Latest Ref Range: Not Detected  Not Detected   Influenza B by PCR Latest Ref Range: Not Detected  Not Detected   Adenovirus by PCR Latest Ref Range: Not Detected  Not Detected   Coronavirus 229E by PCR Latest Ref Range: Not Detected  Not Detected   Coronavirus HKU1 by PCR Latest Ref Range: Not Detected  Not Detected   Coronavirus NL63 by PCR Latest Ref Range: Not Detected  Not Detected   Coronavirus OC43 by PCR Latest Ref Range: Not Detected  Not Detected   Human Metapneumovirus by PCR Latest Ref Range: Not Detected  Not Detected   Human Rhinovirus/Enterovirus by PCR Latest Ref Range: Not Detected  Not Detected   Parainfluenza Virus 1 by PCR Latest Ref Range: Not Detected  Not Detected   Parainfluenza Virus 2 by PCR Latest Ref Range: Not Detected  Not Detected   Parainfluenza Virus 3 by PCR Latest Ref Range: Not Detected  Not Detected   Parainfluenza Virus 4 by PCR Latest Ref Range: Not Detected  Not Detected   Respiratory Syncytial Virus by PCR Latest Ref Range: Not Detected  Not Detected Bordetella parapertussis by PCR Latest Ref Range: Not Detected  Not Detected   Chlamydophilia pneumoniae by PCR Latest Ref Range: Not Detected  Not Detected   Mycoplasma pneumoniae by PCR Latest Ref Range: Not Detected  Not Detected   SARS-CoV-2, PCR Latest Ref Range: Not Detected  Not Detected   Bordetella pertussis by PCR Latest Ref Range: Not Detected  Not Detected         XR CHEST (2 VW)   Final Result   Cardiomegaly. Bibasilar atelectasis and/or infiltrate. US DUP LOWER EXTREMITIES BILATERAL VENOUS   Final Result   No evidence of DVT in either lower extremity. XR ELBOW LEFT (2 VIEWS)   Final Result   1. There is no fracture or dislocation of the left elbow   2. No soft tissue foreign bodies noted. CT Head WO Contrast   Final Result   No acute intracranial abnormality. Bilateral maxillary and ethmoid sinusitis. Stable old lacunar infarct, right thalamus. CT Cervical Spine WO Contrast   Final Result   No acute abnormality of the cervical spine. Severe multilevel degenerative changes. CTA PULMONARY W CONTRAST   Final Result   No evidence of pulmonary embolism or acute pulmonary abnormality. CT ABDOMEN PELVIS W IV CONTRAST Additional Contrast? None   Final Result   No evidence of acute intra-abdominal or pelvic injury. XR CHEST PORTABLE   Final Result   Bibasilar airspace opacities which may be on the basis of chronic fibrotic   changes, similar appearance to the examination from October 2020. ASSESSMENT:  1.) Acute Exacerbation of COPD  2.) Fall without Syncope    PLAN:  1.) Wean O2, currently on 2L nc, not on home O2.   2.) Continue IV steroids but change to QD  3.) Continue pulmicort, brovana, duoneb   4.) supportive care  5.) respiratory panel negative      PABLO Page-CNP    Attending Attestation Note:    Patient seen and examined with NP. I agree with above.     In addition, the following apply:    - supportive care, IV steroids  - SNF vs LTACH  - await D/C planning   - lab work and imaging reviewed    Chris Luo  5/12/2021  4:18 PM

## 2021-05-12 NOTE — PROGRESS NOTES
Subjective: The patient is awake and alert. Complains of constipation    Objective:    BP (!) 181/85   Pulse 78   Temp 98.2 °F (36.8 °C) (Oral)   Resp 22   Ht 6' (1.829 m)   Wt 221 lb 4 oz (100.4 kg)   SpO2 96%   BMI 30.01 kg/m²     Heart:  RRR, no murmurs, gallops, or rubs.   Lungs:  CTA bilaterally, no wheeze, rales or rhonchi  Abd: bowel sounds present, nontender, nondistended, no masses  Extrem:  No clubbing, cyanosis, or edema    CBC with Differential:    Lab Results   Component Value Date    WBC 8.6 05/12/2021    RBC 4.47 05/12/2021    HGB 12.7 05/12/2021    HCT 40.7 05/12/2021     05/12/2021    MCV 91.1 05/12/2021    MCH 28.4 05/12/2021    MCHC 31.2 05/12/2021    RDW 16.8 05/12/2021    SEGSPCT 89 03/16/2013    LYMPHOPCT 34.0 05/07/2021    MONOPCT 9.5 05/07/2021    BASOPCT 0.6 05/07/2021    MONOSABS 0.78 05/07/2021    LYMPHSABS 2.78 05/07/2021    EOSABS 0.42 05/07/2021    BASOSABS 0.05 05/07/2021     CMP:    Lab Results   Component Value Date     05/12/2021    K 5.2 05/12/2021    K 3.9 05/07/2021     05/12/2021    CO2 28 05/12/2021    BUN 34 05/12/2021    CREATININE 1.0 05/12/2021    GFRAA >60 05/12/2021    LABGLOM >60 05/12/2021    GLUCOSE 146 05/12/2021    GLUCOSE 98 10/14/2010    PROT 6.6 05/07/2021    LABALBU 3.9 05/07/2021    LABALBU 3.6 10/14/2010    CALCIUM 8.8 05/12/2021    BILITOT 0.6 05/07/2021    ALKPHOS 89 05/07/2021    AST 16 05/07/2021    ALT 12 05/07/2021     PT/INR:    Lab Results   Component Value Date    PROTIME 16.5 05/12/2021    PROTIME 20.5 10/14/2010    INR 1.5 05/12/2021     @cktotal:3,ckmb:3,ckmbindex:3,troponini:3@  U/A:    Lab Results   Component Value Date    NITRU Negative 05/07/2021    COLORU Yellow 05/07/2021    PHUR 6.0 05/07/2021    WBCUA 2-5 05/07/2021    RBCUA 10-20 05/07/2021    RBCUA NONE 03/15/2013    BACTERIA RARE 05/07/2021    CLARITYU Clear 05/07/2021    SPECGRAV 1.020 05/07/2021    UROBILINOGEN 0.2 05/07/2021    BILIRUBINUR Negative 05/07/2021    BLOODU MODERATE 05/07/2021    GLUCOSEU Negative 05/07/2021    KETUA Negative 05/07/2021        Assessment:    Patient Active Problem List   Diagnosis    Atrial fibrillation ([facility]    [facility]    Chest pain    Asthma    CAP (community acquired pneumonia)    GI bleeding    COPD exacerbation ([facility]       Plan:    COPD exacerbation-  Slow imporvement  Continue steroids and aerosols  Better today    constiaption  Still no stool  Mag citrate today    dispostion-  Slight improvement  Hopefully if continues to JOSH in am    Jessie Miners, DO  6:40 AM  5/12/2021

## 2021-05-13 VITALS
SYSTOLIC BLOOD PRESSURE: 139 MMHG | OXYGEN SATURATION: 96 % | HEART RATE: 95 BPM | TEMPERATURE: 98.8 F | HEIGHT: 72 IN | WEIGHT: 221.25 LBS | DIASTOLIC BLOOD PRESSURE: 65 MMHG | RESPIRATION RATE: 18 BRPM | BODY MASS INDEX: 29.97 KG/M2

## 2021-05-13 LAB
ANION GAP SERPL CALCULATED.3IONS-SCNC: 4 MMOL/L (ref 7–16)
BUN BLDV-MCNC: 40 MG/DL (ref 6–23)
CALCIUM SERPL-MCNC: 8.7 MG/DL (ref 8.6–10.2)
CHLORIDE BLD-SCNC: 103 MMOL/L (ref 98–107)
CO2: 30 MMOL/L (ref 22–29)
CREAT SERPL-MCNC: 1.1 MG/DL (ref 0.7–1.2)
GFR AFRICAN AMERICAN: >60
GFR NON-AFRICAN AMERICAN: >60 ML/MIN/1.73
GLUCOSE BLD-MCNC: 134 MG/DL (ref 74–99)
HCT VFR BLD CALC: 40.6 % (ref 37–54)
HEMOGLOBIN: 12.7 G/DL (ref 12.5–16.5)
INR BLD: 1.5
MCH RBC QN AUTO: 28.7 PG (ref 26–35)
MCHC RBC AUTO-ENTMCNC: 31.3 % (ref 32–34.5)
MCV RBC AUTO: 91.9 FL (ref 80–99.9)
METER GLUCOSE: 129 MG/DL (ref 74–99)
METER GLUCOSE: 190 MG/DL (ref 74–99)
PDW BLD-RTO: 16.6 FL (ref 11.5–15)
PLATELET # BLD: 190 E9/L (ref 130–450)
PMV BLD AUTO: 9.9 FL (ref 7–12)
POTASSIUM SERPL-SCNC: 5.5 MMOL/L (ref 3.5–5)
PROTHROMBIN TIME: 16.3 SEC (ref 9.3–12.4)
RBC # BLD: 4.42 E12/L (ref 3.8–5.8)
SODIUM BLD-SCNC: 137 MMOL/L (ref 132–146)
WBC # BLD: 8.3 E9/L (ref 4.5–11.5)

## 2021-05-13 PROCEDURE — 2580000003 HC RX 258: Performed by: INTERNAL MEDICINE

## 2021-05-13 PROCEDURE — 2700000000 HC OXYGEN THERAPY PER DAY

## 2021-05-13 PROCEDURE — 6360000002 HC RX W HCPCS: Performed by: INTERNAL MEDICINE

## 2021-05-13 PROCEDURE — 36415 COLL VENOUS BLD VENIPUNCTURE: CPT

## 2021-05-13 PROCEDURE — 94640 AIRWAY INHALATION TREATMENT: CPT

## 2021-05-13 PROCEDURE — 97530 THERAPEUTIC ACTIVITIES: CPT

## 2021-05-13 PROCEDURE — 85027 COMPLETE CBC AUTOMATED: CPT

## 2021-05-13 PROCEDURE — 6370000000 HC RX 637 (ALT 250 FOR IP): Performed by: INTERNAL MEDICINE

## 2021-05-13 PROCEDURE — 80048 BASIC METABOLIC PNL TOTAL CA: CPT

## 2021-05-13 PROCEDURE — 6370000000 HC RX 637 (ALT 250 FOR IP): Performed by: NURSE PRACTITIONER

## 2021-05-13 PROCEDURE — 82962 GLUCOSE BLOOD TEST: CPT

## 2021-05-13 PROCEDURE — 85610 PROTHROMBIN TIME: CPT

## 2021-05-13 RX ORDER — POLYVINYL ALCOHOL 14 MG/ML
1 SOLUTION/ DROPS OPHTHALMIC 2 TIMES DAILY
Qty: 1 BOTTLE | Refills: 4 | Status: SHIPPED | OUTPATIENT
Start: 2021-05-13 | End: 2021-06-12

## 2021-05-13 RX ORDER — IPRATROPIUM BROMIDE AND ALBUTEROL SULFATE 2.5; .5 MG/3ML; MG/3ML
3 SOLUTION RESPIRATORY (INHALATION)
Qty: 360 ML | Refills: 0 | Status: SHIPPED | OUTPATIENT
Start: 2021-05-13

## 2021-05-13 RX ORDER — PREDNISONE 20 MG/1
40 TABLET ORAL DAILY
Status: DISCONTINUED | OUTPATIENT
Start: 2021-05-13 | End: 2021-05-13 | Stop reason: HOSPADM

## 2021-05-13 RX ORDER — PREDNISONE 1 MG/1
10 TABLET ORAL DAILY
Status: DISCONTINUED | OUTPATIENT
Start: 2021-05-22 | End: 2021-05-13 | Stop reason: HOSPADM

## 2021-05-13 RX ORDER — WARFARIN SODIUM 1 MG/1
1 TABLET ORAL
Status: DISCONTINUED | OUTPATIENT
Start: 2021-05-13 | End: 2021-05-13 | Stop reason: HOSPADM

## 2021-05-13 RX ORDER — BRIMONIDINE TARTRATE 2 MG/ML
1 SOLUTION/ DROPS OPHTHALMIC 2 TIMES DAILY
Qty: 1 BOTTLE | Refills: 3 | Status: SHIPPED | OUTPATIENT
Start: 2021-05-13

## 2021-05-13 RX ORDER — PREDNISONE 20 MG/1
20 TABLET ORAL DAILY
Status: DISCONTINUED | OUTPATIENT
Start: 2021-05-19 | End: 2021-05-13 | Stop reason: HOSPADM

## 2021-05-13 RX ADMIN — ASPIRIN 81 MG: 81 TABLET, COATED ORAL at 08:41

## 2021-05-13 RX ADMIN — MICONAZOLE NITRATE: 20 POWDER TOPICAL at 08:42

## 2021-05-13 RX ADMIN — PETROLATUM: 420 OINTMENT TOPICAL at 08:42

## 2021-05-13 RX ADMIN — ARFORMOTEROL TARTRATE 15 MCG: 15 SOLUTION RESPIRATORY (INHALATION) at 10:07

## 2021-05-13 RX ADMIN — PREDNISONE 40 MG: 20 TABLET ORAL at 08:44

## 2021-05-13 RX ADMIN — TAMSULOSIN HYDROCHLORIDE 0.4 MG: 0.4 CAPSULE ORAL at 08:41

## 2021-05-13 RX ADMIN — IPRATROPIUM BROMIDE AND ALBUTEROL SULFATE 1 AMPULE: 2.5; .5 SOLUTION RESPIRATORY (INHALATION) at 13:14

## 2021-05-13 RX ADMIN — POLYVINYL ALCOHOL 1 DROP: 14 SOLUTION/ DROPS OPHTHALMIC at 08:42

## 2021-05-13 RX ADMIN — METHYLPREDNISOLONE SODIUM SUCCINATE 40 MG: 40 INJECTION, POWDER, LYOPHILIZED, FOR SOLUTION INTRAMUSCULAR; INTRAVENOUS at 03:53

## 2021-05-13 RX ADMIN — SODIUM CHLORIDE, PRESERVATIVE FREE 10 ML: 5 INJECTION INTRAVENOUS at 03:54

## 2021-05-13 RX ADMIN — BRIMONIDINE TARTRATE 1 DROP: 2 SOLUTION OPHTHALMIC at 08:42

## 2021-05-13 RX ADMIN — Medication 10 ML: at 08:42

## 2021-05-13 RX ADMIN — BUDESONIDE 500 MCG: 0.5 SUSPENSION RESPIRATORY (INHALATION) at 10:07

## 2021-05-13 RX ADMIN — IPRATROPIUM BROMIDE AND ALBUTEROL SULFATE 1 AMPULE: 2.5; .5 SOLUTION RESPIRATORY (INHALATION) at 10:07

## 2021-05-13 RX ADMIN — INSULIN LISPRO 1 UNITS: 100 INJECTION, SOLUTION INTRAVENOUS; SUBCUTANEOUS at 11:03

## 2021-05-13 ASSESSMENT — PAIN SCALES - GENERAL
PAINLEVEL_OUTOF10: 0
PAINLEVEL_OUTOF10: 0

## 2021-05-13 NOTE — CARE COORDINATION
Pt cleared for discharge. To transport via [facility] (w/c) with 2l today at 3:30 pm N-n [phone]. Pt aware and agrees. Staff advised. Gonzalez Butler.

## 2021-05-13 NOTE — PROGRESS NOTES
Subjective: The patient is awake and alert. Feel better and stronger  Objective:    BP (!) 189/81   Pulse 76   Temp 98.2 °F (36.8 °C) (Oral)   Resp 18   Ht 6' (1.829 m)   Wt 221 lb 4 oz (100.4 kg)   SpO2 97%   BMI 30.01 kg/m²     Heart:  RRR, no murmurs, gallops, or rubs.   Lungs:  CTA bilaterally, no wheeze, rales or rhonchi  Abd: bowel sounds present, nontender, nondistended, no masses  Extrem:  No clubbing, cyanosis, or edema    CBC with Differential:    Lab Results   Component Value Date    WBC 8.3 05/13/2021    RBC 4.42 05/13/2021    HGB 12.7 05/13/2021    HCT 40.6 05/13/2021     05/13/2021    MCV 91.9 05/13/2021    MCH 28.7 05/13/2021    MCHC 31.3 05/13/2021    RDW 16.6 05/13/2021    SEGSPCT 89 03/16/2013    LYMPHOPCT 34.0 05/07/2021    MONOPCT 9.5 05/07/2021    BASOPCT 0.6 05/07/2021    MONOSABS 0.78 05/07/2021    LYMPHSABS 2.78 05/07/2021    EOSABS 0.42 05/07/2021    BASOSABS 0.05 05/07/2021     CMP:    Lab Results   Component Value Date     05/13/2021    K 5.5 05/13/2021    K 3.9 05/07/2021     05/13/2021    CO2 30 05/13/2021    BUN 40 05/13/2021    CREATININE 1.1 05/13/2021    GFRAA >60 05/13/2021    LABGLOM >60 05/13/2021    GLUCOSE 134 05/13/2021    GLUCOSE 98 10/14/2010    PROT 6.6 05/07/2021    LABALBU 3.9 05/07/2021    LABALBU 3.6 10/14/2010    CALCIUM 8.7 05/13/2021    BILITOT 0.6 05/07/2021    ALKPHOS 89 05/07/2021    AST 16 05/07/2021    ALT 12 05/07/2021     PT/INR:    Lab Results   Component Value Date    PROTIME 16.3 05/13/2021    PROTIME 20.5 10/14/2010    INR 1.5 05/13/2021     @cktotal:3,ckmb:3,ckmbindex:3,troponini:3@  U/A:    Lab Results   Component Value Date    NITRU Negative 05/07/2021    COLORU Yellow 05/07/2021    PHUR 6.0 05/07/2021    WBCUA 2-5 05/07/2021    RBCUA 10-20 05/07/2021    RBCUA NONE 03/15/2013    BACTERIA RARE 05/07/2021    CLARITYU Clear 05/07/2021    SPECGRAV 1.020 05/07/2021    UROBILINOGEN 0.2 05/07/2021    BILIRUBINUR Negative 05/07/2021    BLOODU MODERATE 05/07/2021    GLUCOSEU Negative 05/07/2021    KETUA Negative 05/07/2021        Assessment:    Patient Active Problem List   Diagnosis    [facility]    COPD with exacerbation ([facility]    Chest pain    Asthma    CAP (community acquired pneumonia)    GI bleeding    COPD exacerbation ([facility]       Plan:    Asthma exacerbation-  improved from previous  From my standpoint ok for rehab when ok with pulm  Will need converted to oral steroids          Melissa Lombardo DO  6:24 AM  5/13/2021

## 2021-05-13 NOTE — PLAN OF CARE
Problem: Falls - Risk of:  Goal: Will remain free from falls  Description: Will remain free from falls  Outcome: Met This Shift  Goal: Absence of physical injury  Description: Absence of physical injury  Outcome: Met This Shift     Problem: Pain:  Goal: Control of acute pain  Description: Control of acute pain  Outcome: Met This Shift     Problem: Gas Exchange - Impaired  Goal: Able to breathe comfortably  Description: Able to breathe comfortably  Outcome: Met This Shift     Problem: Skin Integrity:  Goal: Absence of new skin breakdown  Description: Absence of new skin breakdown  Outcome: Met This Shift

## 2021-05-13 NOTE — CARE COORDINATION
Social work / Discharge Planning:       COVID NEGATIVE 5/7/21. Discharge order noted. Plan is for [facility] SNF when ok with pulmonology. N-67, [address] and ambulette form completed.    Electronically signed by SASHA Anne on 5/13/2021 at 8:11 AM

## 2021-05-13 NOTE — PROGRESS NOTES
Physical Therapy    Facility/Department: [address] MED SURG/TELE  Treatment Note  NAME: Yael Bermeo  : 1929  MRN: [id number]    Date of Service: 2021      Referring Provider:  Chris Bo MD    Evaluating Therapist: Brianne Maldonado PT    Room #: 65  DIAGNOSIS: COPD, S/p falls at home  Additional Pertinent History:CVA, COPD  PRECAUTIONS: falls, alarm, O2    Social:  Pt lives with wife in a 1 floor plan 0 steps  to enter. Uses basement to walk for exercise  Prior to admission independent with cane or ww     Initial Evaluation  Date: 21 Treatment      Short Term/ Long Term   Goals   Was pt agreeable to Eval/treatment? yes yes    Does pt have pain? L side ribs No c/o pain today    Bed Mobility  Rolling: SBA  Supine to sit: SBA  Sit to supine: NT  Scooting: SBA Rolling: supervision  Supine to sit: supervision  Sit to supine: NA  Scooting: supervision independent   Transfers Sit to stand: min assist  Stand to sit: min assist  Stand pivot: min assist Sit to stand: supervision  Stand to sit: SBA  Stand pivot: SBA ww supervision   Ambulation    25 feet with ww with min assist 300 feet with ww SBA  200 feet with ww with supervision   Stair Negotiation  Ascended and descended  NT NA N/A   AM-PAC Raw score               1624 18/24      BLE ROM is WFL  BLE Strength is grossly 4/5  Balance: sitting EOB supervision and standing with ww supervision/SBA    ASSESSMENT:    Comments:  Pt states he is still feeling better and again was steady on his feet during amb and had no LOB. He walked in the conteh with ww on 2L O2 today instead of 3L O2 like yesterday and had mild SOB, but had no c/o fatigue. Upon return to his room O2 sat was checked and found to be 91%. Vitals:   Pt was on 2L O2 throughout PT session. O2 sat after amb was 91% and he had only mild SOB today with amb.       Treatment:  Patient practiced and was instructed in the following treatment:     Bed mobility, transfers, and gait with ww to improve functional strength and endurance. Pt was left sitting up in recliner chair and call light left by patient. Chair/bed alarm: chair alarm was activated and pt's wife and son were present. PLAN:    Pt is making good progress toward established Physical Therapy goals. Continue with physical therapy current plan of care. Time in  13:45  Time out  14:05    Total Treatment Time 20 minutes     Evaluation Time includes thorough review of current medical information, gathering information on past medical history/social history and prior level of function, completion of standardized testing/informal observation of tasks, assessment of data and education on plan of care and goals. CPT codes:  [] Low Complexity PT evaluation 63780  [] Moderate Complexity PT evaluation 97950  [] High Complexity PT evaluation 00578  [] PT Re-evaluation 19750  [] Gait training 44859 ** minutes  [] Manual therapy 04243 ** minutes  [x] Therapeutic activities 70848 20 minutes  [] Therapeutic exercises 67754 ** minutes  [] Neuromuscular reeducation 15617 ** minutes     Efren Salmon., P.T.   License Number: [id number]

## 2021-05-18 NOTE — DISCHARGE SUMMARY
Patient ID:  Gita Chaparro  [id number]  56 y.o.  2/17/1929    Admit date: 5/7/2021    Discharge date and time: 5/13/2021  4:28 PM     Admission Diagnoses: COPD exacerbation ([facility] [address] [J44.1]  COPD exacerbation ([facility] [address] [J44.1]    Discharge Diagnoses: Active Problems:    COPD exacerbation ([facility] [address]  Resolved Problems:    * No resolved hospital problems. *        Consults: pulmonary/intensive care    Procedures: none    Hospital Course: Patient admitted after fall with acute shortness of breath. He had left sided chest pain. He had normal CTA of lung and cardiac enzymes. He has asthma and had acute exacerbation. Some swelling in legs but no active CHF. He was given IV steroids. His bowel program was alternated. He underwent PT and felt that he was a candidate for SNF. He was transferred to [facility] in good condition. Discharge Exam:  See progress note from today    Disposition: SNF    Patient Instructions:   Discharge Medication List as of 5/13/2021  3:49 PM      START taking these medications    Details   ipratropium-albuterol (DUONEB) 0.5-2.5 (3) MG/3ML SOLN nebulizer solution Inhale 3 mLs into the lungs every 4 hours (while awake), Disp-360 mL, R-0Normal      !! insulin lispro (HUMALOG) 100 UNIT/ML injection vial Inject 0-3 Units into the skin nightly, Disp-1 vial, R-3Normal      !! insulin lispro (HUMALOG) 100 UNIT/ML injection vial Inject 0-6 Units into the skin 3 times daily (with meals), Disp-1 vial, R-3Normal      brimonidine (ALPHAGAN) 0.2 % ophthalmic solution Place 1 drop into both eyes 2 times daily, Disp-1 Bottle, R-3Normal      polyvinyl alcohol (LIQUIFILM TEARS) 1.4 % ophthalmic solution Place 1 drop into both eyes 2 times daily, Disp-1 Bottle, R-4Normal       !! - Potential duplicate medications found. Please discuss with provider.       CONTINUE these medications which have NOT CHANGED    Details   furosemide (LASIX) 40 MG tablet Take 40 mg by mouth daily as needed Has taken for past 5 days 5/3/21-5/7/21Historical Med assault potassium chloride (KLOR-CON M) 10 MEQ extended release tablet Take 10 mEq by mouth daily as needed If takes lasixHistorical Med      cephALEXin (KEFLEX) 500 MG capsule Take 500 mg by mouth 2 times dailyHistorical Med      mometasone-formoterol (DULERA) 200-5 MCG/ACT inhaler Inhale 1 puff into the lungs dailyHistorical Med      fluticasone (FLOVENT HFA) 110 MCG/ACT inhaler Inhale 1 puff into the lungs daily as neededHistorical Med      glycerin-hypromellose- (ARTIFICIAL TEARS) 0.2-0.2-1 % SOLN opthalmic solution Place 1 drop into both eyes 2 times daily as needed REFRESH PMHistorical Med      warfarin (COUMADIN) 1 MG tablet Skip today's dose, resume 9/21/2017, Disp-30 tablet, R-3Normal      Multiple Vitamins-Minerals (PRESERVISION AREDS 2) CAPS Take 1 capsule by mouth 2 times dailyHistorical Med      docusate sodium (COLACE) 100 MG capsule Take 200 mg by mouth nightlyHistorical Med      tamsulosin (FLOMAX) 0.4 MG capsule Take 0.4 mg by mouth daily      montelukast (SINGULAIR) 10 MG tablet Take 10 mg by mouth nightly. STOP taking these medications       White Petrolatum-Mineral Oil (REFRESH P.M. OP) Comments:   Reason for Stopping:         ALPHAGAN P 0.1 % SOLN Comments:   Reason for Stopping:         fluticasone (FLONASE) 50 MCG/ACT nasal spray Comments:   Reason for Stopping:         fluticasone-salmeterol (ADVAIR) 500-50 MCG/DOSE diskus inhaler Comments:   Reason for Stopping:             Activity: activity as tolerated  Diet: cardiac diet    Follow-up with OLEG becerra in 2 days.     Note that over 30 minutes was spent in preparing discharge papers, discussing discharge with patient, medication review, etc.    Signed:  Neha Youssef DO  5/18/2021  6:20 AM

## 2021-07-10 ENCOUNTER — HOSPITAL ENCOUNTER (INPATIENT)
Age: 86
LOS: 4 days | Discharge: NURSING, REHAB OR LONG-TERM CARE | DRG: 308 | End: 2021-07-14
Attending: EMERGENCY MEDICINE | Admitting: INTERNAL MEDICINE
Payer: MEDICARE

## 2021-07-10 ENCOUNTER — APPOINTMENT (OUTPATIENT)
Dept: GENERAL RADIOLOGY | Age: 86
DRG: 308 | End: 2021-07-10
Payer: MEDICARE

## 2021-07-10 DIAGNOSIS — I50.9 ACUTE ON CHRONIC CONGESTIVE HEART FAILURE, UNSPECIFIED HEART FAILURE TYPE (HCC): ICD-10-CM

## 2021-07-10 DIAGNOSIS — N17.9 AKI (ACUTE KIDNEY INJURY) (HCC): ICD-10-CM

## 2021-07-10 DIAGNOSIS — I48.91 ATRIAL FIBRILLATION WITH RVR (HCC): Primary | ICD-10-CM

## 2021-07-10 LAB
ANION GAP SERPL CALCULATED.3IONS-SCNC: 11 MMOL/L (ref 7–16)
APTT: 45 SEC (ref 24.5–35.1)
BASOPHILS ABSOLUTE: 0.04 E9/L (ref 0–0.2)
BASOPHILS RELATIVE PERCENT: 0.6 % (ref 0–2)
BUN BLDV-MCNC: 38 MG/DL (ref 6–23)
CALCIUM SERPL-MCNC: 8.8 MG/DL (ref 8.6–10.2)
CHLORIDE BLD-SCNC: 105 MMOL/L (ref 98–107)
CO2: 25 MMOL/L (ref 22–29)
CREAT SERPL-MCNC: 1.8 MG/DL (ref 0.7–1.2)
EOSINOPHILS ABSOLUTE: 0.14 E9/L (ref 0.05–0.5)
EOSINOPHILS RELATIVE PERCENT: 2 % (ref 0–6)
GFR AFRICAN AMERICAN: 43
GFR NON-AFRICAN AMERICAN: 35 ML/MIN/1.73
GLUCOSE BLD-MCNC: 116 MG/DL (ref 74–99)
HCT VFR BLD CALC: 41.2 % (ref 37–54)
HEMOGLOBIN: 12.6 G/DL (ref 12.5–16.5)
IMMATURE GRANULOCYTES #: 0.03 E9/L
IMMATURE GRANULOCYTES %: 0.4 % (ref 0–5)
INR BLD: 3.6
LYMPHOCYTES ABSOLUTE: 2.41 E9/L (ref 1.5–4)
LYMPHOCYTES RELATIVE PERCENT: 34.5 % (ref 20–42)
MCH RBC QN AUTO: 28.3 PG (ref 26–35)
MCHC RBC AUTO-ENTMCNC: 30.6 % (ref 32–34.5)
MCV RBC AUTO: 92.4 FL (ref 80–99.9)
METER GLUCOSE: 147 MG/DL (ref 74–99)
MONOCYTES ABSOLUTE: 0.67 E9/L (ref 0.1–0.95)
MONOCYTES RELATIVE PERCENT: 9.6 % (ref 2–12)
NEUTROPHILS ABSOLUTE: 3.69 E9/L (ref 1.8–7.3)
NEUTROPHILS RELATIVE PERCENT: 52.9 % (ref 43–80)
PDW BLD-RTO: 15.7 FL (ref 11.5–15)
PLATELET # BLD: 178 E9/L (ref 130–450)
PMV BLD AUTO: 10.4 FL (ref 7–12)
POTASSIUM REFLEX MAGNESIUM: 4 MMOL/L (ref 3.5–5)
PRO-BNP: 3888 PG/ML (ref 0–450)
PROTHROMBIN TIME: 40 SEC (ref 9.3–12.4)
RBC # BLD: 4.46 E12/L (ref 3.8–5.8)
SODIUM BLD-SCNC: 141 MMOL/L (ref 132–146)
TROPONIN, HIGH SENSITIVITY: 48 NG/L (ref 0–11)
TROPONIN, HIGH SENSITIVITY: 52 NG/L (ref 0–11)
WBC # BLD: 7 E9/L (ref 4.5–11.5)

## 2021-07-10 PROCEDURE — 93005 ELECTROCARDIOGRAM TRACING: CPT | Performed by: STUDENT IN AN ORGANIZED HEALTH CARE EDUCATION/TRAINING PROGRAM

## 2021-07-10 PROCEDURE — 6360000002 HC RX W HCPCS: Performed by: EMERGENCY MEDICINE

## 2021-07-10 PROCEDURE — 99285 EMERGENCY DEPT VISIT HI MDM: CPT

## 2021-07-10 PROCEDURE — 51702 INSERT TEMP BLADDER CATH: CPT

## 2021-07-10 PROCEDURE — 6370000000 HC RX 637 (ALT 250 FOR IP): Performed by: INTERNAL MEDICINE

## 2021-07-10 PROCEDURE — 83880 ASSAY OF NATRIURETIC PEPTIDE: CPT

## 2021-07-10 PROCEDURE — 96376 TX/PRO/DX INJ SAME DRUG ADON: CPT

## 2021-07-10 PROCEDURE — 2500000003 HC RX 250 WO HCPCS: Performed by: STUDENT IN AN ORGANIZED HEALTH CARE EDUCATION/TRAINING PROGRAM

## 2021-07-10 PROCEDURE — 71045 X-RAY EXAM CHEST 1 VIEW: CPT

## 2021-07-10 PROCEDURE — 85610 PROTHROMBIN TIME: CPT

## 2021-07-10 PROCEDURE — 82962 GLUCOSE BLOOD TEST: CPT

## 2021-07-10 PROCEDURE — 96375 TX/PRO/DX INJ NEW DRUG ADDON: CPT

## 2021-07-10 PROCEDURE — 84484 ASSAY OF TROPONIN QUANT: CPT

## 2021-07-10 PROCEDURE — 80048 BASIC METABOLIC PNL TOTAL CA: CPT

## 2021-07-10 PROCEDURE — 96374 THER/PROPH/DIAG INJ IV PUSH: CPT

## 2021-07-10 PROCEDURE — 85730 THROMBOPLASTIN TIME PARTIAL: CPT

## 2021-07-10 PROCEDURE — 2060000000 HC ICU INTERMEDIATE R&B

## 2021-07-10 PROCEDURE — 85025 COMPLETE CBC W/AUTO DIFF WBC: CPT

## 2021-07-10 RX ORDER — DOCUSATE SODIUM 100 MG/1
200 CAPSULE, LIQUID FILLED ORAL NIGHTLY
Status: DISCONTINUED | OUTPATIENT
Start: 2021-07-10 | End: 2021-07-14 | Stop reason: HOSPADM

## 2021-07-10 RX ORDER — BRIMONIDINE TARTRATE 2 MG/ML
1 SOLUTION/ DROPS OPHTHALMIC 2 TIMES DAILY
Status: DISCONTINUED | OUTPATIENT
Start: 2021-07-10 | End: 2021-07-14 | Stop reason: HOSPADM

## 2021-07-10 RX ORDER — DILTIAZEM HYDROCHLORIDE 5 MG/ML
20 INJECTION INTRAVENOUS ONCE
Status: COMPLETED | OUTPATIENT
Start: 2021-07-10 | End: 2021-07-10

## 2021-07-10 RX ORDER — ONDANSETRON 4 MG/1
4 TABLET, ORALLY DISINTEGRATING ORAL EVERY 8 HOURS PRN
Status: DISCONTINUED | OUTPATIENT
Start: 2021-07-10 | End: 2021-07-14 | Stop reason: HOSPADM

## 2021-07-10 RX ORDER — FLUTICASONE PROPIONATE 110 UG/1
1 AEROSOL, METERED RESPIRATORY (INHALATION) DAILY PRN
Status: DISCONTINUED | OUTPATIENT
Start: 2021-07-10 | End: 2021-07-10 | Stop reason: CLARIF

## 2021-07-10 RX ORDER — TAMSULOSIN HYDROCHLORIDE 0.4 MG/1
0.4 CAPSULE ORAL DAILY
Status: DISCONTINUED | OUTPATIENT
Start: 2021-07-11 | End: 2021-07-14 | Stop reason: HOSPADM

## 2021-07-10 RX ORDER — ONDANSETRON 2 MG/ML
4 INJECTION INTRAMUSCULAR; INTRAVENOUS EVERY 6 HOURS PRN
Status: DISCONTINUED | OUTPATIENT
Start: 2021-07-10 | End: 2021-07-14 | Stop reason: HOSPADM

## 2021-07-10 RX ORDER — IPRATROPIUM BROMIDE AND ALBUTEROL SULFATE 2.5; .5 MG/3ML; MG/3ML
3 SOLUTION RESPIRATORY (INHALATION) EVERY 4 HOURS PRN
Status: DISCONTINUED | OUTPATIENT
Start: 2021-07-10 | End: 2021-07-14 | Stop reason: HOSPADM

## 2021-07-10 RX ORDER — SODIUM CHLORIDE 0.9 % (FLUSH) 0.9 %
5-40 SYRINGE (ML) INJECTION PRN
Status: DISCONTINUED | OUTPATIENT
Start: 2021-07-10 | End: 2021-07-14 | Stop reason: HOSPADM

## 2021-07-10 RX ORDER — MONTELUKAST SODIUM 10 MG/1
10 TABLET ORAL NIGHTLY
Status: DISCONTINUED | OUTPATIENT
Start: 2021-07-10 | End: 2021-07-14 | Stop reason: HOSPADM

## 2021-07-10 RX ORDER — FUROSEMIDE 10 MG/ML
40 INJECTION INTRAMUSCULAR; INTRAVENOUS ONCE
Status: COMPLETED | OUTPATIENT
Start: 2021-07-10 | End: 2021-07-10

## 2021-07-10 RX ORDER — BUDESONIDE 0.5 MG/2ML
0.5 INHALANT ORAL DAILY PRN
Status: DISCONTINUED | OUTPATIENT
Start: 2021-07-10 | End: 2021-07-12 | Stop reason: SDUPTHER

## 2021-07-10 RX ORDER — POLYVINYL ALCOHOL 14 MG/ML
1 SOLUTION/ DROPS OPHTHALMIC EVERY 6 HOURS PRN
Status: DISCONTINUED | OUTPATIENT
Start: 2021-07-10 | End: 2021-07-14 | Stop reason: HOSPADM

## 2021-07-10 RX ORDER — POTASSIUM CHLORIDE 750 MG/1
10 TABLET, EXTENDED RELEASE ORAL DAILY
Status: DISCONTINUED | OUTPATIENT
Start: 2021-07-11 | End: 2021-07-14 | Stop reason: HOSPADM

## 2021-07-10 RX ORDER — SODIUM CHLORIDE 0.9 % (FLUSH) 0.9 %
5-40 SYRINGE (ML) INJECTION EVERY 12 HOURS SCHEDULED
Status: DISCONTINUED | OUTPATIENT
Start: 2021-07-10 | End: 2021-07-14 | Stop reason: HOSPADM

## 2021-07-10 RX ORDER — SODIUM CHLORIDE 9 MG/ML
25 INJECTION, SOLUTION INTRAVENOUS PRN
Status: DISCONTINUED | OUTPATIENT
Start: 2021-07-10 | End: 2021-07-14 | Stop reason: HOSPADM

## 2021-07-10 RX ORDER — ANTIOX #8/OM3/DHA/EPA/LUT/ZEAX 250-2.5 MG
1 CAPSULE ORAL 2 TIMES DAILY
Status: DISCONTINUED | OUTPATIENT
Start: 2021-07-10 | End: 2021-07-14 | Stop reason: HOSPADM

## 2021-07-10 RX ORDER — ACETAMINOPHEN 325 MG/1
650 TABLET ORAL EVERY 4 HOURS PRN
Status: DISCONTINUED | OUTPATIENT
Start: 2021-07-10 | End: 2021-07-14 | Stop reason: HOSPADM

## 2021-07-10 RX ADMIN — FUROSEMIDE 40 MG: 10 INJECTION, SOLUTION INTRAMUSCULAR; INTRAVENOUS at 17:02

## 2021-07-10 RX ADMIN — MONTELUKAST SODIUM 10 MG: 10 TABLET, FILM COATED ORAL at 22:07

## 2021-07-10 RX ADMIN — DEXTROSE MONOHYDRATE 5 MG/HR: 50 INJECTION, SOLUTION INTRAVENOUS at 17:56

## 2021-07-10 RX ADMIN — DILTIAZEM HYDROCHLORIDE 20 MG: 5 INJECTION, SOLUTION INTRAVENOUS at 16:12

## 2021-07-10 RX ADMIN — DOCUSATE SODIUM 200 MG: 100 CAPSULE, LIQUID FILLED ORAL at 22:07

## 2021-07-10 RX ADMIN — INSULIN LISPRO 1 UNITS: 100 INJECTION, SOLUTION INTRAVENOUS; SUBCUTANEOUS at 22:07

## 2021-07-10 ASSESSMENT — ENCOUNTER SYMPTOMS
ABDOMINAL PAIN: 0
EYE REDNESS: 0
VOMITING: 0
EYE PAIN: 0
EYE DISCHARGE: 0
COUGH: 0
SORE THROAT: 0
SHORTNESS OF BREATH: 1
DIARRHEA: 0
SINUS PRESSURE: 0
BACK PAIN: 0
WHEEZING: 0
NAUSEA: 0

## 2021-07-10 NOTE — ED NOTES
Bed: 27  Expected date:   Expected time:   Means of arrival:   Comments:  EMS     Jeremy Chaidez RN  07/10/21 [id number]

## 2021-07-10 NOTE — ED PROVIDER NOTES
The history is provided by the patient, medical records and the spouse. 80-year male presents emergency department has a past medical history of atrial fibrillation, asthma complaint of shortness of breath. Patient states she has been short of breath for approximately 2 to 3 days. States worse with exertion nothing makes it better. States he did take 2 puffs of his inhaler and DuoNeb treatments that did not help today. He was also recently seen by his primary care physician on Wednesday and they advised him to come into the emergency department at that time however he refused at that time. Patient brought in by EMS because he had worsening shortness of breath today, EMS did report patient was in atrial fibrillation with RVR. Patient has no chest pain at this time. Does not feel like his heart is irregular or rapid. He is on Coumadin for atrial fibrillation. Otherwise denies any other complaints at this time. The patient presents with sob that has been going on for 2-3 days. These symptoms are moderate in severity. Symptoms are made better by nothing. Symptoms are made worse by exertion. Associated symptoms include none. Review of Systems   Constitutional: Negative for chills and fever. HENT: Negative for ear pain, sinus pressure and sore throat. Eyes: Negative for pain, discharge and redness. Respiratory: Positive for shortness of breath. Negative for cough and wheezing. Cardiovascular: Negative for chest pain. Gastrointestinal: Negative for abdominal pain, diarrhea, nausea and vomiting. Genitourinary: Negative for dysuria and frequency. Musculoskeletal: Negative for arthralgias and back pain. Skin: Negative for rash and wound. Neurological: Negative for weakness and headaches. Hematological: Negative for adenopathy. All other systems reviewed and are negative. Physical Exam  Vitals and nursing note reviewed. Exam conducted with a chaperone present.    Constitutional: Appearance: Normal appearance. He is well-developed. He is obese. He is not toxic-appearing. HENT:      Head: Normocephalic and atraumatic. Eyes:      General: No scleral icterus. Conjunctiva/sclera: Conjunctivae normal.   Cardiovascular:      Rate and Rhythm: Tachycardia present. Rhythm irregular. Pulses: Normal pulses. Heart sounds: Normal heart sounds. No murmur heard. Pulmonary:      Effort: Pulmonary effort is normal. No respiratory distress. Breath sounds: Normal breath sounds. No wheezing or rales. Abdominal:      General: Bowel sounds are normal.      Palpations: Abdomen is soft. Tenderness: There is no abdominal tenderness. There is no guarding or rebound. Musculoskeletal:         General: No swelling, tenderness or deformity. Cervical back: Normal range of motion and neck supple. Skin:     General: Skin is warm and dry. Coloration: Skin is not jaundiced. Findings: No bruising. Comments: Pitting edema to bilateral lower extremities 1 +   Neurological:      General: No focal deficit present. Mental Status: He is alert and oriented to person, place, and time. Psychiatric:         Mood and Affect: Mood normal.         Thought Content: Thought content normal.          Procedures     MDM     80-year-old male past medical history of atrial fibrillation, on Coumadin presents emerged department complaint shortness of breath, was in atrial fibrillation with RVR on EKG no acute ST elevation depression. Patient's rate was controlled here in the emergency department with Cardizem bolus as well as Cardizem drip. His symptoms improved significantly following that. Patient was also noted to have some trace bilateral pleural effusions on chest x-ray as well as pitting edema to lower extremities, does appear to be fluid overloaded most likely from acute heart failure exacerbation. Patient's laboratory work did support this with an elevated BNP.   He does also have an acute JUAN J most likely from his atrial fibrillation with RVR as well. Due to this patient was admitted to the hospital patient agreeable this plan moving forward. EKG: This EKG is signed by emergency department physician. Rate: 129  Rhythm: Atrial fibrillation  Interpretation: a fib with rvr no st elevation or depression  Comparison: changes compared to previous EKG          --------------------------------------------- PAST HISTORY ---------------------------------------------  Past Medical History:  has a past medical history of Asthma, COPD (chronic obstructive pulmonary disease) ([facility] [address] and Unspecified cerebral artery occlusion with cerebral infarction. Past Surgical History:  has a past surgical history that includes Appendectomy; hernia repair; joint replacement (6-11-12); ECHO Compl W Dop Color Flow (3/16/2013); and Abdomen surgery. Social History:  reports that he quit smoking about 39 years ago. His smoking use included cigarettes. He started smoking about 75 years ago. He smoked 1.00 pack per day. He has never used smokeless tobacco. He reports that he does not drink alcohol and does not use drugs. Family History: family history includes Cancer in his father and mother; No Known Problems in his sister. The patients home medications have been reviewed. Allergies: Patient has no known allergies.     -------------------------------------------------- RESULTS -------------------------------------------------    LABS:  Results for orders placed or performed during the hospital encounter of 07/10/21   CBC Auto Differential   Result Value Ref Range    WBC 7.0 4.5 - 11.5 E9/L    RBC 4.46 3.80 - 5.80 E12/L    Hemoglobin 12.6 12.5 - 16.5 g/dL    Hematocrit 41.2 37.0 - 54.0 %    MCV 92.4 80.0 - 99.9 fL    MCH 28.3 26.0 - 35.0 pg    MCHC 30.6 (L) 32.0 - 34.5 %    RDW 15.7 (H) 11.5 - 15.0 fL    Platelets 047 522 - 698 E9/L    MPV 10.4 7.0 - 12.0 fL    Neutrophils % 52.9 43.0 - 80.0 %    Immature Granulocytes % 0.4 0.0 - 5.0 %    Lymphocytes % 34.5 20.0 - 42.0 %    Monocytes % 9.6 2.0 - 12.0 %    Eosinophils % 2.0 0.0 - 6.0 %    Basophils % 0.6 0.0 - 2.0 %    Neutrophils Absolute 3.69 1.80 - 7.30 E9/L    Immature Granulocytes # 0.03 E9/L    Lymphocytes Absolute 2.41 1.50 - 4.00 E9/L    Monocytes Absolute 0.67 0.10 - 0.95 E9/L    Eosinophils Absolute 0.14 0.05 - 0.50 E9/L    Basophils Absolute 0.04 0.00 - 0.20 R4/C   Basic Metabolic Panel w/ Reflex to MG   Result Value Ref Range    Sodium 141 132 - 146 mmol/L    Potassium reflex Magnesium 4.0 3.5 - 5.0 mmol/L    Chloride 105 98 - 107 mmol/L    CO2 25 22 - 29 mmol/L    Anion Gap 11 7 - 16 mmol/L    Glucose 116 (H) 74 - 99 mg/dL    BUN 38 (H) 6 - 23 mg/dL    CREATININE 1.8 (H) 0.7 - 1.2 mg/dL    GFR Non-African American 35 >=60 mL/min/1.73    GFR African American 43     Calcium 8.8 8.6 - 10.2 mg/dL   Troponin   Result Value Ref Range    Troponin, High Sensitivity 52 (H) 0 - 11 ng/L   Brain Natriuretic Peptide   Result Value Ref Range    Pro-BNP 3,888 (H) 0 - 450 pg/mL   APTT   Result Value Ref Range    aPTT 45.0 (H) 24.5 - 35.1 sec   Protime-INR   Result Value Ref Range    Protime 40.0 (H) 9.3 - 12.4 sec    INR 3.6    Troponin   Result Value Ref Range    Troponin, High Sensitivity 48 (H) 0 - 11 ng/L   EKG 12 Lead   Result Value Ref Range    Ventricular Rate 129 BPM    Atrial Rate 122 BPM    QRS Duration 130 ms    Q-T Interval 366 ms    QTc Calculation (Bazett) 536 ms    R Axis -46 degrees    T Axis 11 degrees       RADIOLOGY:  XR CHEST PORTABLE   Final Result   1. Bibasilar airspace disease and trace bilateral pleural effusions. 2. Cardiomegaly.   Gross findings of CHF are not appreciated within the   perihilar regions.                 ------------------------- NURSING NOTES AND VITALS REVIEWED ---------------------------  Date / Time Roomed:  7/10/2021  3:19 PM  ED Bed Assignment:  27/27    The nursing notes within the ED encounter and vital signs as below have been reviewed. Patient Vitals for the past 24 hrs:   BP Temp Temp src Pulse Resp SpO2 Height Weight   07/10/21 1916 121/75 97.2 °F (36.2 °C) Oral 133 20 99 % -- --   07/10/21 1830 (!) 121/98 -- -- 92 -- 98 % -- --   07/10/21 1815 139/73 -- -- 116 -- 98 % -- --   07/10/21 1745 99/68 -- -- 131 -- 97 % -- --   07/10/21 1730 118/82 -- -- 102 -- 97 % -- --   07/10/21 1715 102/70 -- -- 112 -- 97 % -- --   07/10/21 1700 128/79 -- -- 120 -- 97 % -- --   07/10/21 1628 104/64 -- -- 93 16 97 % -- --   07/10/21 1612 111/83 -- -- 130 24 98 % -- --   07/10/21 1556 -- 98.2 °F (36.8 °C) -- -- -- 92 % -- --   07/10/21 1539 130/80 -- -- 129 22 -- 6' (1.829 m) 221 lb (100.2 kg)       Oxygen Saturation Interpretation: Normal    ------------------------------------------ PROGRESS NOTES ------------------------------------------  Re-evaluation(s):  Time: 8875  Patients symptoms show no change  Repeat physical examination is not changed    Counseling:  I have spoken with the patient and discussed todays results, in addition to providing specific details for the plan of care and counseling regarding the diagnosis and prognosis. Their questions are answered at this time and they are agreeable with the plan of admission.    --------------------------------- ADDITIONAL PROVIDER NOTES ---------------------------------  Consultations:  Time: 1806. Spoke with Dr. Raudel Vazquez.  Discussed case. They will admit the patient. This patient's ED course included: a personal history and physicial examination, re-evaluation prior to disposition, multiple bedside re-evaluations, IV medications, cardiac monitoring and continuous pulse oximetry    This patient has remained hemodynamically stable during their ED course. Diagnosis:  1. Atrial fibrillation with RVR ([facility] [address]    2. Acute on chronic congestive heart failure, unspecified heart failure type ([address]    3.  [facility]AN [facility]        Disposition:  Patient's disposition: Admit to telemetry  Patient's condition is stable.     The patient was seen and evaluated by myself and Dr. Mana Florez MD PGY-2  7/10/2021 7:27 PM          Linette Hudson MD  Resident  07/10/21 [id number]

## 2021-07-11 LAB
ANION GAP SERPL CALCULATED.3IONS-SCNC: 10 MMOL/L (ref 7–16)
BUN BLDV-MCNC: 37 MG/DL (ref 6–23)
CALCIUM SERPL-MCNC: 8.8 MG/DL (ref 8.6–10.2)
CHLORIDE BLD-SCNC: 106 MMOL/L (ref 98–107)
CO2: 26 MMOL/L (ref 22–29)
CREAT SERPL-MCNC: 1.8 MG/DL (ref 0.7–1.2)
EKG ATRIAL RATE: 122 BPM
EKG Q-T INTERVAL: 366 MS
EKG QRS DURATION: 130 MS
EKG QTC CALCULATION (BAZETT): 536 MS
EKG R AXIS: -46 DEGREES
EKG T AXIS: 11 DEGREES
EKG VENTRICULAR RATE: 129 BPM
GFR AFRICAN AMERICAN: 43
GFR NON-AFRICAN AMERICAN: 35 ML/MIN/1.73
GLUCOSE BLD-MCNC: 140 MG/DL (ref 74–99)
HCT VFR BLD CALC: 39.6 % (ref 37–54)
HEMOGLOBIN: 12.1 G/DL (ref 12.5–16.5)
INR BLD: 3.7
LV EF: 60 %
LVEF MODALITY: NORMAL
MCH RBC QN AUTO: 27.9 PG (ref 26–35)
MCHC RBC AUTO-ENTMCNC: 30.6 % (ref 32–34.5)
MCV RBC AUTO: 91.5 FL (ref 80–99.9)
METER GLUCOSE: 113 MG/DL (ref 74–99)
METER GLUCOSE: 114 MG/DL (ref 74–99)
METER GLUCOSE: 133 MG/DL (ref 74–99)
METER GLUCOSE: 99 MG/DL (ref 74–99)
PDW BLD-RTO: 15.7 FL (ref 11.5–15)
PLATELET # BLD: 168 E9/L (ref 130–450)
PMV BLD AUTO: 10.6 FL (ref 7–12)
POTASSIUM SERPL-SCNC: 4 MMOL/L (ref 3.5–5)
PROTHROMBIN TIME: 41.3 SEC (ref 9.3–12.4)
RBC # BLD: 4.33 E12/L (ref 3.8–5.8)
SODIUM BLD-SCNC: 142 MMOL/L (ref 132–146)
WBC # BLD: 5.5 E9/L (ref 4.5–11.5)

## 2021-07-11 PROCEDURE — 80048 BASIC METABOLIC PNL TOTAL CA: CPT

## 2021-07-11 PROCEDURE — 6370000000 HC RX 637 (ALT 250 FOR IP): Performed by: INTERNAL MEDICINE

## 2021-07-11 PROCEDURE — 2060000000 HC ICU INTERMEDIATE R&B

## 2021-07-11 PROCEDURE — 85610 PROTHROMBIN TIME: CPT

## 2021-07-11 PROCEDURE — 36415 COLL VENOUS BLD VENIPUNCTURE: CPT

## 2021-07-11 PROCEDURE — 2580000003 HC RX 258: Performed by: INTERNAL MEDICINE

## 2021-07-11 PROCEDURE — 82962 GLUCOSE BLOOD TEST: CPT

## 2021-07-11 PROCEDURE — 2500000003 HC RX 250 WO HCPCS: Performed by: STUDENT IN AN ORGANIZED HEALTH CARE EDUCATION/TRAINING PROGRAM

## 2021-07-11 PROCEDURE — 93306 TTE W/DOPPLER COMPLETE: CPT

## 2021-07-11 PROCEDURE — 2700000000 HC OXYGEN THERAPY PER DAY

## 2021-07-11 PROCEDURE — 85027 COMPLETE CBC AUTOMATED: CPT

## 2021-07-11 PROCEDURE — 6360000002 HC RX W HCPCS: Performed by: INTERNAL MEDICINE

## 2021-07-11 PROCEDURE — 93010 ELECTROCARDIOGRAM REPORT: CPT | Performed by: INTERNAL MEDICINE

## 2021-07-11 RX ORDER — DIGOXIN 0.25 MG/ML
250 INJECTION INTRAMUSCULAR; INTRAVENOUS DAILY
Status: DISCONTINUED | OUTPATIENT
Start: 2021-07-11 | End: 2021-07-11

## 2021-07-11 RX ORDER — DIGOXIN 125 MCG
125 TABLET ORAL DAILY
Status: DISCONTINUED | OUTPATIENT
Start: 2021-07-12 | End: 2021-07-14 | Stop reason: HOSPADM

## 2021-07-11 RX ORDER — DIGOXIN 0.25 MG/ML
250 INJECTION INTRAMUSCULAR; INTRAVENOUS ONCE
Status: COMPLETED | OUTPATIENT
Start: 2021-07-11 | End: 2021-07-11

## 2021-07-11 RX ORDER — DIGOXIN 125 MCG
125 TABLET ORAL DAILY
Status: DISCONTINUED | OUTPATIENT
Start: 2021-07-11 | End: 2021-07-11

## 2021-07-11 RX ADMIN — POTASSIUM CHLORIDE 10 MEQ: 750 TABLET, EXTENDED RELEASE ORAL at 08:02

## 2021-07-11 RX ADMIN — SODIUM CHLORIDE, PRESERVATIVE FREE 10 ML: 5 INJECTION INTRAVENOUS at 21:04

## 2021-07-11 RX ADMIN — BRIMONIDINE TARTRATE 1 DROP: 2 SOLUTION OPHTHALMIC at 08:01

## 2021-07-11 RX ADMIN — MONTELUKAST SODIUM 10 MG: 10 TABLET, FILM COATED ORAL at 21:03

## 2021-07-11 RX ADMIN — DEXTROSE MONOHYDRATE 15 MG/HR: 50 INJECTION, SOLUTION INTRAVENOUS at 02:36

## 2021-07-11 RX ADMIN — METOPROLOL TARTRATE 25 MG: 25 TABLET, FILM COATED ORAL at 12:19

## 2021-07-11 RX ADMIN — ACETAMINOPHEN 650 MG: 325 TABLET ORAL at 01:56

## 2021-07-11 RX ADMIN — SODIUM CHLORIDE, PRESERVATIVE FREE 10 ML: 5 INJECTION INTRAVENOUS at 08:02

## 2021-07-11 RX ADMIN — BRIMONIDINE TARTRATE 1 DROP: 2 SOLUTION OPHTHALMIC at 21:04

## 2021-07-11 RX ADMIN — DIGOXIN 250 MCG: 250 INJECTION, SOLUTION INTRAMUSCULAR; INTRAVENOUS; PARENTERAL at 08:42

## 2021-07-11 RX ADMIN — TAMSULOSIN HYDROCHLORIDE 0.4 MG: 0.4 CAPSULE ORAL at 08:02

## 2021-07-11 RX ADMIN — DOCUSATE SODIUM 200 MG: 100 CAPSULE, LIQUID FILLED ORAL at 21:03

## 2021-07-11 ASSESSMENT — PAIN SCALES - GENERAL
PAINLEVEL_OUTOF10: 0
PAINLEVEL_OUTOF10: 0

## 2021-07-11 NOTE — PROGRESS NOTES
[facility] Quality Flow/Interdisciplinary Rounds Progress Note        Quality Flow Rounds held on July 11, 2021    Disciplines Attending:  Bedside Nurse, ,  and Nursing Unit Leadership    Jordyn Paulino was admitted on 7/10/2021  3:19 PM    Anticipated Discharge Date:  Expected Discharge Date: 07/14/21    Disposition:    Dwayne Score:  Dwayne Scale Score: 19    Readmission Risk              Risk of Unplanned Readmission:  21           Discussed patient goal for the day, patient clinical progression, and barriers to discharge. The following Goal(s) of the Day/Commitment(s) have been identified: Cardizem drip held due to hypotension, given IV Digoxin and transitioned to oral, monitor vitals, attempt to wean oxygen as tolerated, check Cardiology plan.       Stacey Robbins RN  July 11, 2021

## 2021-07-11 NOTE — PROGRESS NOTES
Call placed to Dr Denver Ordaz answering service for admission orders. Dr Cristal Ford covering. Awaiting call back.

## 2021-07-11 NOTE — PROGRESS NOTES
Pharmacy Consultation Note  (Anticoagulant Dosing and Monitoring)    Initial consult date: 7/10/2021  Consulting physician: Dr. Preston Cea    Allergies:  Patient has no known allergies. Ht Readings from Last 1 Encounters:   07/10/21 6' (1.829 m)     Wt Readings from Last 1 Encounters:   07/11/21 214 lb (97.1 kg)       Warfarin Indication Target   INR Range Home Dose  (if applicable) Diet/Feeding Tube   Atrial fibrillation 2-3 Warfarin 1 mg five days a week   (no dose on Monday or Tuesday) Regular diet     Vitamin K or Blood product  Administration Date               Warfarin drug-drug interactions  Start  Stop Home Med?  Comments                                 Hepatic Function Panel:                       Lab Results   Component Value Date    ALKPHOS 63 05/14/2021    ALT 16 05/14/2021    AST 25 05/14/2021    PROT 5.9 05/14/2021    BILITOT 0.6 05/14/2021    LABALBU 3.2 05/14/2021    LABALBU 3.6 10/14/2010       Date Warfarin Dose INR Heparin or LMWH HBG/HCT PLT Comment   7/10 No dose 3.6 ------------ 12.6/41.2 178    7/11 No dose 3.7 ------------ 12.1/39.6 168                                 Assessment:  · Patient is a 80year old male on warfarin for atrial fibrillation  · Per documentation, patient takes warfarin 1 mg on five days out of the week (no dose on Monday or Tuesday)  · INR goal = 2-3; INR today = 3.7 (supratherapeutic, slightly increased from 3.6 yesterday)    Plan:  · Hold warfarin tonight  · Daily PT/INR until the INR is stable within the therapeutic range  · Pharmacist will follow and monitor/adjust dosing as necessary    Louis Lua, ChetanD, BCCCP  7/11/2021  11:43 AM

## 2021-07-11 NOTE — CONSULTS
[facility] cardiology/[facility] [address]    Name: Yonas Peterson    Age: 80 y.o. Date of Admission: 7/10/2021  3:19 PM    Date of Service: 7/11/2021    Reason for Consultation: Shortness of breath/FLANNERY, tachycardia    Referring Physician:     History of Present Illness: The patient is a 80y.o. year old male with a known history of prior paroxysmal atrial fibrillation and maintained on warfarin anticoagulation, chronic right bundle branch block. Prior history of a stroke with right vision loss. He is evaluated with his wife at bedside who is very helpful. Patient reports noticing progressive fatigue and shortness of breath for about 3 or 4 days prior to admission. He is unaware of any tachycardia or sense of heart racing. He denies any significant sputum production no fevers chills nausea vomiting or indication of dehydration. He has just become progressively weaker with decreased activity. He denies any clear-cut unstable anginal symptoms. He also has known chronic lower extremity edema right pedal worse than left. On warfarin at home and admitting INR 3.6 yesterday and today INR 3.7. BNP elevated at 3888, troponin 52 then 48. Past Medical History: Echocardiogram March 2013 LVEF normal 1-2+ MR, 1+ TR. Past surgical history: Appendectomy, abdominal surgery right knee replacement June 2012    Review of Systems:     Constitutional: No fever, chills, sweats  Cardiac: As per HPI  Pulmonary: No cough, wheeze, hemoptysis  HEENT: No visual disturbances or difficult swallowing  GI: No nausea, vomiting, diarrhea, abdominal pain, rectal bleeding  : No dysuria or hematuria  Endocrine: No excessive thirst, heat or cold intolerance. Musculoskeletal: No joint pain or muscle aches.  No claudication  Skin: No skin breakdown or rashes  Neuro: No headache, confusion, or seizures  Psych: No depression, anxiety      Family History:  Family History   Problem Relation Age of Onset  Cancer Mother         stomach    Cancer Father         lung    No Known Problems Sister        Social History:  Social History     Socioeconomic History    Marital status:      Spouse name: Not on file    Number of children: Not on file    Years of education: Not on file    Highest education level: Not on file   Occupational History    Not on file   Tobacco Use    Smoking status: Former Smoker     Packs/day: 1.00     Types: Cigarettes     Start date: 1945     Quit date: 1982     Years since quittin.3    Smokeless tobacco: Never Used   Vaping Use    Vaping Use: Never used   Substance and Sexual Activity    Alcohol use: No    Drug use: Never    Sexual activity: Never   Other Topics Concern    Not on file   Social History Narrative    Not on file     Social Determinants of Health     Financial Resource Strain:     Difficulty of Paying Living Expenses:    Food Insecurity:     Worried About Running Out of Food in the Last Year:     [address] in the Last Year:    Transportation Needs:     Lack of Transportation (Medical):  Lack of Transportation (Non-Medical):    Physical Activity:     Days of Exercise per Week:     Minutes of Exercise per Session:    Stress:     Feeling of Stress :    Social Connections:     Frequency of Communication with Friends and Family:     Frequency of Social Gatherings with Friends and Family:     Attends Anabaptism Services:     Active Member of Clubs or Organizations:     Attends Club or Organization Meetings:     Marital Status:    Intimate Partner Violence:     Fear of Current or Ex-Partner:     Emotionally Abused:     Physically Abused:     Sexually Abused:         Allergies:  No Known Allergies    Current Medications:  Current Facility-Administered Medications   Medication Dose Route Frequency Provider Last Rate Last Admin    [START ON 2021] digoxin (LANOXIN) tablet 125 mcg  125 mcg Oral Daily Pastor Adrian MD       Anam Self perflutren lipid microspheres (DEFINITY) injection 1.65 mg  1.5 mL Intravenous ONCE PRN Rozina العلي MD        [START ON 7/12/2021] warfarin (COUMADIN) daily dosing (placeholder)   Other Daily Henry Colorado MD        dilTIAZem 100 mg in dextrose 5 % 100 mL infusion ([facility])  5-15 mg/hr Intravenous Continuous Citlaly Jernigan MD   Stopped at 07/11/21 0826    sodium chloride flush 0.9 % injection 5-40 mL  5-40 mL Intravenous 2 times per day Molly Mojica, DO   10 mL at 07/11/21 0802    sodium chloride flush 0.9 % injection 5-40 mL  5-40 mL Intravenous PRN Molly Mojica, DO        0.9 % sodium chloride infusion  25 mL Intravenous PRN Molly Mojica, DO        acetaminophen (TYLENOL) tablet 650 mg  650 mg Oral Q4H PRN Molly Mojica, DO   650 mg at 07/11/21 0156    ondansetron (ZOFRAN-ODT) disintegrating tablet 4 mg  4 mg Oral Q8H PRN Molly Mojica, DO        Or    ondansetron [facility]) injection 4 mg  4 mg Intravenous Q6H PRN Molly Mojica, DO        brimonidine (ALPHAGAN) 0.2 % ophthalmic solution 1 drop  1 drop Both Eyes BID Henry Colorado MD   1 drop at 07/11/21 0801    docusate sodium (COLACE) capsule 200 mg  200 mg Oral Nightly Henry Colorado MD   200 mg at 07/10/21 2207    ipratropium-albuterol (DUONEB) nebulizer solution 3 mL  3 mL Inhalation Q4H PRN Henry Colorado MD        montelukast (SINGULAIR) tablet 10 mg  10 mg Oral Nightly Henry Colorado MD   10 mg at 07/10/21 2207    PreserVision AREDS 2 CAPS 1 capsule  1 capsule Oral BID Henry Colorado MD        potassium chloride Anamaria BARRIENTOS) extended release tablet 10 mEq  10 mEq Oral Daily Henry Colorado MD   10 mEq at 07/11/21 0802    tamsulosin (FLOMAX) capsule 0.4 mg  0.4 mg Oral Daily Henry Colorado MD   0.4 mg at 07/11/21 0802    insulin lispro (HUMALOG) injection vial 0-12 Units  0-12 Units Subcutaneous TID  Henry Colorado MD        insulin lispro (HUMALOG) injection vial 0-6 Units  0-6 Units Subcutaneous Nightly Henry Colorado MD   1 Units at 07/10/21 9709     07/11/2021    MPV 10.6 07/11/2021     No results for input(s): ALKPHOS, ALT, AST, PROT, BILITOT, BILIDIR, LABALBU in the last 72 hours. No results found for: MG  Lab Results   Component Value Date    PROTIME 41.3 07/11/2021    PROTIME 20.5 10/14/2010    INR 3.7 07/11/2021     Lab Results   Component Value Date    TSH 1.310 03/24/2019     No components found for: CHLPL  No results found for: TRIG  No results found for: HDL  No results found for: [address]  Lab Results   Component Value Date    INR 3.7 07/11/2021       Admission ECG personally reviewed by me revealed atrial fibrillation heart rate 80 old right bundle branch block with associated nonspecific ST-T wave changes. I personally reviewed his telemetry and this morning shows atrial fibrillation heart rate in the 80s. I personally reviewed his echocardiogram completed today shows LVEF 60%, 2+ MR, 1-2+ TR, moderate pulmonary hypertension RVSP 59 mmHg, right ventricle is mildly dilated with mild systolic dysfunction. ASSESSMENT / PLAN:    1. Atrial fibrillation onset likely in the last couple of days by history. Controlled ventricular rate with initially IV diltiazem and will receive IV and p.o. digoxin today and then p.o. digoxin 125 mcg daily as his blood pressure runs on the low side. Digoxin will not lower his blood pressure but once systolic blood pressure over 110 would start metoprolol tartrate 25 mg twice a day. #2 warfarin anticoagulation check INR daily and long-term would like to see INR in the 2-3 range, hold warfarin if INR greater than 3.0. #3 troponin elevation 52 then 48 and no reported chest pain per the patient unstable angina. Controlled ventricular heart rate and at 80years old would continue to modify CAD risk factors optimizing blood pressure, lipids and blood sugar long-term. #4 echocardiogram completed today shows normal LVEF 60%, 2+ MR, 1+ TR, RV mild dysfunction and dilatation.   Moderate pulmonary hypertension RVSP 59 mmHg. Continue to optimize blood pressure and afterload reduction. #5 moderate pulmonary hypertension. Optimizing blood pressure. Pulmonary hypertension likely has caused RV dilatation and mild RV systolic dysfunction. Up to chair with meals and advance activity as tolerated. Addendum: Renal insufficiency and creatinine 1.8 BUN of 37 today and would not aggressively diurese him as a suspect bilateral pedal edema is chronic and especially right foot with prior right knee replacement. Creatinine was 1.1 on May 14, 2021. Continue to avoid nonsteroidals or dye load.     Electronically signed by Tony Pryor MD on 7/11/2021 at 11:49 AM

## 2021-07-11 NOTE — H&P
[address] [address]                              HISTORY AND PHYSICAL    PATIENT NAME: Twila Zhu                         :        1929  MED REC NO:   [id number]                            ROOM:       1850  ACCOUNT NO:   [de-identified]                           ADMIT DATE: 07/10/2021  PROVIDER:     Maureen Armendariz MD    DATE OF ADMISSION:  07/10/2021    CHIEF COMPLAINT:  Rapid heart rate and shortness of breath. HISTORY OF PRESENT ILLNESS:  This is a 80-year-old gentleman who  presented to the emergency room with increasing shortness of breath. He  denied any chest pain, leg swelling, orthopnea, or PND. He was noted to  have a rapid heart rate consistent with atrial fibrillation in the  emergency room. He does have a history of paroxysmal atrial  fibrillation, so this is not a new rhythm for him. His heart rate,  however, was uncontrolled. At this time, his heart rate is improved  into the 80s and 90s while on IV Cardizem. Cardiology has been  consulted. He was felt to be in mild congestive heart failure with some  pulmonary vascular congestion noted on chest x-ray and elevated proBNP,  and he was given a dose of IV Lasix in the emergency room. Otherwise,  the patient at this time is lying in bed comfortably, in no acute  distress. PAST MEDICAL HISTORY:  Significant for paroxysmal atrial fibrillation,  COPD, diabetes, chronic kidney disease stage III, and BPH. PAST SURGICAL HISTORY:  Significant for appendectomy, hernia repair, and  joint replacement. SOCIAL HISTORY:  He is an ex-smoker having quit several years ago. He  does not drink any significant amounts of alcohol.     MEDICATIONS:  Include DuoNeb aerosols every four hours as needed, Dulera  puffer 200/5 one puff daily, Flovent 110 mcg one puff daily, Coumadin 1  mg daily, Lasix 40 mg daily, Flomax 0.4 mg q. p.m., and Singulair 10 consulted. 2.  Acute-on-chronic diastolic congestive heart failure. The patient  appeared to be in some mild volume overload on presentation with subtle  leg swelling, an elevated proBNP, and some mild pulmonary vascular  congestion on exam.  He was given a dose of IV Lasix. He does not  appear to be short of breath presently. 3.  Acute kidney injury. The patient's creatinine is elevated from his  baseline. This is presumably due to his mild CHF and rapid atrial  fibrillation. These _____ corrected and we will have to follow his  kidney function along. We will hold Lasix for the time being. 4.  COPD. This is stable and not in any exacerbation at this time.         Augie Garcia MD    D: 07/11/2021 12:34:14       T: 07/11/2021 12:37:59     [email or web address]  Job#: [id number]     Doc#: [id number]    CC:  Emily Baca DO

## 2021-07-12 LAB
ALBUMIN SERPL-MCNC: 3.4 G/DL (ref 3.5–5.2)
ALP BLD-CCNC: 91 U/L (ref 40–129)
ALT SERPL-CCNC: 19 U/L (ref 0–40)
ANION GAP SERPL CALCULATED.3IONS-SCNC: 9 MMOL/L (ref 7–16)
AST SERPL-CCNC: 21 U/L (ref 0–39)
BASOPHILS ABSOLUTE: 0.03 E9/L (ref 0–0.2)
BASOPHILS RELATIVE PERCENT: 0.5 % (ref 0–2)
BILIRUB SERPL-MCNC: 1.3 MG/DL (ref 0–1.2)
BUN BLDV-MCNC: 35 MG/DL (ref 6–23)
CALCIUM SERPL-MCNC: 8.8 MG/DL (ref 8.6–10.2)
CHLORIDE BLD-SCNC: 102 MMOL/L (ref 98–107)
CO2: 26 MMOL/L (ref 22–29)
CREAT SERPL-MCNC: 1.6 MG/DL (ref 0.7–1.2)
EOSINOPHILS ABSOLUTE: 0.17 E9/L (ref 0.05–0.5)
EOSINOPHILS RELATIVE PERCENT: 2.8 % (ref 0–6)
GFR AFRICAN AMERICAN: 49
GFR NON-AFRICAN AMERICAN: 41 ML/MIN/1.73
GLUCOSE BLD-MCNC: 166 MG/DL (ref 74–99)
HCT VFR BLD CALC: 40 % (ref 37–54)
HEMOGLOBIN: 12.1 G/DL (ref 12.5–16.5)
IMMATURE GRANULOCYTES #: 0.01 E9/L
IMMATURE GRANULOCYTES %: 0.2 % (ref 0–5)
INR BLD: 3.4
LYMPHOCYTES ABSOLUTE: 2.13 E9/L (ref 1.5–4)
LYMPHOCYTES RELATIVE PERCENT: 35.1 % (ref 20–42)
MCH RBC QN AUTO: 27.8 PG (ref 26–35)
MCHC RBC AUTO-ENTMCNC: 30.3 % (ref 32–34.5)
MCV RBC AUTO: 92 FL (ref 80–99.9)
METER GLUCOSE: 105 MG/DL (ref 74–99)
METER GLUCOSE: 132 MG/DL (ref 74–99)
METER GLUCOSE: 155 MG/DL (ref 74–99)
METER GLUCOSE: 180 MG/DL (ref 74–99)
MONOCYTES ABSOLUTE: 0.49 E9/L (ref 0.1–0.95)
MONOCYTES RELATIVE PERCENT: 8.1 % (ref 2–12)
NEUTROPHILS ABSOLUTE: 3.23 E9/L (ref 1.8–7.3)
NEUTROPHILS RELATIVE PERCENT: 53.3 % (ref 43–80)
PDW BLD-RTO: 15.7 FL (ref 11.5–15)
PLATELET # BLD: 186 E9/L (ref 130–450)
PMV BLD AUTO: 10.8 FL (ref 7–12)
POTASSIUM SERPL-SCNC: 3.7 MMOL/L (ref 3.5–5)
PROTHROMBIN TIME: 37.2 SEC (ref 9.3–12.4)
RBC # BLD: 4.35 E12/L (ref 3.8–5.8)
SODIUM BLD-SCNC: 137 MMOL/L (ref 132–146)
SODIUM URINE: 29 MMOL/L
TOTAL PROTEIN: 5.8 G/DL (ref 6.4–8.3)
WBC # BLD: 6.1 E9/L (ref 4.5–11.5)

## 2021-07-12 PROCEDURE — 97161 PT EVAL LOW COMPLEX 20 MIN: CPT

## 2021-07-12 PROCEDURE — 2700000000 HC OXYGEN THERAPY PER DAY

## 2021-07-12 PROCEDURE — 2580000003 HC RX 258: Performed by: INTERNAL MEDICINE

## 2021-07-12 PROCEDURE — 36415 COLL VENOUS BLD VENIPUNCTURE: CPT

## 2021-07-12 PROCEDURE — 2060000000 HC ICU INTERMEDIATE R&B

## 2021-07-12 PROCEDURE — 94664 DEMO&/EVAL PT USE INHALER: CPT

## 2021-07-12 PROCEDURE — 6370000000 HC RX 637 (ALT 250 FOR IP): Performed by: INTERNAL MEDICINE

## 2021-07-12 PROCEDURE — 80053 COMPREHEN METABOLIC PANEL: CPT

## 2021-07-12 PROCEDURE — 6360000002 HC RX W HCPCS: Performed by: INTERNAL MEDICINE

## 2021-07-12 PROCEDURE — 85610 PROTHROMBIN TIME: CPT

## 2021-07-12 PROCEDURE — 85025 COMPLETE CBC W/AUTO DIFF WBC: CPT

## 2021-07-12 PROCEDURE — 82962 GLUCOSE BLOOD TEST: CPT

## 2021-07-12 PROCEDURE — 84300 ASSAY OF URINE SODIUM: CPT

## 2021-07-12 PROCEDURE — 94640 AIRWAY INHALATION TREATMENT: CPT

## 2021-07-12 RX ORDER — ALBUTEROL SULFATE 2.5 MG/3ML
2.5 SOLUTION RESPIRATORY (INHALATION) 4 TIMES DAILY
Status: DISCONTINUED | OUTPATIENT
Start: 2021-07-12 | End: 2021-07-14 | Stop reason: HOSPADM

## 2021-07-12 RX ORDER — BUDESONIDE AND FORMOTEROL FUMARATE DIHYDRATE 80; 4.5 UG/1; UG/1
2 AEROSOL RESPIRATORY (INHALATION) 2 TIMES DAILY
Status: DISCONTINUED | OUTPATIENT
Start: 2021-07-12 | End: 2021-07-12

## 2021-07-12 RX ORDER — ARFORMOTEROL TARTRATE 15 UG/2ML
15 SOLUTION RESPIRATORY (INHALATION) 2 TIMES DAILY
Status: DISCONTINUED | OUTPATIENT
Start: 2021-07-12 | End: 2021-07-14 | Stop reason: HOSPADM

## 2021-07-12 RX ORDER — SODIUM CHLORIDE 9 MG/ML
INJECTION, SOLUTION INTRAVENOUS ONCE
Status: COMPLETED | OUTPATIENT
Start: 2021-07-12 | End: 2021-07-12

## 2021-07-12 RX ORDER — BUDESONIDE 0.5 MG/2ML
0.5 INHALANT ORAL 2 TIMES DAILY
Status: DISCONTINUED | OUTPATIENT
Start: 2021-07-12 | End: 2021-07-14 | Stop reason: HOSPADM

## 2021-07-12 RX ORDER — SODIUM CHLORIDE 9 MG/ML
INJECTION, SOLUTION INTRAVENOUS CONTINUOUS
Status: DISCONTINUED | OUTPATIENT
Start: 2021-07-12 | End: 2021-07-14 | Stop reason: HOSPADM

## 2021-07-12 RX ADMIN — SODIUM CHLORIDE: 9 INJECTION, SOLUTION INTRAVENOUS at 06:24

## 2021-07-12 RX ADMIN — POTASSIUM CHLORIDE 10 MEQ: 750 TABLET, EXTENDED RELEASE ORAL at 08:50

## 2021-07-12 RX ADMIN — DOCUSATE SODIUM 200 MG: 100 CAPSULE, LIQUID FILLED ORAL at 20:46

## 2021-07-12 RX ADMIN — BRIMONIDINE TARTRATE 1 DROP: 2 SOLUTION OPHTHALMIC at 20:51

## 2021-07-12 RX ADMIN — SODIUM CHLORIDE, PRESERVATIVE FREE 10 ML: 5 INJECTION INTRAVENOUS at 20:51

## 2021-07-12 RX ADMIN — BUDESONIDE 500 MCG: 0.5 SUSPENSION RESPIRATORY (INHALATION) at 10:08

## 2021-07-12 RX ADMIN — INSULIN LISPRO 1 UNITS: 100 INJECTION, SOLUTION INTRAVENOUS; SUBCUTANEOUS at 20:45

## 2021-07-12 RX ADMIN — POLYVINYL ALCOHOL 1 DROP: 14 SOLUTION/ DROPS OPHTHALMIC at 20:51

## 2021-07-12 RX ADMIN — BRIMONIDINE TARTRATE 1 DROP: 2 SOLUTION OPHTHALMIC at 08:55

## 2021-07-12 RX ADMIN — ARFORMOTEROL TARTRATE 15 MCG: 15 SOLUTION RESPIRATORY (INHALATION) at 21:46

## 2021-07-12 RX ADMIN — SODIUM CHLORIDE: 9 INJECTION, SOLUTION INTRAVENOUS at 15:26

## 2021-07-12 RX ADMIN — INSULIN LISPRO 2 UNITS: 100 INJECTION, SOLUTION INTRAVENOUS; SUBCUTANEOUS at 15:31

## 2021-07-12 RX ADMIN — DIGOXIN 125 MCG: 125 TABLET ORAL at 08:51

## 2021-07-12 RX ADMIN — ARFORMOTEROL TARTRATE 15 MCG: 15 SOLUTION RESPIRATORY (INHALATION) at 10:08

## 2021-07-12 RX ADMIN — BUDESONIDE 500 MCG: 0.5 SUSPENSION RESPIRATORY (INHALATION) at 21:46

## 2021-07-12 RX ADMIN — MONTELUKAST SODIUM 10 MG: 10 TABLET, FILM COATED ORAL at 20:46

## 2021-07-12 RX ADMIN — ALBUTEROL SULFATE 2.5 MG: 2.5 SOLUTION RESPIRATORY (INHALATION) at 16:47

## 2021-07-12 RX ADMIN — ALBUTEROL SULFATE 2.5 MG: 2.5 SOLUTION RESPIRATORY (INHALATION) at 21:46

## 2021-07-12 RX ADMIN — TAMSULOSIN HYDROCHLORIDE 0.4 MG: 0.4 CAPSULE ORAL at 08:51

## 2021-07-12 RX ADMIN — POLYVINYL ALCOHOL 1 DROP: 14 SOLUTION/ DROPS OPHTHALMIC at 08:51

## 2021-07-12 RX ADMIN — ALBUTEROL SULFATE 2.5 MG: 2.5 SOLUTION RESPIRATORY (INHALATION) at 10:08

## 2021-07-12 ASSESSMENT — PAIN SCALES - GENERAL
PAINLEVEL_OUTOF10: 0

## 2021-07-12 NOTE — PROGRESS NOTES
Physical Therapy    Facility/Department: [facility]  Initial Assessment    NAME: Rebecca Douglas  : 1929  MRN: [id number]    Date of Service: 2021       REQUIRES PT FOLLOW UP: Yes       Patient Diagnosis(es): The primary encounter diagnosis was Atrial fibrillation with RVR ([facility] [address] Diagnoses of Acute on chronic congestive heart failure, unspecified heart failure type ([facility] [address] and JUAN J (acute kidney injury) ([facility] [address] were also pertinent to this visit. has a past medical history of Asthma, COPD (chronic obstructive pulmonary disease) ([facility] [address] and Unspecified cerebral artery occlusion with cerebral infarction. has a past surgical history that includes Appendectomy; hernia repair; joint replacement (12); ECHO Compl W Dop Color Flow (3/16/2013); and Abdomen surgery. Evaluating Therapist: Margaret Grant, PT     Referring Provider:   Dr. Roberto Quinones     PT order :  PT eval and treat     Room #:  215   DIAGNOSIS:  A fib with RVR   PRECAUTIONS: falls     Social:  Pt lives with wife  in a  1  floor plan  no steps to enter. Prior to admission pt walked with ww. Initial Evaluation  Date:  2021  Treatment      Short Term/ Long Term   Goals   Was pt agreeable to Eval/treatment? yes      Does pt have pain? None reported      Bed Mobility  Rolling: NT   Supine to sit:  NT   Sit to supine: Mod assist   Scooting:  Min assist    SBA    Transfers Sit to stand:  Min assist   Stand to sit: min assist   Stand pivot: NT    SBA    Ambulation     50  feet with  ww  with CGA   100 feet with  ww  with SBA    LE ROM  AAROM grossly WFL      LE strength  3- to 4-/ 5      AM- PAC RAW score               Pt is alert and Oriented      Balance: min assist, fall risk due to LE edema and weakness  Edema : B LEs  Endurance: decreased   Bed/Chair alarm: Yes      ASSESSMENT  Pt displays functional ability as noted in the objective portion of this evaluation.         Conditions Requiring Skilled Therapeutic Intervention:    [x]Decreased strength     []Decreased ROM  [x]Decreased functional mobility  [x]Decreased balance   [x]Decreased endurance   [x]Decreased posture  []Decreased sensation  [x]Decreased coordination   [x]Decreased vision  [x]Decreased safety awareness   []Increased pain             Treatment/Education:    Mobility as above. Pt sitting EOB upon arrival; was not wearing O2. Pulse ox at rest on RA 96%. After mobility, 97%. RN informed and pt left on RA per her instruction . Cues for hand placement with transfers and ww safety. Occ assist needed with ww maneuvering due to decreased vision     Pt educated on fall risk,  Safety with mobility        Patient response to education:   Pt verbalized understanding Pt demonstrated skill Pt requires further education in this area   x  with cues   x        Comments:  Pt left in bed per pt  request after session, with call light in reach. Rehab potential is Good for reaching above PT goals. Pts/ family goals   1. None stated     Patient and or family understand(s) diagnosis, prognosis, and plan of care. -  Yes    PLAN  PT care will be provided in accordance with the objectives noted above. Whenever appropriate, clear delegation orders will be provided for nursing staff. Exercises and functional mobility practice will be used as well as appropriate assistive devices or modalities to obtain goals. Patient and family education will also be administered as needed.         PLAN OF CARE:    Current Treatment Recommendations     [x] Strengthening to improve independence with functional mobility   [] ROM to improve independence with functional mobility   [x] Balance Training to improve static/dynamic balance and to reduce fall risk  [x] Endurance Training to improve activity tolerance during functional mobility   [x] Transfer Training to improve safety and independence with all functional transfers   [x] Gait Training to improve gait mechanics, endurance and assess need for appropriate assistive device  [] Stair Training in preparation for safe discharge home and/or into the community   [x] Positioning to prevent skin breakdown and contractures  [x] Safety and Education Training   [x] Patient/Caregiver Education   [] HEP  [] Other     Frequency of treatments will be 2-5x/week x 7-10 days. Time in: 1309  Time out: 1328       Evaluation Time includes thorough review of current medical information, gathering information on past medical history/social history and prior level of function, completion of standardized testing/informal observation of tasks, assessment of data and education on plan of care and goals.     CPT codes:  [x] Low Complexity PT evaluation 59474  [] Moderate Complexity PT evaluation 28515  [] High Complexity PT evaluation 30710  [] PT Re-evaluation 84423  [] Gait training 80610  minutes  [] Therapeutic activities 26209  minutes  [] Therapeutic exercises 59421  minutes  [] Neuromuscular reeducation 65036  minutes       Jeremiah Guardado number:

## 2021-07-12 NOTE — PROGRESS NOTES
[facility] Cardiology    INPATIENT CARDIOLOGY FOLLOW-UP    Name: Chaitanya Briscoe    Age: 80 y.o. Date of Admission: 7/10/2021  3:19 PM    Date of Service: 7/12/2021     Chief Complaint: Follow-up for atrial fibrillation    Interim History:  No new overnight cardiac complaints. Generalized weakness. No chest pain, palpitations or shortness of breath.   Hypotensive and receiving IV fluids      Telemetry  reviewed and showed atrial fibrillation with moderate ventricular rate        Review of Systems:   Cardiac: As per HPI  General: No fever, chills  Pulmonary: No cough, wheeze, or shortness of breath  GI: No nausea, vomiting,or abdominal pain  Neuro: No headache or confusion      Allergies:  No Known Allergies    Current Medications:  Current Facility-Administered Medications   Medication Dose Route Frequency Provider Last Rate Last Admin    albuterol (PROVENTIL) nebulizer solution 2.5 mg  2.5 mg Nebulization 4x daily Reddy Canada DO        budesonide (PULMICORT) nebulizer suspension 500 mcg  0.5 mg Nebulization BID Savannah Plata DO        Arformoterol Tartrate (BROVANA) nebulizer solution 15 mcg  15 mcg Nebulization BID Savnanah Plata DO        digoxin (LANOXIN) tablet 125 mcg  125 mcg Oral Daily Ellen Sharma MD        perflutren lipid microspheres (DEFINITY) injection 1.65 mg  1.5 mL Intravenous ONCE PRN Ellen Sharma MD        warfarin (COUMADIN) daily dosing (placeholder)   Other Daily Taran Neri MD        metoprolol tartrate (LOPRESSOR) tablet 25 mg  25 mg Oral BID Ellen Sharma MD   25 mg at 07/11/21 1219    dilTIAZem 100 mg in dextrose 5 % 100 mL infusion ([facility])  5-15 mg/hr Intravenous Continuous Esdras Gaytan MD   Stopped at 07/11/21 0826    sodium chloride flush 0.9 % injection 5-40 mL  5-40 mL Intravenous 2 times per day Savannah Plata DO   10 mL at 07/11/21 2104    sodium chloride flush 0.9 % injection 5-40 mL  5-40 mL Intravenous PRN Savannah Plata DO        0.9 % sodium chloride infusion  25 mL Intravenous PRN Julio César Hillmane, DO        acetaminophen (TYLENOL) tablet 650 mg  650 mg Oral Q4H PRN Julio César Lin, DO   650 mg at 07/11/21 0156    ondansetron (ZOFRAN-ODT) disintegrating tablet 4 mg  4 mg Oral Q8H PRN Julio César Lin, DO        Or    ondansetron [facility]) injection 4 mg  4 mg Intravenous Q6H PRN Julio César Lin, DO        brimonidine (ALPHAGAN) 0.2 % ophthalmic solution 1 drop  1 drop Both Eyes BID Cristela Cassidy MD   1 drop at 07/11/21 2104    docusate sodium (COLACE) capsule 200 mg  200 mg Oral Nightly Cristela Cassidy MD   200 mg at 07/11/21 2103    ipratropium-albuterol (DUONEB) nebulizer solution 3 mL  3 mL Inhalation Q4H PRN Cristela Cassidy MD        montelukast (SINGULAIR) tablet 10 mg  10 mg Oral Nightly Cristela Cassidy MD   10 mg at 07/11/21 2103    PreserVision AREDS 2 CAPS 1 capsule  1 capsule Oral BID Cristela Cassidy MD        potassium chloride [facility]) extended release tablet 10 mEq  10 mEq Oral Daily Cristela Cassidy MD   10 mEq at 07/11/21 0802    tamsulosin (FLOMAX) capsule 0.4 mg  0.4 mg Oral Daily Cristela Cassidy MD   0.4 mg at 07/11/21 0802    insulin lispro (HUMALOG) injection vial 0-12 Units  0-12 Units Subcutaneous TID  Cristela Cassidy MD        insulin lispro (HUMALOG) injection vial 0-6 Units  0-6 Units Subcutaneous Nightly Cristela Cassidy MD   1 Units at 07/10/21 2207    polyvinyl alcohol (LIQUIFILM TEARS) 1.4 % ophthalmic solution 1 drop  1 drop Both Eyes Q6H PRN Cristela Cassidy MD          dilTIAZem Stopped (07/11/21 0826)    sodium chloride         Physical Exam:  BP (!) 76/52   Pulse 88   Temp 97.8 °F (36.6 °C) (Oral)   Resp (!) 110   Ht 6' (1.829 m)   Wt 214 lb (97.1 kg)   SpO2 92%   BMI 29.02 kg/m²   Weight change:    Wt Readings from Last 3 Encounters:   07/11/21 214 lb (97.1 kg)   05/11/21 221 lb 4 oz (100.4 kg)   04/27/21 217 lb (98.4 kg)         Intake/Output:    Intake/Output Summary (Last 24 hours) at 7/12/2021 0650  Last data filed at 7/11/2021 1845  Gross per 24 hour   Intake 220 ml   Output 1450 ml   Net -1230 ml     No intake/output data recorded. General: Awake, alert, oriented x3, no acute distress    Neck: No JVD or carotid bruits,     Cardiac: Irregularly irregular rhythm, , normal S1 and  S2, no murmurs, no S4 or S3, no  rubs. Normal carotid upstroke and no bruits. Resp: Unlabored respirations at rest.  No wheezes, rales or rhonchi. Abdomen: soft, nontender, nondistended, BS+; no masses, bruits, or hepatomegaly    Extremities: no cyanosis, clubbing, or edema. Normal pulses in the upper/lower extremities bilaterally. Skin: Warm and dry, no bruises, petechiae or rashes. Neuro: moves all extremities appropriately to command, and normal sensation to light touch in the upper and lower extremities bilaterally. Laboratory Tests:  Lab Results   Component Value Date    CREATININE 1.8 (H) 07/11/2021    BUN 37 (H) 07/11/2021     07/11/2021    K 4.0 07/11/2021     07/11/2021    CO2 26 07/11/2021     No results for input(s): CKTOTAL, CKMB, TROPONINI in the last 72 hours. Lab Results   Component Value Date    PROBNP 3,888 (H) 07/10/2021     Lab Results   Component Value Date    WBC 5.5 07/11/2021    RBC 4.33 07/11/2021    HGB 12.1 07/11/2021    HCT 39.6 07/11/2021    MCV 91.5 07/11/2021    MCH 27.9 07/11/2021    MCHC 30.6 07/11/2021    RDW 15.7 07/11/2021     07/11/2021    MPV 10.6 07/11/2021     No results for input(s): ALKPHOS, ALT, AST, PROT, BILITOT, BILIDIR, LABALBU in the last 72 hours. No results found for: MG  Lab Results   Component Value Date    PROTIME 41.3 07/11/2021    PROTIME 20.5 10/14/2010    INR 3.7 07/11/2021     Lab Results   Component Value Date    TSH 1.310 03/24/2019       Radiology:  XR CHEST PORTABLE   Final Result   1. Bibasilar airspace disease and trace bilateral pleural effusions. 2. Cardiomegaly. Gross findings of CHF are not appreciated within the   perihilar regions.

## 2021-07-12 NOTE — PROGRESS NOTES
Subjective: The patient is awake and alert. Feels weak  No chest pain  . Objective:    BP (!) 76/52   Pulse 88   Temp 97.8 °F (36.6 °C) (Oral)   Resp (!) 110   Ht 6' (1.829 m)   Wt 214 lb (97.1 kg)   SpO2 92%   BMI 29.02 kg/m²     Heart:  RRR, no murmurs, gallops, or rubs.   Lungs:  CTA bilaterally, no wheeze, rales or rhonchi  Abd: bowel sounds present, nontender, nondistended, no masses  Extrem:  No clubbing, cyanosis, or edema    CBC with Differential:    Lab Results   Component Value Date    WBC 5.5 07/11/2021    RBC 4.33 07/11/2021    HGB 12.1 07/11/2021    HCT 39.6 07/11/2021     07/11/2021    MCV 91.5 07/11/2021    MCH 27.9 07/11/2021    MCHC 30.6 07/11/2021    RDW 15.7 07/11/2021    SEGSPCT 89 03/16/2013    LYMPHOPCT 34.5 07/10/2021    MONOPCT 9.6 07/10/2021    BASOPCT 0.6 07/10/2021    MONOSABS 0.67 07/10/2021    LYMPHSABS 2.41 07/10/2021    EOSABS 0.14 07/10/2021    BASOSABS 0.04 07/10/2021     CMP:    Lab Results   Component Value Date     07/11/2021    K 4.0 07/11/2021    K 4.0 07/10/2021     07/11/2021    CO2 26 07/11/2021    BUN 37 07/11/2021    CREATININE 1.8 07/11/2021    GFRAA 43 07/11/2021    LABGLOM 35 07/11/2021    GLUCOSE 140 07/11/2021    GLUCOSE 98 10/14/2010    PROT 5.9 05/14/2021    LABALBU 3.2 05/14/2021    LABALBU 3.6 10/14/2010    CALCIUM 8.8 07/11/2021    BILITOT 0.6 05/14/2021    ALKPHOS 63 05/14/2021    AST 25 05/14/2021    ALT 16 05/14/2021     PT/INR:    Lab Results   Component Value Date    PROTIME 41.3 07/11/2021    PROTIME 20.5 10/14/2010    INR 3.7 07/11/2021     @cktotal:3,ckmb:3,ckmbindex:3,troponini:3@  U/A:    Lab Results   Component Value Date    NITRU Negative 05/07/2021    COLORU Yellow 05/07/2021    PHUR 6.0 05/07/2021    WBCUA 2-5 05/07/2021    RBCUA 10-20 05/07/2021    RBCUA NONE 03/15/2013    BACTERIA RARE 05/07/2021    CLARITYU Clear 05/07/2021    SPECGRAV 1.020 05/07/2021    UROBILINOGEN 0.2 05/07/2021    BILIRUBINUR Negative 05/07/2021    BLOODU MODERATE 05/07/2021    GLUCOSEU Negative 05/07/2021    KETUA Negative 05/07/2021        Assessment:    Patient Active Problem List   Diagnosis    [facility]    COPD with exacerbation ([facility]    Chest pain    Asthma    CAP (community acquired pneumonia)    GI bleeding    COPD exacerbation ([facility]    [facility]       Plan:    Atrial fib with RVR-  Hypotension this am  Looks dry  Give some IVF  Check urine sodium    CKD stage stage 3b  Supportive care and watch cre    Weakness-  ambulate today      Mariaelena Wong DO  6:40 AM  7/12/2021

## 2021-07-12 NOTE — PROGRESS NOTES
Pt's wife requested SLP liya.  States that patient chokes a lot when eating and says the patient keeps forgetting to tell Dr. Josue Monsivais this. Pt cosigned this narrative. SLP liya & treat has been ordered.

## 2021-07-12 NOTE — PROGRESS NOTES
Pharmacy Consultation Note  (Anticoagulant Dosing and Monitoring)    Initial consult date: 7/10/2021  Consulting physician: Dr. Scot Prince    Allergies:  Patient has no known allergies. Ht Readings from Last 1 Encounters:   07/10/21 6' (1.829 m)     Wt Readings from Last 1 Encounters:   07/11/21 214 lb (97.1 kg)       Warfarin Indication Target   INR Range Home Dose  (if applicable) Diet/Feeding Tube   Atrial fibrillation 2-3 Warfarin 1 mg five days a week   (no dose on Monday or Tuesday) Regular diet     Vitamin K or Blood product  Administration Date               Warfarin drug-drug interactions  Start  Stop Home Med?  Comments                                 Hepatic Function Panel:                       Lab Results   Component Value Date    ALKPHOS 91 07/12/2021    ALT 19 07/12/2021    AST 21 07/12/2021    PROT 5.8 07/12/2021    BILITOT 1.3 07/12/2021    LABALBU 3.4 07/12/2021    LABALBU 3.6 10/14/2010       Date Warfarin Dose INR Heparin or LMWH HBG/HCT PLT Comment   7/10 No dose 3.6 ------------ 12.6/41.2 178    7/11 No dose 3.7 ------------ 12.1/39.6 168    7/12 HOLD 3.4 -- 12.1/40 186                        Assessment:  · Patient is a 80year old male on warfarin for atrial fibrillation  · Per documentation, patient takes warfarin 1 mg on five days out of the week (no dose on Monday or Tuesday)  · INR goal = 2-3; INR today = 3.4 (supratherapeutic, slightly decreased from 3.7 yesterday)    Plan:  · Hold warfarin tonight  · Daily PT/INR until the INR is stable within the therapeutic range  · Pharmacist will follow and monitor/adjust dosing as necessary    Maxine Melvin PharmD, BCPS 7/12/2021 1:30 PM   Ext: [phone]

## 2021-07-12 NOTE — PROGRESS NOTES
Occupational Therapy    OT order received and chart reviewed. Pt just returned to bed after working with PT services. Will continue to follow. Thank you.     Marie Nam OTR/L  [id number]

## 2021-07-13 LAB
ALBUMIN SERPL-MCNC: 3 G/DL (ref 3.5–5.2)
ALP BLD-CCNC: 88 U/L (ref 40–129)
ALT SERPL-CCNC: 18 U/L (ref 0–40)
ANION GAP SERPL CALCULATED.3IONS-SCNC: 4 MMOL/L (ref 7–16)
AST SERPL-CCNC: 14 U/L (ref 0–39)
BASOPHILS ABSOLUTE: 0.01 E9/L (ref 0–0.2)
BASOPHILS RELATIVE PERCENT: 0.2 % (ref 0–2)
BILIRUB SERPL-MCNC: 0.7 MG/DL (ref 0–1.2)
BUN BLDV-MCNC: 34 MG/DL (ref 6–23)
CALCIUM SERPL-MCNC: 8.5 MG/DL (ref 8.6–10.2)
CHLORIDE BLD-SCNC: 103 MMOL/L (ref 98–107)
CO2: 29 MMOL/L (ref 22–29)
CREAT SERPL-MCNC: 1.5 MG/DL (ref 0.7–1.2)
EOSINOPHILS ABSOLUTE: 0.12 E9/L (ref 0.05–0.5)
EOSINOPHILS RELATIVE PERCENT: 2.2 % (ref 0–6)
GFR AFRICAN AMERICAN: 53
GFR NON-AFRICAN AMERICAN: 44 ML/MIN/1.73
GLUCOSE BLD-MCNC: 116 MG/DL (ref 74–99)
HCT VFR BLD CALC: 36.8 % (ref 37–54)
HEMOGLOBIN: 11.4 G/DL (ref 12.5–16.5)
IMMATURE GRANULOCYTES #: 0.01 E9/L
IMMATURE GRANULOCYTES %: 0.2 % (ref 0–5)
INR BLD: 3.3
LYMPHOCYTES ABSOLUTE: 1.87 E9/L (ref 1.5–4)
LYMPHOCYTES RELATIVE PERCENT: 34.8 % (ref 20–42)
MCH RBC QN AUTO: 27.9 PG (ref 26–35)
MCHC RBC AUTO-ENTMCNC: 31 % (ref 32–34.5)
MCV RBC AUTO: 90.2 FL (ref 80–99.9)
METER GLUCOSE: 130 MG/DL (ref 74–99)
METER GLUCOSE: 135 MG/DL (ref 74–99)
METER GLUCOSE: 97 MG/DL (ref 74–99)
MONOCYTES ABSOLUTE: 0.57 E9/L (ref 0.1–0.95)
MONOCYTES RELATIVE PERCENT: 10.6 % (ref 2–12)
NEUTROPHILS ABSOLUTE: 2.79 E9/L (ref 1.8–7.3)
NEUTROPHILS RELATIVE PERCENT: 52 % (ref 43–80)
PDW BLD-RTO: 15.6 FL (ref 11.5–15)
PLATELET # BLD: 169 E9/L (ref 130–450)
PMV BLD AUTO: 9.8 FL (ref 7–12)
POTASSIUM SERPL-SCNC: 3.9 MMOL/L (ref 3.5–5)
PROTHROMBIN TIME: 36.1 SEC (ref 9.3–12.4)
RBC # BLD: 4.08 E12/L (ref 3.8–5.8)
SODIUM BLD-SCNC: 136 MMOL/L (ref 132–146)
TOTAL PROTEIN: 5.3 G/DL (ref 6.4–8.3)
WBC # BLD: 5.4 E9/L (ref 4.5–11.5)

## 2021-07-13 PROCEDURE — 36415 COLL VENOUS BLD VENIPUNCTURE: CPT

## 2021-07-13 PROCEDURE — 6360000002 HC RX W HCPCS: Performed by: INTERNAL MEDICINE

## 2021-07-13 PROCEDURE — 85025 COMPLETE CBC W/AUTO DIFF WBC: CPT

## 2021-07-13 PROCEDURE — 2060000000 HC ICU INTERMEDIATE R&B

## 2021-07-13 PROCEDURE — 92526 ORAL FUNCTION THERAPY: CPT | Performed by: SPEECH-LANGUAGE PATHOLOGIST

## 2021-07-13 PROCEDURE — 2700000000 HC OXYGEN THERAPY PER DAY

## 2021-07-13 PROCEDURE — 82962 GLUCOSE BLOOD TEST: CPT

## 2021-07-13 PROCEDURE — 80053 COMPREHEN METABOLIC PANEL: CPT

## 2021-07-13 PROCEDURE — 85610 PROTHROMBIN TIME: CPT

## 2021-07-13 PROCEDURE — 6370000000 HC RX 637 (ALT 250 FOR IP): Performed by: INTERNAL MEDICINE

## 2021-07-13 PROCEDURE — 92610 EVALUATE SWALLOWING FUNCTION: CPT | Performed by: SPEECH-LANGUAGE PATHOLOGIST

## 2021-07-13 PROCEDURE — 94640 AIRWAY INHALATION TREATMENT: CPT

## 2021-07-13 PROCEDURE — 2580000003 HC RX 258: Performed by: INTERNAL MEDICINE

## 2021-07-13 RX ADMIN — ARFORMOTEROL TARTRATE 15 MCG: 15 SOLUTION RESPIRATORY (INHALATION) at 09:23

## 2021-07-13 RX ADMIN — METOPROLOL TARTRATE 12.5 MG: 25 TABLET, FILM COATED ORAL at 10:11

## 2021-07-13 RX ADMIN — BRIMONIDINE TARTRATE 1 DROP: 2 SOLUTION OPHTHALMIC at 10:11

## 2021-07-13 RX ADMIN — SODIUM CHLORIDE, PRESERVATIVE FREE 10 ML: 5 INJECTION INTRAVENOUS at 20:57

## 2021-07-13 RX ADMIN — DOCUSATE SODIUM 200 MG: 100 CAPSULE, LIQUID FILLED ORAL at 20:47

## 2021-07-13 RX ADMIN — SODIUM CHLORIDE, PRESERVATIVE FREE 10 ML: 5 INJECTION INTRAVENOUS at 10:11

## 2021-07-13 RX ADMIN — MONTELUKAST SODIUM 10 MG: 10 TABLET, FILM COATED ORAL at 20:48

## 2021-07-13 RX ADMIN — ARFORMOTEROL TARTRATE 15 MCG: 15 SOLUTION RESPIRATORY (INHALATION) at 21:06

## 2021-07-13 RX ADMIN — ALBUTEROL SULFATE 2.5 MG: 2.5 SOLUTION RESPIRATORY (INHALATION) at 16:38

## 2021-07-13 RX ADMIN — ALBUTEROL SULFATE 2.5 MG: 2.5 SOLUTION RESPIRATORY (INHALATION) at 09:23

## 2021-07-13 RX ADMIN — POTASSIUM CHLORIDE 10 MEQ: 750 TABLET, EXTENDED RELEASE ORAL at 10:11

## 2021-07-13 RX ADMIN — BRIMONIDINE TARTRATE 1 DROP: 2 SOLUTION OPHTHALMIC at 20:41

## 2021-07-13 RX ADMIN — TAMSULOSIN HYDROCHLORIDE 0.4 MG: 0.4 CAPSULE ORAL at 10:11

## 2021-07-13 RX ADMIN — BUDESONIDE 500 MCG: 0.5 SUSPENSION RESPIRATORY (INHALATION) at 09:23

## 2021-07-13 RX ADMIN — DIGOXIN 125 MCG: 125 TABLET ORAL at 10:11

## 2021-07-13 RX ADMIN — METOPROLOL TARTRATE 12.5 MG: 25 TABLET, FILM COATED ORAL at 20:48

## 2021-07-13 RX ADMIN — ALBUTEROL SULFATE 2.5 MG: 2.5 SOLUTION RESPIRATORY (INHALATION) at 12:10

## 2021-07-13 RX ADMIN — ALBUTEROL SULFATE 2.5 MG: 2.5 SOLUTION RESPIRATORY (INHALATION) at 21:06

## 2021-07-13 RX ADMIN — BUDESONIDE 500 MCG: 0.5 SUSPENSION RESPIRATORY (INHALATION) at 21:06

## 2021-07-13 ASSESSMENT — PAIN SCALES - GENERAL
PAINLEVEL_OUTOF10: 0

## 2021-07-13 NOTE — CARE COORDINATION
Social Work discharge planning   Sw spoke to [phone], who advised her  Lynn Watkins is at work. Sean Mccall advised she is picking up pt's wife today at 150p to bring her to eye  in [facility]. Sean Mccall said she will talk to pt and wife about snf and hhc. And advise them that [facility] said they are unable accept pt. Sean Mccall advised [facility] will be next choice, and she will talk to them and call Sw back with snf choices. SW called referral to [facility] with [facility] for potential need.    Electronically signed by Phoenix Hoover on 7/13/2021 at 1:19 PM

## 2021-07-13 NOTE — PROGRESS NOTES
SPEECH/LANGUAGE PATHOLOGY  CLINICAL ASSESSMENT OF SWALLOWING FUNCTION   and PLAN OF CARE    PATIENT NAME:  Jordyn Paulino  (male)     MRN:  [id number]    :  1929  (80 y.o.)  STATUS:  Inpatient: Room -A    TODAY'S DATE:  2021  REFERRING PROVIDER:   Rosaura Kelley DO  SPECIFIC PROVIDER ORDER: SLP swallowing-dysphagia evaluation and treatment Date of order:  21  REASON FOR REFERRAL: Pt's wife reports choking while eating   EVALUATING THERAPIST: DAIJA Castañeda   SUPERVISING THERAPIST: Majel Seip, MA CCC/SLP                RESULTS:    DYSPHAGIA DIAGNOSIS:   Clinical indicators of oral phase dysphagia and possible pharyngeal dysphagia, though indicators of pharyngeal phase dysphagia were inconsistent. No choking or coughing was observed during CBSE, though a delayed throat clear was observed 1x and wet vocal quality was observed 1x following thin liquid trials. Pt demonstrated generalized oral weakness and difficulty with mastication due to decreased dentition. If aspiration of modified diet is suspected, recommend video swallow evaluation to further assess. DIET RECOMMENDATIONS:  Soft and bite size consistency solids (dysphagia 3) with thin liquids contingent upon compensatory strategy use.      FEEDING RECOMMENDATIONS:     Assistance level:  Stand by assistance is needed during all oral intake      Compensatory strategies recommended: Small bites/sips, remain upright during meals      Discussed recommendations with nursing and/or faxed report to referring provider: Yes    SPEECH THERAPY  PLAN OF CARE   The dysphagia POC is established based on physician order, dysphagia diagnosis and results of clinical assessment     Skilled SLP intervention for dysphagia management on acute care 3-5 x per week until goals met, pt plateaus in function and/or discharged from hospital    Conditions Requiring Skilled Therapeutic Intervention for dysphagia:    Reduced oral control of bolus   Wet vocal quality during PO intake  Throat clearing during PO intake   Oral motor strength/coordination impairment    Specific dysphagia interventions to include:     Compensatory strategy training   Therapeutic exercises  Meal time assessment for 1-2 sessions to provide diet modification and compensatory strategy implementation due to inconsistent episodes of clinical indicators of dysphagia during intake    Specific instructions for next treatment:  development and training of compensatory swallow strategies to improve airway protection and swallow function, initiate instruction of therapeutic exercises, and initiate instruction of compensatory strategies. Patient Treatment Goals:    Short Term Goals:  Pt will implement identified compensatory swallowing strategies on 90% of opportunities or greater to improve airway protection and swallow function. Pt will participate in meal time assessment for 1-2 sessions to provide diet modification and compensatory strategy implementation due to inconsistent episodes of clinical indicators of dysphagia during intake  Pt will complete oral motor strength/ coordination exercises to improve bolus prep/ control and mastication with  minimal verbal prompts. Pt will complete laryngeal strength/ ROM therapeutic   exercises to improve airway protection for the least restrictive PO   diet minimal verbal prompts. Long Term Goals:   Pt will maintain adequate nutrition/hydration via PO intake of the least restrictive oral diet with implementation of safe swallow/ compensatory strategies and decrease signs/symptoms of aspiration to less than 1 x/day. Patient/family Goal:    Did not state. Will further assess during treatment.     Plan of care discussed with Patient   The Patient understand(s) the diagnosis, prognosis and plan of care     Rehabilitation Potential/Prognosis: good                    ADMITTING DIAGNOSIS: Atrial fibrillation with RVR ([facility] [address] [I48.91]    VISIT DIAGNOSIS: Visit Diagnoses       Codes    Acute on chronic congestive heart failure, unspecified heart failure type ([facility]     I50.9    JUAN J (acute kidney injury) ([facility] [address] [facility]     N17.9           PATIENT REPORT/COMPLAINT: Pt reported occasional coughing and choking during meals and difficulty chewing food due to decreased dentition. RN cleared patient for participation in assessment     yes     PRIOR LEVEL OF SWALLOW FUNCTION:    PAST HISTORY OF DYSPHAGIA?: none reported    Home diet: Regular consistency solids with  thin liquids    PROCEDURE:  Consistencies Administered During the Evaluation   Liquids: thin liquid   Solids:  pureed foods and solid foods      Method of Intake:   cup, straw, spoon  Self fed, Fed by clinician      Position:   Seated, upright    CLINICAL ASSESSMENT:  Oral Stage:       Decreased mastication due to:  poor/missing dentition        Pharyngeal Stage:    Delayed throat clearing present after presentation of thin liquid 1x. Wet/gurgly vocal quality was noted after presentation of thin liquid 1x. Cognition:   Within functional limits for this exam    Oral Peripheral Examination   Generalized oral weakness with fasciculations in the jaw during movement. Current Respiratory Status    room air     Parameters of Speech Production  Respiration:  Adequate for speech production  Quality:   Within functional limits  Intensity: Within functional limits    Volitional Swallow: present     Volitional Cough:   present     Pain: No pain reported. EDUCATION:   The Speech Language Pathologist (SLP) completed education regarding results of evaluation and that intervention is warranted at this time. Learner: Patient  Education: Reviewed results and recommendations of this evaluation  Evaluation of Education:  Parth Serna understanding    This plan may be re-evaluated and revised as warranted.       Evaluation Time includes thorough review of current medical information, gathering information on past medical history/social history and prior level of function, completion of standardized testing/informal observation of tasks, assessment of data and education on plan of care and goals. INTERVENTION    Pt/family educated on above results and plan of care. Pt/family trained on compensatory strategies for safe swallow and signs and symptoms of aspiration (throat clearing, coughing/choking, wet vocal quality. , etc.) and encouraged to notify staff if observed. Handouts provided as warranted. Pt/family encouraged to engage in question/answer session. All questions answered and pt/family verbalized understanding of above. [x]The admitting diagnosis and active problem list, have been reviewed prior to initiation of this evaluation. ACTIVE PROBLEM LIST:   Patient Active Problem List   Diagnosis    Atrial fibrillation ([facility] [address]    COPD with exacerbation ([address]    Chest pain    Asthma    CAP (community acquired pneumonia)    GI bleeding    COPD exacerbation ([address]    Atrial fibrillation with RVR ([facility]         CPT code:  89335  bedside swallow eval; 19079 dysphagia treatment    Becky Echevarria   Clinician  [facility]     Jostin GUARDADO CCC/SLP [id number]  Speech-Language Pathologist

## 2021-07-13 NOTE — PROGRESS NOTES
Occupational Therapy  OT evaluation attempted this PM. Pt sleeping soundly, will allow to rest. Will continue to follow. Thank you.     Trace Gonzalez OTR/L  [id number]

## 2021-07-13 NOTE — PROGRESS NOTES
[facility] Quality Flow/Interdisciplinary Rounds Progress Note        Quality Flow Rounds held on July 13, 2021    Disciplines Attending:  Bedside Nurse, ,  and Nursing Unit Leadership    Ashwini Lester was admitted on 7/10/2021  3:19 PM    Anticipated Discharge Date:  Expected Discharge Date: 07/14/21    Disposition:    Dwayne Score:  Dwayne Scale Score: 19    Readmission Risk              Risk of Unplanned Readmission:  25           Discussed patient goal for the day, patient clinical progression, and barriers to discharge. The following Goal(s) of the Day/Commitment(s) have been identified: Cardizem drip off, continue PO digoxin daily, monitor INR- today 3.3, continue coumadin, monitor labs and vitals, check primary plan, and discharge planning home vs SNF.       Darene Landau, RN  July 13, 2021

## 2021-07-13 NOTE — CARE COORDINATION
Social Work discharge planning addendum   Per pt wife and Yarely Saldana, they have chosen [facility] for [employer] and [facility] for [employer]. Sw called referral to [facility] and [facility] liaison [place]. Pt and wife to make their final decision on discharge plan by tomorrow. Pt will need PT and OT updates Wednesday 7/14 for planning please.   Electronically signed by Mendel Maduro on 7/13/2021 at 3:12 PM

## 2021-07-13 NOTE — PROGRESS NOTES
Subjective: The patient is awake and alert. No problems overnight. Denies chest pain, angina, and dyspnea. Denies abdominal pain. Tolerating diet. No nausea or vomiting. Objective:    /66   Pulse 92   Temp 98.3 °F (36.8 °C) (Oral)   Resp 20   Ht 6' (1.829 m)   Wt 217 lb 11.2 oz (98.7 kg)   SpO2 92%   BMI 29.53 kg/m²     Heart:  RRR, no murmurs, gallops, or rubs.   Lungs:  CTA bilaterally, no wheeze, rales or rhonchi  Abd: bowel sounds present, nontender, nondistended, no masses  Extrem:  No clubbing, cyanosis, or edema    CBC with Differential:    Lab Results   Component Value Date    WBC 5.4 07/13/2021    RBC 4.08 07/13/2021    HGB 11.4 07/13/2021    HCT 36.8 07/13/2021     07/13/2021    MCV 90.2 07/13/2021    MCH 27.9 07/13/2021    MCHC 31.0 07/13/2021    RDW 15.6 07/13/2021    SEGSPCT 89 03/16/2013    LYMPHOPCT 34.8 07/13/2021    MONOPCT 10.6 07/13/2021    BASOPCT 0.2 07/13/2021    MONOSABS 0.57 07/13/2021    LYMPHSABS 1.87 07/13/2021    EOSABS 0.12 07/13/2021    BASOSABS 0.01 07/13/2021     CMP:    Lab Results   Component Value Date     07/13/2021    K 3.9 07/13/2021    K 4.0 07/10/2021     07/13/2021    CO2 29 07/13/2021    BUN 34 07/13/2021    CREATININE 1.5 07/13/2021    GFRAA 53 07/13/2021    LABGLOM 44 07/13/2021    GLUCOSE 116 07/13/2021    GLUCOSE 98 10/14/2010    PROT 5.3 07/13/2021    LABALBU 3.0 07/13/2021    LABALBU 3.6 10/14/2010    CALCIUM 8.5 07/13/2021    BILITOT 0.7 07/13/2021    ALKPHOS 88 07/13/2021    AST 14 07/13/2021    ALT 18 07/13/2021     PT/INR:    Lab Results   Component Value Date    PROTIME 36.1 07/13/2021    PROTIME 20.5 10/14/2010    INR 3.3 07/13/2021     @cktotal:3,ckmb:3,ckmbindex:3,troponini:3@  U/A:    Lab Results   Component Value Date    NITRU Negative 05/07/2021    COLORU Yellow 05/07/2021    PHUR 6.0 05/07/2021    WBCUA 2-5 05/07/2021    RBCUA 10-20 05/07/2021    RBCUA NONE 03/15/2013    BACTERIA RARE 05/07/2021    CLARITYU Clear 05/07/2021    SPECGRAV 1.020 05/07/2021    UROBILINOGEN 0.2 05/07/2021    BILIRUBINUR Negative 05/07/2021    BLOODU MODERATE 05/07/2021    GLUCOSEU Negative 05/07/2021    KETUA Negative 05/07/2021        Assessment:    Patient Active Problem List   Diagnosis    [facility] [address]    COPD with exacerbation ([facility]    Chest pain    Asthma    CAP (community acquired pneumonia)    GI bleeding    COPD exacerbation ([facility]    Atrial fibrillation with RVR ([facility] [facility]       Plan:    Atrial fib with RVR  Rate improved  Coumadin per pharmacy  BP improved  Med's adjusted  anticipate d/c in next 1-2 days  Stop IVF after this liter      Blanca Cheney, DO  6:57 AM  7/13/2021

## 2021-07-13 NOTE — PROGRESS NOTES
Pharmacy Consultation Note  (Anticoagulant Dosing and Monitoring)    Initial consult date: 7/10/2021  Consulting physician: Dr. Agus López    Allergies:  Patient has no known allergies. Ht Readings from Last 1 Encounters:   07/10/21 6' (1.829 m)     Wt Readings from Last 1 Encounters:   07/13/21 217 lb 11.2 oz (98.7 kg)       Warfarin Indication Target   INR Range Home Dose  (if applicable) Diet/Feeding Tube   Atrial fibrillation 2-3 Warfarin 1 mg five days a week   (no dose on Monday or Tuesday) Regular diet     Vitamin K or Blood product  Administration Date               Warfarin drug-drug interactions  Start  Stop Home Med?  Comments                                 Hepatic Function Panel:                       Lab Results   Component Value Date    ALKPHOS 88 07/13/2021    ALT 18 07/13/2021    AST 14 07/13/2021    PROT 5.3 07/13/2021    BILITOT 0.7 07/13/2021    LABALBU 3.0 07/13/2021    LABALBU 3.6 10/14/2010       Date Warfarin Dose INR Heparin or LMWH HBG/HCT PLT Comment   7/10 No dose 3.6 ------------ 12.6/41.2 178    7/11 No dose 3.7 ------------ 12.1/39.6 168    7/12 HOLD 3.4 -- 12.1/40 186    7/13 HOLD 3.3 -- 11.4/36.8 169               Assessment:  · Patient is a 80year old male on warfarin for atrial fibrillation  · Per documentation, patient takes warfarin 1 mg on five days out of the week (no dose on Monday or Tuesday)  · INR goal = 2-3; INR today = 3.3 (still supratherapeutic, slightly decreased from 3.4 yesterday)    Plan:  · Hold warfarin again tonight; hoping to restart tomorrow dependent on INR  · Daily PT/INR until the INR is stable within the therapeutic range  · Pharmacist will follow and monitor/adjust dosing as necessary    Mela Aquino, ChetanD, BCPS 7/13/2021 2:53 PM   Ext: [phone]

## 2021-07-13 NOTE — CARE COORDINATION
Social Work discharge planning   Pt and family reviewing snf list. Sw also made new referral to [facility] hhc yesterday. Pt's PT score was 16/24 yesterday. Wife is having stress test herself today she advised. Noted Dr Kortney Lewis advised discharge in 1-2 days this am.  BOB will follow with CM to see how pt does with PT OT today and tomorrow, and get snf choices once they decide. No dme preference IF goes home IF need new home 02. Electronically signed by Kenrick Desir on 7/13/2021 at 10:27 AM     Addendum-    Sw received call from Katarzyna Pickett at [facility]. She advised that they CAN NOT accept referral on pt. Will discuss with pt & family snf choices and hhc choices again.   Electronically signed by Kenrick Desir on 7/13/2021 at 1:00 PM

## 2021-07-13 NOTE — PROGRESS NOTES
[facility]    INPATIENT CARDIOLOGY FOLLOW-UP    Name: Channing Hernández    Age: 80 y.o. Date of Admission: 7/10/2021  3:19 PM    Date of Service: 7/13/2021     Chief Complaint: Follow-up for atrial fibrillation    Interim History:  No new overnight cardiac complaints. Generalized weakness. No chest pain, palpitations or shortness of breath. Blood pressure now stable. Telemetry  reviewed and showed atrial fibrillation with controlled VR.   Off IV diltiazem        Review of Systems:   Cardiac: As per HPI  General: No fever, chills  Pulmonary: No cough, wheeze, or shortness of breath  GI: No nausea, vomiting,or abdominal pain  Neuro: No headache or confusion      Allergies:  No Known Allergies    Current Medications:  Current Facility-Administered Medications   Medication Dose Route Frequency Provider Last Rate Last Admin    albuterol (PROVENTIL) nebulizer solution 2.5 mg  2.5 mg Nebulization 4x daily Hemalatha Burrell, DO   2.5 mg at 07/12/21 2146    budesonide (PULMICORT) nebulizer suspension 500 mcg  0.5 mg Nebulization BID Hemalatha Burrell, DO   500 mcg at 07/12/21 2146    Arformoterol Tartrate (BROVANA) nebulizer solution 15 mcg  15 mcg Nebulization BID Hemalatha Burrell, DO   15 mcg at 07/12/21 2146    metoprolol tartrate (LOPRESSOR) tablet 12.5 mg  12.5 mg Oral BID Brett Henley MD        0.9 % sodium chloride infusion   Intravenous Continuous Hemalatha Burrell, DO 75 mL/hr at 07/12/21 1526 New Bag at 07/12/21 1526    digoxin (LANOXIN) tablet 125 mcg  125 mcg Oral Daily Mathew Pascal MD   125 mcg at 07/12/21 0851    perflutren lipid microspheres (DEFINITY) injection 1.65 mg  1.5 mL Intravenous ONCE PRN Mathew Pascal MD        warfarin (COUMADIN) daily dosing (placeholder)   Other Daily Kathy Wallace MD        dilTIAZem 100 mg in dextrose 5 % 100 mL infusion ([place])  5-15 mg/hr Intravenous Continuous Sera Duvall MD   Stopped at 07/11/21 0826    sodium chloride flush 0.9 % injection 5-40 mL  5-40 mL Intravenous 2 times per day Alice Honer, DO   10 mL at 07/12/21 2051    sodium chloride flush 0.9 % injection 5-40 mL  5-40 mL Intravenous PRN Alice Honer, DO        0.9 % sodium chloride infusion  25 mL Intravenous PRN Alice Honer, DO        acetaminophen (TYLENOL) tablet 650 mg  650 mg Oral Q4H PRN Alice Honer, DO   650 mg at 07/11/21 0156    ondansetron (ZOFRAN-ODT) disintegrating tablet 4 mg  4 mg Oral Q8H PRN Alice Honer, DO        Or    [facility]ron [facility] injection 4 mg  4 mg Intravenous Q6H PRN Alice Honer, DO        brimonidine (ALPHAGAN) 0.2 % ophthalmic solution 1 drop  1 drop Both Eyes BID Geovanny Escalante MD   1 drop at 07/12/21 2051    docusate sodium (COLACE) capsule 200 mg  200 mg Oral Nightly Geovanny Escalanet MD   200 mg at 07/12/21 2046    ipratropium-albuterol (DUONEB) nebulizer solution 3 mL  3 mL Inhalation Q4H PRN Geovanny Escalante MD        montelukast (SINGULAIR) tablet 10 mg  10 mg Oral Nightly Geovanny Escalante MD   10 mg at 07/12/21 2046    PreserVision AREDS 2 CAPS 1 capsule  1 capsule Oral BID Geovanny Escalante MD        potassium chloride Oumou Kerri M) extended release tablet 10 mEq  10 mEq Oral Daily Geovanny Escalante MD   10 mEq at 07/12/21 0850    tamsulosin (FLOMAX) capsule 0.4 mg  0.4 mg Oral Daily Geovanny Escalante MD   0.4 mg at 07/12/21 0851    insulin lispro (HUMALOG) injection vial 0-12 Units  0-12 Units Subcutaneous TID [facility] Geovanny Escalante MD   2 Units at 07/12/21 1531    insulin lispro (HUMALOG) injection vial 0-6 Units  0-6 Units Subcutaneous Nightly Geovanny Escalante MD   1 Units at 07/12/21 2045    polyvinyl alcohol (LIQUIFILM TEARS) 1.4 % ophthalmic solution 1 drop  1 drop Both Eyes Q6H PRN Geovanny Escalante MD   1 drop at 07/12/21 2051      sodium chloride 75 mL/hr at 07/12/21 1526    dilTIAZem Stopped (07/11/21 0826)    sodium chloride         Physical Exam:  /66   Pulse 92   Temp 98.3 °F (36.8 °C) (Oral)   Resp 20   Ht 6' (1.829 m)   Wt 217 lb 11.2 oz (98.7 kg)   SpO2 92%   BMI 29.53 kg/m²   Weight change: Wt Readings from Last 3 Encounters:   07/13/21 217 lb 11.2 oz (98.7 kg)   05/11/21 221 lb 4 oz (100.4 kg)   04/27/21 217 lb (98.4 kg)         Intake/Output:    Intake/Output Summary (Last 24 hours) at 7/13/2021 0729  Last data filed at 7/12/2021 2030  Gross per 24 hour   Intake --   Output 800 ml   Net -800 ml     No intake/output data recorded. General: Awake, alert, oriented x3, no acute distress    Neck: No JVD or carotid bruits,     Cardiac: Irregularly irregular rhythm, , normal S1 and  S2, no murmurs, no S4 or S3, no  rubs. Normal carotid upstroke and no bruits. Resp: Unlabored respirations at rest.  No wheezes, rales or rhonchi. Abdomen: soft, nontender, nondistended, BS+; no masses, bruits, or hepatomegaly    Extremities: no cyanosis, clubbing, or edema. Normal pulses in the upper/lower extremities bilaterally. Skin: Warm and dry, no bruises, petechiae or rashes. Neuro: moves all extremities appropriately to command, and normal sensation to light touch in the upper and lower extremities bilaterally. Laboratory Tests:  Lab Results   Component Value Date    CREATININE 1.5 (H) 07/13/2021    BUN 34 (H) 07/13/2021     07/13/2021    K 3.9 07/13/2021     07/13/2021    CO2 29 07/13/2021     No results for input(s): CKTOTAL, CKMB, TROPONINI in the last 72 hours.   Lab Results   Component Value Date    PROBNP 3,888 (H) 07/10/2021     Lab Results   Component Value Date    WBC 5.4 07/13/2021    RBC 4.08 07/13/2021    HGB 11.4 07/13/2021    HCT 36.8 07/13/2021    MCV 90.2 07/13/2021    MCH 27.9 07/13/2021    MCHC 31.0 07/13/2021    RDW 15.6 07/13/2021     07/13/2021    MPV 9.8 07/13/2021     Recent Labs     07/12/21  0940 07/13/21  0250   ALKPHOS 91 88   ALT 19 18   AST 21 14   PROT 5.8* 5.3*   BILITOT 1.3* 0.7   LABALBU 3.4* 3.0*     No results found for: MG  Lab Results   Component Value Date    PROTIME 36.1 07/13/2021    PROTIME 20.5 10/14/2010    INR 3.3 07/13/2021     Lab Results   Component Value Date    TSH 1.310 03/24/2019       Radiology:  XR CHEST PORTABLE   Final Result   1. Bibasilar airspace disease and trace bilateral pleural effusions. 2. Cardiomegaly. Gross findings of CHF are not appreciated within the   perihilar regions. Problem List:  Active Hospital Problems    Diagnosis     Atrial fibrillation with RVR ([facility]) [I48.91]            ASSESSMENT/PLAN:  1. Atrial fibrillation with CVR . Blood pressure now stable after IV fluids. IV diltiazem off. Continue daily digoxin and  metoprolol  12.5 mg twice daily. FCA3DJ8-PHBr score is 5. Continue warfarin for target INR 2-3 seconds. 2. Elevated/nondiagnostic troponin secondary to above. Nothing to suggest an acute coronary syndrome  3. Normal LVEF with moderate regurgitant valvular heart disease on echo. Currently stable  4. Chronic kidney disease with creatinine stable at 1.5. No further inpatient cardiac plans.   Office will arrange follow-up appointment      Electronically signed by Andrey Quintana MD on 7/13/2021 at 7:29 AM

## 2021-07-14 VITALS
DIASTOLIC BLOOD PRESSURE: 67 MMHG | RESPIRATION RATE: 20 BRPM | WEIGHT: 219 LBS | HEART RATE: 90 BPM | OXYGEN SATURATION: 93 % | BODY MASS INDEX: 29.66 KG/M2 | TEMPERATURE: 97.4 F | HEIGHT: 72 IN | SYSTOLIC BLOOD PRESSURE: 136 MMHG

## 2021-07-14 LAB
ALBUMIN SERPL-MCNC: 3.1 G/DL (ref 3.5–5.2)
ALP BLD-CCNC: 82 U/L (ref 40–129)
ALT SERPL-CCNC: 15 U/L (ref 0–40)
ANION GAP SERPL CALCULATED.3IONS-SCNC: 6 MMOL/L (ref 7–16)
AST SERPL-CCNC: 12 U/L (ref 0–39)
BASOPHILS ABSOLUTE: 0.01 E9/L (ref 0–0.2)
BASOPHILS RELATIVE PERCENT: 0.2 % (ref 0–2)
BILIRUB SERPL-MCNC: 0.8 MG/DL (ref 0–1.2)
BUN BLDV-MCNC: 29 MG/DL (ref 6–23)
CALCIUM SERPL-MCNC: 8.9 MG/DL (ref 8.6–10.2)
CHLORIDE BLD-SCNC: 106 MMOL/L (ref 98–107)
CO2: 29 MMOL/L (ref 22–29)
CREAT SERPL-MCNC: 1.5 MG/DL (ref 0.7–1.2)
EOSINOPHILS ABSOLUTE: 0.14 E9/L (ref 0.05–0.5)
EOSINOPHILS RELATIVE PERCENT: 2.8 % (ref 0–6)
GFR AFRICAN AMERICAN: 53
GFR NON-AFRICAN AMERICAN: 44 ML/MIN/1.73
GLUCOSE BLD-MCNC: 114 MG/DL (ref 74–99)
HCT VFR BLD CALC: 38.3 % (ref 37–54)
HEMOGLOBIN: 11.5 G/DL (ref 12.5–16.5)
IMMATURE GRANULOCYTES #: 0.02 E9/L
IMMATURE GRANULOCYTES %: 0.4 % (ref 0–5)
INR BLD: 2.4
LYMPHOCYTES ABSOLUTE: 1.9 E9/L (ref 1.5–4)
LYMPHOCYTES RELATIVE PERCENT: 37.5 % (ref 20–42)
MCH RBC QN AUTO: 27.8 PG (ref 26–35)
MCHC RBC AUTO-ENTMCNC: 30 % (ref 32–34.5)
MCV RBC AUTO: 92.7 FL (ref 80–99.9)
METER GLUCOSE: 105 MG/DL (ref 74–99)
METER GLUCOSE: 94 MG/DL (ref 74–99)
MONOCYTES ABSOLUTE: 0.48 E9/L (ref 0.1–0.95)
MONOCYTES RELATIVE PERCENT: 9.5 % (ref 2–12)
NEUTROPHILS ABSOLUTE: 2.52 E9/L (ref 1.8–7.3)
NEUTROPHILS RELATIVE PERCENT: 49.6 % (ref 43–80)
PDW BLD-RTO: 15.7 FL (ref 11.5–15)
PLATELET # BLD: 168 E9/L (ref 130–450)
PMV BLD AUTO: 9.7 FL (ref 7–12)
POTASSIUM SERPL-SCNC: 4.5 MMOL/L (ref 3.5–5)
PROTHROMBIN TIME: 26.3 SEC (ref 9.3–12.4)
RBC # BLD: 4.13 E12/L (ref 3.8–5.8)
SARS-COV-2, NAAT: NOT DETECTED
SODIUM BLD-SCNC: 141 MMOL/L (ref 132–146)
TOTAL PROTEIN: 5.4 G/DL (ref 6.4–8.3)
WBC # BLD: 5.1 E9/L (ref 4.5–11.5)

## 2021-07-14 PROCEDURE — 6370000000 HC RX 637 (ALT 250 FOR IP): Performed by: INTERNAL MEDICINE

## 2021-07-14 PROCEDURE — 97530 THERAPEUTIC ACTIVITIES: CPT

## 2021-07-14 PROCEDURE — 94640 AIRWAY INHALATION TREATMENT: CPT

## 2021-07-14 PROCEDURE — 36415 COLL VENOUS BLD VENIPUNCTURE: CPT

## 2021-07-14 PROCEDURE — 87635 SARS-COV-2 COVID-19 AMP PRB: CPT

## 2021-07-14 PROCEDURE — 82962 GLUCOSE BLOOD TEST: CPT

## 2021-07-14 PROCEDURE — 85610 PROTHROMBIN TIME: CPT

## 2021-07-14 PROCEDURE — 97165 OT EVAL LOW COMPLEX 30 MIN: CPT

## 2021-07-14 PROCEDURE — 97110 THERAPEUTIC EXERCISES: CPT | Performed by: SPEECH-LANGUAGE PATHOLOGIST

## 2021-07-14 PROCEDURE — 97535 SELF CARE MNGMENT TRAINING: CPT

## 2021-07-14 PROCEDURE — 6360000002 HC RX W HCPCS: Performed by: INTERNAL MEDICINE

## 2021-07-14 PROCEDURE — 80053 COMPREHEN METABOLIC PANEL: CPT

## 2021-07-14 PROCEDURE — 2580000003 HC RX 258: Performed by: INTERNAL MEDICINE

## 2021-07-14 PROCEDURE — 85025 COMPLETE CBC W/AUTO DIFF WBC: CPT

## 2021-07-14 PROCEDURE — 92526 ORAL FUNCTION THERAPY: CPT | Performed by: SPEECH-LANGUAGE PATHOLOGIST

## 2021-07-14 RX ORDER — DIGOXIN 125 MCG
125 TABLET ORAL DAILY
Qty: 30 TABLET | Refills: 3 | Status: SHIPPED | OUTPATIENT
Start: 2021-07-15

## 2021-07-14 RX ORDER — WARFARIN SODIUM 1 MG/1
1 TABLET ORAL
Status: DISCONTINUED | OUTPATIENT
Start: 2021-07-14 | End: 2021-07-14 | Stop reason: HOSPADM

## 2021-07-14 RX ADMIN — ALBUTEROL SULFATE 2.5 MG: 2.5 SOLUTION RESPIRATORY (INHALATION) at 08:19

## 2021-07-14 RX ADMIN — SODIUM CHLORIDE, PRESERVATIVE FREE 10 ML: 5 INJECTION INTRAVENOUS at 10:50

## 2021-07-14 RX ADMIN — METOPROLOL TARTRATE 12.5 MG: 25 TABLET, FILM COATED ORAL at 10:46

## 2021-07-14 RX ADMIN — TAMSULOSIN HYDROCHLORIDE 0.4 MG: 0.4 CAPSULE ORAL at 10:47

## 2021-07-14 RX ADMIN — BRIMONIDINE TARTRATE 1 DROP: 2 SOLUTION OPHTHALMIC at 10:44

## 2021-07-14 RX ADMIN — BUDESONIDE 500 MCG: 0.5 SUSPENSION RESPIRATORY (INHALATION) at 08:19

## 2021-07-14 RX ADMIN — ARFORMOTEROL TARTRATE 15 MCG: 15 SOLUTION RESPIRATORY (INHALATION) at 08:19

## 2021-07-14 RX ADMIN — ALBUTEROL SULFATE 2.5 MG: 2.5 SOLUTION RESPIRATORY (INHALATION) at 13:45

## 2021-07-14 RX ADMIN — POTASSIUM CHLORIDE 10 MEQ: 750 TABLET, EXTENDED RELEASE ORAL at 10:46

## 2021-07-14 RX ADMIN — ACETAMINOPHEN 650 MG: 325 TABLET ORAL at 00:07

## 2021-07-14 RX ADMIN — DIGOXIN 125 MCG: 125 TABLET ORAL at 10:46

## 2021-07-14 ASSESSMENT — PAIN SCALES - GENERAL
PAINLEVEL_OUTOF10: 0
PAINLEVEL_OUTOF10: 0
PAINLEVEL_OUTOF10: 3

## 2021-07-14 ASSESSMENT — PAIN DESCRIPTION - PROGRESSION: CLINICAL_PROGRESSION: NOT CHANGED

## 2021-07-14 ASSESSMENT — PAIN DESCRIPTION - ONSET: ONSET: SUDDEN

## 2021-07-14 ASSESSMENT — PAIN DESCRIPTION - LOCATION: LOCATION: HEAD

## 2021-07-14 ASSESSMENT — PAIN - FUNCTIONAL ASSESSMENT: PAIN_FUNCTIONAL_ASSESSMENT: ACTIVITIES ARE NOT PREVENTED

## 2021-07-14 ASSESSMENT — PAIN DESCRIPTION - DESCRIPTORS: DESCRIPTORS: ACHING

## 2021-07-14 ASSESSMENT — PAIN DESCRIPTION - FREQUENCY: FREQUENCY: INTERMITTENT

## 2021-07-14 NOTE — CARE COORDINATION
Social Work discharge planning   Per Dahlia at [facility], they can accept pt IF pt/family decide on snf instead of hhc.  AAKASH Elizondo following as well. Await PT OT updates today, and pt/family final plan decision. This Sw notified pt's Edwardo Francois for today.   Electronically signed by Teresita Hall on 7/14/2021 at 8:13 AM

## 2021-07-14 NOTE — CARE COORDINATION
Social Work:    Received a call back from [place], patient's daughter-in-law. [place] spoke with her , MrHieu Salazar and his wife and they plan rehab at [facility]. Social work advised [place] of possible transfer today. Unit charge RN Wilfrid Schilder is aware. Dahlia at [facility] requested a neg covid test prior to admission. CHRIS Yip is aware.   ([place], N-17, ambulette done)    Electronically signed by SASHA Powell on 7/14/2021 at 1:02 PM

## 2021-07-14 NOTE — CARE COORDINATION
Social work:    dEna Domínguez was arranged to transport Mr. Bill Casillas to [facility] snf today at 4:00 p.m. Social service updated patient daughter-in-law Kalia Diggs who updating Mr. & Mrs. Bill Casillas of plans.   ([place], N-17, ambulette done)    Electronically signed by SASHA Irwin on 7/14/2021 at 2:26 PM

## 2021-07-14 NOTE — PROGRESS NOTES
OCCUPATIONAL THERAPY INITIAL EVALUATION    [address]  Baudilio Vigil [address]         XLAE:                                                  Patient Name: Gita Chaparro    MRN: [id number]    : 1929    Room: [address] [place] [address]      Evaluating OT: Limmie Peabody, OTR/L [id number]      Referring Liz Castillo DO    Specific Provider Orders/Date:2021 OT eval and treat      Diagnosis: A- fib with RVR     Surgery: joint replacement      Pertinent Medical History: COPD, unspecified cerebral artery occlusion with cerebral infarct      Past Medical History:   Diagnosis Date    Asthma     COPD (chronic obstructive pulmonary disease) ([facility] [address]     Unspecified cerebral artery occlusion with cerebral infarction          Past Surgical History:   Procedure Laterality Date    ABDOMEN SURGERY      multiple; intestinal abscess; hernia repair;     APPENDECTOMY      ECHO COMPL W DOP COLOR FLOW  3/16/2013         HERNIA REPAIR      JOINT REPLACEMENT  12      Precautions:  Fall Risk, bed/ chair alarm     Assessment of current deficits    [] Functional mobility  [x]ADLs  [x] Strength               []Cognition    [x] Functional transfers   [x] IADLs         [x] Safety Awareness   [x]Endurance    [] Fine Coordination              [x] Balance      [] Vision/perception   []Sensation     []Gross Motor Coordination  [] ROM  [] Delirium                   [] Motor Control     OT PLAN OF CARE   OT POC based on physician orders, patient diagnosis and results of clinical assessment    Frequency/Duration 2-5 days/wk for 2 weeks PRN   Specific OT Treatment Interventions to include:   * Instruction/training on adapted ADL techniques and AE recommendations to increase functional independence within precautions       * Training on energy conservation strategies, correct breathing pattern and techniques to improve independence/tolerance for self-care routine  * Functional transfer/mobility training/DME recommendations for increased independence, safety, and fall prevention  * Patient/Family education to increase follow through with safety techniques and functional independence  * Recommendation of environmental modifications for increased safety with functional transfers/mobility and ADLs  * Therapeutic exercise to improve motor endurance, ROM, and functional strength for ADLs/functional transfers  * Therapeutic activities to facilitate/challenge dynamic balance, stand tolerance for increased safety and independence with ADLs  * Therapeutic activities to facilitate gross/fine motor skills for increased independence with ADLs    Recommended Adaptive Equipment:  TBD     Home Living: Pt lives with his wife in a 1 story home with 0 steps to enter   Bathroom setup: walk in shower with a chair, hi-rise commode   Equipment owned: fww    Prior Level of Function: Wife assisted some dressing tasks due to poor vision with ADLs.  She indicate that she gets into shower to assist  , Assist with IADLs; ambulated  I with FWW with falls at home   Driving: no  Occupation: retired    Pain Level: Pt did not indicate pain at present time;  Cognition: A&O: 3/3; Follows 1-2 step directions   Memory:  Fair    Sequencing:  fair   Problem solving:  fair   Judgement/safety:  fair     Functional Assessment:  AM-PAC Daily Activity Raw Score: 16/24   Initial Eval Status  Date: 7/14/21 Treatment Status  Date: STGs = LTGs  Time frame: 10-14 days   Feeding Set up      Grooming Contact Guard Assist   At sink to brush teeth and wash face standing at sink   Moderate [place]    UB Dressing Minimal Assist   Gown management  Independent    LB Dressing Moderate Assist   Dep to cirilo L sock and Min A R sock   Minimal Assist    Bathing NT   Minimal Assist    Toileting Moderate Assist   Sepulveda cath  Moderate [place]    Bed Mobility  Supine to sit: GCA with bed slightly elevated    Sit to supine: NT   Supine to sit: Independent   Sit to supine: Independent    Functional Transfers Min A with FWW  Sit to stand  Moderate [place]    Functional Mobility Contact Guard Assist with FWW  Moderate [place]    Balance Sitting:     Static:  Fair    Dynamic:Fair  Standing: Fair-     Activity Tolerance Poor- heavy breathing with activity   Fair to participate in self care tasks   Visual/  Perceptual Glasses: no  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 At end of session, patient sitting in bedside chair  with call light and phone within reach, all lines and tubes intact. Overall patient demonstrated  decreased independence and safety during completion of ADL/functional transfer/mobility tasks. Pt would benefit from continued skilled OT to increase safety and independence with completion of ADL/IADL tasks for functional independence and quality of life. Treatment: OT treatment provided this date includes:    Instruction/training on safety and adapted techniques for completion of ADLs:     Instruction/training on safe functional mobility/transfer techniques:     Instruction/training on energy conservation/work simplification for completion of ADLs: Brett Jiménez  Neuromuscular Reeducation to facilitate balance/righting reactions for increased function with ADLs tasks:       Rehab Potential: Good  for established goals     Patient / Family Goal: none stated       Patient and/or family were instructed on functional diagnosis, prognosis/goals and OT plan of care. Demonstrated fair understanding.      Eval Complexity: Low    Time In: 10:35  Time Out: 11:00  Total Treatment Time: 10 Min Units   OT Eval Low 42240  15  1   OT Eval Medium 98710      OT Eval High 27332      OT Re-Eval [id number]       Therapeutic Ex [id number]       Therapeutic Activities 15489       ADL/Self Care 92815  10  1   Orthotic Management 98901       Manual [id number]     Neuro Re-Ed 95436       Non-Billable Time          Evaluation Time additionally includes thorough review of current medical information, gathering information on past medical history/social history and prior level of function, interpretation of standardized testing/informal observation of tasks, assessment of data and development of plan of care and goals.           Riddhi Hathaway, OTR/L [id number]

## 2021-07-14 NOTE — PROGRESS NOTES
SPEECH LANGUAGE PATHOLOGY  DAILY PROGRESS NOTE        PATIENT NAME:  Flavio Mooney      :  1929          TODAY'S DATE:  2021 ROOM:  6654/0003-X        SWALLOWING      Pt awake and in chair. Wife present during treatment. Nursing was present administering medications. Pt took medications with thin liquid without any difficulties or signs of aspiration. Pt expressed desire to remain on a regular diet for solids and will choose soft items to eat. Pt completed oral motor exercises to support oral strength and coordination. Pt completed each exercise 1x with good range of motion and fair-good strength. Pt completed pitch glides to support increased laryngeal elevation. Pt demonstrated good pitch range and sustained phonation. Pt completed 5 repetitions of pitch glides. Pt was provided with handouts for exercises. Pt lunch tray was delivered during treatment. Meal time assessment was completed to determine tolerance of diet recommendations. Pt ate over 75% of his meal with minimal difficulty. Pt did not demonstrate any difficulty chewing or swallowing solid foods, but stated this was because he chose soft items. Pt coughed twice with the broth of his soup. Pt was observed to cough after rapid consumption and large bites. Pt was counseled to slow his rate of oral intake and take smaller sips. Coughing was not observed while pt practiced compensatory strategies. Pt was provided with a handout detailing compensatory strategies. Recommend diet upgrade to regular consistency solids and pt will choose soft items. DYSPHAGIA DIAGNOSIS:   Clinical indicators of oral phase dysphagia and possible pharyngeal dysphagia, though indicators of pharyngeal phase dysphagia were inconsistent. Coughing was observed twice following rapid intake and large sips of thin liquids.  Pt demonstrated generalized oral weakness and difficulty with mastication due to decreased dentition.     If aspiration of modified diet is suspected, recommend video swallow evaluation to further assess. DIET RECOMMENDATIONS:  Regular consistency solids -- pt to choose soft foods -- with thin liquids contingent upon compensatory strategy use.                 FEEDING RECOMMENDATIONS:                           Assistance level:  Stand by assistance is needed during all oral intake                             Compensatory strategies recommended: Small bites/sips, slow rate of intake, remain upright during meals      Oral- adequate labial seal and A-P transfer, no oral residue      Pharyngeal- Pt coughed twice on thin liquids while using rapid intake and large sips, no additional signs of aspiration, no multiple swallows      Education- Pt educated on results and POC. Pt trained on compensatory strategies for safe swallow with good outcome. Questions answered. Will continue SP intervention as per previously established POC. Melvin Austin   Clinician  STORMY IBANEZ [facility]      Elliott GUARDADO CCC/SLP [id number]  Speech Pathologist      CPT code(s) 71798  dysphagia tx  00499  oral motor exercises (15 min)  Total minutes :  30 minutes

## 2021-07-14 NOTE — PROGRESS NOTES
Physical Therapy  Facility/Department: [facility]  Daily Treatment Note  NAME: Rodrigo Swenson  : 1929  MRN: [id number]    Date of Service: 2021      Patient Diagnosis(es): The primary encounter diagnosis was Atrial fibrillation with RVR ([facility] [address] Diagnoses of Acute on chronic congestive heart failure, unspecified heart failure type ([facility] [address] and JUAN J [facility]) [facility] were also pertinent to this visit. has a past medical history of Asthma, COPD (chronic obstructive pulmonary disease) ([facility] [address] and Unspecified cerebral artery occlusion with cerebral infarction. has a past surgical history that includes Appendectomy; hernia repair; joint replacement (12); ECHO Compl W Dop Color Flow (3/16/2013); and Abdomen surgery. Evaluating Therapist: Trinidad Durand PT      Referring Provider:   Dr. Emeli Benson      PT order :  PT eval and treat      Room #:  967   DIAGNOSIS:  A fib with RVR   PRECAUTIONS: falls      Social:  Pt lives with wife  in a  1  floor plan  no steps to enter. Prior to admission pt walked with ww.        Initial Evaluation  Date:  2021  Treatment   2021    Short Term/ Long Term   Goals   Was pt agreeable to Eval/treatment?  yes  Yes      Does pt have pain? None reported  Reports fatigue/worn out      Bed Mobility  Rolling: NT   Supine to sit:  NT   Sit to supine: Mod assist   Scooting:  Min assist   supine to sit : mod assist   SBA    Transfers Sit to stand:  Min assist   Stand to sit: min assist   Stand pivot: NT   Sit <> stand : CGA /min assist   SBA    Ambulation     50  feet with  ww  with CGA  40 feet with walker  CGA/min assist  100 feet with  ww  with SBA    LE ROM  AAROM grossly WFL        LE strength  3- to 4-/ 5        AM- PAC RAW score                  Pt is alert and Oriented, but easily confused . [place]        Balance: CGA/min assist, fall risk due to LE edema and weakness  Edema : B LEs  Endurance: decreased   Bed/Chair alarm:   Yes ASSESSMENT    Pt displays functional ability as noted in the objective portion of this treatment      Conditions Requiring Skilled Therapeutic Intervention:     [x]? Decreased strength                                      []?Decreased ROM  [x]? Decreased functional mobility  [x]? Decreased balance   [x]? Decreased endurance   [x]? Decreased posture  []? Decreased sensation  [x]? Decreased coordination   [x]? Decreased vision  [x]? Decreased safety awareness   []? Increased pain                      Treatment/Education:    Mobility as above. Pt sitting on commode upon arrival; was not wearing O2. Pulse ox at rest on RA 97%. After mobility, 93%. CGA for static standing balance as pt performed hand hygiene at sink. Family requesting for fingernails to be cut   Cues for hand placement with transfers and ww safety. Occ assist needed with ww maneuvering due to decreased vision and decreased motor planning. Pt reports feeling worn out after minimal mobility. Wife present for session      Pt educated on fall risk,  Safety with mobility         Patient response to education:   Pt verbalized understanding Pt demonstrated skill Pt requires further education in this area   x  with cues   x          Comments:  Pt left in bed per pt  request after session, with call light in reach.           PLAN    PT care will be provided in accordance with the objectives noted above. Whenever appropriate, clear delegation orders will be provided for nursing staff. Exercises and functional mobility practice will be used as well as appropriate assistive devices or modalities to obtain goals. Patient and family education will also be administered as needed.           PLAN OF CARE:     Current Treatment Recommendations      [x]? Strengthening to improve independence with functional mobility   []? ROM to improve independence with functional mobility   [x]? Balance Training to improve static/dynamic balance and to reduce fall risk  [x]?  Endurance

## 2021-07-14 NOTE — PROGRESS NOTES
Pharmacy Consultation Note  (Anticoagulant Dosing and Monitoring)    Initial consult date: 7/10/2021  Consulting physician: Dr. Danny Oliva    Allergies:  Patient has no known allergies. Ht Readings from Last 1 Encounters:   07/10/21 6' (1.829 m)     Wt Readings from Last 1 Encounters:   07/13/21 219 lb (99.3 kg)       Warfarin Indication Target   INR [place] Dose  (if applicable) Diet/Feeding Tube   Atrial fibrillation 2-3 Warfarin 1 mg five days a week   (no dose on Monday or Tuesday) Regular diet     Vitamin K or Blood product  Administration Date               Warfarin drug-drug interactions  Start  Stop Home Med?  Comments                                 Hepatic Function Panel:                       Lab Results   Component Value Date    ALKPHOS 82 07/14/2021    ALT 15 07/14/2021    AST 12 07/14/2021    PROT 5.4 07/14/2021    BILITOT 0.8 07/14/2021    LABALBU 3.1 07/14/2021    LABALBU 3.6 10/14/2010       Date Warfarin Dose INR Heparin or LMWH HBG/HCT PLT Comment   7/10 No dose 3.6 ------------ 12.6/41.2 178    7/11 No dose 3.7 ------------ 12.1/39.6 168    7/12 HOLD 3.4 -- 12.1/40 186    7/13 HOLD 3.3 -- 11.4/36.8 169    7/14 1mg 2.4 -- 11.5/38.3 168      Assessment:  · Patient is a 80year old male on warfarin for atrial fibrillation  · Per documentation, patient takes warfarin 1 mg on five days out of the week (no dose on Monday or Tuesday)  · INR goal = 2-3; INR today = 2.4 (therapeutic; decreased from 3.3 yesterday)    Plan:  · Warfarin 1mg tonight  · Daily PT/INR until the INR is stable within the therapeutic range  · Pharmacist will follow and monitor/adjust dosing as necessary    Myriam Guerra PharmD, BCPS 7/14/2021 9:47 AM   Ext: [phone]

## 2021-07-14 NOTE — PROGRESS NOTES
P Quality Flow/Interdisciplinary Rounds Progress Note        Quality Flow Rounds held on July 14, 2021    Disciplines Attending:  Bedside Nurse, ,  and Nursing Unit Leadership    Blanche King was admitted on 7/10/2021  3:19 PM    Anticipated Discharge Date:  Expected Discharge Date: 07/14/21    Disposition:    Dwayne Score:  Dwayne Scale Score: 19    Readmission Risk              Risk of Unplanned Readmission:  22           Discussed patient goal for the day, patient clinical progression, and barriers to discharge. The following Goal(s) of the Day/Commitment(s) have been identified: Monitor labs and vitals, reevaluate PT/OT for discharge planning home vs SNF.     Radha Sahni RN  July 14, 2021

## 2021-07-14 NOTE — PROGRESS NOTES
Subjective: The patient is awake and alert. No problems overnight. Denies chest pain, angina, and dyspnea. Denies abdominal pain. Tolerating diet. No nausea or vomiting. Objective:    /65   Pulse 80   Temp 97.2 °F (36.2 °C) (Oral)   Resp 18   Ht 6' (1.829 m)   Wt 219 lb (99.3 kg)   SpO2 94%   BMI 29.70 kg/m²     Heart:  RRR, no murmurs, gallops, or rubs.   Lungs:  CTA bilaterally, no wheeze, rales or rhonchi  Abd: bowel sounds present, nontender, nondistended, no masses  Extrem:  No clubbing, cyanosis, or edema    CBC with Differential:    Lab Results   Component Value Date    WBC 5.1 07/14/2021    RBC 4.13 07/14/2021    HGB 11.5 07/14/2021    HCT 38.3 07/14/2021     07/14/2021    MCV 92.7 07/14/2021    MCH 27.8 07/14/2021    MCHC 30.0 07/14/2021    RDW 15.7 07/14/2021    SEGSPCT 89 03/16/2013    LYMPHOPCT 37.5 07/14/2021    MONOPCT 9.5 07/14/2021    BASOPCT 0.2 07/14/2021    MONOSABS 0.48 07/14/2021    LYMPHSABS 1.90 07/14/2021    EOSABS 0.14 07/14/2021    BASOSABS 0.01 07/14/2021     CMP:    Lab Results   Component Value Date     07/14/2021    K 4.5 07/14/2021    K 4.0 07/10/2021     07/14/2021    CO2 29 07/14/2021    BUN 29 07/14/2021    CREATININE 1.5 07/14/2021    GFRAA 53 07/14/2021    LABGLOM 44 07/14/2021    GLUCOSE 114 07/14/2021    GLUCOSE 98 10/14/2010    PROT 5.4 07/14/2021    LABALBU 3.1 07/14/2021    LABALBU 3.6 10/14/2010    CALCIUM 8.9 07/14/2021    BILITOT 0.8 07/14/2021    ALKPHOS 82 07/14/2021    AST 12 07/14/2021    ALT 15 07/14/2021     PT/INR:    Lab Results   Component Value Date    PROTIME 26.3 07/14/2021    PROTIME 20.5 10/14/2010    INR 2.4 07/14/2021     @cktotal:3,ckmb:3,ckmbindex:3,troponini:3@  U/A:    Lab Results   Component Value Date    NITRU Negative 05/07/2021    COLORU Yellow 05/07/2021    PHUR 6.0 05/07/2021    WBCUA 2-5 05/07/2021    RBCUA 10-20 05/07/2021    RBCUA NONE 03/15/2013    BACTERIA RARE 05/07/2021    CLARITYU Clear 05/07/2021 SPECGRAV 1.020 05/07/2021    UROBILINOGEN 0.2 05/07/2021    BILIRUBINUR Negative 05/07/2021    BLOODU MODERATE 05/07/2021    GLUCOSEU Negative 05/07/2021    KETUA Negative 05/07/2021        Assessment:    Patient Active Problem List   Diagnosis    [facility]    [facility]    Chest pain    Asthma    CAP (community acquired pneumonia)    GI bleeding    COPD exacerbation ([facility]    [facility] fibrillation with RVR [facility]       Plan:    Atrial fib with RVR-  Improved  Blood pressure improved  Discharge planning  Home vs home health  Weakness remains issue is hgihg fall risk      Jan Kaufman DO  6:26 AM  7/14/2021

## 2021-07-14 NOTE — DISCHARGE INSTR - COC
Continuity of Care Form    Patient Name: Leo Flores   :  1929  MRN:  [id number]    Admit date:  7/10/2021  Discharge date: 21      Code Status Order: Full Code   Advance Directives:      Admitting Physician:  Jan Kaufman DO  PCP: Jan Kaufman DO    Discharging Nurse: [facility] Unit/Room#: 2239/9894-V  Discharging Unit Phone Number: [phone]    Emergency Contact:   Extended Emergency Contact Information  Primary Emergency Contact: [facility]  Address: [address] Phone: [phone]  Relation: Spouse  Secondary Emergency Contact: [address] [place] [address] Phone: [phone]  Mobile Phone: [phone]  Relation: Child   needed?  No    Past Surgical History:  Past Surgical History:   Procedure Laterality Date    ABDOMEN SURGERY      multiple; intestinal abscess; hernia repair;     APPENDECTOMY      ECHO COMPL W DOP COLOR FLOW  3/16/2013         HERNIA REPAIR      JOINT REPLACEMENT  12       Immunization History:   Immunization History   Administered Date(s) Administered    COVID-19, Finnegan Peter, PF, 30mcg/0.3mL 2021, 02/10/2021    Influenza Virus Vaccine 2016, 2020    Influenza, High Dose (Fluzone 65 yrs and older) 2019    Influenza, High-dose, Quadv, 72 yrs +, IM (Fluzone) 2020    Pneumococcal Conjugate Vaccine 2013    Tdap (Boostrix, Adacel) 2017, 2018       Active Problems:  Patient Active Problem List   Diagnosis Code    Atrial fibrillation ([address] I48.91    COPD with exacerbation ([address] J44.1    Chest pain R07.9    Asthma J45.909    CAP (community acquired pneumonia) J18.9    GI bleeding K92.2    COPD exacerbation ([address] J44.1    Atrial fibrillation with RVR ([address] I48.91       Isolation/Infection:   Isolation            No Isolation          Patient Infection Status       Infection Onset Added Last Indicated Last Indicated By Review Planned Expiration Resolved Resolved By    None active    Resolved    COVID-19 Rule Out 05/11/21 05/11/21 05/11/21 Respiratory Panel, Molecular, with COVID-19 (Restricted: peds pts or suitable admitted adults) (Ordered)   05/11/21 Rule-Out Test Resulted    COVID-19 Rule Out 05/07/21 05/07/21 05/07/21 COVID-19, Rapid (Ordered)   05/07/21 Rule-Out Test Resulted            Nurse Assessment:  Last Vital Signs: /67   Pulse 90   Temp 97.4 °F (36.3 °C) (Infrared)   Resp 20   Ht 6' (1.829 m)   Wt 219 lb (99.3 kg)   SpO2 93%   BMI 29.70 kg/m²     Last documented pain score (0-10 scale): Pain Level: 0  Last Weight:   Wt Readings from Last 1 Encounters:   07/13/21 219 lb (99.3 kg)     Mental Status:  oriented    IV Access:  - None    Nursing Mobility/ADLs:  Walking   Assisted  Transfer  Assisted  Bathing  Assisted  Dressing  Assisted  Toileting  Assisted  Feeding  Independent  Med Admin  Assisted  Med Delivery   whole    Wound Care Documentation and Therapy:  Wound 03/23/19 Buttocks Right; Inner 1.0 x 1.0 white area surrounded by reddened periwound that is blanchable (Active)   Number of days: 843       Wound 04/30/21 Brachial Anterior; Left (Active)   Number of days: 74       Wound 05/07/21 Arm Right (Active)   Number of days: 67        Elimination:  Continence:   · Bowel: Yes  · Bladder: Yes  Urinary Catheter: d/c 7/14/21 shine d/c 1430   Colostomy/Ileostomy/Ileal Conduit: No       Date of Last BM: 7/13/21    Intake/Output Summary (Last 24 hours) at 7/14/2021 1302  Last data filed at 7/14/2021 0449  Gross per 24 hour   Intake --   Output 500 ml   Net -500 ml     I/O last 3 completed shifts:  In: -   Out: 1150 [Urine:1150]    Safety Concerns: At Risk for Falls    Impairments/Disabilities:      Vision    Nutrition Therapy:  Current Nutrition Therapy:   - Oral Diet:  General    Routes of Feeding: Oral  Liquids:  Thin Liquids  Daily Fluid Restriction: no  Last Modified Barium Swallow with Video (Video Swallowing Test): not done    Treatments at the Time of Hospital Discharge:   Respiratory Treatments: ***  Oxygen Therapy: room air  Ventilator:    - No ventilator support    Rehab Therapies: Physical Therapy and Occupational Therapy  Weight Bearing Status/Restrictions: No weight bearing restirctions  Other Medical Equipment (for information only, NOT a DME order):  walker  Other Treatments: n/a    Patient's personal belongings (please select all that are sent with patient):  n/a    RN SIGNATURE:  Juaquin      CASE MANAGEMENT/SOCIAL WORK SECTION    Inpatient Status Date: ***    Readmission Risk Assessment Score:  Readmission Risk              Risk of Unplanned Readmission:  23           Discharging to Facility/ Agency   · [facility]  · Address:  · Phone: [phone]  · Fax:    Dialysis Facility (if applicable)   · Name:  · Address:  · Dialysis Schedule:  · Phone:  · Fax:    / signature: Electronically signed by SASHA Streeter on 7/14/2021 at 1:03 PM    PHYSICIAN SECTION    Prognosis: {Prognosis:7917288587}    Condition at Discharge: [address] Patient Condition:[id number]}    Rehab Potential (if transferring to Rehab): {Prognosis:1125336157}    Recommended Labs or Other Treatments After Discharge: ***    Physician Certification: I certify the above information and transfer of Laura Crowe  is necessary for the continuing treatment of the diagnosis listed and that he requires Chinedu Michael for less 30 days.      Update Admission H&P: {CHP DME Changes in [facility]:182011799}    PHYSICIAN SIGNATURE:  Electronically signed by Molly Mojica DO on 7/14/2021 at 2:04 PM

## 2021-07-24 NOTE — DISCHARGE SUMMARY
Patient ID:  Gretchen Avery  [id number]  70 y.o.  2/17/1929    Admit date: 7/10/2021    Discharge date and time: 7/14/2021  5:30 PM     Admission Diagnoses: Atrial fibrillation with RVR ([address] [I48.91]    Discharge Diagnoses: Active Problems:    Atrial fibrillation with RVR (HCC)  Resolved Problems:    * No resolved hospital problems. *        Consults: cardiology    Procedures: none    Hospital Course: Patient presents with increasing shortness of breath and weakness. He was given IV diuretics and rate was controlled he was already on anticoagulation. He had and echocardiogram that showed EF of 60%. He did have moderate pulmonary HTN leading to his edema. He has know CKD. He remained weak and was transferred to [facility] in stable condition. Discharge Exam:  See progress note from today    Disposition: SNF    Patient Instructions:   Discharge Medication List as of 7/14/2021  3:28 PM      START taking these medications    Details   metoprolol tartrate (LOPRESSOR) 25 MG tablet Take 0.5 tablets by mouth 2 times daily, Disp-60 tablet, R-3Normal      digoxin (LANOXIN) 125 MCG tablet Take 1 tablet by mouth daily, Disp-30 tablet, R-3Normal         CONTINUE these medications which have CHANGED    Details   !! insulin lispro (HUMALOG) 100 UNIT/ML injection vial Inject 0-12 Units into the skin 3 times daily (with meals), Disp-1 vial, R-3Normal      !! insulin lispro (HUMALOG) 100 UNIT/ML injection vial Inject 0-6 Units into the skin nightly, Disp-1 vial, R-3Normal       !! - Potential duplicate medications found. Please discuss with provider.       CONTINUE these medications which have NOT CHANGED    Details   ipratropium-albuterol (DUONEB) 0.5-2.5 (3) MG/3ML SOLN nebulizer solution Inhale 3 mLs into the lungs every 4 hours (while awake), Disp-360 mL, R-0Normal      brimonidine (ALPHAGAN) 0.2 % ophthalmic solution Place 1 drop into both eyes 2 times daily, Disp-1 Bottle, R-3Normal      potassium chloride (KLOR-CON M) 10 MEQ extended release tablet Take 10 mEq by mouth daily as needed If takes lasixHistorical Med      mometasone-formoterol (DULERA) 200-5 MCG/ACT inhaler Inhale 1 puff into the lungs dailyHistorical Med      fluticasone (FLOVENT HFA) 110 MCG/ACT inhaler Inhale 1 puff into the lungs daily as neededHistorical Med      glycerin-hypromellose- (ARTIFICIAL TEARS) 0.2-0.2-1 % SOLN opthalmic solution Place 1 drop into both eyes 2 times daily as needed REFRESH PMHistorical Med      warfarin (COUMADIN) 1 MG tablet Skip today's dose, resume 9/21/2017, Disp-30 tablet, R-3Normal      Multiple Vitamins-Minerals (PRESERVISION AREDS 2) CAPS Take 1 capsule by mouth 2 times dailyHistorical Med      docusate sodium (COLACE) 100 MG capsule Take 200 mg by mouth nightlyHistorical Med      tamsulosin (FLOMAX) 0.4 MG capsule Take 0.4 mg by mouth daily      montelukast (SINGULAIR) 10 MG tablet Take 10 mg by mouth nightly. STOP taking these medications       furosemide (LASIX) 40 MG tablet Comments:   Reason for Stopping:         cephALEXin (KEFLEX) 500 MG capsule Comments:   Reason for Stopping:             Activity: activity as tolerated  Diet: cardiac diet    Follow-up with JOSH becerra in 2 days.     Note that over 30 minutes was spent in preparing discharge papers, discussing discharge with patient, medication review, etc.    Signed:  Autumn Walton DO  7/24/2021  7:47 PM

## 2022-02-11 ENCOUNTER — HOSPITAL ENCOUNTER (OUTPATIENT)
Dept: WOUND CARE | Age: 87
Discharge: HOME | End: 2022-02-11
Payer: MEDICARE

## 2022-02-11 VITALS
HEART RATE: 70 BPM | SYSTOLIC BLOOD PRESSURE: 134 MMHG | WEIGHT: 195 LBS | DIASTOLIC BLOOD PRESSURE: 68 MMHG | HEIGHT: 72 IN | RESPIRATION RATE: 18 BRPM | BODY MASS INDEX: 26.41 KG/M2 | TEMPERATURE: 96 F

## 2022-02-11 DIAGNOSIS — L03.031 CELLULITIS OF SECOND TOE OF RIGHT FOOT: ICD-10-CM

## 2022-02-11 DIAGNOSIS — I73.9 PVD (PERIPHERAL VASCULAR DISEASE) (HCC): ICD-10-CM

## 2022-02-11 DIAGNOSIS — I70.235 ATHEROSCLEROSIS OF NATIVE ARTERIES OF RIGHT LEG WITH ULCERATION OF OTHER PART OF FOOT (HCC): ICD-10-CM

## 2022-02-11 PROCEDURE — 6370000000 HC RX 637 (ALT 250 FOR IP): Performed by: PODIATRIST

## 2022-02-11 PROCEDURE — 99203 OFFICE O/P NEW LOW 30 MIN: CPT

## 2022-02-11 PROCEDURE — 99203 OFFICE O/P NEW LOW 30 MIN: CPT | Performed by: PODIATRIST

## 2022-02-11 RX ORDER — LIDOCAINE HYDROCHLORIDE 40 MG/ML
SOLUTION TOPICAL ONCE
Status: CANCELLED | OUTPATIENT
Start: 2022-02-11 | End: 2022-02-11

## 2022-02-11 RX ORDER — LIDOCAINE 50 MG/G
OINTMENT TOPICAL ONCE
Status: CANCELLED | OUTPATIENT
Start: 2022-02-11 | End: 2022-02-11

## 2022-02-11 RX ORDER — LIDOCAINE HYDROCHLORIDE 40 MG/ML
SOLUTION TOPICAL ONCE
Status: COMPLETED | OUTPATIENT
Start: 2022-02-11 | End: 2022-02-11

## 2022-02-11 RX ORDER — LIDOCAINE 40 MG/G
CREAM TOPICAL ONCE
Status: CANCELLED | OUTPATIENT
Start: 2022-02-11 | End: 2022-02-11

## 2022-02-11 RX ORDER — CLOBETASOL PROPIONATE 0.5 MG/G
OINTMENT TOPICAL ONCE
Status: CANCELLED | OUTPATIENT
Start: 2022-02-11 | End: 2022-02-11

## 2022-02-11 RX ORDER — BETAMETHASONE DIPROPIONATE 0.05 %
OINTMENT (GRAM) TOPICAL ONCE
Status: CANCELLED | OUTPATIENT
Start: 2022-02-11 | End: 2022-02-11

## 2022-02-11 RX ORDER — BACITRACIN, NEOMYCIN, POLYMYXIN B 400; 3.5; 5 [USP'U]/G; MG/G; [USP'U]/G
OINTMENT TOPICAL ONCE
Status: CANCELLED | OUTPATIENT
Start: 2022-02-11 | End: 2022-02-11

## 2022-02-11 RX ORDER — CEPHALEXIN 500 MG/1
500 CAPSULE ORAL 2 TIMES DAILY
Qty: 28 CAPSULE | Refills: 0 | Status: SHIPPED | OUTPATIENT
Start: 2022-02-11 | End: 2022-02-25

## 2022-02-11 RX ORDER — GENTAMICIN SULFATE 1 MG/G
OINTMENT TOPICAL ONCE
Status: CANCELLED | OUTPATIENT
Start: 2022-02-11 | End: 2022-02-11

## 2022-02-11 RX ORDER — LIDOCAINE HYDROCHLORIDE 20 MG/ML
JELLY TOPICAL ONCE
Status: CANCELLED | OUTPATIENT
Start: 2022-02-11 | End: 2022-02-11

## 2022-02-11 RX ORDER — GINSENG 100 MG
CAPSULE ORAL ONCE
Status: CANCELLED | OUTPATIENT
Start: 2022-02-11 | End: 2022-02-11

## 2022-02-11 RX ORDER — BACITRACIN ZINC AND POLYMYXIN B SULFATE 500; 1000 [USP'U]/G; [USP'U]/G
OINTMENT TOPICAL ONCE
Status: CANCELLED | OUTPATIENT
Start: 2022-02-11 | End: 2022-02-11

## 2022-02-11 RX ADMIN — LIDOCAINE HYDROCHLORIDE 3 ML: 40 SOLUTION TOPICAL at 15:39

## 2022-02-11 NOTE — PROGRESS NOTES
[facility] Followup Visit Note    Referring Physician : Antonieta Eleni   [address] RECORD NUMBER:  [id number]  AGE: 80 y.o. GENDER: male  : 1929  EPISODE DATE:  2022    Subjective:     Chief Complaint   Patient presents with    Wound Check     right foot      HISTORY of PRESENT ILLNESS PRABHU Westbrook is a 80 y.o. male who presents today in regards to follow up evaluation and treatment of wound/ulcer. That patient's past medical, family and social hx were reviewed and changes were made if present. History of Wound Context: Patient noticed superficial ulcerations to multiple digital regions right foot. Patient was sent here by his podiatrist for evaluation and care. Patient is in no acute distress. He denies any nausea, vomiting, fever, chills. Patient's daughter-in-law presents with him today and he has been soaking his foot in Epson salts 2-3 times a day. No other additional abnormalities noted.     Wound/Ulcer Pain Timing/Severity: mild  Quality of pain: aching  Severity:  3 / 10   Modifying Factors: Pain is relieved/improved with rest  Associated Signs/Symptoms: pain    Ulcer Identification:  Ulcer Type: arterial  Contributing Factors: arterial insufficiency    Diabetic/Pressure/Non Pressure Ulcers only:  Ulcer: Non-Pressure ulcer, fat layer exposed    Wound: Abrasion        PAST MEDICAL HISTORY      Diagnosis Date    Asthma     COPD (chronic obstructive pulmonary disease) (HCC)     Unspecified cerebral artery occlusion with cerebral infarction      Past Surgical History:   Procedure Laterality Date    ABDOMEN SURGERY      multiple; intestinal abscess; hernia repair;     APPENDECTOMY      ECHO COMPL W DOP COLOR FLOW  3/16/2013         HERNIA REPAIR      JOINT REPLACEMENT  12     Family History   Problem Relation Age of Onset    Cancer Mother         stomach    Cancer Father         lung    No Known Problems Sister      Social History     Tobacco Use  Smoking status: Former Smoker     Packs/day: 1.00     Types: Cigarettes     Start date: 1945     Quit date: 1982     Years since quittin.8    Smokeless tobacco: Never Used   Vaping Use    Vaping Use: Never used   Substance Use Topics    Alcohol use: No    Drug use: Never     No Known Allergies  Current Outpatient Medications on File Prior to Encounter   Medication Sig Dispense Refill    metoprolol tartrate (LOPRESSOR) 25 MG tablet Take 0.5 tablets by mouth 2 times daily 60 tablet 3    insulin lispro (HUMALOG) 100 UNIT/ML injection vial Inject 0-12 Units into the skin 3 times daily (with meals) 1 vial 3    insulin lispro (HUMALOG) 100 UNIT/ML injection vial Inject 0-6 Units into the skin nightly 1 vial 3    digoxin (LANOXIN) 125 MCG tablet Take 1 tablet by mouth daily 30 tablet 3    ipratropium-albuterol (DUONEB) 0.5-2.5 (3) MG/3ML SOLN nebulizer solution Inhale 3 mLs into the lungs every 4 hours (while awake) 360 mL 0    brimonidine (ALPHAGAN) 0.2 % ophthalmic solution Place 1 drop into both eyes 2 times daily 1 Bottle 3    potassium chloride (KLOR-CON M) 10 MEQ extended release tablet Take 10 mEq by mouth daily as needed If takes lasix      mometasone-formoterol (DULERA) 200-5 MCG/ACT inhaler Inhale 1 puff into the lungs daily      fluticasone (FLOVENT HFA) 110 MCG/ACT inhaler Inhale 1 puff into the lungs daily as needed      glycerin-hypromellose- (ARTIFICIAL TEARS) 0.2-0.2-1 % SOLN opthalmic solution Place 1 drop into both eyes 2 times daily as needed REFRESH PM      warfarin (COUMADIN) 1 MG tablet Skip today's dose, resume 2017 (Patient taking differently: Take 1 mg by mouth Five times weekly Indications: Cerebral Infarction Does not take on Monday or Tuesday.  Takes nightly at dinner  All information provided by shahab Rodriguez) 30 tablet 3    Multiple Vitamins-Minerals (PRESERVISION AREDS 2) CAPS Take 1 capsule by mouth 2 times daily      docusate sodium (COLACE) 100 MG capsule Take 200 mg by mouth nightly      tamsulosin (FLOMAX) 0.4 MG capsule Take 0.4 mg by mouth daily      montelukast (SINGULAIR) 10 MG tablet Take 10 mg by mouth nightly. No current facility-administered medications on file prior to encounter. REVIEW OF SYSTEMS See HPI    Objective:    /68   Pulse 70   Temp 96 °F (35.6 °C) (Temporal)   Resp 18   Ht 6' (1.829 m)   Wt 195 lb (88.5 kg)   BMI 26.45 kg/m²   Wt Readings from Last 3 Encounters:   02/11/22 195 lb (88.5 kg)   07/13/21 219 lb (99.3 kg)   05/11/21 221 lb 4 oz (100.4 kg)     PHYSICAL EXAM  CONSTITUTIONAL:   Awake, alert, cooperative and in no acute distress  SKIN:  Open wound Present    Assessment:     Problem List Items Addressed This Visit     Atherosclerosis of native arteries of right leg with ulceration of other part of foot ([facility]    PVD (peripheral vascular disease) ([facility]    Cellulitis of second toe of right foot          Pre Debridement Measurements:  Are located in the [place]  Documentation Flow Sheet  Post Debridement Measurements:  Wound/Ulcer Descriptions are Pre Debridement except measurements:     Wound 03/23/19 Buttocks Right; Inner 1.0 x 1.0 white area surrounded by reddened periwound that is blanchable (Active)   Number of days: 1056       Wound 04/30/21 Brachial Anterior; Left (Active)   Number of days: 286       Wound 05/07/21 Arm Right (Active)   Number of days: 279       Wound 02/11/22 Toe (Comment  which one) Right;Medial #1 second toe (Active)   Wound Image   02/11/22 1531   Wound Length (cm) 2.6 cm 02/11/22 1531   Wound Width (cm) 1.5 cm 02/11/22 1531   Wound Depth (cm) 0.2 cm 02/11/22 1531   Wound Surface Area (cm^2) 3.9 cm^2 02/11/22 1531   Wound Volume (cm^3) 0.78 cm^3 02/11/22 1531   Drainage Amount Moderate 02/11/22 1531   Drainage Description Serosanguinous 02/11/22 1531   Odor None 02/11/22 1531   Rand-wound Assessment Fragile 02/11/22 1531   Number of days: 0       Wound 02/11/22 Toe (Comment  which one) Right;Lateral #2 third  toe (Active)   Wound Image   02/11/22 1531   Wound Length (cm) 1.1 cm 02/11/22 1531   Wound Width (cm) 1 cm 02/11/22 1531   Wound Depth (cm) 0.1 cm 02/11/22 1531   Wound Surface Area (cm^2) 1.1 cm^2 02/11/22 1531   Wound Volume (cm^3) 0.11 cm^3 02/11/22 1531   Wound Assessment Fibrin;Pink/red 02/11/22 1531   Drainage Amount Scant 02/11/22 1531   Drainage Description Serosanguinous 02/11/22 1531   Odor None 02/11/22 1531   Rand-wound Assessment Fragile 02/11/22 1531   Number of days: 0       Wound 02/11/22 Toe (Comment  which one) Right;Lateral #3 second lateral (Active)   Wound Image   02/11/22 1531   Wound Length (cm) 2 cm 02/11/22 1531   Wound Width (cm) 1 cm 02/11/22 1531   Wound Depth (cm) 0.2 cm 02/11/22 1531   Wound Surface Area (cm^2) 2 cm^2 02/11/22 1531   Wound Volume (cm^3) 0.4 cm^3 02/11/22 1531   Drainage Amount Small 02/11/22 1531   Drainage Description Serosanguinous 02/11/22 1531   Odor None 02/11/22 1531   Rand-wound Assessment Fragile 02/11/22 1531   Number of days: 0          Procedure Note  Indications:  Based on my examination of this patient's wound(s)/ulcer(s) today, debridement is not required to promote healing and evaluate the wound base. No debridement performed on today's visit. Prescription medication Keflex given with exact instructions on usage. I discussed the potential side effects that the patient may experience with the medication and the need to call if they experience any. We did discuss dressing changes with patient and his daughter-in-law today. We are going to set up home nursing for appropriate wound care. Patient will be followed up for continued evaluation and management. Plan:   Treatment Note please see attached Discharge Instructions    Written patient dismissal instructions given to patient and signed by patient or POA.          Discharge Instructions       Visit Discharge/Physician Orders    Discharge condition: Stable    Assessment of pain at discharge: mild    Anesthetic used: 4% Lidocaine    Discharge to: Home    Left via:Private automobile    Accompanied by: accompanied by family member    ECF/HHA:     Dressing Orders: Cleanse wounds right foot with normal saline. Treatment Orders: Elevate legs when seated. Follow a nutritious diet. [address] [place] [address],[address] followup visit ______1 week_______Dr. Miner________________  (Please note your next appointment above and if you are unable to keep, kindly give a 24 hour notice. Thank you.)    Physician signature:__________________________      If you experience any of the following, please call the [facility] during business hours:    * Increase in Pain  * Temperature over 101  * Increase in drainage from your wound  * Drainage with a foul odor  * Bleeding  * Increase in swelling  * Need for compression bandage changes due to slippage, breakthrough drainage. If you need medical attention outside of the business hours of the [facility] please contact your PCP or go to the nearest emergency room.         Electronically signed by Esau Majano DPM on 2/11/2022 at 4:09 PM

## 2022-02-18 ENCOUNTER — HOSPITAL ENCOUNTER (OUTPATIENT)
Dept: WOUND CARE | Age: 87
Discharge: HOME | End: 2022-02-18
Payer: MEDICARE

## 2022-02-18 VITALS
HEART RATE: 88 BPM | BODY MASS INDEX: 26.41 KG/M2 | TEMPERATURE: 96.8 F | SYSTOLIC BLOOD PRESSURE: 168 MMHG | RESPIRATION RATE: 18 BRPM | HEIGHT: 72 IN | DIASTOLIC BLOOD PRESSURE: 70 MMHG | WEIGHT: 195 LBS

## 2022-02-18 DIAGNOSIS — I73.9 PVD (PERIPHERAL VASCULAR DISEASE) (HCC): ICD-10-CM

## 2022-02-18 DIAGNOSIS — L03.031 CELLULITIS OF SECOND TOE OF RIGHT FOOT: ICD-10-CM

## 2022-02-18 DIAGNOSIS — I70.235 ATHEROSCLEROSIS OF NATIVE ARTERIES OF RIGHT LEG WITH ULCERATION OF OTHER PART OF FOOT (HCC): Primary | ICD-10-CM

## 2022-02-18 PROCEDURE — 99213 OFFICE O/P EST LOW 20 MIN: CPT | Performed by: PODIATRIST

## 2022-02-18 PROCEDURE — 99213 OFFICE O/P EST LOW 20 MIN: CPT

## 2022-02-18 RX ORDER — LIDOCAINE HYDROCHLORIDE 20 MG/ML
JELLY TOPICAL ONCE
Status: CANCELLED | OUTPATIENT
Start: 2022-02-18 | End: 2022-02-18

## 2022-02-18 RX ORDER — LIDOCAINE 50 MG/G
OINTMENT TOPICAL ONCE
Status: CANCELLED | OUTPATIENT
Start: 2022-02-18 | End: 2022-02-18

## 2022-02-18 RX ORDER — GINSENG 100 MG
CAPSULE ORAL ONCE
Status: CANCELLED | OUTPATIENT
Start: 2022-02-18 | End: 2022-02-18

## 2022-02-18 RX ORDER — BACITRACIN ZINC AND POLYMYXIN B SULFATE 500; 1000 [USP'U]/G; [USP'U]/G
OINTMENT TOPICAL ONCE
Status: CANCELLED | OUTPATIENT
Start: 2022-02-18 | End: 2022-02-18

## 2022-02-18 RX ORDER — BACITRACIN, NEOMYCIN, POLYMYXIN B 400; 3.5; 5 [USP'U]/G; MG/G; [USP'U]/G
OINTMENT TOPICAL ONCE
Status: CANCELLED | OUTPATIENT
Start: 2022-02-18 | End: 2022-02-18

## 2022-02-18 RX ORDER — GENTAMICIN SULFATE 1 MG/G
OINTMENT TOPICAL ONCE
Status: CANCELLED | OUTPATIENT
Start: 2022-02-18 | End: 2022-02-18

## 2022-02-18 RX ORDER — LIDOCAINE HYDROCHLORIDE 40 MG/ML
SOLUTION TOPICAL ONCE
Status: CANCELLED | OUTPATIENT
Start: 2022-02-18 | End: 2022-02-18

## 2022-02-18 RX ORDER — LIDOCAINE 40 MG/G
CREAM TOPICAL ONCE
Status: CANCELLED | OUTPATIENT
Start: 2022-02-18 | End: 2022-02-18

## 2022-02-18 RX ORDER — CLOBETASOL PROPIONATE 0.5 MG/G
OINTMENT TOPICAL ONCE
Status: CANCELLED | OUTPATIENT
Start: 2022-02-18 | End: 2022-02-18

## 2022-02-18 RX ORDER — BETAMETHASONE DIPROPIONATE 0.05 %
OINTMENT (GRAM) TOPICAL ONCE
Status: CANCELLED | OUTPATIENT
Start: 2022-02-18 | End: 2022-02-18

## 2022-02-18 NOTE — PROGRESS NOTES
[facility]aling Center Followup Visit Note    Referring Physician : Katty Hodge DO  [address] RECORD NUMBER:  [id number]  AGE: 80 y.o. GENDER: male  : 1929  EPISODE DATE:  2022    Subjective:     Chief Complaint   Patient presents with    Wound Check     right foot      HISTORY of PRESENT ILLNESS HPI   Davon Porras is a 80 y.o. male who presents today in regards to follow up evaluation and treatment of wound/ulcer. That patient's past medical, family and social hx were reviewed and changes were made if present. History of Wound Context: Patient is seen today with his daughter-in-law for continued evaluation regarding arterial ulcerations right foot. Patient still having some discomfort into the digital regions. Patient has been taking the oral antibiotic as prescribed. Patient is in no acute distress. He denies any nausea, vomiting, fever, chills. Home nursing has been coming out and performing dressing changes. No other additional abnormalities noted.     Wound/Ulcer Pain Timing/Severity: mild  Quality of pain: aching  Severity:  3 / 10   Modifying Factors: Pain is relieved/improved with rest  Associated Signs/Symptoms: pain    Ulcer Identification:  Ulcer Type: arterial  Contributing Factors: decreased mobility and arterial insufficiency    Diabetic/Pressure/Non Pressure Ulcers only:  Ulcer: Non-Pressure ulcer, fat layer exposed    Wound: Abrasion        PAST MEDICAL HISTORY      Diagnosis Date    Asthma     COPD (chronic obstructive pulmonary disease) (HCC)     Unspecified cerebral artery occlusion with cerebral infarction      Past Surgical History:   Procedure Laterality Date    ABDOMEN SURGERY      multiple; intestinal abscess; hernia repair;     APPENDECTOMY      ECHO COMPL W DOP COLOR FLOW  3/16/2013         HERNIA REPAIR      JOINT REPLACEMENT  12     Family History   Problem Relation Age of Onset    Cancer Mother         stomach    Cancer Father         lung  No Known Problems Sister      Social History     Tobacco Use    Smoking status: Former Smoker     Packs/day: 1.00     Types: Cigarettes     Start date: 1945     Quit date: 1982     Years since quittin.9    Smokeless tobacco: Never Used   Vaping Use    Vaping Use: Never used   Substance Use Topics    Alcohol use: No    Drug use: Never     No Known Allergies  Current Outpatient Medications on File Prior to Encounter   Medication Sig Dispense Refill    apixaban (ELIQUIS) 2.5 MG TABS tablet Take by mouth 2 times daily      cephALEXin (KEFLEX) 500 MG capsule Take 1 capsule by mouth 2 times daily for 14 days 28 capsule 0    digoxin (LANOXIN) 125 MCG tablet Take 1 tablet by mouth daily 30 tablet 3    brimonidine (ALPHAGAN) 0.2 % ophthalmic solution Place 1 drop into both eyes 2 times daily 1 Bottle 3    potassium chloride (KLOR-CON M) 10 MEQ extended release tablet Take 20 mEq by mouth daily as needed If takes lasix       fluticasone (FLOVENT HFA) 110 MCG/ACT inhaler Inhale 1 puff into the lungs daily as needed      Multiple Vitamins-Minerals (PRESERVISION AREDS 2) CAPS Take 1 capsule by mouth 2 times daily      docusate sodium (COLACE) 100 MG capsule Take 200 mg by mouth nightly      tamsulosin (FLOMAX) 0.4 MG capsule Take 0.4 mg by mouth daily      montelukast (SINGULAIR) 10 MG tablet Take 10 mg by mouth nightly.  ipratropium-albuterol (DUONEB) 0.5-2.5 (3) MG/3ML SOLN nebulizer solution Inhale 3 mLs into the lungs every 4 hours (while awake) 360 mL 0    mometasone-formoterol (DULERA) 200-5 MCG/ACT inhaler Inhale 1 puff into the lungs daily      glycerin-hypromellose- (ARTIFICIAL TEARS) 0.2-0.2-1 % SOLN opthalmic solution Place 1 drop into both eyes 2 times daily as needed REFRESH PM       No current facility-administered medications on file prior to encounter.        REVIEW OF SYSTEMS See HPI    Objective:    BP (!) 168/70   Pulse 88   Temp 96.8 °F (36 °C) (Temporal) Resp 18   Ht 6' (1.829 m)   Wt 195 lb (88.5 kg)   BMI 26.45 kg/m²   Wt Readings from Last 3 Encounters:   02/18/22 195 lb (88.5 kg)   02/11/22 195 lb (88.5 kg)   07/13/21 219 lb (99.3 kg)     PHYSICAL EXAM  CONSTITUTIONAL:   Awake, alert, cooperative and in no acute distress  SKIN:  Open wound Present    Assessment:     Problem List Items Addressed This Visit     Atherosclerosis of native arteries of right leg with ulceration of other part of foot ([facility] [address] - Primary    Relevant Orders    Initiate Outpatient Wound Care Protocol    PVD (peripheral vascular disease) ([facility] [address]    Cellulitis of second toe of right foot    Relevant Orders    Initiate Outpatient Wound Care Protocol          Pre Debridement Measurements:  Are located in the [place]  Documentation Flow Sheet  Post Debridement Measurements:  Wound/Ulcer Descriptions are Pre Debridement except measurements:     Wound 03/23/19 Buttocks Right; Inner 1.0 x 1.0 white area surrounded by reddened periwound that is blanchable (Active)   Number of days: 1063       Wound 04/30/21 Brachial Anterior; Left (Active)   Number of days: 293       Wound 05/07/21 Arm Right (Active)   Number of days: 286       Wound 02/11/22 Toe (Comment  which one) Right;Medial #1 second toe (Active)   Wound Image   02/11/22 1531   Dressing Status New dressing applied 02/18/22 1540   Wound Cleansed Betadine/povidone iodine 02/18/22 1540   Dressing/Treatment Alginate with Ag 02/18/22 1540   Offloading for Diabetic Foot Ulcers Post op shoe 02/18/22 1540   Wound Length (cm) 2.3 cm 02/18/22 1417   Wound Width (cm) 1.2 cm 02/18/22 1417   Wound Depth (cm) 0.1 cm 02/18/22 1417   Wound Surface Area (cm^2) 2.76 cm^2 02/18/22 1417   Change in Wound Size % (l*w) 29.23 02/18/22 1417   Wound Volume (cm^3) 0.276 cm^3 02/18/22 1417   Wound Healing % 65 02/18/22 1417   Wound Assessment Fibrin;Pink/red 02/18/22 1417   Drainage Amount Moderate 02/18/22 1417   Drainage Description Serosanguinous 02/18/22 1417   Odor None 02/18/22 1417   Rand-wound Assessment Intact 02/18/22 1417   Number of days: 7       Wound 02/11/22 Toe (Comment  which one) Right;Lateral #2 third  toe (Active)   Wound Image   02/11/22 1531   Dressing Status New dressing applied 02/18/22 1540   Wound Cleansed Betadine/povidone iodine 02/18/22 1540   Dressing/Treatment Alginate with Ag 02/18/22 1540   Offloading for Diabetic Foot Ulcers Post op shoe 02/18/22 1540   Wound Length (cm) 0.5 cm 02/18/22 1417   Wound Width (cm) 0.6 cm 02/18/22 1417   Wound Depth (cm) 0.1 cm 02/18/22 1417   Wound Surface Area (cm^2) 0.3 cm^2 02/18/22 1417   Change in Wound Size % (l*w) 72.73 02/18/22 1417   Wound Volume (cm^3) 0.03 cm^3 02/18/22 1417   Wound Healing % 73 02/18/22 1417   Wound Assessment Fibrin;Pink/red 02/18/22 1417   Drainage Amount Moderate 02/18/22 1417   Drainage Description Serosanguinous 02/18/22 1417   Odor None 02/18/22 1417   Rand-wound Assessment Intact 02/18/22 1417   Number of days: 7       Wound 02/11/22 Toe (Comment  which one) Right;Lateral #3 second lateral (Active)   Wound Image   02/18/22 1417   Dressing Status New dressing applied 02/18/22 1540   Wound Cleansed Betadine/povidone iodine 02/18/22 1540   Dressing/Treatment Alginate with Ag 02/18/22 1540   Offloading for Diabetic Foot Ulcers Post op shoe 02/18/22 1540   Wound Length (cm) 0 cm 02/18/22 1417   Wound Width (cm) 0 cm 02/18/22 1417   Wound Depth (cm) 0 cm 02/18/22 1417   Wound Surface Area (cm^2) 0 cm^2 02/18/22 1417   Change in Wound Size % (l*w) 100 02/18/22 1417   Wound Volume (cm^3) 0 cm^3 02/18/22 1417   Wound Healing % 100 02/18/22 1417   Drainage Amount Small 02/11/22 1531   Drainage Description Serosanguinous 02/11/22 1531   Odor None 02/11/22 1531   Rand-wound Assessment Fragile 02/11/22 1531   Number of days: 7       Wound 02/18/22 Toe (Comment  which one) Right;Lateral #4 4th toe (Active)   Wound Image   02/18/22 1417   Dressing Status New dressing applied 02/18/22 1540   Wound Cleansed Betadine/povidone iodine 02/18/22 1540   Dressing/Treatment Alginate with Ag 02/18/22 1540   Offloading for Diabetic Foot Ulcers Offloading not ordered 02/18/22 1540   Wound Length (cm) 0 cm 02/18/22 1417   Wound Width (cm) 0 cm 02/18/22 1417   Wound Depth (cm) 0 cm 02/18/22 1417   Wound Surface Area (cm^2) 0 cm^2 02/18/22 1417   Wound Volume (cm^3) 0 cm^3 02/18/22 1417   Wound Assessment Fibrin;Pink/red 02/18/22 1417   Drainage Amount Moderate 02/18/22 1417   Drainage Description Yellow 02/18/22 1417   Odor None 02/18/22 1417   Rand-wound Assessment Intact 02/18/22 1417   Number of days: 0          Procedure Note  Indications:  Based on my examination of this patient's wound(s)/ulcer(s) today, debridement is not required to promote healing and evaluate the wound base. No debridement performed on today's visit. Did recommend continued dressing changes to the wound sites per nursing staff and family members. Patient is to continue taking the oral antibiotics as instructed. Patient's family member was advised on continued use of his good supportive shoe gear to prevent any potential digital irritation issues bilaterally. Plan:   Treatment Note please see attached Discharge Instructions    Written patient dismissal instructions given to patient and signed by patient or POA. Discharge Instructions       Visit Discharge/Physician Orders     Discharge condition: Stable     Assessment of pain at discharge: mild     Anesthetic used: 4% Lidocaine     Discharge to: Home     Left via:Private automobile     Accompanied by: accompanied by family member     ECF/HHA: [place]     Dressing Orders: Cleanse wounds right foot with normal saline. Continue to use betadine between 1st and 3rd toes, followed by silver alginate in between toes as well. Do not need to cover with gauze.       Treatment Orders: Elevate legs when seated. Follow a nutritious diet.  Try to keep foot dry, schedule bathing around dressing changes. May soak in epsom salts before home care visits. Make sure feet and toes are dry before dressings are put in place. [facility] followup visit ______1 week_______Dr. Miner________________  (Please note your next appointment above and if you are unable to keep, kindly give a 24 hour notice.  Thank you.)     Physician signature:__________________________        If you experience any of the following, please call the [facility] during business hours:     * Increase in Pain  * Temperature over 101  * Increase in drainage from your wound  * Drainage with a foul odor  * Bleeding  * Increase in swelling  * Need for compression bandage changes due to slippage, breakthrough drainage.     If you need medical attention outside of the business hours of the [facility] please contact your PCP or go to the nearest emergency room.                 Electronically signed by Kevyn Barrow DPM on 2/18/2022 at 4:25 PM

## 2022-02-25 ENCOUNTER — HOSPITAL ENCOUNTER (OUTPATIENT)
Dept: WOUND CARE | Age: 87
Discharge: HOME | End: 2022-02-25
Payer: MEDICARE

## 2022-02-25 VITALS
BODY MASS INDEX: 26.41 KG/M2 | HEIGHT: 72 IN | WEIGHT: 195 LBS | DIASTOLIC BLOOD PRESSURE: 62 MMHG | RESPIRATION RATE: 18 BRPM | HEART RATE: 82 BPM | TEMPERATURE: 96.5 F | SYSTOLIC BLOOD PRESSURE: 120 MMHG

## 2022-02-25 DIAGNOSIS — I73.9 PVD (PERIPHERAL VASCULAR DISEASE) (HCC): ICD-10-CM

## 2022-02-25 DIAGNOSIS — I70.235 ATHEROSCLEROSIS OF NATIVE ARTERIES OF RIGHT LEG WITH ULCERATION OF OTHER PART OF FOOT (HCC): Primary | ICD-10-CM

## 2022-02-25 DIAGNOSIS — L03.031 CELLULITIS OF SECOND TOE OF RIGHT FOOT: ICD-10-CM

## 2022-02-25 PROCEDURE — 99213 OFFICE O/P EST LOW 20 MIN: CPT

## 2022-02-25 PROCEDURE — 6370000000 HC RX 637 (ALT 250 FOR IP): Performed by: PODIATRIST

## 2022-02-25 PROCEDURE — 99213 OFFICE O/P EST LOW 20 MIN: CPT | Performed by: PODIATRIST

## 2022-02-25 RX ORDER — GINSENG 100 MG
CAPSULE ORAL ONCE
Status: CANCELLED | OUTPATIENT
Start: 2022-02-25 | End: 2022-02-25

## 2022-02-25 RX ORDER — LIDOCAINE HYDROCHLORIDE 20 MG/ML
JELLY TOPICAL ONCE
Status: CANCELLED | OUTPATIENT
Start: 2022-02-25 | End: 2022-02-25

## 2022-02-25 RX ORDER — LIDOCAINE 50 MG/G
OINTMENT TOPICAL ONCE
Status: CANCELLED | OUTPATIENT
Start: 2022-02-25 | End: 2022-02-25

## 2022-02-25 RX ORDER — LIDOCAINE HYDROCHLORIDE 40 MG/ML
SOLUTION TOPICAL ONCE
Status: CANCELLED | OUTPATIENT
Start: 2022-02-25 | End: 2022-02-25

## 2022-02-25 RX ORDER — BACITRACIN, NEOMYCIN, POLYMYXIN B 400; 3.5; 5 [USP'U]/G; MG/G; [USP'U]/G
OINTMENT TOPICAL ONCE
Status: CANCELLED | OUTPATIENT
Start: 2022-02-25 | End: 2022-02-25

## 2022-02-25 RX ORDER — GENTAMICIN SULFATE 1 MG/G
OINTMENT TOPICAL ONCE
Status: CANCELLED | OUTPATIENT
Start: 2022-02-25 | End: 2022-02-25

## 2022-02-25 RX ORDER — LIDOCAINE HYDROCHLORIDE 40 MG/ML
SOLUTION TOPICAL ONCE
Status: COMPLETED | OUTPATIENT
Start: 2022-02-25 | End: 2022-02-25

## 2022-02-25 RX ORDER — BETAMETHASONE DIPROPIONATE 0.05 %
OINTMENT (GRAM) TOPICAL ONCE
Status: CANCELLED | OUTPATIENT
Start: 2022-02-25 | End: 2022-02-25

## 2022-02-25 RX ORDER — BACITRACIN ZINC AND POLYMYXIN B SULFATE 500; 1000 [USP'U]/G; [USP'U]/G
OINTMENT TOPICAL ONCE
Status: CANCELLED | OUTPATIENT
Start: 2022-02-25 | End: 2022-02-25

## 2022-02-25 RX ORDER — CLOBETASOL PROPIONATE 0.5 MG/G
OINTMENT TOPICAL ONCE
Status: CANCELLED | OUTPATIENT
Start: 2022-02-25 | End: 2022-02-25

## 2022-02-25 RX ORDER — LIDOCAINE 40 MG/G
CREAM TOPICAL ONCE
Status: CANCELLED | OUTPATIENT
Start: 2022-02-25 | End: 2022-02-25

## 2022-02-25 RX ADMIN — LIDOCAINE HYDROCHLORIDE 3 ML: 40 SOLUTION TOPICAL at 16:15

## 2022-02-25 ASSESSMENT — PAIN SCALES - GENERAL: PAINLEVEL_OUTOF10: 0

## 2022-03-11 ENCOUNTER — HOSPITAL ENCOUNTER (OUTPATIENT)
Dept: WOUND CARE | Age: 87
Discharge: HOME | End: 2022-03-11
Payer: MEDICARE

## 2022-03-11 VITALS
TEMPERATURE: 96.8 F | HEART RATE: 98 BPM | SYSTOLIC BLOOD PRESSURE: 118 MMHG | WEIGHT: 195 LBS | HEIGHT: 72 IN | RESPIRATION RATE: 18 BRPM | BODY MASS INDEX: 26.41 KG/M2 | DIASTOLIC BLOOD PRESSURE: 60 MMHG

## 2022-03-11 DIAGNOSIS — L03.031 CELLULITIS OF SECOND TOE OF RIGHT FOOT: ICD-10-CM

## 2022-03-11 DIAGNOSIS — I70.235 ATHEROSCLEROSIS OF NATIVE ARTERIES OF RIGHT LEG WITH ULCERATION OF OTHER PART OF FOOT (HCC): Primary | ICD-10-CM

## 2022-03-11 PROCEDURE — 99213 OFFICE O/P EST LOW 20 MIN: CPT

## 2022-03-11 PROCEDURE — 6370000000 HC RX 637 (ALT 250 FOR IP): Performed by: PODIATRIST

## 2022-03-11 PROCEDURE — 99213 OFFICE O/P EST LOW 20 MIN: CPT | Performed by: PODIATRIST

## 2022-03-11 RX ORDER — LIDOCAINE 40 MG/G
CREAM TOPICAL ONCE
Status: CANCELLED | OUTPATIENT
Start: 2022-03-11 | End: 2022-03-11

## 2022-03-11 RX ORDER — GINSENG 100 MG
CAPSULE ORAL ONCE
Status: CANCELLED | OUTPATIENT
Start: 2022-03-11 | End: 2022-03-11

## 2022-03-11 RX ORDER — BETAMETHASONE DIPROPIONATE 0.05 %
OINTMENT (GRAM) TOPICAL ONCE
Status: CANCELLED | OUTPATIENT
Start: 2022-03-11 | End: 2022-03-11

## 2022-03-11 RX ORDER — LIDOCAINE HYDROCHLORIDE 20 MG/ML
JELLY TOPICAL ONCE
Status: CANCELLED | OUTPATIENT
Start: 2022-03-11 | End: 2022-03-11

## 2022-03-11 RX ORDER — LIDOCAINE 50 MG/G
OINTMENT TOPICAL ONCE
Status: CANCELLED | OUTPATIENT
Start: 2022-03-11 | End: 2022-03-11

## 2022-03-11 RX ORDER — CLOBETASOL PROPIONATE 0.5 MG/G
OINTMENT TOPICAL ONCE
Status: CANCELLED | OUTPATIENT
Start: 2022-03-11 | End: 2022-03-11

## 2022-03-11 RX ORDER — BACITRACIN ZINC AND POLYMYXIN B SULFATE 500; 1000 [USP'U]/G; [USP'U]/G
OINTMENT TOPICAL ONCE
Status: CANCELLED | OUTPATIENT
Start: 2022-03-11 | End: 2022-03-11

## 2022-03-11 RX ORDER — LIDOCAINE HYDROCHLORIDE 40 MG/ML
SOLUTION TOPICAL ONCE
Status: COMPLETED | OUTPATIENT
Start: 2022-03-11 | End: 2022-03-11

## 2022-03-11 RX ORDER — BACITRACIN, NEOMYCIN, POLYMYXIN B 400; 3.5; 5 [USP'U]/G; MG/G; [USP'U]/G
OINTMENT TOPICAL ONCE
Status: CANCELLED | OUTPATIENT
Start: 2022-03-11 | End: 2022-03-11

## 2022-03-11 RX ORDER — LIDOCAINE HYDROCHLORIDE 40 MG/ML
SOLUTION TOPICAL ONCE
Status: CANCELLED | OUTPATIENT
Start: 2022-03-11 | End: 2022-03-11

## 2022-03-11 RX ORDER — GENTAMICIN SULFATE 1 MG/G
OINTMENT TOPICAL ONCE
Status: CANCELLED | OUTPATIENT
Start: 2022-03-11 | End: 2022-03-11

## 2022-03-11 RX ADMIN — LIDOCAINE HYDROCHLORIDE 5 ML: 40 SOLUTION TOPICAL at 15:44

## 2022-03-11 ASSESSMENT — PAIN DESCRIPTION - LOCATION: LOCATION: FOOT

## 2022-03-11 ASSESSMENT — PAIN DESCRIPTION - PROGRESSION: CLINICAL_PROGRESSION: NOT CHANGED

## 2022-03-11 ASSESSMENT — PAIN DESCRIPTION - ONSET: ONSET: AWAKENED FROM SLEEP

## 2022-03-11 ASSESSMENT — PAIN DESCRIPTION - DESCRIPTORS: DESCRIPTORS: ACHING

## 2022-03-11 ASSESSMENT — PAIN DESCRIPTION - ORIENTATION: ORIENTATION: RIGHT

## 2022-03-11 ASSESSMENT — PAIN DESCRIPTION - PAIN TYPE: TYPE: CHRONIC PAIN

## 2022-03-11 ASSESSMENT — PAIN - FUNCTIONAL ASSESSMENT: PAIN_FUNCTIONAL_ASSESSMENT: PREVENTS OR INTERFERES SOME ACTIVE ACTIVITIES AND ADLS

## 2022-03-11 ASSESSMENT — PAIN DESCRIPTION - FREQUENCY: FREQUENCY: INTERMITTENT

## 2022-03-11 ASSESSMENT — PAIN SCALES - GENERAL: PAINLEVEL_OUTOF10: 4

## 2022-03-11 NOTE — PROGRESS NOTES
Wound Healing Center Followup Visit Note    Referring Physician : Haven Messina DO  [address] RECORD NUMBER:  [id number]  AGE: 80 y.o. GENDER: male  : 1929  EPISODE DATE:  3/11/2022    Subjective:     Chief Complaint   Patient presents with    Wound Check     right foot      HISTORY of PRESENT ILLNESS HPI   Josafat Anders is a 80 y.o. male who presents today in regards to follow up evaluation and treatment of wound/ulcer. That patient's past medical, family and social hx were reviewed and changes were made if present. History of Wound Context: Patient presents with his family members today without any additional use noted. They have been applying the dressing changes as instructed, and nursing has been applying them as well. Patient denies any new issues at this time. Patient is in no acute distress. He denies any nausea, vomiting, fever, chills. 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 No ascending cellulitis noted and/or lymphadenopathy to digital areas right foot.         PAST MEDICAL HISTORY      Diagnosis Date    Asthma     COPD (chronic obstructive pulmonary disease) ([facility] [address]     Unspecified cerebral artery occlusion with cerebral infarction      Past Surgical History:   Procedure Laterality Date    ABDOMEN SURGERY      multiple; intestinal abscess; hernia repair;     APPENDECTOMY      ECHO COMPL W DOP COLOR FLOW  3/16/2013         HERNIA REPAIR      JOINT REPLACEMENT  12     Family History   Problem Relation Age of Onset    Cancer Mother         stomach    Cancer Father         lung    No Known Problems Sister      Social History     Tobacco Use    Smoking status: Former Smoker     Packs/day: 1.00     Types: Cigarettes     Start date: 1945     Quit date: 1982     Years since quittin.9    Smokeless tobacco: Never Used   Vaping Use    Vaping Use: Never used   Substance Use Topics    Alcohol use: No    Drug use: Never     No Known Allergies  Current Outpatient Medications on File Prior to Encounter   Medication Sig Dispense Refill    apixaban (ELIQUIS) 2.5 MG TABS tablet Take by mouth 2 times daily      digoxin (LANOXIN) 125 MCG tablet Take 1 tablet by mouth daily 30 tablet 3    ipratropium-albuterol (DUONEB) 0.5-2.5 (3) MG/3ML SOLN nebulizer solution Inhale 3 mLs into the lungs every 4 hours (while awake) 360 mL 0    brimonidine (ALPHAGAN) 0.2 % ophthalmic solution Place 1 drop into both eyes 2 times daily 1 Bottle 3    potassium chloride (KLOR-CON M) 10 MEQ extended release tablet Take 20 mEq by mouth daily as needed If takes lasix       mometasone-formoterol (DULERA) 200-5 MCG/ACT inhaler Inhale 1 puff into the lungs daily      fluticasone (FLOVENT HFA) 110 MCG/ACT inhaler Inhale 1 puff into the lungs daily as needed      glycerin-hypromellose- (ARTIFICIAL TEARS) 0.2-0.2-1 % SOLN opthalmic solution Place 1 drop into both eyes 2 times daily as needed REFRESH PM      Multiple Vitamins-Minerals (PRESERVISION AREDS 2) CAPS Take 1 capsule by mouth 2 times daily      docusate sodium (COLACE) 100 MG capsule Take 200 mg by mouth nightly      tamsulosin (FLOMAX) 0.4 MG capsule Take 0.4 mg by mouth daily      montelukast (SINGULAIR) 10 MG tablet Take 10 mg by mouth nightly. No current facility-administered medications on file prior to encounter.        REVIEW OF SYSTEMS See HPI    Objective:    /60   Pulse 98   Temp 96.8 °F (36 °C) (Temporal)   Resp 18   Ht 6' (1.829 m) Wt 195 lb (88.5 kg)   BMI 26.45 kg/m²   Wt Readings from Last 3 Encounters:   03/11/22 195 lb (88.5 kg)   02/25/22 195 lb (88.5 kg)   02/18/22 195 lb (88.5 kg)     PHYSICAL EXAM  CONSTITUTIONAL:   Awake, alert, cooperative and in no acute distress  SKIN:  Open wound Present    Assessment:     Problem List Items Addressed This Visit     Atherosclerosis of native arteries of right leg with ulceration of other part of foot ([address] - Primary    Relevant Orders    Initiate Outpatient Wound Care Protocol    Cellulitis of second toe of right foot    Relevant Orders    Initiate Outpatient Wound Care Protocol          Pre Debridement Measurements:  Are located in the [place]  Documentation Flow Sheet  Post Debridement Measurements:  Wound/Ulcer Descriptions are Pre Debridement except measurements:     Wound 03/23/19 Buttocks Right; Inner 1.0 x 1.0 white area surrounded by reddened periwound that is blanchable (Active)   Number of days: 1084       Wound 04/30/21 Brachial Anterior; Left (Active)   Number of days: 314       Wound 05/07/21 Arm Right (Active)   Number of days: 307       Wound 02/11/22 Toe (Comment  which one) Right;Medial #1 second toe (Active)   Wound Image   02/11/22 1531   Dressing Status New dressing applied 02/18/22 1540   Wound Cleansed Betadine/povidone iodine 02/18/22 1540   Dressing/Treatment Alginate with Ag 02/18/22 1540   Offloading for Diabetic Foot Ulcers Post op shoe 02/18/22 1540   Wound Length (cm) 2.1 cm 03/11/22 1534   Wound Width (cm) 1.1 cm 03/11/22 1534   Wound Depth (cm) 0.2 cm 03/11/22 1534   Wound Surface Area (cm^2) 2.31 cm^2 03/11/22 1534   Change in Wound Size % (l*w) 40.77 03/11/22 1534   Wound Volume (cm^3) 0.462 cm^3 03/11/22 1534   Wound Healing % 41 03/11/22 1534   Wound Assessment Fibrin;Pink/red 03/11/22 1534   Drainage Amount Moderate 03/11/22 1534   Drainage Description Serosanguinous 03/11/22 1534   Odor None 03/11/22 1534   Rand-wound Assessment Maceration 03/11/22 5268 Number of days: 28       Wound 02/11/22 Toe (Comment  which one) Right;Lateral #2 third  toe (Active)   Wound Image   02/11/22 1531   Dressing Status New dressing applied 02/18/22 1540   Wound Cleansed Betadine/povidone iodine 02/18/22 1540   Dressing/Treatment Alginate with Ag 02/18/22 1540   Offloading for Diabetic Foot Ulcers Post op shoe 02/18/22 1540   Wound Length (cm) 0.6 cm 03/11/22 1534   Wound Width (cm) 0.6 cm 03/11/22 1534   Wound Depth (cm) 0.3 cm 03/11/22 1534   Wound Surface Area (cm^2) 0.36 cm^2 03/11/22 1534   Change in Wound Size % (l*w) 67.27 03/11/22 1534   Wound Volume (cm^3) 0.108 cm^3 03/11/22 1534   Wound Healing % 2 03/11/22 1534   Wound Assessment Fibrin;Pink/red 03/11/22 1534   Drainage Amount Moderate 03/11/22 1534   Drainage Description Serosanguinous 03/11/22 1534   Odor None 03/11/22 1534   Rand-wound Assessment Maceration 03/11/22 1534   Number of days: 28       Wound 02/11/22 Toe (Comment  which one) Right;Lateral #3 second lateral (Active)   Wound Image   02/18/22 1417   Dressing Status New dressing applied 02/18/22 1540   Wound Cleansed Betadine/povidone iodine 02/18/22 1540   Dressing/Treatment Alginate with Ag 02/18/22 1540   Offloading for Diabetic Foot Ulcers Post op shoe 02/18/22 1540   Wound Length (cm) 0 cm 02/25/22 1608   Wound Width (cm) 0 cm 02/25/22 1608   Wound Depth (cm) 0 cm 02/25/22 1608   Wound Surface Area (cm^2) 0 cm^2 02/25/22 1608   Change in Wound Size % (l*w) 100 02/25/22 1608   Wound Volume (cm^3) 0 cm^3 02/25/22 1608   Wound Healing % 100 02/25/22 1608   Drainage Amount Small 02/25/22 1608   Drainage Description Serosanguinous 02/25/22 1608   Odor None 02/25/22 1608   Rand-wound Assessment Fragile 02/25/22 1608   Number of days: 28       Wound 02/18/22 Toe (Comment  which one) Right;Lateral #4 4th toe (Active)   Wound Image   02/18/22 1417   Dressing Status New dressing applied 02/18/22 1540   Wound Cleansed Betadine/povidone iodine 02/18/22 1540 Dressing/Treatment Alginate with Ag 02/18/22 1540   Offloading for Diabetic Foot Ulcers Offloading not ordered 02/18/22 1540   Wound Length (cm) 0 cm 02/18/22 1417   Wound Width (cm) 0 cm 02/18/22 1417   Wound Depth (cm) 0 cm 02/18/22 1417   Wound Surface Area (cm^2) 0 cm^2 02/18/22 1417   Wound Volume (cm^3) 0 cm^3 02/18/22 1417   Wound Assessment Fibrin;Pink/red 02/18/22 1417   Drainage Amount Moderate 02/18/22 1417   Drainage Description Yellow 02/18/22 1417   Odor None 02/18/22 1417   Rand-wound Assessment Intact 02/18/22 1417   Number of days: 21          Procedure Note  Indications:  Based on my examination of this patient's wound(s)/ulcer(s) today, debridement is not required to promote healing and evaluate the wound base. No debridement performed on today's visit. We did recommend continued use of the alginate dressing into the webspaces to prevent any excess maceration and potential wound issues. We will continue with home nursing care as this is improving his ulcerations. Patient he wearing his supportive shoes which give him added room in the toe box regions to prevent potential irritation. We did recommend the above controlling the maceration/perspiration into the webspaces on a daily basis. Plan:   Treatment Note please see attached Discharge Instructions    Written patient dismissal instructions given to patient and signed by patient or POA. Discharge Instructions       Visit Discharge/Physician Orders     Discharge condition: Stable     Assessment of pain at discharge: mild     Anesthetic used: 4% Lidocaine     Discharge to: Home     Left via:Private automobile     Accompanied by: accompanied by family member     ECF/HHA: [facility]     Dressing Orders: Cleanse wounds right foot with normal saline. Continue to use betadine between 1st and 3rd toes, followed by silver alginate in between toes as well. Do not need to cover with gauze.       Treatment Orders: Elevate legs when seated. Follow a nutritious diet. Try to keep foot dry, schedule bathing around dressing changes. Make sure feet and toes are dry before dressings are put in place.     [facility] followup visit ______2 week_______Dr. Miner________________  (Please note your next appointment above and if you are unable to keep, kindly give a 24 hour notice.  Thank you.)     Physician signature:__________________________        If you experience any of the following, please call the [facility] during business hours:     * Increase in Pain  * Temperature over 101  * Increase in drainage from your wound  * Drainage with a foul odor  * Bleeding  * Increase in swelling  * Need for compression bandage changes due to slippage, breakthrough drainage.     If you need medical attention outside of the business hours of the [facility] please contact your PCP or go to the nearest emergency room.                                                     Electronically signed by Raz Salas DPM on 3/11/2022 at 4:47 PM

## 2022-03-25 ENCOUNTER — HOSPITAL ENCOUNTER (OUTPATIENT)
Dept: WOUND CARE | Age: 87
Discharge: HOME | DRG: 617 | End: 2022-03-25
Payer: MEDICARE

## 2022-03-25 ENCOUNTER — APPOINTMENT (OUTPATIENT)
Dept: GENERAL RADIOLOGY | Age: 87
DRG: 617 | End: 2022-03-25
Payer: MEDICARE

## 2022-03-25 ENCOUNTER — HOSPITAL ENCOUNTER (INPATIENT)
Age: 87
LOS: 5 days | Discharge: NURSING, REHAB OR LONG-TERM CARE | DRG: 617 | End: 2022-03-30
Attending: STUDENT IN AN ORGANIZED HEALTH CARE EDUCATION/TRAINING PROGRAM | Admitting: INTERNAL MEDICINE
Payer: MEDICARE

## 2022-03-25 VITALS
TEMPERATURE: 96.9 F | HEART RATE: 68 BPM | BODY MASS INDEX: 26.41 KG/M2 | HEIGHT: 72 IN | WEIGHT: 195 LBS | SYSTOLIC BLOOD PRESSURE: 112 MMHG | DIASTOLIC BLOOD PRESSURE: 62 MMHG | RESPIRATION RATE: 18 BRPM

## 2022-03-25 DIAGNOSIS — L08.9 WOUND INFECTION: Primary | ICD-10-CM

## 2022-03-25 DIAGNOSIS — I70.235 ATHEROSCLEROSIS OF NATIVE ARTERIES OF RIGHT LEG WITH ULCERATION OF OTHER PART OF FOOT (HCC): Primary | ICD-10-CM

## 2022-03-25 DIAGNOSIS — T14.8XXA WOUND INFECTION: Primary | ICD-10-CM

## 2022-03-25 DIAGNOSIS — I96 GANGRENE OF TOE OF RIGHT FOOT (HCC): ICD-10-CM

## 2022-03-25 DIAGNOSIS — L03.031 CELLULITIS OF SECOND TOE OF RIGHT FOOT: ICD-10-CM

## 2022-03-25 PROBLEM — L97.519: Status: ACTIVE | Noted: 2022-03-25

## 2022-03-25 LAB
ALBUMIN SERPL-MCNC: 4 G/DL (ref 3.5–5.2)
ALP BLD-CCNC: 83 U/L (ref 40–129)
ALT SERPL-CCNC: 10 U/L (ref 0–40)
ANION GAP SERPL CALCULATED.3IONS-SCNC: 10 MMOL/L (ref 7–16)
AST SERPL-CCNC: 13 U/L (ref 0–39)
BASOPHILS ABSOLUTE: 0.04 E9/L (ref 0–0.2)
BASOPHILS RELATIVE PERCENT: 0.5 % (ref 0–2)
BILIRUB SERPL-MCNC: 0.6 MG/DL (ref 0–1.2)
BUN BLDV-MCNC: 30 MG/DL (ref 6–23)
C-REACTIVE PROTEIN: 0.7 MG/DL (ref 0–0.4)
CALCIUM SERPL-MCNC: 9.3 MG/DL (ref 8.6–10.2)
CHLORIDE BLD-SCNC: 102 MMOL/L (ref 98–107)
CO2: 28 MMOL/L (ref 22–29)
CREAT SERPL-MCNC: 1.5 MG/DL (ref 0.7–1.2)
EOSINOPHILS ABSOLUTE: 0.3 E9/L (ref 0.05–0.5)
EOSINOPHILS RELATIVE PERCENT: 3.5 % (ref 0–6)
GFR AFRICAN AMERICAN: 53
GFR NON-AFRICAN AMERICAN: 44 ML/MIN/1.73
GLUCOSE BLD-MCNC: 111 MG/DL (ref 74–99)
HCT VFR BLD CALC: 42.9 % (ref 37–54)
HEMOGLOBIN: 14 G/DL (ref 12.5–16.5)
IMMATURE GRANULOCYTES #: 0.03 E9/L
IMMATURE GRANULOCYTES %: 0.3 % (ref 0–5)
LACTIC ACID: 1.7 MMOL/L (ref 0.5–2.2)
LYMPHOCYTES ABSOLUTE: 2.57 E9/L (ref 1.5–4)
LYMPHOCYTES RELATIVE PERCENT: 29.8 % (ref 20–42)
MCH RBC QN AUTO: 29.9 PG (ref 26–35)
MCHC RBC AUTO-ENTMCNC: 32.6 % (ref 32–34.5)
MCV RBC AUTO: 91.5 FL (ref 80–99.9)
MONOCYTES ABSOLUTE: 0.66 E9/L (ref 0.1–0.95)
MONOCYTES RELATIVE PERCENT: 7.7 % (ref 2–12)
NEUTROPHILS ABSOLUTE: 5.01 E9/L (ref 1.8–7.3)
NEUTROPHILS RELATIVE PERCENT: 58.2 % (ref 43–80)
PDW BLD-RTO: 14.6 FL (ref 11.5–15)
PLATELET # BLD: 225 E9/L (ref 130–450)
PMV BLD AUTO: 9.1 FL (ref 7–12)
POTASSIUM REFLEX MAGNESIUM: 4 MMOL/L (ref 3.5–5)
RBC # BLD: 4.69 E12/L (ref 3.8–5.8)
SEDIMENTATION RATE, ERYTHROCYTE: 20 MM/HR (ref 0–15)
SODIUM BLD-SCNC: 140 MMOL/L (ref 132–146)
TOTAL PROTEIN: 7.1 G/DL (ref 6.4–8.3)
WBC # BLD: 8.6 E9/L (ref 4.5–11.5)

## 2022-03-25 PROCEDURE — 80053 COMPREHEN METABOLIC PANEL: CPT

## 2022-03-25 PROCEDURE — 99213 OFFICE O/P EST LOW 20 MIN: CPT | Performed by: PODIATRIST

## 2022-03-25 PROCEDURE — 85025 COMPLETE CBC W/AUTO DIFF WBC: CPT

## 2022-03-25 PROCEDURE — 86140 C-REACTIVE PROTEIN: CPT

## 2022-03-25 PROCEDURE — 87040 BLOOD CULTURE FOR BACTERIA: CPT

## 2022-03-25 PROCEDURE — 87077 CULTURE AEROBIC IDENTIFY: CPT

## 2022-03-25 PROCEDURE — 83605 ASSAY OF LACTIC ACID: CPT

## 2022-03-25 PROCEDURE — 2580000003 HC RX 258: Performed by: STUDENT IN AN ORGANIZED HEALTH CARE EDUCATION/TRAINING PROGRAM

## 2022-03-25 PROCEDURE — 6370000000 HC RX 637 (ALT 250 FOR IP): Performed by: PODIATRIST

## 2022-03-25 PROCEDURE — 87070 CULTURE OTHR SPECIMN AEROBIC: CPT

## 2022-03-25 PROCEDURE — 99283 EMERGENCY DEPT VISIT LOW MDM: CPT

## 2022-03-25 PROCEDURE — 6360000002 HC RX W HCPCS: Performed by: STUDENT IN AN ORGANIZED HEALTH CARE EDUCATION/TRAINING PROGRAM

## 2022-03-25 PROCEDURE — 87186 SC STD MICRODIL/AGAR DIL: CPT

## 2022-03-25 PROCEDURE — 1200000000 HC SEMI PRIVATE

## 2022-03-25 PROCEDURE — 85651 RBC SED RATE NONAUTOMATED: CPT

## 2022-03-25 PROCEDURE — 73630 X-RAY EXAM OF FOOT: CPT

## 2022-03-25 PROCEDURE — 99213 OFFICE O/P EST LOW 20 MIN: CPT

## 2022-03-25 RX ORDER — BACITRACIN ZINC AND POLYMYXIN B SULFATE 500; 1000 [USP'U]/G; [USP'U]/G
OINTMENT TOPICAL ONCE
OUTPATIENT
Start: 2022-03-25 | End: 2022-03-25

## 2022-03-25 RX ORDER — BETAMETHASONE DIPROPIONATE 0.05 %
OINTMENT (GRAM) TOPICAL ONCE
OUTPATIENT
Start: 2022-03-25 | End: 2022-03-25

## 2022-03-25 RX ORDER — LIDOCAINE 50 MG/G
OINTMENT TOPICAL ONCE
OUTPATIENT
Start: 2022-03-25 | End: 2022-03-25

## 2022-03-25 RX ORDER — POLYVINYL ALCOHOL 14 MG/ML
1 SOLUTION/ DROPS OPHTHALMIC 2 TIMES DAILY PRN
Status: DISCONTINUED | OUTPATIENT
Start: 2022-03-25 | End: 2022-03-30 | Stop reason: HOSPADM

## 2022-03-25 RX ORDER — POLYETHYLENE GLYCOL 3350 17 G/17G
17 POWDER, FOR SOLUTION ORAL DAILY PRN
Status: DISCONTINUED | OUTPATIENT
Start: 2022-03-25 | End: 2022-03-30 | Stop reason: HOSPADM

## 2022-03-25 RX ORDER — ACETAMINOPHEN 325 MG/1
650 TABLET ORAL EVERY 6 HOURS PRN
Status: DISCONTINUED | OUTPATIENT
Start: 2022-03-25 | End: 2022-03-30 | Stop reason: HOSPADM

## 2022-03-25 RX ORDER — ARFORMOTEROL TARTRATE 15 UG/2ML
15 SOLUTION RESPIRATORY (INHALATION) 2 TIMES DAILY
Status: DISCONTINUED | OUTPATIENT
Start: 2022-03-25 | End: 2022-03-30 | Stop reason: HOSPADM

## 2022-03-25 RX ORDER — BUDESONIDE AND FORMOTEROL FUMARATE DIHYDRATE 80; 4.5 UG/1; UG/1
2 AEROSOL RESPIRATORY (INHALATION) 2 TIMES DAILY
Status: DISCONTINUED | OUTPATIENT
Start: 2022-03-25 | End: 2022-03-25 | Stop reason: CLARIF

## 2022-03-25 RX ORDER — LIDOCAINE 40 MG/G
CREAM TOPICAL ONCE
OUTPATIENT
Start: 2022-03-25 | End: 2022-03-25

## 2022-03-25 RX ORDER — LIDOCAINE HYDROCHLORIDE 40 MG/ML
SOLUTION TOPICAL ONCE
OUTPATIENT
Start: 2022-03-25 | End: 2022-03-25

## 2022-03-25 RX ORDER — BACITRACIN, NEOMYCIN, POLYMYXIN B 400; 3.5; 5 [USP'U]/G; MG/G; [USP'U]/G
OINTMENT TOPICAL ONCE
OUTPATIENT
Start: 2022-03-25 | End: 2022-03-25

## 2022-03-25 RX ORDER — DOCUSATE SODIUM 100 MG/1
200 CAPSULE, LIQUID FILLED ORAL NIGHTLY
Status: DISCONTINUED | OUTPATIENT
Start: 2022-03-25 | End: 2022-03-30 | Stop reason: HOSPADM

## 2022-03-25 RX ORDER — SODIUM CHLORIDE 9 MG/ML
25 INJECTION, SOLUTION INTRAVENOUS PRN
Status: DISCONTINUED | OUTPATIENT
Start: 2022-03-25 | End: 2022-03-30 | Stop reason: HOSPADM

## 2022-03-25 RX ORDER — LIDOCAINE HYDROCHLORIDE 40 MG/ML
SOLUTION TOPICAL ONCE
Status: COMPLETED | OUTPATIENT
Start: 2022-03-25 | End: 2022-03-25

## 2022-03-25 RX ORDER — IPRATROPIUM BROMIDE AND ALBUTEROL SULFATE 2.5; .5 MG/3ML; MG/3ML
3 SOLUTION RESPIRATORY (INHALATION)
Status: DISCONTINUED | OUTPATIENT
Start: 2022-03-26 | End: 2022-03-30 | Stop reason: HOSPADM

## 2022-03-25 RX ORDER — SODIUM CHLORIDE 0.9 % (FLUSH) 0.9 %
5-40 SYRINGE (ML) INJECTION EVERY 12 HOURS SCHEDULED
Status: DISCONTINUED | OUTPATIENT
Start: 2022-03-25 | End: 2022-03-30 | Stop reason: HOSPADM

## 2022-03-25 RX ORDER — DIGOXIN 125 MCG
125 TABLET ORAL DAILY
Status: DISCONTINUED | OUTPATIENT
Start: 2022-03-26 | End: 2022-03-30 | Stop reason: HOSPADM

## 2022-03-25 RX ORDER — LIDOCAINE HYDROCHLORIDE 20 MG/ML
JELLY TOPICAL ONCE
OUTPATIENT
Start: 2022-03-25 | End: 2022-03-25

## 2022-03-25 RX ORDER — GENTAMICIN SULFATE 1 MG/G
OINTMENT TOPICAL ONCE
OUTPATIENT
Start: 2022-03-25 | End: 2022-03-25

## 2022-03-25 RX ORDER — ONDANSETRON 4 MG/1
4 TABLET, ORALLY DISINTEGRATING ORAL EVERY 8 HOURS PRN
Status: DISCONTINUED | OUTPATIENT
Start: 2022-03-25 | End: 2022-03-30 | Stop reason: HOSPADM

## 2022-03-25 RX ORDER — ANTIOX #8/OM3/DHA/EPA/LUT/ZEAX 250-2.5 MG
1 CAPSULE ORAL 2 TIMES DAILY
Status: DISCONTINUED | OUTPATIENT
Start: 2022-03-25 | End: 2022-03-25 | Stop reason: CLARIF

## 2022-03-25 RX ORDER — BRIMONIDINE TARTRATE 2 MG/ML
1 SOLUTION/ DROPS OPHTHALMIC 2 TIMES DAILY
Status: DISCONTINUED | OUTPATIENT
Start: 2022-03-25 | End: 2022-03-30 | Stop reason: HOSPADM

## 2022-03-25 RX ORDER — TAMSULOSIN HYDROCHLORIDE 0.4 MG/1
0.4 CAPSULE ORAL DAILY
Status: DISCONTINUED | OUTPATIENT
Start: 2022-03-26 | End: 2022-03-30 | Stop reason: HOSPADM

## 2022-03-25 RX ORDER — VITS A,C,E/LUTEIN/MINERALS 300MCG-200
1 TABLET ORAL 2 TIMES DAILY
Status: DISCONTINUED | OUTPATIENT
Start: 2022-03-25 | End: 2022-03-30 | Stop reason: HOSPADM

## 2022-03-25 RX ORDER — ONDANSETRON 2 MG/ML
4 INJECTION INTRAMUSCULAR; INTRAVENOUS EVERY 6 HOURS PRN
Status: DISCONTINUED | OUTPATIENT
Start: 2022-03-25 | End: 2022-03-30 | Stop reason: HOSPADM

## 2022-03-25 RX ORDER — CLINDAMYCIN HYDROCHLORIDE 300 MG/1
300 CAPSULE ORAL 2 TIMES DAILY
Status: ON HOLD | COMMUNITY
End: 2022-03-30 | Stop reason: HOSPADM

## 2022-03-25 RX ORDER — MONTELUKAST SODIUM 10 MG/1
10 TABLET ORAL NIGHTLY
Status: DISCONTINUED | OUTPATIENT
Start: 2022-03-25 | End: 2022-03-30 | Stop reason: HOSPADM

## 2022-03-25 RX ORDER — SODIUM CHLORIDE 9 MG/ML
INJECTION, SOLUTION INTRAVENOUS CONTINUOUS
Status: ACTIVE | OUTPATIENT
Start: 2022-03-25 | End: 2022-03-26

## 2022-03-25 RX ORDER — ACETAMINOPHEN 650 MG/1
650 SUPPOSITORY RECTAL EVERY 6 HOURS PRN
Status: DISCONTINUED | OUTPATIENT
Start: 2022-03-25 | End: 2022-03-30 | Stop reason: HOSPADM

## 2022-03-25 RX ORDER — SODIUM CHLORIDE 0.9 % (FLUSH) 0.9 %
5-40 SYRINGE (ML) INJECTION PRN
Status: DISCONTINUED | OUTPATIENT
Start: 2022-03-25 | End: 2022-03-30 | Stop reason: HOSPADM

## 2022-03-25 RX ORDER — BUDESONIDE 0.25 MG/2ML
0.25 INHALANT ORAL 2 TIMES DAILY
Status: DISCONTINUED | OUTPATIENT
Start: 2022-03-25 | End: 2022-03-30 | Stop reason: HOSPADM

## 2022-03-25 RX ORDER — CLOBETASOL PROPIONATE 0.5 MG/G
OINTMENT TOPICAL ONCE
OUTPATIENT
Start: 2022-03-25 | End: 2022-03-25

## 2022-03-25 RX ORDER — GINSENG 100 MG
CAPSULE ORAL ONCE
OUTPATIENT
Start: 2022-03-25 | End: 2022-03-25

## 2022-03-25 RX ADMIN — VANCOMYCIN HYDROCHLORIDE 1750 MG: 5 INJECTION, POWDER, LYOPHILIZED, FOR SOLUTION INTRAVENOUS at 20:36

## 2022-03-25 RX ADMIN — PIPERACILLIN SODIUM AND TAZOBACTAM SODIUM 4500 MG: 4; .5 INJECTION, POWDER, LYOPHILIZED, FOR SOLUTION INTRAVENOUS at 19:48

## 2022-03-25 RX ADMIN — LIDOCAINE HYDROCHLORIDE: 40 SOLUTION TOPICAL at 16:04

## 2022-03-25 ASSESSMENT — PAIN - FUNCTIONAL ASSESSMENT: PAIN_FUNCTIONAL_ASSESSMENT: 0-10

## 2022-03-25 ASSESSMENT — PAIN SCALES - GENERAL
PAINLEVEL_OUTOF10: 3
PAINLEVEL_OUTOF10: 0
PAINLEVEL_OUTOF10: 5

## 2022-03-25 ASSESSMENT — PAIN DESCRIPTION - PAIN TYPE: TYPE: ACUTE PAIN

## 2022-03-25 NOTE — ED PROVIDER NOTES
Department of Emergency Medicine   ED  Provider Note  Admit Date/RoomTime: 3/25/2022  6:10 PM  ED Room: 0505/0505-A          History of Present Illness:  3/25/22, Time: 7:07 PM EDT  Chief Complaint   Patient presents with    Wound Check     pt is to have surgery on foot to prevent osteomyelitis. was sent in from the wound center. Yael Bermeo is a 80 y.o. male presenting to the ED for wound check, beginning several weeks ago. The complaint has been persistent, moderate in severity, and worsened by nothing. The patient is a 77-year-old male presents the emergency department for a wound check. The patient symptoms have been gradual in onset over the past several weeks, persistent, moderate in severity, nothing makes it better or worse. Has not been diabetic. He does follow-up with podiatry who sent him here today to have surgery on his foot. The wound is on his right second toe. He states it has been progressively worsening. He is on antibiotics but does not know which ones and states that he finished them with no improvement. He denies any fever, chills, headedness, dizziness, syncope, chest pain, shortness of breath, nausea, vomiting, abdominal pain, drainage from the toe, numbness, tingling, unilateral weakness, reconsultation, recent was, or other acute symptoms or concerns. Review of Systems:   A complete review of systems was performed and pertinent positives and negatives are stated within HPI, all other systems reviewed and are negative.        --------------------------------------------- PAST HISTORY ---------------------------------------------  Past Medical History:  has a past medical history of Asthma, COPD (chronic obstructive pulmonary disease) ([facility] [address] and Unspecified cerebral artery occlusion with cerebral infarction.     Past Surgical History:  has a past surgical history that includes Appendectomy; hernia repair; joint replacement (6-11-12); ECHO Compl W Dop Color Flow (3/16/2013); and Abdomen surgery. Social History:  reports that he quit smoking about 40 years ago. His smoking use included cigarettes. He started smoking about 76 years ago. He smoked 1.00 pack per day. He has never used smokeless tobacco. He reports that he does not drink alcohol and does not use drugs. Family History: family history includes Cancer in his father and mother; No Known Problems in his sister. . Unless otherwise noted, family history is non contributory    The patients home medications have been reviewed. Allergies: Patient has no known allergies. I have reviewed the past medical history, past surgical history, social history, and family history    ---------------------------------------------------PHYSICAL EXAM--------------------------------------    Constitutional/General: Alert and oriented x3, patient sitting up in bed resting comfortably and in no acute distress  Head: Normocephalic and atraumatic  Eyes:  EOMI, sclera non icteric  ENT: Oropharynx clear, handling secretions, no trismus, no asymmetry of the posterior oropharynx or uvular edema  Neck: Supple, full ROM, no stridor, no meningeal signs  Respiratory: Lungs clear to auscultation bilaterally, no wheezes, rales, or rhonchi. Not in respiratory distress  Cardiovascular:  Regular rate. Regular rhythm. No murmurs, no gallops, no rubs. 2+ distal pulses. Equal extremity pulses. Gastrointestinal:  Abdomen Soft, Non tender, Non distended. No rebound, guarding, or rigidity. No pulsatile masses. Musculoskeletal: Moves all extremities x 4. Warm and well perfused, no clubbing, no cyanosis, no edema. Capillary refill <3 seconds  Skin: skin warm and dry. No rashes. There is a wound on the right second toe that is black in appearance, and a chronic appearing ulcer with slight surrounding erythema. There is no active drainage or discharge. DP pulses are faint but present. Did confirm with Doppler that pulses were present.   Sensation intact and equal to the bilateral lower extremities  Neurologic: GCS 15, no focal deficits, symmetric strength 5/5 in the upper and lower extremities bilaterally  Psychiatric: Normal Affect      -------------------------------------------------- RESULTS -------------------------------------------------  I have personally reviewed all laboratory and imaging results for this patient. Results are listed below.      LABS: (Lab results interpreted by me)  Results for orders placed or performed during the hospital encounter of 03/25/22   CBC with Auto Differential   Result Value Ref Range    WBC 8.6 4.5 - 11.5 E9/L    RBC 4.69 3.80 - 5.80 E12/L    Hemoglobin 14.0 12.5 - 16.5 g/dL    Hematocrit 42.9 37.0 - 54.0 %    MCV 91.5 80.0 - 99.9 fL    MCH 29.9 26.0 - 35.0 pg    MCHC 32.6 32.0 - 34.5 %    RDW 14.6 11.5 - 15.0 fL    Platelets 681 316 - 700 E9/L    MPV 9.1 7.0 - 12.0 fL    Neutrophils % 58.2 43.0 - 80.0 %    Immature Granulocytes % 0.3 0.0 - 5.0 %    Lymphocytes % 29.8 20.0 - 42.0 %    Monocytes % 7.7 2.0 - 12.0 %    Eosinophils % 3.5 0.0 - 6.0 %    Basophils % 0.5 0.0 - 2.0 %    Neutrophils Absolute 5.01 1.80 - 7.30 E9/L    Immature Granulocytes # 0.03 E9/L    Lymphocytes Absolute 2.57 1.50 - 4.00 E9/L    Monocytes Absolute 0.66 0.10 - 0.95 E9/L    Eosinophils Absolute 0.30 0.05 - 0.50 E9/L    Basophils Absolute 0.04 0.00 - 0.20 E9/L   Comprehensive Metabolic Panel w/ Reflex to MG   Result Value Ref Range    Sodium 140 132 - 146 mmol/L    Potassium reflex Magnesium 4.0 3.5 - 5.0 mmol/L    Chloride 102 98 - 107 mmol/L    CO2 28 22 - 29 mmol/L    Anion Gap 10 7 - 16 mmol/L    Glucose 111 (H) 74 - 99 mg/dL    BUN 30 (H) 6 - 23 mg/dL    CREATININE 1.5 (H) 0.7 - 1.2 mg/dL    GFR Non-African American 44 >=60 mL/min/1.73    GFR African American 53     Calcium 9.3 8.6 - 10.2 mg/dL    Total Protein 7.1 6.4 - 8.3 g/dL    Albumin 4.0 3.5 - 5.2 g/dL    Total Bilirubin 0.6 0.0 - 1.2 mg/dL    Alkaline Phosphatase 83 40 - 129 U/L ALT 10 0 - 40 U/L    AST 13 0 - 39 U/L   Lactic Acid   Result Value Ref Range    Lactic Acid 1.7 0.5 - 2.2 mmol/L   Sedimentation Rate   Result Value Ref Range    Sed Rate 20 (H) 0 - 15 mm/Hr   C-Reactive Protein   Result Value Ref Range    CRP 0.7 (H) 0.0 - 0.4 mg/dL   Basic Metabolic Panel w/ Reflex to MG   Result Value Ref Range    Sodium 137 132 - 146 mmol/L    Potassium reflex Magnesium 3.9 3.5 - 5.0 mmol/L    Chloride 102 98 - 107 mmol/L    CO2 27 22 - 29 mmol/L    Anion Gap 8 7 - 16 mmol/L    Glucose 114 (H) 74 - 99 mg/dL    BUN 27 (H) 6 - 23 mg/dL    CREATININE 1.5 (H) 0.7 - 1.2 mg/dL    GFR Non-African American 44 >=60 mL/min/1.73    GFR African American 53     Calcium 8.9 8.6 - 10.2 mg/dL   CBC with Auto Differential   Result Value Ref Range    WBC 7.6 4.5 - 11.5 E9/L    RBC 4.40 3.80 - 5.80 E12/L    Hemoglobin 12.9 12.5 - 16.5 g/dL    Hematocrit 40.8 37.0 - 54.0 %    MCV 92.7 80.0 - 99.9 fL    MCH 29.3 26.0 - 35.0 pg    MCHC 31.6 (L) 32.0 - 34.5 %    RDW 14.2 11.5 - 15.0 fL    Platelets 994 284 - 606 E9/L    MPV 9.2 7.0 - 12.0 fL    Neutrophils % 50.7 43.0 - 80.0 %    Immature Granulocytes % 0.4 0.0 - 5.0 %    Lymphocytes % 33.6 20.0 - 42.0 %    Monocytes % 9.6 2.0 - 12.0 %    Eosinophils % 5.3 0.0 - 6.0 %    Basophils % 0.4 0.0 - 2.0 %    Neutrophils Absolute 3.86 1.80 - 7.30 E9/L    Immature Granulocytes # 0.03 E9/L    Lymphocytes Absolute 2.55 1.50 - 4.00 E9/L    Monocytes Absolute 0.73 0.10 - 0.95 E9/L    Eosinophils Absolute 0.40 0.05 - 0.50 E9/L    Basophils Absolute 0.03 0.00 - 0.20 E9/L   ,       RADIOLOGY:  Interpreted by Radiologist unless otherwise specified  XR FOOT RIGHT (MIN 3 VIEWS)   Final Result   Soft tissue swelling of the mid and distal foot. 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          VL LOWER EXTREMITY ARTERIAL SEGMENTAL PRESSURES W PPG BILATERAL    (Results Pending)       ------------------------- NURSING NOTES AND VITALS REVIEWED ---------------------------   The nursing notes within the ED encounter and vital signs as below have been reviewed by myself  /61   Pulse 68   Temp 98.7 °F (37.1 °C) (Oral)   Resp 18   Ht 6' (1.829 m)   Wt 176 lb 5.9 oz (80 kg)   SpO2 95%   BMI 23.92 kg/m²     Oxygen Saturation Interpretation: Normal    The patients available past medical records and past encounters were reviewed.         ------------------------------ ED COURSE/MEDICAL DECISION MAKING----------------------  Medications   apixaban (ELIQUIS) tablet 2.5 mg (2.5 mg Oral Not Given 3/25/22 2342)   brimonidine (ALPHAGAN) 0.2 % ophthalmic solution 1 drop (1 drop Both Eyes Not Given 3/26/22 0006)   digoxin (LANOXIN) tablet 125 mcg (has no administration in time range)   docusate sodium (COLACE) capsule 200 mg (200 mg Oral Not Given 3/26/22 0006)   ipratropium-albuterol (DUONEB) nebulizer solution 3 mL (has no administration in time range)   montelukast (SINGULAIR) tablet 10 mg (10 mg Oral Not Given 3/26/22 0006)   tamsulosin (FLOMAX) capsule 0.4 mg (has no administration in time range)   sodium chloride flush 0.9 % injection 5-40 mL (10 mLs IntraVENous Given 3/26/22 0015)   sodium chloride flush 0.9 % injection 5-40 mL (has no administration in time range)   0.9 % sodium chloride infusion (has no administration in time range)   ondansetron (ZOFRAN-ODT) disintegrating tablet 4 mg (has no administration in time range)     Or   ondansetron (ZOFRAN) injection 4 mg (has no administration in time range)   polyethylene glycol (GLYCOLAX) packet 17 g (has no administration in time range)   acetaminophen (TYLENOL) tablet 650 mg (650 mg Oral Given 3/26/22 0012)     Or   acetaminophen (TYLENOL) suppository 650 mg ( Rectal See Alternative 3/26/22 0012)   0.9 % sodium chloride infusion ( IntraVENous New Bag 3/26/22 0408)   polyvinyl alcohol (LIQUIFILM TEARS) 1.4 % ophthalmic solution 1 drop (has no administration in time range)   Arformoterol Tartrate (BROVANA) nebulizer solution 15 mcg ( Nebulization Canceled Entry 3/26/22 0009)     And   budesonide (PULMICORT) nebulizer suspension 250 mcg ( Nebulization Canceled Entry 3/26/22 0009)   antioxidant multivitamin (OCUVITE) tablet (1 tablet Oral Not Given 3/26/22 0005)   vancomycin (VANCOCIN) 1,750 mg in dextrose 5 % 500 mL IVPB (0 mg IntraVENous Stopped 3/25/22 2339)   piperacillin-tazobactam (ZOSYN) 4,500 mg in dextrose 5 % 100 mL IVPB (mini-bag) (0 mg IntraVENous Stopped 3/25/22 2018)           The cardiac monitor revealed NSR with a heart rate in the 60s as interpreted by me. The cardiac monitor was ordered secondary to the patient's wound check and to monitor the patient for dysrhythmia. CPT [id number]       I, Dr. Rachael Miller, am the primary provider of record    Medical Decision Making:   The patient is a 66-year-old male presents the emergency department for a wound check. The patient is hemodynamically stable, nontoxic, and in no acute distress. There is a wound on the right toe that is chronic in appearance. Podiatry resident was at bedside to evaluate the patient. He states that he does want cultures obtained and antibiotics initiated. Concern that the cause may be due to peripheral vascular disease as he is not a diabetic. X-ray did not show evidence of osteomyelitis. Cultures were obtained prior to initiation of antibiotics. Patient was admitted to medicine in stable condition. Again just to confirm the patient did have DP pulses present and this was confirmed with Doppler. There were no focal neurological deficits on examination. Oxygen Saturation Interpretation: 95 % on room air. Re-Evaluations:  ED Course as of 03/26/22 0520   Fri Mar 25, 2022   1942 Consulted with Dr. Jerlene Rubinstein, PCP, who accepted for admission. [KG]      ED Course User Index  [KG] Claudia Nam DO       Patient is resting comfortably and in no distress on reassessment.        This patient's ED course included: a personal history and physicial examination, re-evaluation prior to disposition, multiple bedside re-evaluations, IV medications, cardiac monitoring, continuous pulse oximetry and complex medical decision making and emergency management    This patient has remained hemodynamically stable during their ED course. Consultations:  Spoke with Dr. Waynard Claude (podiatry). Discussed case. They will provide consultation. Spoke with Dr. Franklin Tai (Medicine). Discussed case. They will admit this patient. Counseling: The emergency provider has spoken with the patient and discussed todays results, in addition to providing specific details for the plan of care and counseling regarding the diagnosis and prognosis. Questions are answered at this time and they are agreeable with the plan.       --------------------------------- IMPRESSION AND DISPOSITION ---------------------------------    IMPRESSION  1. Wound infection        DISPOSITION  Disposition: Admit to med/surg floor  Patient condition is stable        NOTE: This report was transcribed using voice recognition software.  Every effort was made to ensure accuracy; however, inadvertent computerized transcription errors may be present       Romy Robert DO  03/26/22 9464

## 2022-03-25 NOTE — CONSULTS
Podiatry Consult H&P  3/25/2022   Beth Reeder       REASON FOR CONSULT: Right 2nd toe wound  REQUESTING PHYSICIAN: Singh Vaughn    HISTORY OF PRESENT ILLNESS: This is a 80 y.o. male seen bedside in the ED for right 2nd toe wound. Pt follows with podiatrist in wound care center who sent the patient here today after visit. Pt admits to moderate pain. Pt denies any N/V/F/C. Pt wife present in room and answers most questions. Pt has no other pedal complaints at this time. Past Medical History:   Diagnosis Date    Asthma     COPD (chronic obstructive pulmonary disease) ([facility] [address]     Unspecified cerebral artery occlusion with cerebral infarction         Past Surgical History:   Procedure Laterality Date    ABDOMEN SURGERY      multiple; intestinal abscess; hernia repair;     APPENDECTOMY      ECHO COMPL W DOP COLOR FLOW  3/16/2013         HERNIA REPAIR      JOINT REPLACEMENT  12         Family History   Problem Relation Age of Onset    Cancer Mother         stomach    Cancer Father         lung    No Known Problems Sister         Social History     Tobacco Use    Smoking status: Former Smoker     Packs/day: 1.00     Types: Cigarettes     Start date: 1945     Quit date: 1982     Years since quittin.0    Smokeless tobacco: Never Used   Substance Use Topics    Alcohol use: No        Prior to Admission medications    Medication Sig Start Date End Date Taking?  Authorizing Provider   clindamycin (CLEOCIN) 300 MG capsule Take 300 mg by mouth in the morning and at bedtime    Historical Provider, MD   apixaban (ELIQUIS) 2.5 MG TABS tablet Take by mouth 2 times daily    Historical Provider, MD   digoxin (LANOXIN) 125 MCG tablet Take 1 tablet by mouth daily 7/15/21   Jannelle Closs, DO   ipratropium-albuterol (DUONEB) 0.5-2.5 (3) MG/3ML SOLN nebulizer solution Inhale 3 mLs into the lungs every 4 hours (while awake) 21   Jannelle Closs, DO   brimonidine (ALPHAGAN) 0.2 % ophthalmic solution Place 1 drop into both eyes 2 times daily 5/13/21   Enrique Trevino DO   potassium chloride (KLOR-CON M) 10 MEQ extended release tablet Take 20 mEq by mouth daily as needed If takes lasix     Historical Provider, MD   mometasone-formoterol (DULERA) 200-5 MCG/ACT inhaler Inhale 1 puff into the lungs daily    Historical Provider, MD   fluticasone (FLOVENT HFA) 110 MCG/ACT inhaler Inhale 1 puff into the lungs daily as needed    Historical Provider, MD   glycerin-hypromellose- (ARTIFICIAL TEARS) 0.2-0.2-1 % SOLN opthalmic solution Place 1 drop into both eyes 2 times daily as needed REFRESH PM    Historical Provider, MD   Multiple Vitamins-Minerals (PRESERVISION AREDS 2) CAPS Take 1 capsule by mouth 2 times daily    Historical Provider, MD   docusate sodium (COLACE) 100 MG capsule Take 200 mg by mouth nightly    Historical Provider, MD   tamsulosin (FLOMAX) 0.4 MG capsule Take 0.4 mg by mouth daily    Historical Provider, MD   montelukast (SINGULAIR) 10 MG tablet Take 10 mg by mouth nightly. Historical Provider, MD        Patient has no known allergies. OBJECTIVE:        Vitals:    03/25/22 1650   BP: 133/70   Pulse: 77   Resp:    Temp:    SpO2: 95%              EXAM:        Pt is AAOx3, NAD  RLE focused exam:    Vascular Exam:  DP and PT pulses are faintly palpable on the right. CFT is ~5 sec, skin is shiny and atrophic. No pedal hair growth noted. Mild edema present. Neuro Exam:  Light touch and protective sensation intact. Dermatologic Exam: Wound present to dorsal R second toe down to level of bone with bone exposed. Wound measuring roughly 2x1x0.3 cm. No malodor present, no drainage noted. No fluctuance or crepitus noted. Full thickness ulcer to lateral 3rd toe to level of subcutaneous tissue measuring roughly 0.5x0.5x0.2 cm. No drainage, malodor, crepitus, fluctuance noted. No probe to bone. Remain webspaces clean dry and intact. MSK: MMT deferred at this time.  POP to 2nd toe pertinent labs, charts, and imaging reviewed prior to encounter  -WBC: pending  -R foot x rays: pending  -Wound culture pending  -NIVS pending  -Pt is stable at this time. Pt will need 2nd toe amputation once vascular work up is complete. Plan for OR in coming days  -Wound dressed in xeroform, DSD.  -Will continue to follow pt while in house  -D/W:   Dennise Rhodes DPM [facility]  Fellowship-Trained Foot and Ankle Surgeon  Diplomate, [employer] Board of Foot and Ankle Surgeons  [phone]     Thank you for involving podiatry in this patients care.  Please do not hesitate to call with any questions or concerns       Marine Carrion DPM  PGY1 Podiatry Resident   3/25/2022   7:34 PM

## 2022-03-25 NOTE — ED NOTES
Report to [facility], care of patient relinquished.       Maddy Holt, RN  03/25/22 1070

## 2022-03-25 NOTE — PROGRESS NOTES
[facility] Followup Visit Note    Referring Physician : Neha Delgado DO  [address] RECORD NUMBER:  [id number]  AGE: 80 y.o. GENDER: male  : 1929  EPISODE DATE:  3/25/2022    Subjective:     Chief Complaint   Patient presents with    Wound Check     bilateral feet      HISTORY of PRESENT ILLNESS HPI   Freddy Emanuel is a 80 y.o. male who presents today in regards to follow up evaluation and treatment of wound/ulcer. That patient's past medical, family and social hx were reviewed and changes were made if present. History of Wound Context: Patient and caregiver noticed as well as home nursing over the last several days worsening of the right second toe. They noticed increased swelling, erythema, and eschar to the distal medial aspect of the right second toe. Patient did have some pain associated with the wound last night. Patient is in no acute distress. Patient denies any nausea, vomiting, fever, chills. No other additional abnormalities noted.     Wound/Ulcer Pain Timing/Severity: mild  Quality of pain: aching  Severity:  3 / 10   Modifying Factors: Pain is relieved/improved with rest  Associated Signs/Symptoms: Swelling and new onset eschar right second toe    Ulcer Identification:  Ulcer Type: arterial  Contributing Factors: decreased mobility and arterial insufficiency    Diabetic/Pressure/Non Pressure Ulcers only:  Ulcer: Non-Pressure ulcer, muscle necrosis    Wound: Blister        PAST MEDICAL HISTORY      Diagnosis Date    Asthma     COPD (chronic obstructive pulmonary disease) (HCC)     Unspecified cerebral artery occlusion with cerebral infarction      Past Surgical History:   Procedure Laterality Date    ABDOMEN SURGERY      multiple; intestinal abscess; hernia repair;     APPENDECTOMY      ECHO COMPL W DOP COLOR FLOW  3/16/2013         HERNIA REPAIR      JOINT REPLACEMENT  12     Family History   Problem Relation Age of Onset    Cancer Mother stomach    Cancer Father         lung    No Known Problems Sister      Social History     Tobacco Use    Smoking status: Former Smoker     Packs/day: 1.00     Types: Cigarettes     Start date: 1945     Quit date: 1982     Years since quittin.0    Smokeless tobacco: Never Used   Vaping Use    Vaping Use: Never used   Substance Use Topics    Alcohol use: No    Drug use: Never     No Known Allergies  Current Outpatient Medications on File Prior to Encounter   Medication Sig Dispense Refill    clindamycin (CLEOCIN) 300 MG capsule Take 300 mg by mouth in the morning and at bedtime      apixaban (ELIQUIS) 2.5 MG TABS tablet Take by mouth 2 times daily      digoxin (LANOXIN) 125 MCG tablet Take 1 tablet by mouth daily 30 tablet 3    ipratropium-albuterol (DUONEB) 0.5-2.5 (3) MG/3ML SOLN nebulizer solution Inhale 3 mLs into the lungs every 4 hours (while awake) 360 mL 0    brimonidine (ALPHAGAN) 0.2 % ophthalmic solution Place 1 drop into both eyes 2 times daily 1 Bottle 3    potassium chloride (KLOR-CON M) 10 MEQ extended release tablet Take 20 mEq by mouth daily as needed If takes lasix       mometasone-formoterol (DULERA) 200-5 MCG/ACT inhaler Inhale 1 puff into the lungs daily      fluticasone (FLOVENT HFA) 110 MCG/ACT inhaler Inhale 1 puff into the lungs daily as needed      glycerin-hypromellose- (ARTIFICIAL TEARS) 0.2-0.2-1 % SOLN opthalmic solution Place 1 drop into both eyes 2 times daily as needed REFRESH PM      Multiple Vitamins-Minerals (PRESERVISION AREDS 2) CAPS Take 1 capsule by mouth 2 times daily      docusate sodium (COLACE) 100 MG capsule Take 200 mg by mouth nightly      tamsulosin (FLOMAX) 0.4 MG capsule Take 0.4 mg by mouth daily      montelukast (SINGULAIR) 10 MG tablet Take 10 mg by mouth nightly. No current facility-administered medications on file prior to encounter.        REVIEW OF SYSTEMS See HPI    Objective:    /62   Pulse 68   Temp 96.9 °F (36.1 °C) (Temporal)   Resp 18   Ht 6' (1.829 m)   Wt 195 lb (88.5 kg)   BMI 26.45 kg/m²   Wt Readings from Last 3 Encounters:   03/25/22 195 lb (88.5 kg)   03/11/22 195 lb (88.5 kg)   02/25/22 195 lb (88.5 kg)     PHYSICAL EXAM  CONSTITUTIONAL:   Awake, alert, cooperative and in no acute distress  SKIN:  Open wound Present    Assessment:     Problem List Items Addressed This Visit     Atherosclerosis of native arteries of right leg with ulceration of other part of foot ([facility] [address] - Primary    Relevant Orders    Initiate Outpatient Wound Care Protocol    Cellulitis of second toe of right foot    Relevant Orders    Initiate Outpatient Wound Care Protocol    Gangrene of toe of right foot ([facility]          Pre Debridement Measurements:  Are located in the [place]  Documentation Flow Sheet  Post Debridement Measurements:  Wound/Ulcer Descriptions are Pre Debridement except measurements:     Wound 03/23/19 Buttocks Right; Inner 1.0 x 1.0 white area surrounded by reddened periwound that is blanchable (Active)   Number of days: 1098       Wound 04/30/21 Brachial Anterior; Left (Active)   Number of days: 328       Wound 05/07/21 Arm Right (Active)   Number of days: 321       Wound 02/11/22 Toe (Comment  which one) Right;Medial #1 second toe (Active)   Wound Image   02/11/22 1531   Dressing Status New dressing applied 02/18/22 1540   Wound Cleansed Betadine/povidone iodine 02/18/22 1540   Dressing/Treatment Alginate with Ag 02/18/22 1540   Offloading for Diabetic Foot Ulcers Post op shoe 02/18/22 1540   Wound Length (cm) 2.8 cm 03/25/22 1558   Wound Width (cm) 1.8 cm 03/25/22 1558   Wound Depth (cm) 0.2 cm 03/25/22 1558   Wound Surface Area (cm^2) 5.04 cm^2 03/25/22 1558   Change in Wound Size % (l*w) -29.23 03/25/22 1558   Wound Volume (cm^3) 1.008 cm^3 03/25/22 1558   Wound Healing % -29 03/25/22 1558   Wound Assessment Fibrin;Pink/red;Eschar dry;Eschar moist;Slough 03/25/22 1554   Drainage Amount Moderate 03/25/22 1554 Drainage Description Serosanguinous 03/25/22 1558   Odor None 03/25/22 1558   Rand-wound Assessment Maceration;Dry/flaky 03/25/22 1558   Number of days: 42       Wound 02/11/22 Toe (Comment  which one) Right;Lateral #2 third  toe (Active)   Wound Image   02/11/22 1531   Dressing Status New dressing applied 02/18/22 1540   Wound Cleansed Betadine/povidone iodine 02/18/22 1540   Dressing/Treatment Alginate with Ag 02/18/22 1540   Offloading for Diabetic Foot Ulcers Post op shoe 02/18/22 1540   Wound Length (cm) 0.7 cm 03/25/22 1558   Wound Width (cm) 0.7 cm 03/25/22 1558   Wound Depth (cm) 0.3 cm 03/25/22 1558   Wound Surface Area (cm^2) 0.49 cm^2 03/25/22 1558   Change in Wound Size % (l*w) 55.45 03/25/22 1558   Wound Volume (cm^3) 0.147 cm^3 03/25/22 1558   Wound Healing % -34 03/25/22 1558   Wound Assessment Fibrin;Pink/red 03/25/22 1558   Drainage Amount Moderate 03/25/22 1558   Drainage Description Serosanguinous 03/25/22 1558   Odor None 03/25/22 1558   Rand-wound Assessment Maceration 03/25/22 1558   Number of days: 42       Wound 03/25/22 Heel Right #3 (Active)   Wound Image   03/25/22 1558   Wound Length (cm) 1 cm 03/25/22 1558   Wound Width (cm) 0.3 cm 03/25/22 1558   Wound Depth (cm) 0.2 cm 03/25/22 1558   Wound Surface Area (cm^2) 0.3 cm^2 03/25/22 1558   Wound Volume (cm^3) 0.06 cm^3 03/25/22 1558   Wound Assessment Fibrin;Dry 03/25/22 1558   Drainage Amount None 03/25/22 1558   Odor None 03/25/22 1558   Rand-wound Assessment Hyperkeratosis (callous) 03/25/22 1558   Number of days: 0          Procedure Note  Indications:  Based on my examination of this patient's wound(s)/ulcer(s) today, debridement is not required to promote healing and evaluate the wound base. No debridement performed on today's visit. Upon evaluation today, we did recommend hospital admission for intravenous antibiotics and probable digital amputation right second toe.   We did discuss the current clinical situation and importance of intravenous antibiotic coverage to prevent ascending cellulitic issues and/or potential for sepsis. Betadine and dry padded dressing was applied to the right lower extremity on today's visit. We did discuss care with Dr. Tres Medrano who will see patient in the hospital upon admission. Patient and his caregiver did understand the rationale behind hospital admission, IV antibiotics, and surgical intervention to prevent worsening of symptoms and potential for loss of limb and/or life. Patient will be followed up at a later date for continued evaluation and management. Plan:   Treatment Note please see attached Discharge Instructions    Written patient dismissal instructions given to patient and signed by patient or POA. Discharge Instructions       Visit Discharge/Physician Orders     Discharge condition: Stable     Assessment of pain at discharge: mild     Anesthetic used: 4% Lidocaine     Discharge to: Home     Left via:Private automobile     Accompanied by: accompanied by family member     ECF/[facility]: [place]     Dressing Orders: Cleanse wounds right foot with normal saline. Continue to use betadine between 1st and 3rd toes, followed by silver alginate in between toes as well. Do not need to cover with gauze.       Treatment Orders: Please go to the [facility] Emergency Department as soon as possible, for evaluation of wound between 1st and 2nd toes.          [facility] followup visit ______1  week_______Dr. Miner________________  (Please note your next appointment above and if you are unable to keep, kindly give a 24 hour notice.  Thank you.)     Physician signature:__________________________        If you experience any of the following, please call the [address] [place] [address] Road during business hours:     * Increase in Pain  * Temperature over 101  * Increase in drainage from your wound  * Drainage with a foul odor  * Bleeding  * Increase in swelling  * Need for compression bandage changes due to slippage, breakthrough drainage.     If you need medical attention outside of the business hours of the [address] [place] [address] Road please contact your PCP or go to the nearest emergency room.                                                                          Electronically signed by Kashmir Chino DPM on 3/25/2022 at 4:40 PM

## 2022-03-26 LAB
ANION GAP SERPL CALCULATED.3IONS-SCNC: 8 MMOL/L (ref 7–16)
BASOPHILS ABSOLUTE: 0.03 E9/L (ref 0–0.2)
BASOPHILS RELATIVE PERCENT: 0.4 % (ref 0–2)
BUN BLDV-MCNC: 27 MG/DL (ref 6–23)
CALCIUM SERPL-MCNC: 8.9 MG/DL (ref 8.6–10.2)
CHLORIDE BLD-SCNC: 102 MMOL/L (ref 98–107)
CO2: 27 MMOL/L (ref 22–29)
CREAT SERPL-MCNC: 1.5 MG/DL (ref 0.7–1.2)
EOSINOPHILS ABSOLUTE: 0.4 E9/L (ref 0.05–0.5)
EOSINOPHILS RELATIVE PERCENT: 5.3 % (ref 0–6)
GFR AFRICAN AMERICAN: 53
GFR NON-AFRICAN AMERICAN: 44 ML/MIN/1.73
GLUCOSE BLD-MCNC: 114 MG/DL (ref 74–99)
HBA1C MFR BLD: 6.1 % (ref 4–5.6)
HCT VFR BLD CALC: 40.8 % (ref 37–54)
HEMOGLOBIN: 12.9 G/DL (ref 12.5–16.5)
IMMATURE GRANULOCYTES #: 0.03 E9/L
IMMATURE GRANULOCYTES %: 0.4 % (ref 0–5)
LYMPHOCYTES ABSOLUTE: 2.55 E9/L (ref 1.5–4)
LYMPHOCYTES RELATIVE PERCENT: 33.6 % (ref 20–42)
MCH RBC QN AUTO: 29.3 PG (ref 26–35)
MCHC RBC AUTO-ENTMCNC: 31.6 % (ref 32–34.5)
MCV RBC AUTO: 92.7 FL (ref 80–99.9)
MONOCYTES ABSOLUTE: 0.73 E9/L (ref 0.1–0.95)
MONOCYTES RELATIVE PERCENT: 9.6 % (ref 2–12)
NEUTROPHILS ABSOLUTE: 3.86 E9/L (ref 1.8–7.3)
NEUTROPHILS RELATIVE PERCENT: 50.7 % (ref 43–80)
PDW BLD-RTO: 14.2 FL (ref 11.5–15)
PLATELET # BLD: 201 E9/L (ref 130–450)
PMV BLD AUTO: 9.2 FL (ref 7–12)
POTASSIUM REFLEX MAGNESIUM: 3.9 MMOL/L (ref 3.5–5)
RBC # BLD: 4.4 E12/L (ref 3.8–5.8)
SODIUM BLD-SCNC: 137 MMOL/L (ref 132–146)
WBC # BLD: 7.6 E9/L (ref 4.5–11.5)

## 2022-03-26 PROCEDURE — 6370000000 HC RX 637 (ALT 250 FOR IP): Performed by: SPECIALIST

## 2022-03-26 PROCEDURE — 6360000002 HC RX W HCPCS: Performed by: INTERNAL MEDICINE

## 2022-03-26 PROCEDURE — 6360000002 HC RX W HCPCS: Performed by: SPECIALIST

## 2022-03-26 PROCEDURE — 6370000000 HC RX 637 (ALT 250 FOR IP): Performed by: INTERNAL MEDICINE

## 2022-03-26 PROCEDURE — 80048 BASIC METABOLIC PNL TOTAL CA: CPT

## 2022-03-26 PROCEDURE — 99223 1ST HOSP IP/OBS HIGH 75: CPT | Performed by: SURGERY

## 2022-03-26 PROCEDURE — 2580000003 HC RX 258: Performed by: INTERNAL MEDICINE

## 2022-03-26 PROCEDURE — 85025 COMPLETE CBC W/AUTO DIFF WBC: CPT

## 2022-03-26 PROCEDURE — 1200000000 HC SEMI PRIVATE

## 2022-03-26 PROCEDURE — 36415 COLL VENOUS BLD VENIPUNCTURE: CPT

## 2022-03-26 PROCEDURE — 83036 HEMOGLOBIN GLYCOSYLATED A1C: CPT

## 2022-03-26 PROCEDURE — 94640 AIRWAY INHALATION TREATMENT: CPT

## 2022-03-26 RX ORDER — DOXYCYCLINE HYCLATE 100 MG/1
100 CAPSULE ORAL EVERY 12 HOURS SCHEDULED
Status: DISCONTINUED | OUTPATIENT
Start: 2022-03-26 | End: 2022-03-29

## 2022-03-26 RX ADMIN — SODIUM CHLORIDE: 9 INJECTION, SOLUTION INTRAVENOUS at 00:10

## 2022-03-26 RX ADMIN — SODIUM CHLORIDE, PRESERVATIVE FREE 10 ML: 5 INJECTION INTRAVENOUS at 20:38

## 2022-03-26 RX ADMIN — DIGOXIN 125 MCG: 125 TABLET ORAL at 08:34

## 2022-03-26 RX ADMIN — APIXABAN 2.5 MG: 2.5 TABLET, FILM COATED ORAL at 20:38

## 2022-03-26 RX ADMIN — IPRATROPIUM BROMIDE AND ALBUTEROL SULFATE 3 ML: 2.5; .5 SOLUTION RESPIRATORY (INHALATION) at 19:36

## 2022-03-26 RX ADMIN — Medication 1 TABLET: at 08:35

## 2022-03-26 RX ADMIN — DOXYCYCLINE HYCLATE 100 MG: 100 CAPSULE ORAL at 20:38

## 2022-03-26 RX ADMIN — SODIUM CHLORIDE, PRESERVATIVE FREE 10 ML: 5 INJECTION INTRAVENOUS at 00:15

## 2022-03-26 RX ADMIN — SODIUM CHLORIDE: 9 INJECTION, SOLUTION INTRAVENOUS at 04:08

## 2022-03-26 RX ADMIN — MONTELUKAST SODIUM 10 MG: 10 TABLET ORAL at 20:38

## 2022-03-26 RX ADMIN — BRIMONIDINE TARTRATE 1 DROP: 2 SOLUTION OPHTHALMIC at 08:37

## 2022-03-26 RX ADMIN — DOCUSATE SODIUM 200 MG: 100 CAPSULE, LIQUID FILLED ORAL at 20:38

## 2022-03-26 RX ADMIN — BRIMONIDINE TARTRATE 1 DROP: 2 SOLUTION OPHTHALMIC at 20:38

## 2022-03-26 RX ADMIN — BUDESONIDE 250 MCG: 0.25 SUSPENSION RESPIRATORY (INHALATION) at 19:36

## 2022-03-26 RX ADMIN — IPRATROPIUM BROMIDE AND ALBUTEROL SULFATE 3 ML: 2.5; .5 SOLUTION RESPIRATORY (INHALATION) at 08:06

## 2022-03-26 RX ADMIN — TAMSULOSIN HYDROCHLORIDE 0.4 MG: 0.4 CAPSULE ORAL at 08:35

## 2022-03-26 RX ADMIN — POLYVINYL ALCOHOL 1 DROP: 14 SOLUTION/ DROPS OPHTHALMIC at 20:38

## 2022-03-26 RX ADMIN — ARFORMOTEROL TARTRATE 15 MCG: 15 SOLUTION RESPIRATORY (INHALATION) at 08:06

## 2022-03-26 RX ADMIN — IPRATROPIUM BROMIDE AND ALBUTEROL SULFATE 3 ML: 2.5; .5 SOLUTION RESPIRATORY (INHALATION) at 11:57

## 2022-03-26 RX ADMIN — IPRATROPIUM BROMIDE AND ALBUTEROL SULFATE 3 ML: 2.5; .5 SOLUTION RESPIRATORY (INHALATION) at 15:39

## 2022-03-26 RX ADMIN — Medication 2000 MG: at 13:42

## 2022-03-26 RX ADMIN — SODIUM CHLORIDE, PRESERVATIVE FREE 10 ML: 5 INJECTION INTRAVENOUS at 13:43

## 2022-03-26 RX ADMIN — ACETAMINOPHEN 650 MG: 325 TABLET ORAL at 00:12

## 2022-03-26 RX ADMIN — Medication 1 TABLET: at 20:38

## 2022-03-26 RX ADMIN — POLYVINYL ALCOHOL 1 DROP: 14 SOLUTION/ DROPS OPHTHALMIC at 12:26

## 2022-03-26 RX ADMIN — ARFORMOTEROL TARTRATE 15 MCG: 15 SOLUTION RESPIRATORY (INHALATION) at 19:36

## 2022-03-26 RX ADMIN — BUDESONIDE 250 MCG: 0.25 SUSPENSION RESPIRATORY (INHALATION) at 08:06

## 2022-03-26 RX ADMIN — ACETAMINOPHEN 650 MG: 325 TABLET ORAL at 20:38

## 2022-03-26 ASSESSMENT — PAIN DESCRIPTION - PROGRESSION: CLINICAL_PROGRESSION: NOT CHANGED

## 2022-03-26 ASSESSMENT — PAIN DESCRIPTION - DESCRIPTORS: DESCRIPTORS: ACHING

## 2022-03-26 ASSESSMENT — PAIN DESCRIPTION - PAIN TYPE: TYPE: ACUTE PAIN

## 2022-03-26 ASSESSMENT — PAIN - FUNCTIONAL ASSESSMENT: PAIN_FUNCTIONAL_ASSESSMENT: PREVENTS OR INTERFERES SOME ACTIVE ACTIVITIES AND ADLS

## 2022-03-26 ASSESSMENT — PAIN SCALES - GENERAL
PAINLEVEL_OUTOF10: 3
PAINLEVEL_OUTOF10: 0
PAINLEVEL_OUTOF10: 6

## 2022-03-26 ASSESSMENT — PAIN DESCRIPTION - LOCATION: LOCATION: FOOT

## 2022-03-26 ASSESSMENT — PAIN DESCRIPTION - ORIENTATION: ORIENTATION: RIGHT

## 2022-03-26 ASSESSMENT — PAIN DESCRIPTION - ONSET: ONSET: ON-GOING

## 2022-03-26 ASSESSMENT — PAIN DESCRIPTION - FREQUENCY: FREQUENCY: INTERMITTENT

## 2022-03-26 NOTE — H&P
History & Physical        Reason for admission:   Foot ulcer. Failed outpatient antibiotic    History Obtained From:  ER, patient    HISTORY OF PRESENT ILLNESS:    The patient is a 80 y.o. male with history of  paroxysmal atrial fibrillation,  COPD, diabetes?, chronic kidney disease stage III, and BPH. who presents with 4+ weeks right 2nd toe wound. Pt follows with podiatrist in wound care center who sent the patient to ER yesterday, after visit. Has apparently failed outpatient  atb with clindamycin. Plan was to initiate IV atb and evaluate for possible amputation early next week. Also concern raised about his vascular status. Vascular studies ordered. Baseline, ambulates with walker. Notes poor balance.     Past Medical History:        Diagnosis Date    Asthma     COPD (chronic obstructive pulmonary disease) ([facility] [address]     Unspecified cerebral artery occlusion with cerebral infarction        Past Surgical History:        Procedure Laterality Date    ABDOMEN SURGERY      multiple; intestinal abscess; hernia repair;     APPENDECTOMY      ECHO COMPL W DOP COLOR FLOW  3/16/2013         HERNIA REPAIR      JOINT REPLACEMENT  6-11-12       Medications Prior to Admission:    Medications Prior to Admission: bisacodyl (DULCOLAX) 5 MG EC tablet, Take 5 mg by mouth daily  clindamycin (CLEOCIN) 300 MG capsule, Take 300 mg by mouth in the morning and at bedtime  apixaban (ELIQUIS) 2.5 MG TABS tablet, Take by mouth 2 times daily  digoxin (LANOXIN) 125 MCG tablet, Take 1 tablet by mouth daily  ipratropium-albuterol (DUONEB) 0.5-2.5 (3) MG/3ML SOLN nebulizer solution, Inhale 3 mLs into the lungs every 4 hours (while awake)  brimonidine (ALPHAGAN) 0.2 % ophthalmic solution, Place 1 drop into both eyes 2 times daily  potassium chloride (KLOR-CON M) 10 MEQ extended release tablet, Take 20 mEq by mouth daily as needed If takes lasix   mometasone-formoterol (DULERA) 200-5 MCG/ACT inhaler, Inhale 1 puff into the lungs daily  fluticasone (FLOVENT HFA) 110 MCG/ACT inhaler, Inhale 1 puff into the lungs daily as needed  glycerin-hypromellose- (ARTIFICIAL TEARS) 0.2-0.2-1 % SOLN opthalmic solution, Place 1 drop into both eyes 2 times daily as needed REFRESH PM  Multiple Vitamins-Minerals (PRESERVISION AREDS 2) CAPS, Take 1 capsule by mouth 2 times daily  docusate sodium (COLACE) 100 MG capsule, Take 200 mg by mouth nightly  tamsulosin (FLOMAX) 0.4 MG capsule, Take 0.4 mg by mouth daily  montelukast (SINGULAIR) 10 MG tablet, Take 10 mg by mouth nightly. Allergies:  Patient has no known allergies. Social History:   TOBACCO:   reports that he quit smoking about 40 years ago. His smoking use included cigarettes. He started smoking about 76 years ago. He smoked 1.00 pack per day. He has never used smokeless tobacco.  ETOH:   reports no history of alcohol use. Family History:       Problem Relation Age of Onset   Mai Cancer Mother         stomach    Cancer Father         lung    No Known Problems Sister        REVIEW OF SYSTEMS:  CONSTITUTIONAL:  Neg   Recent weight changes,fatigue,fever,chills or night sweats  EYES:  Neg  blurriness,tearing,itching or acute change in vision  NOSE:  Neg  rhinorrhea,sneezing,itching,allergy or epistaxis  MOUTH/THROAT:  Neg  bleeding gums,hoarseness or sore throat. RESPIRATORY:   Neg SOB,wheeze,cough,sputum,hemoptysis or bronochitis  CARDIOVASCULAR   Neg : chest pain,palpitations,dyspnea on exertion,orthopnea,paroxysmal nocturnal dyspnea or edema  GASTROINTESTINAL:  Neg   Appetite changes,nausea,vomiting,or diarrhea,indigestion,dysphagia,change in bowel movements, or abdominal pain. GENITOURINARY:  Neg  Urinary frequency,hesitancy,urgency,polyuria,dysuria,hematuria,or incontinence. HEMATOLOGIC/LYMPHATIC:  Neg  Anemia,bleeding tendency  MUSCULOSKELETAL:  Neg   New myalgias,bone pain,joint pain,swelling or stiffness and has had no change in gait.   NEUROLOGICAL:  Neg  Loss of Consciousness,memeory loss,forgetfulness,periods of confusion,decline in intellect,nervousness,insomina,aphasia or dysarthria. SKIN :  Neg  skin or hair changes,and has no itching,rashes. Pos: Ulcer    PHYSICAL EXAM:  /61   Pulse 68   Temp 98.7 °F (37.1 °C) (Oral)   Resp 18   Ht 6' (1.829 m)   Wt 176 lb 5.9 oz (80 kg)   SpO2 95%   BMI 23.92 kg/m²   General appearance: alert, appears stated age, cooperative and no distress  Head: Normocephalic, without obvious abnormality, atraumatic  Eyes: conjunctivae/corneas clear. PERRL, EOM's intact. Ears: normal external ear canals both ears  Neck: no adenopathy, no carotid bruit, no JVD, supple, symmetrical, trachea midline and thyroid not enlarged, no tenderness/mass/nodules  Lungs: Clear to A and P  Heart: irregularly, irregular, S1, S2 normal, systolic  murmur, regular rate and rhythm ,no precordial heave  Abdomen:soft, non-tender; non-distended normal bowel sounds no masses, no organomegaly  Extremities:-CCE, diminished peripheral pulses. Ulcer present over dorsal R second to to bone. Ulcer 3rd toe laterally, not as deep. No obvious purulent d/c.  Neurologic:Mental status: Alert, oriented, thought content appropriate  Cranial nerves:II-XII Grossly intact  Sensory: normal  Motor:grossly normal    DATA:  Recent Labs     03/25/22 1921 03/26/22  0255   WBC 8.6 7.6   HGB 14.0 12.9   HCT 42.9 40.8   MCV 91.5 92.7    201     Recent Labs     03/25/22 1921 03/26/22  0255    137   K 4.0 3.9    102   CO2 28 27   BUN 30* 27*   CREATININE 1.5* 1.5*   GLUCOSE 111* 114*     Recent Labs     03/25/22 1921   AST 13   ALT 10   BILITOT 0.6   ALKPHOS 83     No results for input(s): CKTOTAL, CKMB, TROPONINI in the last 72 hours.   Lab Results   Component Value Date    INR 2.4 07/14/2021    INR 3.3 07/13/2021    INR 3.4 07/12/2021    PROTIME 26.3 (H) 07/14/2021    PROTIME 36.1 (H) 07/13/2021    PROTIME 37.2 (H) 07/12/2021        CBC with Differential:    Lab Results   Component Value Date    WBC 7.6 03/26/2022    RBC 4.40 03/26/2022    HGB 12.9 03/26/2022    HCT 40.8 03/26/2022     03/26/2022    MCV 92.7 03/26/2022    MCH 29.3 03/26/2022    MCHC 31.6 03/26/2022    RDW 14.2 03/26/2022    SEGSPCT 89 03/16/2013    LYMPHOPCT 33.6 03/26/2022    MONOPCT 9.6 03/26/2022    BASOPCT 0.4 03/26/2022    MONOSABS 0.73 03/26/2022    LYMPHSABS 2.55 03/26/2022    EOSABS 0.40 03/26/2022    BASOSABS 0.03 03/26/2022     CMP:    Lab Results   Component Value Date     03/26/2022    K 3.9 03/26/2022     03/26/2022    CO2 27 03/26/2022    BUN 27 03/26/2022    CREATININE 1.5 03/26/2022    GFRAA 53 03/26/2022    LABGLOM 44 03/26/2022    GLUCOSE 114 03/26/2022    GLUCOSE 98 10/14/2010    PROT 7.1 03/25/2022    LABALBU 4.0 03/25/2022    LABALBU 3.6 10/14/2010    CALCIUM 8.9 03/26/2022    BILITOT 0.6 03/25/2022    ALKPHOS 83 03/25/2022    AST 13 03/25/2022    ALT 10 03/25/2022     Magnesium:  No results found for: MG  Phosphorus:  No results found for: PHOS  Troponin:    Lab Results   Component Value Date    TROPONINI 0.01 05/07/2021     U/A:    Lab Results   Component Value Date    COLORU Yellow 05/07/2021    PHUR 6.0 05/07/2021    WBCUA 2-5 05/07/2021    RBCUA 10-20 05/07/2021    RBCUA NONE 03/15/2013    BACTERIA RARE 05/07/2021    CLARITYU Clear 05/07/2021    SPECGRAV 1.020 05/07/2021    LEUKOCYTESUR Negative 05/07/2021    UROBILINOGEN 0.2 05/07/2021    BILIRUBINUR Negative 05/07/2021    BLOODU MODERATE 05/07/2021    GLUCOSEU Negative 05/07/2021     ABG:  No results found for: PHART, HBX8NUM, PO2ART, JXW7XJN, BEART, THGBART, GXY9YPX, B0MIEKLM       RADIOLOGY:   XR FOOT RIGHT (MIN 3 VIEWS)   Final Result   Soft tissue swelling of the mid and distal foot. 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          VL LOWER EXTREMITY ARTERIAL SEGMENTAL PRESSURES W PPG BILATERAL    (Results Pending)       ASSESSMENT :    R second and 3rd to ulcer, failed outpatient therapy  PAD  PAF  COPD  CKD3  NIDDM? BPH          Patient Active Problem List   Diagnosis Code    Atrial fibrillation ([facility]) I48.91    [facility] J44.1    Chest pain R07.9    Asthma J45.909    CAP (community acquired pneumonia) J18.9    GI bleeding K92.2    COPD exacerbation ([facility] J44.1    Atrial fibrillation with RVR ([facility]) I48.91    Atherosclerosis of native arteries of right leg with ulceration of other part of foot ([facility]) I70.235    PVD (peripheral vascular disease) ([facility]) I73.9    Cellulitis of second toe of right foot L03.031    Gangrene of toe of right foot ([facility] I96    Wound infection T14. 8XXA, L08.9    Ulcer of right foot, with unspecified severity ([facility] L97.519     PLAN:  Labs/cultures. Consults to podiatry, ID. Vascular studies. May need vascular consult pending. PT/OT, Ambulate  Follow labs.       Disposition:  Home vs. [facility]      Electronically signed by Linda Mo MD on 3/26/2022 at 7:51 AM

## 2022-03-26 NOTE — PROGRESS NOTES
Podiatry Progress Note  3/26/2022   Channing Hernández       HISTORY OF PRESENT ILLNESS: Pt is a 80 y.o. male seen bedside for right 2nd toe wound. Today pt denies any N/V/F/C or foot pain. Explained to pt we would like vascular surgery to assess blood flow to foot before proceeding with 2nd digit amp as this is not an emergent case at this time. Pt has no other pedal complaints at this time. Past Medical History:   Diagnosis Date    Asthma     COPD (chronic obstructive pulmonary disease) ([address]     Unspecified cerebral artery occlusion with cerebral infarction         Past Surgical History:   Procedure Laterality Date    ABDOMEN SURGERY      multiple; intestinal abscess; hernia repair;     APPENDECTOMY      ECHO COMPL W DOP COLOR FLOW  3/16/2013         HERNIA REPAIR      JOINT REPLACEMENT  12         Family History   Problem Relation Age of Onset    Cancer Mother         stomach    Cancer Father         lung    No Known Problems Sister         Social History     Tobacco Use    Smoking status: Former Smoker     Packs/day: 1.00     Types: Cigarettes     Start date: 1945     Quit date: 1982     Years since quittin.0    Smokeless tobacco: Never Used   Substance Use Topics    Alcohol use: No        Prior to Admission medications    Medication Sig Start Date End Date Taking?  Authorizing Provider   bisacodyl (DULCOLAX) 5 MG EC tablet Take 5 mg by mouth daily   Yes Historical Provider, MD   clindamycin (CLEOCIN) 300 MG capsule Take 300 mg by mouth in the morning and at bedtime    Historical Provider, MD   apixaban (ELIQUIS) 2.5 MG TABS tablet Take by mouth 2 times daily    Historical Provider, MD   digoxin (LANOXIN) 125 MCG tablet Take 1 tablet by mouth daily 7/15/21   Hemalatha Burrell DO   ipratropium-albuterol (DUONEB) 0.5-2.5 (3) MG/3ML SOLN nebulizer solution Inhale 3 mLs into the lungs every 4 hours (while awake) 21   Hemalatha Burrell DO   brimonidine (ALPHAGAN) 0.2 % ophthalmic solution Place 1 drop into both eyes 2 times daily 5/13/21   Anam Aaron, DO   potassium chloride (KLOR-CON M) 10 MEQ extended release tablet Take 20 mEq by mouth daily as needed If takes lasix     Historical Provider, MD   mometasone-formoterol (DULERA) 200-5 MCG/ACT inhaler Inhale 1 puff into the lungs daily    Historical Provider, MD   fluticasone (FLOVENT HFA) 110 MCG/ACT inhaler Inhale 1 puff into the lungs daily as needed    Historical Provider, MD   glycerin-hypromellose- (ARTIFICIAL TEARS) 0.2-0.2-1 % SOLN opthalmic solution Place 1 drop into both eyes 2 times daily as needed REFRESH PM    Historical Provider, MD   Multiple Vitamins-Minerals (PRESERVISION AREDS 2) CAPS Take 1 capsule by mouth 2 times daily    Historical Provider, MD   docusate sodium (COLACE) 100 MG capsule Take 200 mg by mouth nightly    Historical Provider, MD   tamsulosin (FLOMAX) 0.4 MG capsule Take 0.4 mg by mouth daily    Historical Provider, MD   montelukast (SINGULAIR) 10 MG tablet Take 10 mg by mouth nightly. Historical Provider, MD        Patient has no known allergies. OBJECTIVE:        Vitals:    03/26/22 0829   BP: 120/76   Pulse: 96   Resp: 18   Temp: 97.9 °F (36.6 °C)   SpO2: 92%              EXAM:        Pt is AAOx3, NAD  RLE focused exam:    Vascular Exam:  DP and PT pulses are faintly palpable on the right. CFT is ~5 sec, skin is shiny and atrophic. No pedal hair growth noted. Mild edema present. Neuro Exam:  Light touch and protective sensation intact. Dermatologic Exam: Wound present to dorsal R second toe down to level of bone with bone exposed. Wound measuring roughly 2x1x0.3 cm. No malodor present, no drainage noted. No fluctuance or crepitus noted. Full thickness ulcer to lateral 3rd toe to level of subcutaneous tissue measuring roughly 0.5x0.5x0.2 cm. No drainage, malodor, crepitus, fluctuance noted. No probe to bone. Remain webspaces clean dry and intact.      MSK: MMT deferred at this mcg  0.25 mg Nebulization BID Tru Portillo MD   250 mcg at 03/26/22 1207    antioxidant multivitamin (OCUVITE) tablet  1 tablet Oral BID Tru Portillo MD   1 tablet at 03/26/22 0835        Lab Results   Component Value Date    WBC 7.6 03/26/2022    HCT 40.8 03/26/2022    HGB 12.9 03/26/2022     03/26/2022     03/26/2022    K 3.9 03/26/2022     03/26/2022    CO2 27 03/26/2022    BUN 27 (H) 03/26/2022    CREATININE 1.5 (H) 03/26/2022    GLUCOSE 114 (H) 03/26/2022    CRP 0.7 (H) 03/25/2022         Radiographs:    ASSESSMENT:  Full thickness ulcer down to bone Right 2nd toe, POA  PVD    Active Problems:    * No active hospital problems. *       PLAN:  -Patient examined and evaluated at bedside  -All pertinent labs, charts, and imaging reviewed prior to encounter  -WBC: 7.6, ESR: 20, CRP: 0.7  -R foot x rays: Soft tissue swelling of the mid and distal foot. 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   -Wound culture pending  -NIVS pending  -Vascular consulted. Pt is stable at this time. Pt will need 2nd toe amputation once vascular work up is complete. Plan for OR in coming days  -Wound dressed in xeroform, DSD.  -Will continue to follow pt while in house  -D/W:   Jameson Franklin DPM [id number]  Fellowship-Trained Foot and Ankle Surgeon  Diplomate, American Board of Foot and Ankle Surgeons  [phone]     Thank you for involving podiatry in this patients care.  Please do not hesitate to call with any questions or concerns       Omar Romero DPM  PGY1 Podiatry Resident   3/26/2022   9:48 AM

## 2022-03-26 NOTE — CONSULTS
Vascular Surgery Inpatient Consultation Note      Reason for Consultation: PVD    HISTORY OF PRESENT ILLNESS:                The patient is a 80 y.o. male who is admitted to the hospital for treatment of foot ulcer. The patient was seen as an outpatient by his podiatrist who felt that his foot ulcer was worsening and recommended admission. The patient is accompanied by his wife. They deny history of peripheral vascular disease. There is concern for poor wound healing. Arterial studies have been ordered. Vascular surgery is consulted for evaluation and treatment. IMPRESSION: Foot wounds. Lower extremity arterial disease. I can appreciate strong popliteal pulses bilaterally but I do not appreciate ankle pulses bilaterally. The patient likely has some degree of tibial artery disease and unfortunately lower extremity arterial Doppler studies will not give us much more information. From my standpoint, the patient is cleared for foot surgery. If there is concern for nonhealing post procedure, we could perform an arteriogram but this would have to be performed in [facility]. Patient Active Problem List   Diagnosis Code    Atrial fibrillation ([facility]) I48.91    COPD with exacerbation ([facility] J44.1    Chest pain R07.9    Asthma J45.909    CAP (community acquired pneumonia) J18.9    GI bleeding K92.2    COPD exacerbation ([address] J44.1    Atrial fibrillation with RVR ([facility]) I48.91    Atherosclerosis of native arteries of right leg with ulceration of other part of foot ([facility]) I70.235    PVD (peripheral vascular disease) ([facility]) I73.9    Cellulitis of second toe of right foot L03.031    Gangrene of toe of right foot ([facility] I96    Wound infection T14. 8XXA, L08.9    Ulcer of right foot, with unspecified severity ([address] L97.519       Past Medical History:   Diagnosis Date    Asthma     COPD (chronic obstructive pulmonary disease) ([facility]     Unspecified cerebral artery occlusion with cerebral infarction of Food in the Last Year: Not on file   Transportation Needs:     Lack of Transportation (Medical): Not on file    Lack of Transportation (Non-Medical):  Not on file   Physical Activity:     Days of Exercise per Week: Not on file    Minutes of Exercise per Session: Not on file   Stress:     Feeling of Stress : Not on file   Social Connections:     Frequency of Communication with Friends and Family: Not on file    Frequency of Social Gatherings with Friends and Family: Not on file    Attends Oriental orthodox Services: Not on file    Active Member of [address] [place] [address] or Organizations: Not on file    Attends Club or Organization Meetings: Not on file    Marital Status: Not on file   Intimate Partner Violence:     Fear of Current or Ex-Partner: Not on file    Emotionally Abused: Not on file    Physically Abused: Not on file    Sexually Abused: Not on file   Housing Stability:     Unable to Pay for Housing in the Last Year: Not on file    Number of [place] in the Last Year: Not on file    Unstable Housing in the Last Year: Not on file        Family History   Problem Relation Age of Onset    Cancer Mother         stomach    Cancer Father         lung    No Known Problems Sister        REVIEW OF SYSTEMS:      Eyes:      Blurred vision:  No [x]/Yes []               Diplopia:   No [x]/Yes []               Vision loss:       No [x]/Yes []   Ears, nose, throat:             Hearing loss:    No [x]/Yes []      Vertigo:   No [x]/Yes []                       Swallowing problem:  No [x]/Yes []               Nose bleeds:   No [x]/Yes []      Voice hoarseness:  No [x]/Yes []  Respiratory:             Cough:   No [x]/Yes []      Pleuritic chest pain:  No [x]/Yes []                        Dyspnea:   No [x]/Yes []      Wheezing:   No [x]/Yes []  Cardiovascular:             Angina:   No [x]/Yes []      Palpitations:   No [x]/Yes []          Claudication:    No [x]/Yes []      Leg swelling:   No [x]/Yes []  Gastrointestinal: Nausea or vomiting:  No [x]/Yes []               Abdominal pain:  No [x]/Yes []                     Intestinal bleeding: No [x]/Yes []  Musculoskeletal:             Leg pain:   No [x]/Yes []      Back pain:   No [x]/Yes []                    Weakness:   No [x]/Yes []  Neurologic:             Numbness:   No [x]/Yes []      Paralysis:   No [x]/Yes []                       Headaches:   No [x]/Yes []  Hematologic, lymphatic:   Anemia:   No [x]/Yes []              Bleeding or bruising:  No [x]/Yes []              Fevers or chills: No [x]/Yes []  Endocrine:             Temp intolerance:   No [x]/Yes []                       Polydipsia, polyuria:  No [x]/Yes []  Skin:              Rash:    No [x]/Yes []      Ulcers:   No [x]/Yes []              Abnorm pigment: No [x]/Yes []  :              Frequency/urgency:  No [x]/Yes []      Hematuria:    No [x]/Yes []                      Incontinence:    No [x]/Yes []    PHYSICAL EXAM:  Vitals:    03/26/22 1539   BP:    Pulse:    Resp:    Temp:    SpO2: 96%     General Appearance: alert and oriented to person, place and time, in no acute distress, well developed and well- nourished  Neurologic: no cranial nerve deficit, speech normal  Head: normocephalic and atraumatic  Eyes: extraocular eye movements intact, conjunctivae normal  ENT: external ear and ear canal normal bilaterally, nose without deformity  Pulmonary/Chest: normal air movement, no respiratory distress  Cardiovascular: normal rate, regular rhythm  Abdomen: non-distended, no masses  Musculoskeletal: no joint deformity or tenderness  Extremities: no leg edema bilaterally  Skin: warm and dry, no rash or erythema    PULSE EXAM      Right      Left   Brachial     Radial     Femoral     Popliteal 3 3   Dorsalis Pedis 0 0   Posterior Tibial 0 0   (3=normal, 2=diminished, 1=barely palpable, 4=widened)      LABS:    Lab Results   Component Value Date    WBC 7.6 03/26/2022    HGB 12.9 03/26/2022    HCT 40.8 03/26/2022    PLT 201 03/26/2022    PROTIME 26.3 (H) 07/14/2021    INR 2.4 07/14/2021    K 3.9 03/26/2022    BUN 27 (H) 03/26/2022    CREATININE 1.5 (H) 03/26/2022       RADIOLOGY:

## 2022-03-26 NOTE — PROGRESS NOTES
[address] [place] [address] [facility]  Consultation Note     Admit Date: 3/25/2022  6:10 PM    Reason for Consult:   Infected ulcer right second toe. Failed outpatient antibiotic    Attending Physician:  Travis Franz DO    HISTORY OF PRESENT ILLNESS:             The history is obtained from extensive review of available past medical records. The patient is a 80 y.o. male who is not known to the ID service. The patient has chronic ulcers on the right foot and is being followed by podiatry. He was being treated with Clindamycin. No wound cultures were taken. He was seen on 3/25/2022 and was sent to the ED for an evaluation. The wound culture was taken as he was treated with Vancomycin and Zosyn. The patient denies having had any fever. He has no pain.     Past Medical History:        Diagnosis Date    Asthma     COPD (chronic obstructive pulmonary disease) ([address]     Unspecified cerebral artery occlusion with cerebral infarction      Past Surgical History:        Procedure Laterality Date    ABDOMEN SURGERY      multiple; intestinal abscess; hernia repair;     APPENDECTOMY      ECHO COMPL W DOP COLOR FLOW  3/16/2013         HERNIA REPAIR      JOINT REPLACEMENT  6-11-12     Current Medications:   Scheduled Meds:   apixaban  2.5 mg Oral BID    brimonidine  1 drop Both Eyes BID    digoxin  125 mcg Oral Daily    docusate sodium  200 mg Oral Nightly    ipratropium-albuterol  3 mL Inhalation Q4H WA    montelukast  10 mg Oral Nightly    tamsulosin  0.4 mg Oral Daily    sodium chloride flush  5-40 mL IntraVENous 2 times per day    Arformoterol Tartrate  15 mcg Nebulization BID    And    budesonide  0.25 mg Nebulization BID    ocuvite-lutein  1 tablet Oral BID     Continuous Infusions:   sodium chloride       PRN Meds:sodium chloride flush, sodium chloride, ondansetron **OR** ondansetron, polyethylene glycol, acetaminophen **OR** acetaminophen, polyvinyl alcohol    Allergies: Patient has no known allergies. Social History:   Social History     Socioeconomic History    Marital status:      Spouse name: None    Number of children: None    Years of education: None    Highest education level: None   Occupational History    None   Tobacco Use    Smoking status: Former Smoker     Packs/day: 1.00     Types: Cigarettes     Start date: 1945     Quit date: 1982     Years since quittin.0    Smokeless tobacco: Never Used   Vaping Use    Vaping Use: Never used   Substance and Sexual Activity    Alcohol use: No    Drug use: Never    Sexual activity: Never   Other Topics Concern    None   Social History Narrative    None     Social Determinants of Health     Financial Resource Strain:     Difficulty of Paying Living Expenses: Not on file   Food Insecurity:     Worried About Running Out of Food in the Last Year: Not on file    Laila of Food in the Last Year: Not on file   Transportation Needs:     Lack of Transportation (Medical): Not on file    Lack of Transportation (Non-Medical):  Not on file   Physical Activity:     Days of Exercise per Week: Not on file    Minutes of Exercise per Session: Not on file   Stress:     Feeling of Stress : Not on file   Social Connections:     Frequency of Communication with Friends and Family: Not on file    Frequency of Social Gatherings with Friends and Family: Not on file    Attends Denominational Services: Not on file    Active Member of [address] [place] [address] or Organizations: Not on file    Attends Club or Organization Meetings: Not on file    Marital Status: Not on file   Intimate Partner Violence:     Fear of Current or Ex-Partner: Not on file    Emotionally Abused: Not on file    Physically Abused: Not on file    Sexually Abused: Not on file   Housing Stability:     Unable to Pay for Housing in the Last Year: Not on file    Number of [place] in the Last Year: Not on file    Unstable Housing in the Last Year: Not on file Pets: No  Travel: No  The patient lives at home with his wife. He retired as a  for [email or web address]    Family History:       Problem Relation Age of Onset    Cancer Mother         stomach    Cancer Father         lung    No Known Problems Sister    . Otherwise non-pertinent to the chief complaint. REVIEW OF SYSTEMS:    Constitutional: Negative for fevers, chills, diaphoresis  Neurologic: Negative   Psychiatric: Negative  Rheumatologic: Negative   Endocrine: Negative  Hematologic: Negative  Immunologic: Vaccinations including SARS-CoV-2 up-to-date  ENT: Negative  Respiratory: Negative   Cardiovascular: Negative  GI: Negative  : Negative  Musculoskeletal: As in the HPI  Skin: No rashes. PHYSICAL EXAM:    Vitals:   /76   Pulse 96   Temp 97.9 °F (36.6 °C) (Oral)   Resp 18   Ht 6' (1.829 m)   Wt 176 lb 5.9 oz (80 kg)   SpO2 92%   BMI 23.92 kg/m²   Constitutional: The patient is awake, alert, and oriented. Hard of hearing. Wife present. Skin: Warm and dry. No rashes were noted. HEENT: Round right pupil. Asymmetric left pupil. No jaundice. Moist mucous membranes, no ulcerations, no thrush. Neck: Supple to movements. No lymphadenopathy. Chest: No use of accessory muscles to breathe. Symmetrical expansion. Auscultation reveals no wheezing, crackles, or rhonchi. Cardiovascular: S1 and S2 are rhythmic and regular. No murmurs appreciated. Abdomen: Positive bowel sounds to auscultation. Benign to palpation. No masses felt. No hepatosplenomegaly. Extremities: The right second toe shows an ulcer along the medial aspect of the toe with some necrosis and exposed bone. Minimal surrounding erythema. Lines: Peripheral.      CBC+dif:  Recent Labs     03/25/22 1921 03/25/22 1921 03/26/22  0255   WBC 8.6  --  7.6   HGB 14.0   < > 12.9   HCT 42.9   < > 40.8   MCV 91.5   < > 92.7      < > 201   NEUTROABS 5.01   < > 3.86    < > = values in this interval not displayed.      Lab Results   Component Value Date    CRP 0.7 (H) 03/25/2022      No results found for: CRPHS  Lab Results   Component Value Date    SEDRATE 20 (H) 03/25/2022     Lab Results   Component Value Date    ALT 10 03/25/2022    AST 13 03/25/2022    ALKPHOS 83 03/25/2022    BILITOT 0.6 03/25/2022     Lab Results   Component Value Date     03/26/2022    K 3.9 03/26/2022     03/26/2022    CO2 27 03/26/2022    BUN 27 03/26/2022    CREATININE 1.5 03/26/2022    GFRAA 53 03/26/2022    LABGLOM 44 03/26/2022    GLUCOSE 114 03/26/2022    GLUCOSE 98 10/14/2010    PROT 7.1 03/25/2022    LABALBU 4.0 03/25/2022    LABALBU 3.6 10/14/2010    CALCIUM 8.9 03/26/2022    BILITOT 0.6 03/25/2022    ALKPHOS 83 03/25/2022    AST 13 03/25/2022    ALT 10 03/25/2022       Lab Results   Component Value Date    PROTIME 26.3 07/14/2021    PROTIME 20.5 10/14/2010    INR 2.4 07/14/2021       Lab Results   Component Value Date    TSH 1.310 03/24/2019       Lab Results   Component Value Date    COLORU Yellow 05/07/2021    PHUR 6.0 05/07/2021    WBCUA 2-5 05/07/2021    RBCUA 10-20 05/07/2021    RBCUA NONE 03/15/2013    BACTERIA RARE 05/07/2021    CLARITYU Clear 05/07/2021    SPECGRAV 1.020 05/07/2021    LEUKOCYTESUR Negative 05/07/2021    UROBILINOGEN 0.2 05/07/2021    BILIRUBINUR Negative 05/07/2021    BLOODU MODERATE 05/07/2021    GLUCOSEU Negative 05/07/2021       Lab Results   Component Value Date    HCO3 26.3 05/07/2021    BE 1.7 05/07/2021    O2SAT 95.7 05/07/2021    PH 7.424 05/07/2021    PCO2 41.1 05/07/2021    PO2 78.2 05/07/2021     Radiology:  Noted    Microbiology:  Pending  No results for input(s): BC in the last 72 hours. No results for input(s): ORG in the last 72 hours. No results for input(s): Adenike Slater in the last 72 hours. No results for input(s): STREPNEUMAGU in the last 72 hours. No results for input(s): LP1UAG in the last 72 hours. No results for input(s): ASO in the last 72 hours.   No results for input(s): CULTRESP in the last 72 hours. No results for input(s): PROCAL in the last 72 hours. Assessment:  · Chronic osteomyelitis of the right second toe. It is not worth salvaging    Plan:    · Start oral Doxycycline and IV Cefazolin  · Check cultures. · Toe potation  · Will follow with you    Thank you for having us see this patient in consultation. I will be discussing this case with the treating physicians.     Bud Yoder MD  9:17 AM  3/26/2022

## 2022-03-26 NOTE — PLAN OF CARE
Problem: Falls - Risk of:  Goal: Will remain free from falls  Description: Will remain free from falls  Outcome: Ongoing     Problem: Pain:  Description: Pain management should include both nonpharmacologic and pharmacologic interventions. Goal: Pain level will decrease  Description: Pain level will decrease  Outcome: Ongoing     Problem:  Activity:  Goal: Ability to tolerate increased activity will improve  Description: Ability to tolerate increased activity will improve  Outcome: Ongoing

## 2022-03-26 NOTE — PROGRESS NOTES
Spoke with Dr Izabel Heck we are not to hold pt eliquis currently, await vascular input before any surgical intervention

## 2022-03-27 ENCOUNTER — ANESTHESIA EVENT (OUTPATIENT)
Dept: OPERATING ROOM | Age: 87
DRG: 617 | End: 2022-03-27
Payer: MEDICARE

## 2022-03-27 ENCOUNTER — ANESTHESIA (OUTPATIENT)
Dept: OPERATING ROOM | Age: 87
DRG: 617 | End: 2022-03-27
Payer: MEDICARE

## 2022-03-27 LAB
ANION GAP SERPL CALCULATED.3IONS-SCNC: 9 MMOL/L (ref 7–16)
BASOPHILS ABSOLUTE: 0.04 E9/L (ref 0–0.2)
BASOPHILS RELATIVE PERCENT: 0.6 % (ref 0–2)
BUN BLDV-MCNC: 26 MG/DL (ref 6–23)
CALCIUM SERPL-MCNC: 8.7 MG/DL (ref 8.6–10.2)
CHLORIDE BLD-SCNC: 104 MMOL/L (ref 98–107)
CO2: 25 MMOL/L (ref 22–29)
CREAT SERPL-MCNC: 1.5 MG/DL (ref 0.7–1.2)
EOSINOPHILS ABSOLUTE: 0.26 E9/L (ref 0.05–0.5)
EOSINOPHILS RELATIVE PERCENT: 3.6 % (ref 0–6)
GFR AFRICAN AMERICAN: 53
GFR NON-AFRICAN AMERICAN: 44 ML/MIN/1.73
GLUCOSE BLD-MCNC: 119 MG/DL (ref 74–99)
HCT VFR BLD CALC: 36.7 % (ref 37–54)
HEMOGLOBIN: 12 G/DL (ref 12.5–16.5)
IMMATURE GRANULOCYTES #: 0.03 E9/L
IMMATURE GRANULOCYTES %: 0.4 % (ref 0–5)
LYMPHOCYTES ABSOLUTE: 2.26 E9/L (ref 1.5–4)
LYMPHOCYTES RELATIVE PERCENT: 31.6 % (ref 20–42)
MCH RBC QN AUTO: 29.8 PG (ref 26–35)
MCHC RBC AUTO-ENTMCNC: 32.7 % (ref 32–34.5)
MCV RBC AUTO: 91.1 FL (ref 80–99.9)
MONOCYTES ABSOLUTE: 0.62 E9/L (ref 0.1–0.95)
MONOCYTES RELATIVE PERCENT: 8.7 % (ref 2–12)
NEUTROPHILS ABSOLUTE: 3.94 E9/L (ref 1.8–7.3)
NEUTROPHILS RELATIVE PERCENT: 55.1 % (ref 43–80)
PDW BLD-RTO: 14.5 FL (ref 11.5–15)
PLATELET # BLD: 187 E9/L (ref 130–450)
PMV BLD AUTO: 9.2 FL (ref 7–12)
POTASSIUM REFLEX MAGNESIUM: 3.8 MMOL/L (ref 3.5–5)
RBC # BLD: 4.03 E12/L (ref 3.8–5.8)
SODIUM BLD-SCNC: 138 MMOL/L (ref 132–146)
WBC # BLD: 7.2 E9/L (ref 4.5–11.5)

## 2022-03-27 PROCEDURE — 6360000002 HC RX W HCPCS: Performed by: SPECIALIST

## 2022-03-27 PROCEDURE — 94640 AIRWAY INHALATION TREATMENT: CPT

## 2022-03-27 PROCEDURE — 6370000000 HC RX 637 (ALT 250 FOR IP): Performed by: INTERNAL MEDICINE

## 2022-03-27 PROCEDURE — 1200000000 HC SEMI PRIVATE

## 2022-03-27 PROCEDURE — 6360000002 HC RX W HCPCS: Performed by: INTERNAL MEDICINE

## 2022-03-27 PROCEDURE — 85025 COMPLETE CBC W/AUTO DIFF WBC: CPT

## 2022-03-27 PROCEDURE — 80048 BASIC METABOLIC PNL TOTAL CA: CPT

## 2022-03-27 PROCEDURE — 6370000000 HC RX 637 (ALT 250 FOR IP): Performed by: SPECIALIST

## 2022-03-27 PROCEDURE — 36415 COLL VENOUS BLD VENIPUNCTURE: CPT

## 2022-03-27 PROCEDURE — 2580000003 HC RX 258: Performed by: INTERNAL MEDICINE

## 2022-03-27 RX ORDER — CEPHALEXIN 500 MG/1
1000 CAPSULE ORAL EVERY 12 HOURS SCHEDULED
Status: DISCONTINUED | OUTPATIENT
Start: 2022-03-27 | End: 2022-03-28

## 2022-03-27 RX ORDER — DOXYCYCLINE HYCLATE 100 MG/1
100 CAPSULE ORAL EVERY 12 HOURS SCHEDULED
Qty: 14 CAPSULE | Refills: 0 | Status: SHIPPED | OUTPATIENT
Start: 2022-03-27 | End: 2022-03-29 | Stop reason: HOSPADM

## 2022-03-27 RX ORDER — CEPHALEXIN 500 MG/1
1000 CAPSULE ORAL EVERY 12 HOURS SCHEDULED
Qty: 14 CAPSULE | Refills: 0 | Status: SHIPPED | OUTPATIENT
Start: 2022-03-27 | End: 2022-03-29 | Stop reason: HOSPADM

## 2022-03-27 RX ADMIN — APIXABAN 2.5 MG: 2.5 TABLET, FILM COATED ORAL at 08:52

## 2022-03-27 RX ADMIN — POLYVINYL ALCOHOL 1 DROP: 14 SOLUTION/ DROPS OPHTHALMIC at 20:23

## 2022-03-27 RX ADMIN — BRIMONIDINE TARTRATE 1 DROP: 2 SOLUTION OPHTHALMIC at 20:23

## 2022-03-27 RX ADMIN — SODIUM CHLORIDE, PRESERVATIVE FREE 10 ML: 5 INJECTION INTRAVENOUS at 08:52

## 2022-03-27 RX ADMIN — MONTELUKAST SODIUM 10 MG: 10 TABLET ORAL at 20:24

## 2022-03-27 RX ADMIN — IPRATROPIUM BROMIDE AND ALBUTEROL SULFATE 3 ML: 2.5; .5 SOLUTION RESPIRATORY (INHALATION) at 15:37

## 2022-03-27 RX ADMIN — Medication 1 TABLET: at 08:52

## 2022-03-27 RX ADMIN — BUDESONIDE 250 MCG: 0.25 SUSPENSION RESPIRATORY (INHALATION) at 07:46

## 2022-03-27 RX ADMIN — BUDESONIDE 250 MCG: 0.25 SUSPENSION RESPIRATORY (INHALATION) at 19:28

## 2022-03-27 RX ADMIN — IPRATROPIUM BROMIDE AND ALBUTEROL SULFATE 3 ML: 2.5; .5 SOLUTION RESPIRATORY (INHALATION) at 19:28

## 2022-03-27 RX ADMIN — ACETAMINOPHEN 650 MG: 325 TABLET ORAL at 20:23

## 2022-03-27 RX ADMIN — Medication 1 TABLET: at 20:24

## 2022-03-27 RX ADMIN — BISACODYL 10 MG: 5 TABLET, COATED ORAL at 11:15

## 2022-03-27 RX ADMIN — Medication 2000 MG: at 02:13

## 2022-03-27 RX ADMIN — ACETAMINOPHEN 650 MG: 325 TABLET ORAL at 13:50

## 2022-03-27 RX ADMIN — DOCUSATE SODIUM 200 MG: 100 CAPSULE, LIQUID FILLED ORAL at 20:24

## 2022-03-27 RX ADMIN — ARFORMOTEROL TARTRATE 15 MCG: 15 SOLUTION RESPIRATORY (INHALATION) at 19:28

## 2022-03-27 RX ADMIN — DOXYCYCLINE HYCLATE 100 MG: 100 CAPSULE ORAL at 08:52

## 2022-03-27 RX ADMIN — DOXYCYCLINE HYCLATE 100 MG: 100 CAPSULE ORAL at 20:24

## 2022-03-27 RX ADMIN — ARFORMOTEROL TARTRATE 15 MCG: 15 SOLUTION RESPIRATORY (INHALATION) at 07:46

## 2022-03-27 RX ADMIN — SODIUM CHLORIDE, PRESERVATIVE FREE 10 ML: 5 INJECTION INTRAVENOUS at 20:24

## 2022-03-27 RX ADMIN — CEPHALEXIN 1000 MG: 500 CAPSULE ORAL at 20:24

## 2022-03-27 RX ADMIN — IPRATROPIUM BROMIDE AND ALBUTEROL SULFATE 3 ML: 2.5; .5 SOLUTION RESPIRATORY (INHALATION) at 07:47

## 2022-03-27 RX ADMIN — TAMSULOSIN HYDROCHLORIDE 0.4 MG: 0.4 CAPSULE ORAL at 08:52

## 2022-03-27 RX ADMIN — BRIMONIDINE TARTRATE 1 DROP: 2 SOLUTION OPHTHALMIC at 08:52

## 2022-03-27 RX ADMIN — IPRATROPIUM BROMIDE AND ALBUTEROL SULFATE 3 ML: 2.5; .5 SOLUTION RESPIRATORY (INHALATION) at 11:59

## 2022-03-27 RX ADMIN — DIGOXIN 125 MCG: 125 TABLET ORAL at 08:52

## 2022-03-27 ASSESSMENT — LIFESTYLE VARIABLES: SMOKING_STATUS: 0

## 2022-03-27 ASSESSMENT — PAIN DESCRIPTION - ONSET: ONSET: ON-GOING

## 2022-03-27 ASSESSMENT — PAIN DESCRIPTION - PAIN TYPE: TYPE: ACUTE PAIN

## 2022-03-27 ASSESSMENT — PAIN SCALES - GENERAL
PAINLEVEL_OUTOF10: 8
PAINLEVEL_OUTOF10: 3

## 2022-03-27 ASSESSMENT — PAIN DESCRIPTION - LOCATION: LOCATION: FOOT

## 2022-03-27 ASSESSMENT — PAIN DESCRIPTION - DESCRIPTORS: DESCRIPTORS: ACHING

## 2022-03-27 ASSESSMENT — PAIN DESCRIPTION - ORIENTATION: ORIENTATION: RIGHT

## 2022-03-27 ASSESSMENT — PAIN DESCRIPTION - FREQUENCY: FREQUENCY: INTERMITTENT

## 2022-03-27 ASSESSMENT — PAIN DESCRIPTION - PROGRESSION: CLINICAL_PROGRESSION: NOT CHANGED

## 2022-03-27 NOTE — PROGRESS NOTES
Podiatry Progress Note  3/27/2022   [facility]       HISTORY OF PRESENT ILLNESS: Pt is a 80 y.o. male seen bedside for right 2nd toe wound. Today pt denies any N/V/F/C but does admit to Right 2nd toe pain. Explained to pt and family that we will schedule pt for surgery tomorrow after 4:00 PM. All questions answered. Pt has no other pedal complaints at this time. Past Medical History:   Diagnosis Date    Asthma     COPD (chronic obstructive pulmonary disease) ([address]     Unspecified cerebral artery occlusion with cerebral infarction         Past Surgical History:   Procedure Laterality Date    ABDOMEN SURGERY      multiple; intestinal abscess; hernia repair;     APPENDECTOMY      ECHO COMPL W DOP COLOR FLOW  3/16/2013         HERNIA REPAIR      JOINT REPLACEMENT  12         Family History   Problem Relation Age of Onset    Cancer Mother         stomach    Cancer Father         lung    No Known Problems Sister         Social History     Tobacco Use    Smoking status: Former Smoker     Packs/day: 1.00     Types: Cigarettes     Start date: 1945     Quit date: 1982     Years since quittin.0    Smokeless tobacco: Never Used   Substance Use Topics    Alcohol use: No        Prior to Admission medications    Medication Sig Start Date End Date Taking?  Authorizing Provider   doxycycline hyclate (VIBRAMYCIN) 100 MG capsule Take 1 capsule by mouth every 12 hours for 7 days 3/27/22 4/3/22 Yes Stephanie Beatty MD   cephALEXin (KEFLEX) 500 MG capsule Take 2 capsules by mouth every 12 hours for 7 doses 3/27/22 3/31/22 Yes Stephanie Beatty MD   bisacodyl (DULCOLAX) 5 MG EC tablet Take 5 mg by mouth daily   Yes Historical Provider, MD   clindamycin (CLEOCIN) 300 MG capsule Take 300 mg by mouth in the morning and at bedtime    Historical Provider, MD   apixaban (ELIQUIS) 2.5 MG TABS tablet Take by mouth 2 times daily    Historical Provider, MD   digoxin (LANOXIN) 125 MCG tablet Take 1 tablet by mouth daily 7/15/21   Bam Jorge, DO   ipratropium-albuterol (DUONEB) 0.5-2.5 (3) MG/3ML SOLN nebulizer solution Inhale 3 mLs into the lungs every 4 hours (while awake) 5/13/21   Bam Jorge, DO   brimonidine (ALPHAGAN) 0.2 % ophthalmic solution Place 1 drop into both eyes 2 times daily 5/13/21   Bam Jorge, DO   potassium chloride (KLOR-CON M) 10 MEQ extended release tablet Take 20 mEq by mouth daily as needed If takes lasix     Historical Provider, MD   mometasone-formoterol (DULERA) 200-5 MCG/ACT inhaler Inhale 1 puff into the lungs daily    Historical Provider, MD   fluticasone (FLOVENT HFA) 110 MCG/ACT inhaler Inhale 1 puff into the lungs daily as needed    Historical Provider, MD   glycerin-hypromellose- (ARTIFICIAL TEARS) 0.2-0.2-1 % SOLN opthalmic solution Place 1 drop into both eyes 2 times daily as needed REFRESH PM    Historical Provider, MD   Multiple Vitamins-Minerals (PRESERVISION AREDS 2) CAPS Take 1 capsule by mouth 2 times daily    Historical Provider, MD   docusate sodium (COLACE) 100 MG capsule Take 200 mg by mouth nightly    Historical Provider, MD   tamsulosin (FLOMAX) 0.4 MG capsule Take 0.4 mg by mouth daily    Historical Provider, MD   montelukast (SINGULAIR) 10 MG tablet Take 10 mg by mouth nightly. Historical Provider, MD        Patient has no known allergies. OBJECTIVE:        Vitals:    03/27/22 0845   BP: (!) 108/59   Pulse: 73   Resp: 16   Temp: 97.7 °F (36.5 °C)   SpO2: 95%              EXAM:        Pt is AAOx3, NAD  RLE focused exam:    Vascular Exam:  DP and PT pulses are faintly palpable on the right. CFT is ~5 sec, skin is shiny and atrophic. No pedal hair growth noted. Mild edema present. Neuro Exam:  Light touch and protective sensation intact. Dermatologic Exam: Wound present to dorsal R second toe down to level of bone with bone exposed. Wound measuring roughly 2x1x0.3 cm. No malodor present, no drainage noted.  No fluctuance or crepitus noted. Full thickness ulcer to lateral 3rd toe to level of subcutaneous tissue measuring roughly 0.5x0.5x0.2 cm. No drainage, malodor, crepitus, fluctuance noted. No probe to bone. Remain webspaces clean dry and intact. MSK: MMT deferred at this time. POP to 2nd toe right foot.      Current Facility-Administered Medications   Medication Dose Route Frequency Provider Last Rate Last Admin    cephALEXin (KEFLEX) capsule 1,000 mg  1,000 mg Oral 2 times per day Steve Delgado MD        doxycycline hyclate (VIBRAMYCIN) capsule 100 mg  100 mg Oral 2 times per day Steve Delgado MD   100 mg at 03/27/22 4548    apixaban (ELIQUIS) tablet 2.5 mg  2.5 mg Oral BID Zandra Foreman MD   2.5 mg at 03/27/22 0852    brimonidine (ALPHAGAN) 0.2 % ophthalmic solution 1 drop  1 drop Both Eyes BID Zandra Foreman MD   1 drop at 03/27/22 7017    digoxin (LANOXIN) tablet 125 mcg  125 mcg Oral Daily Zandra Foreman MD   125 mcg at 03/27/22 4024    docusate sodium (COLACE) capsule 200 mg  200 mg Oral Nightly Zandra Foreman MD   200 mg at 03/26/22 2038    ipratropium-albuterol (DUONEB) nebulizer solution 3 mL  3 mL Inhalation Q4H WA Zandra Foreman MD   3 mL at 03/27/22 1159    montelukast (SINGULAIR) tablet 10 mg  10 mg Oral Nightly Zandra Foreman MD   10 mg at 03/26/22 2038    tamsulosin (FLOMAX) capsule 0.4 mg  0.4 mg Oral Daily Zandra Foreman MD   0.4 mg at 03/27/22 4885    sodium chloride flush 0.9 % injection 5-40 mL  5-40 mL IntraVENous 2 times per day Zandra Foreman MD   10 mL at 03/27/22 7962    sodium chloride flush 0.9 % injection 5-40 mL  5-40 mL IntraVENous PRN Zandra Foreman MD   10 mL at 03/26/22 1343    0.9 % sodium chloride infusion  25 mL IntraVENous PRN Zandra Foreman MD        ondansetron (ZOFRAN-ODT) disintegrating tablet 4 mg  4 mg Oral Q8H PRN Zandra Foreman MD        Or    ondansetron [facility]) injection 4 mg  4 mg IntraVENous Q6H PRN Zandra Foreman MD        polyethylene glycol [facility]) packet 17 g  17 g Oral Daily PRN Osvaldo Baxter MD        acetaminophen (TYLENOL) tablet 650 mg  650 mg Oral Q6H PRN Osvaldo Baxter MD   650 mg at 03/26/22 2038    Or    acetaminophen (TYLENOL) suppository 650 mg  650 mg Rectal Q6H PRN Osvaldo Baxter MD        polyvinyl alcohol (LIQUIFILM TEARS) 1.4 % ophthalmic solution 1 drop  1 drop Both Eyes BID PRN Osvaldo Baxter MD   1 drop at 03/26/22 2038    Arformoterol Tartrate (BROVANA) nebulizer solution 15 mcg  15 mcg Nebulization BID Osvaldo Baxter MD   15 mcg at 03/27/22 8591    And    budesonide (PULMICORT) nebulizer suspension 250 mcg  0.25 mg Nebulization BID Osvaldo Baxter MD   250 mcg at 03/27/22 0746    antioxidant multivitamin (OCUVITE) tablet  1 tablet Oral BID Osvaldo Baxter MD   1 tablet at 03/27/22 9269        Lab Results   Component Value Date    WBC 7.2 03/27/2022    HCT 36.7 (L) 03/27/2022    HGB 12.0 (L) 03/27/2022     03/27/2022     03/27/2022    K 3.8 03/27/2022     03/27/2022    CO2 25 03/27/2022    BUN 26 (H) 03/27/2022    CREATININE 1.5 (H) 03/27/2022    GLUCOSE 119 (H) 03/27/2022    CRP 0.7 (H) 03/25/2022         Radiographs:    ASSESSMENT:  Full thickness ulcer down to bone Right 2nd toe, POA  PVD    Active Problems:    * No active hospital problems. *       PLAN:  -Patient examined and evaluated at bedside  -All pertinent labs, charts, and imaging reviewed prior to encounter  -WBC: 7.2  -R foot x rays: Soft tissue swelling of the mid and distal foot. 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   -Wound culture prelim: gram negative rods, corynebacterium spp.   -NIVS pending  -Vascular consulted-From their standpoint the patient is cleared for foot surgery.  If there is concern for nonhealing post procedure, they can perform an arteriogram.  -Wound dressed in xeroform, DSD.  -Pt scheduled for R 2nd toe amp tomorrow as an add on surgery after 4 PM. Pt to be NPO after 8:00 AM tomorrow 3/28/22   -Will continue to follow pt while in house  -D/W:   BRIDGER Rodriguez [id number]  Fellowship-Trained Foot and Ankle Surgeon  Diplomate, [employer] Board of Foot and Ankle Surgeons  [phone]     Thank you for involving podiatry in this patients care.  Please do not hesitate to call with any questions or concerns       Kristine Eller DPM  PGY1 Podiatry Resident   3/27/2022   1:39 PM

## 2022-03-27 NOTE — PROGRESS NOTES
[address] [place] [address] [facility]  Progress Note    SUBJECTIVE:  Chief Complaint   Patient presents with    Wound Check     pt is to have surgery on foot to prevent osteomyelitis. was sent in from the wound center. Patient is tolerating medications. No reported adverse drug reactions. No nausea, vomiting, diarrhea. Tolerating medications resting in bed comfortably. Review of systems:  As stated above in the chief complaint, otherwise negative. Medications:  Scheduled Meds:   bisacodyl  10 mg Oral Once    ceFAZolin  2,000 mg IntraVENous Q12H    doxycycline hyclate  100 mg Oral 2 times per day    apixaban  2.5 mg Oral BID    brimonidine  1 drop Both Eyes BID    digoxin  125 mcg Oral Daily    docusate sodium  200 mg Oral Nightly    ipratropium-albuterol  3 mL Inhalation Q4H WA    montelukast  10 mg Oral Nightly    tamsulosin  0.4 mg Oral Daily    sodium chloride flush  5-40 mL IntraVENous 2 times per day    Arformoterol Tartrate  15 mcg Nebulization BID    And    budesonide  0.25 mg Nebulization BID    ocuvite-lutein  1 tablet Oral BID     Continuous Infusions:   sodium chloride       PRN Meds:sodium chloride flush, sodium chloride, ondansetron **OR** ondansetron, polyethylene glycol, acetaminophen **OR** acetaminophen, polyvinyl alcohol    OBJECTIVE:  BP (!) 108/59   Pulse 73   Temp 97.7 °F (36.5 °C) (Oral)   Resp 16   Ht 6' (1.829 m)   Wt 176 lb 5.9 oz (80 kg)   SpO2 95%   BMI 23.92 kg/m²   Temp  Av.9 °F (36.6 °C)  Min: 97.7 °F (36.5 °C)  Max: 98 °F (36.7 °C)  Constitutional: The patient is awake, alert, and oriented. [place]  Skin: Warm and dry. No rashes were noted. HEENT: Round and reactive pupils. Moist mucous membranes. No ulcerations or thrush. Neck: Supple to movements. Chest: No use of accessory muscles to breathe. Symmetrical expansion. No wheezing, crackles or rhonchi. Cardiovascular: S1 and S2 are rhythmic and regular. No murmurs appreciated. Abdomen: Positive bowel sounds to auscultation. Benign to palpation. No masses felt. No hepatosplenomegaly. Extremities: No clubbing, no cyanosis, no edema. Second toe long ulcerated and necrotic ulcer, mix of dark fibrous tissue necrosis. Erythema noted otherwise foot wrapped  Lines: peripheral    Laboratory and Tests Review:  Lab Results   Component Value Date    WBC 7.2 03/27/2022    WBC 7.6 03/26/2022    WBC 8.6 03/25/2022    HGB 12.0 (L) 03/27/2022    HCT 36.7 (L) 03/27/2022    MCV 91.1 03/27/2022     03/27/2022     Lab Results   Component Value Date    NEUTROABS 3.94 03/27/2022    NEUTROABS 3.86 03/26/2022    NEUTROABS 5.01 03/25/2022     No results found for: [facility]  Lab Results   Component Value Date    ALT 10 03/25/2022    AST 13 03/25/2022    ALKPHOS 83 03/25/2022    BILITOT 0.6 03/25/2022     Lab Results   Component Value Date     03/27/2022    K 3.8 03/27/2022     03/27/2022    CO2 25 03/27/2022    BUN 26 03/27/2022    CREATININE 1.5 03/27/2022    CREATININE 1.5 03/26/2022    CREATININE 1.5 03/25/2022    GFRAA 53 03/27/2022    LABGLOM 44 03/27/2022    GLUCOSE 119 03/27/2022    GLUCOSE 98 10/14/2010    PROT 7.1 03/25/2022    LABALBU 4.0 03/25/2022    LABALBU 3.6 10/14/2010    CALCIUM 8.7 03/27/2022    BILITOT 0.6 03/25/2022    ALKPHOS 83 03/25/2022    AST 13 03/25/2022    ALT 10 03/25/2022     Lab Results   Component Value Date    CRP 0.7 (H) 03/25/2022     Lab Results   Component Value Date    SEDRATE 20 (H) 03/25/2022     Radiology:      Microbiology:     Blood culture 3/25/2022 - so far  Right foot wound culture-superficial culture sent 3/25/2022      Assessment:  · Chronic osteomyelitis of the right second toe. It is not worth salvaging     Plan:    · Continue oral Doxycycline and IV Cefazolin  · Check cultures.   · Vascular and podiatry input  · For vascular studies prior to surgery  · Will follow with you    PABLO Fernandez - CNS  10:10 AM  3/27/2022     Patient seen and examined. I had a face to face encounter with the patient. Agree with exam.  Assessment and plan as outlined above and directed by me. Addition and corrections were done as deemed appropriate. My exam and plan include: Patient is doing well. Awaiting for amputation. Patient is tolerating antibiotics. Change Cefazolin over to oral Cephalexin. Spoke with family.     Fidelina Guillen MD  3/27/2022  12:24 PM

## 2022-03-27 NOTE — PLAN OF CARE
Problem: Falls - Risk of:  Goal: Will remain free from falls  Description: Will remain free from falls  3/27/2022 0935 by Yumiko Tracy RN  Outcome: Met This Shift  3/26/2022 2132 by Vero Dominguez RN  Outcome: Ongoing  Goal: Absence of physical injury  Description: Absence of physical injury  3/27/2022 0935 by Yumiko Tracy RN  Outcome: Met This Shift  3/26/2022 2132 by Vero Dominguez RN  Outcome: Ongoing     Problem: Pain:  Goal: Pain level will decrease  Description: Pain level will decrease  3/27/2022 0935 by Yumiko Tracy RN  Outcome: Met This Shift  3/26/2022 2132 by Vero Dominguez RN  Outcome: Ongoing  Goal: Control of acute pain  Description: Control of acute pain  3/27/2022 0935 by Yumiko Tracy RN  Outcome: Met This Shift  3/26/2022 2132 by Vero Dominguez RN  Outcome: Ongoing  Goal: Control of chronic pain  Description: Control of chronic pain  3/27/2022 0935 by Yumiko Tracy RN  Outcome: Met This Shift  3/26/2022 2132 by Vero Dominguez RN  Outcome: Ongoing     Problem:  Activity:  Goal: Ability to tolerate increased activity will improve  Description: Ability to tolerate increased activity will improve  3/27/2022 0935 by Yumiko Tracy RN  Outcome: Met This Shift  3/26/2022 2132 by Vero Dominguez RN  Outcome: Ongoing

## 2022-03-27 NOTE — PROGRESS NOTES
Admit Date: 3/25/2022    Subjective:     Patient seen. Comfortable. C/O constipation. Wants dulcolax. ID, vascular, podiatric consults reviewed. Appears ok to proceed with 2nd toe amputation per vascular. Await decision from podiatry. Scheduled Meds:   bisacodyl  10 mg Oral Once    ceFAZolin  2,000 mg IntraVENous Q12H    doxycycline hyclate  100 mg Oral 2 times per day    apixaban  2.5 mg Oral BID    brimonidine  1 drop Both Eyes BID    digoxin  125 mcg Oral Daily    docusate sodium  200 mg Oral Nightly    ipratropium-albuterol  3 mL Inhalation Q4H WA    montelukast  10 mg Oral Nightly    tamsulosin  0.4 mg Oral Daily    sodium chloride flush  5-40 mL IntraVENous 2 times per day    Arformoterol Tartrate  15 mcg Nebulization BID    And    budesonide  0.25 mg Nebulization BID    ocuvite-lutein  1 tablet Oral BID     Continuous Infusions:   sodium chloride       PRN Meds:sodium chloride flush, sodium chloride, ondansetron **OR** ondansetron, polyethylene glycol, acetaminophen **OR** acetaminophen, polyvinyl alcohol      Objective:     I/O last 3 completed shifts: In: 1586.4 [P.O.:900; I.V.:586.4; IV Piggyback:100]  Out: 440 [Urine:440]  No intake/output data recorded. BP (!) 108/59   Pulse 73   Temp 97.7 °F (36.5 °C) (Oral)   Resp 16   Ht 6' (1.829 m)   Wt 176 lb 5.9 oz (80 kg)   SpO2 95%   BMI 23.92 kg/m²     Neck: no adenopathy, no carotid bruit, no JVD, supple, symmetrical, trachea midline and thyroid not enlarged, no tenderness/mass/nodules  Lungs: Clear to A and P  Heart: irregularly, irregular, S1, S2 normal, systolic  murmur, regular rate and rhythm ,no precordial heave  Abdomen:soft, non-tender; non-distended normal bowel sounds no masses, no organomegaly  Extremities:-CCE, diminished peripheral pulses. Ulcer present over dorsal R second to to bone. Ulcer 3rd toe laterally, not as deep. No obvious purulent d/c.     Data Review    CBC with Differential:    Lab Results   Component Value Date    WBC 7.2 03/27/2022    RBC 4.03 03/27/2022    HGB 12.0 03/27/2022    HCT 36.7 03/27/2022     03/27/2022    MCV 91.1 03/27/2022    MCH 29.8 03/27/2022    MCHC 32.7 03/27/2022    RDW 14.5 03/27/2022    SEGSPCT 89 03/16/2013    LYMPHOPCT 31.6 03/27/2022    MONOPCT 8.7 03/27/2022    BASOPCT 0.6 03/27/2022    MONOSABS 0.62 03/27/2022    LYMPHSABS 2.26 03/27/2022    EOSABS 0.26 03/27/2022    BASOSABS 0.04 03/27/2022     CMP:    Lab Results   Component Value Date     03/27/2022    K 3.8 03/27/2022     03/27/2022    CO2 25 03/27/2022    BUN 26 03/27/2022    CREATININE 1.5 03/27/2022    GFRAA 53 03/27/2022    LABGLOM 44 03/27/2022    GLUCOSE 119 03/27/2022    GLUCOSE 98 10/14/2010    PROT 7.1 03/25/2022    LABALBU 4.0 03/25/2022    LABALBU 3.6 10/14/2010    CALCIUM 8.7 03/27/2022    BILITOT 0.6 03/25/2022    ALKPHOS 83 03/25/2022    AST 13 03/25/2022    ALT 10 03/25/2022       XR FOOT RIGHT (MIN 3 VIEWS)   Final Result   Soft tissue swelling of the mid and distal foot. 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 VL LOWER EXTREMITY ARTERIAL SEGMENTAL PRESSURES W PPG BILATERAL    (Results Pending)       Assessment:        R  second and 3rd to ulcer, failed outpatient therapy  PAD  PAF- on Eliquis  COPD  CKD3  NIDDM? BPH       Plan:     Consults reviewed/appreciated. Appears ok from vascular standpoint to proceed with surgery. Await input from podiatry. PT/OT, ambulation. Follow labs.       Electronically signed by Marta Florentino MD on 3/27/2022 at 9:25 AM

## 2022-03-27 NOTE — ANESTHESIA PRE PROCEDURE
Department of Anesthesiology  Preprocedure Note       Name:  Antwan Gupta   Age:  80 y.o.  :  1929                                          MRN:  [id number]         Date:  3/27/2022      Surgeon: Carina Child    Procedure: right foot second digit amputation    Medications prior to admission:   Prior to Admission medications    Medication Sig Start Date End Date Taking?  Authorizing Provider   doxycycline hyclate (VIBRAMYCIN) 100 MG capsule Take 1 capsule by mouth every 12 hours for 7 days 3/27/22 4/3/22 Yes Meet King MD   cephALEXin (KEFLEX) 500 MG capsule Take 2 capsules by mouth every 12 hours for 7 doses 3/27/22 3/31/22 Yes Meet King MD   bisacodyl (DULCOLAX) 5 MG EC tablet Take 5 mg by mouth daily   Yes Historical Provider, MD   clindamycin (CLEOCIN) 300 MG capsule Take 300 mg by mouth in the morning and at bedtime    Historical Provider, MD   apixaban (ELIQUIS) 2.5 MG TABS tablet Take by mouth 2 times daily    Historical Provider, MD   digoxin (LANOXIN) 125 MCG tablet Take 1 tablet by mouth daily 7/15/21   Erin Nunez, DO   ipratropium-albuterol (DUONEB) 0.5-2.5 (3) MG/3ML SOLN nebulizer solution Inhale 3 mLs into the lungs every 4 hours (while awake) 21   Erin Nunez DO   brimonidine (ALPHAGAN) 0.2 % ophthalmic solution Place 1 drop into both eyes 2 times daily 21   Erin Nunez DO   potassium chloride (KLOR-CON M) 10 MEQ extended release tablet Take 20 mEq by mouth daily as needed If takes lasix     Historical Provider, MD   mometasone-formoterol (DULERA) 200-5 MCG/ACT inhaler Inhale 1 puff into the lungs daily    Historical Provider, MD   fluticasone (FLOVENT HFA) 110 MCG/ACT inhaler Inhale 1 puff into the lungs daily as needed    Historical Provider, MD   glycerin-hypromellose- (ARTIFICIAL TEARS) 0.2-0.2-1 % SOLN opthalmic solution Place 1 drop into both eyes 2 times daily as needed REFRESH PM    Historical Provider, MD   Multiple Vitamins-Minerals (PRESERVISION AREDS 2) CAPS Take 1 capsule by mouth 2 times daily    Historical Provider, MD   docusate sodium (COLACE) 100 MG capsule Take 200 mg by mouth nightly    Historical Provider, MD   tamsulosin (FLOMAX) 0.4 MG capsule Take 0.4 mg by mouth daily    Historical Provider, MD   montelukast (SINGULAIR) 10 MG tablet Take 10 mg by mouth nightly.     Historical Provider, MD       Current medications:    Current Facility-Administered Medications   Medication Dose Route Frequency Provider Last Rate Last Admin    cephALEXin (KEFLEX) capsule 1,000 mg  1,000 mg Oral 2 times per day Suleman Gomez MD        doxycycline hyclate (VIBRAMYCIN) capsule 100 mg  100 mg Oral 2 times per day Suleman Gomez MD   100 mg at 03/27/22 2881    apixaban (ELIQUIS) tablet 2.5 mg  2.5 mg Oral BID Brian Garner MD   2.5 mg at 03/27/22 0852    brimonidine (ALPHAGAN) 0.2 % ophthalmic solution 1 drop  1 drop Both Eyes BID Brian Garner MD   1 drop at 03/27/22 6180    digoxin (LANOXIN) tablet 125 mcg  125 mcg Oral Daily Brian Garner MD   125 mcg at 03/27/22 4170    docusate sodium (COLACE) capsule 200 mg  200 mg Oral Nightly Brian Garner MD   200 mg at 03/26/22 2038    ipratropium-albuterol (DUONEB) nebulizer solution 3 mL  3 mL Inhalation Q4H WA Brian Garner MD   3 mL at 03/27/22 1537    montelukast (SINGULAIR) tablet 10 mg  10 mg Oral Nightly Brian Garner MD   10 mg at 03/26/22 2038    tamsulosin (FLOMAX) capsule 0.4 mg  0.4 mg Oral Daily Brian Garner MD   0.4 mg at 03/27/22 3980    sodium chloride flush 0.9 % injection 5-40 mL  5-40 mL IntraVENous 2 times per day Brian Garner MD   10 mL at 03/27/22 7171    sodium chloride flush 0.9 % injection 5-40 mL  5-40 mL IntraVENous PRN Brian Garner MD   10 mL at 03/26/22 1343    0.9 % sodium chloride infusion  25 mL IntraVENous PRN Brian Garner MD        ondansetron (ZOFRAN-ODT) disintegrating tablet 4 mg  4 mg Oral Q8H PRN Brian Garner MD        Or    ondansetron [facility] injection 4 mg  4 mg IntraVENous Q6H PRN Matias MD Jason        polyethylene glycol [facility]) packet 17 g  17 g Oral Daily PRN Florencio Gomez MD        acetaminophen (TYLENOL) tablet 650 mg  650 mg Oral Q6H PRN Matias MD Jason   650 mg at 03/27/22 1350    Or    acetaminophen (TYLENOL) suppository 650 mg  650 mg Rectal Q6H PRN Matias MD Jason        polyvinyl alcohol (LIQUIFILM TEARS) 1.4 % ophthalmic solution 1 drop  1 drop Both Eyes BID PRN Matias MD Jason   1 drop at 03/26/22 2038    Arformoterol Tartrate (BROVANA) nebulizer solution 15 mcg  15 mcg Nebulization BID Matias MD Jason   15 mcg at 03/27/22 4446    And    budesonide (PULMICORT) nebulizer suspension 250 mcg  0.25 mg Nebulization BID Matias MD Jason   250 mcg at 03/27/22 0746    antioxidant multivitamin (OCUVITE) tablet  1 tablet Oral BID Matias MD Jason   1 tablet at 03/27/22 6002       Allergies:  No Known Allergies    Problem List:    Patient Active Problem List   Diagnosis Code    Atrial fibrillation ([facility]) I48.91    COPD with exacerbation ([facility] J44.1    Chest pain R07.9    Asthma J45.909    CAP (community acquired pneumonia) J18.9    GI bleeding K92.2    COPD exacerbation ([facility] J44.1    Atrial fibrillation with RVR ([facility] I48.91    Atherosclerosis of native arteries of right leg with ulceration of other part of foot ([facility] I70.235    PVD (peripheral vascular disease) ([facility]) I73.9    Cellulitis of second toe of right foot L03.031    Gangrene of toe of right foot ([facility] I96    Wound infection T14. 8XXA, L08.9    Ulcer of right foot, with unspecified severity ([facility] L97.519       Past Medical History:        Diagnosis Date    Asthma     COPD (chronic obstructive pulmonary disease) ([facility]     Unspecified cerebral artery occlusion with cerebral infarction        Past Surgical History:        Procedure Laterality Date    ABDOMEN SURGERY      multiple; intestinal abscess; hernia repair;     APPENDECTOMY      ECHO COMPL W DOP COLOR FLOW  3/16/2013         HERNIA REPAIR      JOINT REPLACEMENT  12       Social History:    Social History     Tobacco Use    Smoking status: Former Smoker     Packs/day: 1.00     Types: Cigarettes     Start date: 1945     Quit date: 1982     Years since quittin.0    Smokeless tobacco: Never Used   Substance Use Topics    Alcohol use: No                                Counseling given: Not Answered      Vital Signs (Current):   Vitals:    22 1539 22 2030 22 0845 22 1539   BP:  (!) 114/58 (!) 108/59    Pulse:  92 73    Resp:  16 16    Temp:  98 °F (36.7 °C) 97.7 °F (36.5 °C)    TempSrc:  Oral Oral    SpO2: 96% 95% 95% 96%   Weight:       Height:                                                  BP Readings from Last 3 Encounters:   22 (!) 108/59   22 112/62   22 118/60       NPO Status:                                                                                 BMI:   Wt Readings from Last 3 Encounters:   22 176 lb 5.9 oz (80 kg)   22 195 lb (88.5 kg)   22 195 lb (88.5 kg)     Body mass index is 23.92 kg/m². CBC:   Lab Results   Component Value Date    WBC 7.2 2022    RBC 4.03 2022    HGB 12.0 2022    HCT 36.7 2022    MCV 91.1 2022    RDW 14.5 2022     2022       CMP:   Lab Results   Component Value Date     2022    K 3.8 2022     2022    CO2 25 2022    BUN 26 2022    CREATININE 1.5 2022    GFRAA 53 2022    LABGLOM 44 2022    GLUCOSE 119 2022    GLUCOSE 98 10/14/2010    PROT 7.1 2022    CALCIUM 8.7 2022    BILITOT 0.6 2022    ALKPHOS 83 2022    AST 13 2022    ALT 10 2022       POC Tests: No results for input(s): POCGLU, POCNA, POCK, POCCL, POCBUN, POCHEMO, POCHCT in the last 72 hours.     Coags:   Lab Results   Component Value Date    PROTIME 26.3 2021    PROTIME 20.5 10/14/2010    INR 2.4 07/14/2021    APTT 45.0 07/10/2021       HCG (If Applicable): No results found for: PREGTESTUR, PREGSERUM, HCG, HCGQUANT     ABGs: No results found for: PHART, PO2ART, QGD8PGE, ZHN6FNN, BEART, N1XAWINL     Type & Screen (If Applicable):  No results found for: LABABO, LABRH    Drug/Infectious Status (If Applicable):  No results found for: HIV, HEPCAB    COVID-19 Screening (If Applicable):   Lab Results   Component Value Date    COVID19 Not Detected 07/14/2021    COVID19 Not Detected 05/11/2021           Anesthesia Evaluation  Patient summary reviewed and Nursing notes reviewed no history of anesthetic complications:   Airway: Mallampati: III  TM distance: <3 FB   Neck ROM: full  Mouth opening: > = 3 FB Dental:    (+) poor dentition  Comment: Missing several teeth;  \"I only have 8 teeth remaining\"; Per son, all the patient's remaining teeth are brittle - he lost 1 tooth #9 during this current admission    Pulmonary:normal exam    (+) COPD:  asthma:     (-) not a current smoker (former smoker)                           Cardiovascular:    (+) valvular problems/murmurs: MR, dysrhythmias: atrial fibrillation, pulmonary hypertension: moderate,       ECG reviewed      Echocardiogram reviewed               ROS comment: Echo July 2021    Summary   Moderate mitral regurgitation is present. No mitral valve stenosis present. Mild to moderate tricuspid regurgitation. Pulmonary hypertension is moderate . RVSP is 59 mmHg. The left atrium is mildly dilated. Mildly enlarged right atrium size. LV Ejection fraction is visually estimated at 60%. Mild left ventricular concentric hypertrophy noted. Mildly dilated right ventricle. Right ventricle global systolic function is mildly reduced .      Neuro/Psych:   (+) CVA: no interval change,             GI/Hepatic/Renal: Neg GI/Hepatic/Renal ROS            Endo/Other:    (+) blood dyscrasia: anticoagulation therapy:., .                 Abdominal: Vascular:   + PVD, aortic or cerebral, . Other Findings:             Anesthesia Plan      MAC and general     ASA 3             Anesthetic plan and risks discussed with patient, spouse and child/children. Patient to be re-evaluated by DOS Anesthesiologist          Kelin Gordillo MD   3/27/2022      Pt seen, examined, chart reviewed, plan discussed. Sin Tovar MD  3/29/2022  6:51 AM    Chart reviewed, patient seen and interviewed. Agree with note above.   Larisa Crenshaw, APRN - CRNA

## 2022-03-28 ENCOUNTER — APPOINTMENT (OUTPATIENT)
Dept: INTERVENTIONAL RADIOLOGY/VASCULAR | Age: 87
DRG: 617 | End: 2022-03-28
Payer: MEDICARE

## 2022-03-28 LAB
ANION GAP SERPL CALCULATED.3IONS-SCNC: 8 MMOL/L (ref 7–16)
BASOPHILS ABSOLUTE: 0.03 E9/L (ref 0–0.2)
BASOPHILS RELATIVE PERCENT: 0.5 % (ref 0–2)
BUN BLDV-MCNC: 27 MG/DL (ref 6–23)
CALCIUM SERPL-MCNC: 8.9 MG/DL (ref 8.6–10.2)
CHLORIDE BLD-SCNC: 107 MMOL/L (ref 98–107)
CO2: 25 MMOL/L (ref 22–29)
CREAT SERPL-MCNC: 1.5 MG/DL (ref 0.7–1.2)
EOSINOPHILS ABSOLUTE: 0.44 E9/L (ref 0.05–0.5)
EOSINOPHILS RELATIVE PERCENT: 6.9 % (ref 0–6)
GFR AFRICAN AMERICAN: 53
GFR NON-AFRICAN AMERICAN: 44 ML/MIN/1.73
GLUCOSE BLD-MCNC: 120 MG/DL (ref 74–99)
HCT VFR BLD CALC: 37.8 % (ref 37–54)
HEMOGLOBIN: 12.2 G/DL (ref 12.5–16.5)
IMMATURE GRANULOCYTES #: 0.02 E9/L
IMMATURE GRANULOCYTES %: 0.3 % (ref 0–5)
LYMPHOCYTES ABSOLUTE: 2.15 E9/L (ref 1.5–4)
LYMPHOCYTES RELATIVE PERCENT: 33.6 % (ref 20–42)
MCH RBC QN AUTO: 29.3 PG (ref 26–35)
MCHC RBC AUTO-ENTMCNC: 32.3 % (ref 32–34.5)
MCV RBC AUTO: 90.9 FL (ref 80–99.9)
MONOCYTES ABSOLUTE: 0.56 E9/L (ref 0.1–0.95)
MONOCYTES RELATIVE PERCENT: 8.8 % (ref 2–12)
NEUTROPHILS ABSOLUTE: 3.2 E9/L (ref 1.8–7.3)
NEUTROPHILS RELATIVE PERCENT: 49.9 % (ref 43–80)
ORGANISM: ABNORMAL
ORGANISM: ABNORMAL
PDW BLD-RTO: 14.4 FL (ref 11.5–15)
PLATELET # BLD: 182 E9/L (ref 130–450)
PMV BLD AUTO: 9.1 FL (ref 7–12)
POTASSIUM REFLEX MAGNESIUM: 4.1 MMOL/L (ref 3.5–5)
RBC # BLD: 4.16 E12/L (ref 3.8–5.8)
SODIUM BLD-SCNC: 140 MMOL/L (ref 132–146)
WBC # BLD: 6.4 E9/L (ref 4.5–11.5)
WOUND/ABSCESS: ABNORMAL
WOUND/ABSCESS: ABNORMAL

## 2022-03-28 PROCEDURE — 85025 COMPLETE CBC W/AUTO DIFF WBC: CPT

## 2022-03-28 PROCEDURE — 97161 PT EVAL LOW COMPLEX 20 MIN: CPT

## 2022-03-28 PROCEDURE — 6370000000 HC RX 637 (ALT 250 FOR IP): Performed by: INTERNAL MEDICINE

## 2022-03-28 PROCEDURE — 6370000000 HC RX 637 (ALT 250 FOR IP): Performed by: SPECIALIST

## 2022-03-28 PROCEDURE — 94640 AIRWAY INHALATION TREATMENT: CPT

## 2022-03-28 PROCEDURE — 6360000002 HC RX W HCPCS: Performed by: INTERNAL MEDICINE

## 2022-03-28 PROCEDURE — 2580000003 HC RX 258: Performed by: INTERNAL MEDICINE

## 2022-03-28 PROCEDURE — 80048 BASIC METABOLIC PNL TOTAL CA: CPT

## 2022-03-28 PROCEDURE — 97530 THERAPEUTIC ACTIVITIES: CPT

## 2022-03-28 PROCEDURE — 6370000000 HC RX 637 (ALT 250 FOR IP): Performed by: REGISTERED NURSE

## 2022-03-28 PROCEDURE — 36415 COLL VENOUS BLD VENIPUNCTURE: CPT

## 2022-03-28 PROCEDURE — 1200000000 HC SEMI PRIVATE

## 2022-03-28 PROCEDURE — 93923 UPR/LXTR ART STDY 3+ LVLS: CPT

## 2022-03-28 RX ORDER — LEVOFLOXACIN 500 MG/1
500 TABLET, FILM COATED ORAL EVERY OTHER DAY
Status: DISCONTINUED | OUTPATIENT
Start: 2022-03-28 | End: 2022-03-29

## 2022-03-28 RX ADMIN — DIGOXIN 125 MCG: 125 TABLET ORAL at 08:28

## 2022-03-28 RX ADMIN — BUDESONIDE 250 MCG: 0.25 SUSPENSION RESPIRATORY (INHALATION) at 18:12

## 2022-03-28 RX ADMIN — MONTELUKAST SODIUM 10 MG: 10 TABLET ORAL at 20:38

## 2022-03-28 RX ADMIN — SODIUM CHLORIDE, PRESERVATIVE FREE 10 ML: 5 INJECTION INTRAVENOUS at 20:42

## 2022-03-28 RX ADMIN — ARFORMOTEROL TARTRATE 15 MCG: 15 SOLUTION RESPIRATORY (INHALATION) at 09:31

## 2022-03-28 RX ADMIN — Medication 1 TABLET: at 20:38

## 2022-03-28 RX ADMIN — BRIMONIDINE TARTRATE 1 DROP: 2 SOLUTION OPHTHALMIC at 20:40

## 2022-03-28 RX ADMIN — BUDESONIDE 250 MCG: 0.25 SUSPENSION RESPIRATORY (INHALATION) at 09:31

## 2022-03-28 RX ADMIN — IPRATROPIUM BROMIDE AND ALBUTEROL SULFATE 3 ML: 2.5; .5 SOLUTION RESPIRATORY (INHALATION) at 18:12

## 2022-03-28 RX ADMIN — BRIMONIDINE TARTRATE 1 DROP: 2 SOLUTION OPHTHALMIC at 08:28

## 2022-03-28 RX ADMIN — SODIUM CHLORIDE, PRESERVATIVE FREE 10 ML: 5 INJECTION INTRAVENOUS at 08:29

## 2022-03-28 RX ADMIN — DOXYCYCLINE HYCLATE 100 MG: 100 CAPSULE ORAL at 20:38

## 2022-03-28 RX ADMIN — DOXYCYCLINE HYCLATE 100 MG: 100 CAPSULE ORAL at 08:28

## 2022-03-28 RX ADMIN — IPRATROPIUM BROMIDE AND ALBUTEROL SULFATE 3 ML: 2.5; .5 SOLUTION RESPIRATORY (INHALATION) at 09:31

## 2022-03-28 RX ADMIN — CEPHALEXIN 1000 MG: 500 CAPSULE ORAL at 08:29

## 2022-03-28 RX ADMIN — DOCUSATE SODIUM 200 MG: 100 CAPSULE, LIQUID FILLED ORAL at 20:39

## 2022-03-28 RX ADMIN — IPRATROPIUM BROMIDE AND ALBUTEROL SULFATE 3 ML: 2.5; .5 SOLUTION RESPIRATORY (INHALATION) at 12:57

## 2022-03-28 RX ADMIN — Medication 1 TABLET: at 10:24

## 2022-03-28 RX ADMIN — TAMSULOSIN HYDROCHLORIDE 0.4 MG: 0.4 CAPSULE ORAL at 10:24

## 2022-03-28 RX ADMIN — LEVOFLOXACIN 500 MG: 500 TABLET, FILM COATED ORAL at 14:08

## 2022-03-28 RX ADMIN — ACETAMINOPHEN 650 MG: 325 TABLET ORAL at 14:08

## 2022-03-28 RX ADMIN — ARFORMOTEROL TARTRATE 15 MCG: 15 SOLUTION RESPIRATORY (INHALATION) at 18:12

## 2022-03-28 ASSESSMENT — PAIN DESCRIPTION - ORIENTATION: ORIENTATION: RIGHT

## 2022-03-28 ASSESSMENT — PAIN DESCRIPTION - DESCRIPTORS: DESCRIPTORS: SORE

## 2022-03-28 ASSESSMENT — PAIN SCALES - GENERAL
PAINLEVEL_OUTOF10: 0
PAINLEVEL_OUTOF10: 1
PAINLEVEL_OUTOF10: 3

## 2022-03-28 ASSESSMENT — PAIN DESCRIPTION - FREQUENCY: FREQUENCY: INTERMITTENT

## 2022-03-28 ASSESSMENT — PAIN DESCRIPTION - ONSET: ONSET: ON-GOING

## 2022-03-28 ASSESSMENT — PAIN DESCRIPTION - PAIN TYPE: TYPE: ACUTE PAIN

## 2022-03-28 ASSESSMENT — PAIN DESCRIPTION - LOCATION: LOCATION: FOOT

## 2022-03-28 ASSESSMENT — PAIN - FUNCTIONAL ASSESSMENT: PAIN_FUNCTIONAL_ASSESSMENT: ACTIVITIES ARE NOT PREVENTED

## 2022-03-28 ASSESSMENT — PAIN DESCRIPTION - PROGRESSION: CLINICAL_PROGRESSION: NOT CHANGED

## 2022-03-28 NOTE — PATIENT CARE CONFERENCE
[facility] Quality Flow/Interdisciplinary Rounds Progress Note        Quality Flow Rounds held on March 28, 2022    Disciplines Attending:  Bedside Nurse, ,  and Nursing Unit Leadership    Beth Reeder was admitted on 3/25/2022  6:10 PM    Anticipated Discharge Date:  Expected Discharge Date: 03/28/22    Disposition:    Dwayne Score:  Dwayne Scale Score: 20    Readmission Risk              Risk of Unplanned Readmission:  15           Discussed patient goal for the day, patient clinical progression, and barriers to discharge.   The following Goal(s) of the Day/Commitment(s) have been identified:  Diagnostics - Report Results      Amelia Hull RN  March 28, 2022

## 2022-03-28 NOTE — PROGRESS NOTES
Called pastoral care per patient's wife request.    Electronically signed by Koko Grant RN on 3/28/2022 at 2:53 PM

## 2022-03-28 NOTE — PROGRESS NOTES
Abdomen: Positive bowel sounds to auscultation. Benign to palpation. No masses felt. Extremities: No edema. Second toe long ulcerated and necrotic ulcer, mix of dark fibrous tissue necrosis. Erythema noted -- foot is dressed   Lines: peripheral    Laboratory and Tests Review:  Lab Results   Component Value Date    WBC 6.4 03/28/2022    WBC 7.2 03/27/2022    WBC 7.6 03/26/2022    HGB 12.2 (L) 03/28/2022    HCT 37.8 03/28/2022    MCV 90.9 03/28/2022     03/28/2022     Lab Results   Component Value Date    NEUTROABS 3.20 03/28/2022    NEUTROABS 3.94 03/27/2022    NEUTROABS 3.86 03/26/2022     No results found for: CRPHS  Lab Results   Component Value Date    ALT 10 03/25/2022    AST 13 03/25/2022    ALKPHOS 83 03/25/2022    BILITOT 0.6 03/25/2022     Lab Results   Component Value Date     03/28/2022    K 4.1 03/28/2022     03/28/2022    CO2 25 03/28/2022    BUN 27 03/28/2022    CREATININE 1.5 03/28/2022    CREATININE 1.5 03/27/2022    CREATININE 1.5 03/26/2022    GFRAA 53 03/28/2022    LABGLOM 44 03/28/2022    GLUCOSE 120 03/28/2022    GLUCOSE 98 10/14/2010    PROT 7.1 03/25/2022    LABALBU 4.0 03/25/2022    LABALBU 3.6 10/14/2010    CALCIUM 8.9 03/28/2022    BILITOT 0.6 03/25/2022    ALKPHOS 83 03/25/2022    AST 13 03/25/2022    ALT 10 03/25/2022     Lab Results   Component Value Date    CRP 0.7 (H) 03/25/2022     Lab Results   Component Value Date    SEDRATE 20 (H) 03/25/2022     Radiology:      Microbiology:     Blood culture 3/25/2022: negative so far  Right foot wound culture- Corynebacterium, Enterobacter cloacae     Assessment:  · Chronic osteomyelitis of the right second toe. It is not worth salvaging     Plan:    · Continue oral Doxycycline  · Change Cephalexin to oral Levofloxacin at a renally adjusted dose   · Check cultures  · For amputation of right 2nd toe tomorrow AM   · Will follow with you    PABLO Wild CNP  1:16 PM  3/28/2022     Patient seen and examined.  I had a face to face encounter with the patient. Agree with exam.  Assessment and plan as outlined above and directed by me. Addition and corrections were done as deemed appropriate. My exam and plan include: Patient is tolerating antibiotic. Culture growing Enterobacter cloacae. Corynebacterium is a contaminant. Continue Doxycycline. Change Cephalexin to Levofloxacin. Amputation tomorrow. Spoke with wife once again, who did not remember who we were.     Doris Patterson MD  3/28/2022  2:09 PM

## 2022-03-28 NOTE — PROGRESS NOTES
Podiatry Progress Note  3/28/2022   Arley Bence       HISTORY OF PRESENT ILLNESS: Pt is a 80 y.o. male seen bedside for right 2nd toe wound. Pt denies any N/V/F/C but still has mild right 2nd toe pain. Explained to pt and family that surgery was rescheduled for tomorrow at 7:00 AM due to patient volume in the OR today. All questions answered. Pt has no other pedal complaints at this time. Past Medical History:   Diagnosis Date    Asthma     COPD (chronic obstructive pulmonary disease) ([address]     Unspecified cerebral artery occlusion with cerebral infarction         Past Surgical History:   Procedure Laterality Date    ABDOMEN SURGERY      multiple; intestinal abscess; hernia repair;     APPENDECTOMY      ECHO COMPL W DOP COLOR FLOW  3/16/2013         HERNIA REPAIR      JOINT REPLACEMENT  12         Family History   Problem Relation Age of Onset    Cancer Mother         stomach    Cancer Father         lung    No Known Problems Sister         Social History     Tobacco Use    Smoking status: Former Smoker     Packs/day: 1.00     Types: Cigarettes     Start date: 1945     Quit date: 1982     Years since quittin.0    Smokeless tobacco: Never Used   Substance Use Topics    Alcohol use: No        Prior to Admission medications    Medication Sig Start Date End Date Taking?  Authorizing Provider   doxycycline hyclate (VIBRAMYCIN) 100 MG capsule Take 1 capsule by mouth every 12 hours for 7 days 3/27/22 4/3/22 Yes Lachelle Mohan MD   cephALEXin (KEFLEX) 500 MG capsule Take 2 capsules by mouth every 12 hours for 7 doses 3/27/22 3/31/22 Yes Lachelle Mohan MD   bisacodyl (DULCOLAX) 5 MG EC tablet Take 5 mg by mouth daily   Yes Historical Provider, MD   clindamycin (CLEOCIN) 300 MG capsule Take 300 mg by mouth in the morning and at bedtime    Historical Provider, MD   apixaban (ELIQUIS) 2.5 MG TABS tablet Take by mouth 2 times daily    Historical Provider, MD dana Leger) 125 MCG tablet Take 1 tablet by mouth daily 7/15/21   Myah Aaron, DO   ipratropium-albuterol (DUONEB) 0.5-2.5 (3) MG/3ML SOLN nebulizer solution Inhale 3 mLs into the lungs every 4 hours (while awake) 5/13/21   Myah Aaron, DO   brimonidine (ALPHAGAN) 0.2 % ophthalmic solution Place 1 drop into both eyes 2 times daily 5/13/21   Myah Aaron DO   potassium chloride (KLOR-CON M) 10 MEQ extended release tablet Take 20 mEq by mouth daily as needed If takes lasix     Historical Provider, MD   mometasone-formoterol (DULERA) 200-5 MCG/ACT inhaler Inhale 1 puff into the lungs daily    Historical Provider, MD   fluticasone (FLOVENT HFA) 110 MCG/ACT inhaler Inhale 1 puff into the lungs daily as needed    Historical Provider, MD   glycerin-hypromellose- (ARTIFICIAL TEARS) 0.2-0.2-1 % SOLN opthalmic solution Place 1 drop into both eyes 2 times daily as needed REFRESH PM    Historical Provider, MD   Multiple Vitamins-Minerals (PRESERVISION AREDS 2) CAPS Take 1 capsule by mouth 2 times daily    Historical Provider, MD   docusate sodium (COLACE) 100 MG capsule Take 200 mg by mouth nightly    Historical Provider, MD   tamsulosin (FLOMAX) 0.4 MG capsule Take 0.4 mg by mouth daily    Historical Provider, MD   montelukast (SINGULAIR) 10 MG tablet Take 10 mg by mouth nightly. Historical Provider, MD        Patient has no known allergies. OBJECTIVE:        Vitals:    03/28/22 0800   BP: (!) 142/64   Pulse: 68   Resp: 18   Temp: 96.9 °F (36.1 °C)   SpO2: 95%              EXAM:        Pt is AAOx3, NAD  RLE focused exam:    Vascular Exam:  DP and PT pulses are faintly palpable on the right. CFT is ~5 sec, skin is shiny and atrophic. No pedal hair growth noted. Mild edema present. Neuro Exam:  Light touch and protective sensation intact. Dermatologic Exam: Wound present to dorsal R second toe down to level of bone with bone exposed. Wound measuring roughly 2x1x0.3 cm. No malodor present, no drainage noted. No fluctuance or crepitus noted. Full thickness ulcer to lateral 3rd toe to level of subcutaneous tissue measuring roughly 0.5x0.5x0.2 cm. No drainage, malodor, crepitus, fluctuance noted. No probe to bone. Remain webspaces clean dry and intact. MSK: MMT deferred at this time. POP to 2nd toe right foot.      Current Facility-Administered Medications   Medication Dose Route Frequency Provider Last Rate Last Admin    cephALEXin (KEFLEX) capsule 1,000 mg  1,000 mg Oral 2 times per day Bud Yoder MD   1,000 mg at 03/28/22 6310    doxycycline hyclate (VIBRAMYCIN) capsule 100 mg  100 mg Oral 2 times per day Bud Yoder MD   100 mg at 03/28/22 1953    [Held by provider] apixaban (ELIQUIS) tablet 2.5 mg  2.5 mg Oral BID Princess Julian MD   2.5 mg at 03/27/22 0852    brimonidine (ALPHAGAN) 0.2 % ophthalmic solution 1 drop  1 drop Both Eyes BID Princess Julian MD   1 drop at 03/28/22 6303    digoxin (LANOXIN) tablet 125 mcg  125 mcg Oral Daily Princess Julian MD   125 mcg at 03/28/22 4629    docusate sodium (COLACE) capsule 200 mg  200 mg Oral Nightly Princess Julian MD   200 mg at 03/27/22 2024    ipratropium-albuterol (DUONEB) nebulizer solution 3 mL  3 mL Inhalation Q4H WA Princess Julian MD   3 mL at 03/28/22 0931    montelukast (SINGULAIR) tablet 10 mg  10 mg Oral Nightly Princess Julian MD   10 mg at 03/27/22 2024    tamsulosin (FLOMAX) capsule 0.4 mg  0.4 mg Oral Daily Princess Julian MD   0.4 mg at 03/28/22 1024    sodium chloride flush 0.9 % injection 5-40 mL  5-40 mL IntraVENous 2 times per day Princess Julian MD   10 mL at 03/28/22 0829    sodium chloride flush 0.9 % injection 5-40 mL  5-40 mL IntraVENous PRN Princess Julian MD   10 mL at 03/26/22 1343    0.9 % sodium chloride infusion  25 mL IntraVENous PRN Princess Julian MD        ondansetron (ZOFRAN-ODT) disintegrating tablet 4 mg  4 mg Oral Q8H PRN Princess Julian MD        Or    ondansetron [facility]) injection 4 mg  4 mg IntraVENous Q6H PRN Raghavendra Aguiar MD        polyethylene glycol [facility]) packet 17 g  17 g Oral Daily PRN Raghavendra Aguiar MD        acetaminophen (TYLENOL) tablet 650 mg  650 mg Oral Q6H PRN Raghavendra Aguiar MD   650 mg at 03/27/22 2023    Or    acetaminophen (TYLENOL) suppository 650 mg  650 mg Rectal Q6H PRN Raghavendra Aguiar MD        polyvinyl alcohol (LIQUIFILM TEARS) 1.4 % ophthalmic solution 1 drop  1 drop Both Eyes BID PRN Raghavendra Aguiar MD   1 drop at 03/27/22 2023    Arformoterol Tartrate (BROVANA) nebulizer solution 15 mcg  15 mcg Nebulization BID Raghavendra Aguiar MD   15 mcg at 03/28/22 6691    And    budesonide (PULMICORT) nebulizer suspension 250 mcg  0.25 mg Nebulization BID Raghavendra Aguiar MD   250 mcg at 03/28/22 7574    antioxidant multivitamin (OCUVITE) tablet  1 tablet Oral BID Raghavendra Aguiar MD   1 tablet at 03/28/22 1024        Lab Results   Component Value Date    WBC 6.4 03/28/2022    HCT 37.8 03/28/2022    HGB 12.2 (L) 03/28/2022     03/28/2022     03/28/2022    K 4.1 03/28/2022     03/28/2022    CO2 25 03/28/2022    BUN 27 (H) 03/28/2022    CREATININE 1.5 (H) 03/28/2022    GLUCOSE 120 (H) 03/28/2022    CRP 0.7 (H) 03/25/2022         Radiographs:    ASSESSMENT:  Full thickness ulcer down to bone Right 2nd toe, POA  PVD    Active Problems:    * No active hospital problems. *       PLAN:  -Patient examined and evaluated at bedside  -All pertinent labs, charts, and imaging reviewed prior to encounter  -WBC: 6.4  -ID consulted- Abx per their rec  -R foot x rays: Soft tissue swelling of the mid and distal foot. 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   -Wound culture prelim: gram negative rods, corynebacterium spp.   -NIVS pending  -Vascular consulted-From their standpoint the patient is cleared for foot surgery.  If there is concern for nonhealing post procedure, they can perform an arteriogram.  -Wound dressed in xeroform, DSD.  -Pt scheduled for R 2nd toe amp tomorrow at 7:00 AM. Pt to be NPO after midnight  -Will continue to follow pt while in house  -D/W:   Reid Sicard, DPM Pod [place] [id number]  Fellowship-Trained Foot and Ankle Surgeon  Diplomate, [employer] Board of Foot and Ankle Surgeons  [phone]     Thank you for involving podiatry in this patients care.  Please do not hesitate to call with any questions or concerns       Pato Saldaña DPM  PGY1 Podiatry Resident   3/28/2022   11:32 AM

## 2022-03-28 NOTE — PROGRESS NOTES
Subjective: The patient is awake and alert. Pain in foot little to none  [place]. Objective:    /61   Pulse 73   Temp 98 °F (36.7 °C) (Oral)   Resp 16   Ht 6' (1.829 m)   Wt 180 lb 12.4 oz (82 kg)   SpO2 97%   BMI 24.52 kg/m²     Heart:  RRR, no murmurs, gallops, or rubs.   Lungs:  CTA bilaterally, no wheeze, rales or rhonchi  Abd: bowel sounds present, nontender, nondistended, no masses  Extrem:  No clubbing, cyanosis, or edema    CBC with Differential:    Lab Results   Component Value Date    WBC 6.4 03/28/2022    RBC 4.16 03/28/2022    HGB 12.2 03/28/2022    HCT 37.8 03/28/2022     03/28/2022    MCV 90.9 03/28/2022    MCH 29.3 03/28/2022    MCHC 32.3 03/28/2022    RDW 14.4 03/28/2022    SEGSPCT 89 03/16/2013    LYMPHOPCT 33.6 03/28/2022    MONOPCT 8.8 03/28/2022    BASOPCT 0.5 03/28/2022    MONOSABS 0.56 03/28/2022    LYMPHSABS 2.15 03/28/2022    EOSABS 0.44 03/28/2022    BASOSABS 0.03 03/28/2022     CMP:    Lab Results   Component Value Date     03/28/2022    K 4.1 03/28/2022     03/28/2022    CO2 25 03/28/2022    BUN 27 03/28/2022    CREATININE 1.5 03/28/2022    GFRAA 53 03/28/2022    LABGLOM 44 03/28/2022    GLUCOSE 120 03/28/2022    GLUCOSE 98 10/14/2010    PROT 7.1 03/25/2022    LABALBU 4.0 03/25/2022    LABALBU 3.6 10/14/2010    CALCIUM 8.9 03/28/2022    BILITOT 0.6 03/25/2022    ALKPHOS 83 03/25/2022    AST 13 03/25/2022    ALT 10 03/25/2022     PT/INR:    Lab Results   Component Value Date    PROTIME 26.3 07/14/2021    PROTIME 20.5 10/14/2010    INR 2.4 07/14/2021     @cktotal:3,ckmb:3,ckmbindex:3,troponini:3@  U/A:    Lab Results   Component Value Date    NITRU Negative 05/07/2021    COLORU Yellow 05/07/2021    PHUR 6.0 05/07/2021    WBCUA 2-5 05/07/2021    RBCUA 10-20 05/07/2021    RBCUA NONE 03/15/2013    BACTERIA RARE 05/07/2021    CLARITYU Clear 05/07/2021    SPECGRAV 1.020 05/07/2021    UROBILINOGEN 0.2 05/07/2021    BILIRUBINUR Negative 05/07/2021    BLOODU MODERATE 05/07/2021    GLUCOSEU Negative 05/07/2021    KETUA Negative 05/07/2021        Assessment:    Patient Active Problem List   Diagnosis    Atrial fibrillation ([address]    COPD with exacerbation ([address]    Chest pain    Asthma    CAP (community acquired pneumonia)    GI bleeding    COPD exacerbation ([address]    Atrial fibrillation with RVR ([address]    Atherosclerosis of native arteries of right leg with ulceration of other part of foot ([address]    PVD (peripheral vascular disease) ([address]    Cellulitis of second toe of right foot    Gangrene of toe of right foot ([address]    Wound infection    Ulcer of right foot, with unspecified severity ([address]       Plan:  Osteomyelitis of toe-  Will be having amputated  ATB per ID  overall prognosis poor d/t age and beginning failure to thrive  Now on eliquis  Hold this am      Sy Watson, [address] [place] [address]  6:33 AM  3/28/2022

## 2022-03-28 NOTE — PROGRESS NOTES
Physical Therapy    Facility/Department: [facility] MED SURG  Initial Assessment    NAME: Beth Reeder  : 1929  MRN: [id number]    Date of Service: 3/28/2022      Attending Provider:  Jannelle Closs, DO    Evaluating PT:  Hansa Hector, P.T. Room #:  3525/9255-A  Diagnosis:  Wound infection [T14. 8XXA, L08.9]  Ulcer of right foot, with unspecified severity ([facility] [address] [L97.519]  Procedure/Surgery:  Surgery for R 2nd toe amp is planned for 3/29/22  Precautions:  Falls, bed/chair alarm if someone is not with pt, [place], and impaired vision. SUBJECTIVE:    Pt lives with his wife in a 1 story home no stairs to enter. Pt ambulated with ww PTA. OBJECTIVE:   Initial Evaluation  Date: 3/28/22 Treatment Short Term/ Long Term   Goals   Was pt agreeable to Eval/treatment? yes     Does pt have pain? No c/o pain     Bed Mobility  Rolling: Independent  Supine to sit: Independent  Sit to supine: NA  Scooting: Independent  Independent    Transfers Sit to stand: SBA  Stand to sit: SBA  Stand pivot: SBA with ww  supervision   Ambulation   200 feet with ww SBA  250 feet with ww supervision   Stair negotiation: ascended and descended NA  NA   AM-PAC 6 Clicks 58/66       BLE ROM is WFL. BLE strength is grossly 4/5 to 4+/5. Balance: sitting is Independent and standing with ww is SBA  Endurance: fair+    Patient education  Pt educated on hand placement during transfers. Patient response to education:   Pt verbalized understanding Pt demonstrated skill Pt requires further education in this area   yes yes yes     ASSESSMENT:    Conditions Requiring Skilled Therapeutic Intervention:    [x]Decreased strength     []Decreased ROM  [x]Decreased functional mobility  [x]Decreased balance   [x]Decreased endurance   []Decreased posture  []Decreased sensation  []Decreased coordination   [x]Decreased vision  []Decreased safety awareness   []Increased pain    Comments:  Pt was in bed and agreeable to PT.   He was sized and fitted for R post-op shoe as he had large amount of bandage on the R foot. Pt walked slow gait speed with ww and had occasional mild unsteadiness, but no LOB and SBA was provided for safety. He got back to his room and sat on EOB and was agreeable to sitting up in chair. A chair was placed next to his bed and he stood with ww and walked 3 feet to chair and sat down. Treatment:  Patient practiced and was instructed in the following treatment:     Bed mobility, transfers, and gait with ww to improve functional strength and endurance. Pt was left sitting up in chair with BLE elevated on footstool with pillow under his calves and with call light left by patient. Pt's wife and son were present in the room with pt.     Pt's/ family goals   1. To go home. Patient and or family understand(s) diagnosis, prognosis, and plan of care. PHYSICAL THERAPY PLAN OF CARE:    PT POC is established based on physician order and patient diagnosis     Referring provider/PT Order:  PT eval and treat  Diagnosis:  Wound infection [T14. 8XXA, L08.9]  Ulcer of right foot, with unspecified severity ([facility] [address] [L97.519]  Specific instructions for next treatment:  Increased amb distance.      Current Treatment Recommendations:     [x] Strengthening to improve independence with functional mobility   [] ROM to improve ROM and decrease spasm and pain which will help promote independence with functional mobility   [x] Balance Training to improve static/dynamic balance and to reduce fall risk  [x] Endurance Training to improve activity tolerance during functional mobility   [x] Transfer Training to improve safety and independence with all functional transfers   [x] Gait Training to improve gait mechanics, endurance and assess need for appropriate assistive device  [] Stair Training in preparation for safe discharge home and/or into the community   [] Positioning to prevent skin breakdown and contractures  [] Safety and Education Training   [x] Patient/Caregiver Education   [] HEP  [] Other     PT long term treatment goals are located in above grid    Frequency of treatments: 2-5x/week x 1-2 weeks. Time in  14:10  Time out  14:40    Total Treatment Time 10 minutes     Evaluation Time includes thorough review of current medical information, gathering information on past medical history/social history and prior level of function, completion of standardized testing/informal observation of tasks, assessment of data and education on plan of care and goals. CPT codes:  [x] Low Complexity PT evaluation 56848  [] Moderate Complexity PT evaluation 29837  [] High Complexity PT evaluation 78419  [] PT Re-evaluation 35747  [] Gait training 47889 ** minutes  [] Manual therapy 51721 ** minutes  [x] Therapeutic activities 91265 10 minutes  [] Therapeutic exercises 55649 ** minutes  [] Neuromuscular reeducation 80446 ** minutes     Efren Ospina ., P.T.   License Number: [id number]

## 2022-03-28 NOTE — CARE COORDINATION
Introduced my self and provided explanation of CM role to patient,his wife and son Arie Edge all at bedside. Patient is awake, alert, and aware of current diagnosis and treatment plan including OR tomorrow 0700. Patient and family describe patient as being independent at home. He has a walker, an electric stair lift to lower level and bars surrounding his shower area. The familial goal is a return to home, however, a short term rehab stay is not ruled out. Patient has hx of stays at [facility] and [facility]. No referrals to SNF nor [facility] made at this time. Will await post operative pt/ot evals to guide dc planning. Will follow along with  and assist with discharge planning as necessary. Patrick Trevino.  Chucky, MSN, RN  [facility] Case Management  [phone]

## 2022-03-28 NOTE — PROGRESS NOTES
Comprehensive Nutrition Assessment    Type and Reason for Visit:  Initial,Positive Nutrition Screen,Wound    Nutrition Recommendations/Plan: Continue Current Diet, Start Ensure HP BID and Omar BID    Nutrition Assessment:  Pt w/ R 2nd toe ulcer w/ plans for amputation. Hx COPD/CKD. Pt consuming >75% of meals, will add ONS to aid in wound healing. Malnutrition Assessment:  Malnutrition Status: At risk for malnutrition (Comment)    Context:  Chronic Illness     Findings of the 6 clinical characteristics of malnutrition:  Energy Intake:  No significant decrease in energy intake  Weight Loss:  1 - Mild weight loss (specify amount and time period) (-18.6% X 10 months, difficult to appropriately assess d/t CKD)     Body Fat Loss:  Unable to assess     Muscle Mass Loss:  Unable to assess    Fluid Accumulation:  No significant fluid accumulation     Strength:  Not Performed    Estimated Daily Nutrient Needs:  Energy (kcal):  ; Weight Used for Energy Requirements:  Current     Protein (g):  105-120 (1.3-1.5); Weight Used for Protein Requirements:  Current        Fluid (ml/day):  ; Method Used for Fluid Requirements:  1 ml/kcal      Nutrition Related Findings:  A&O X4, blind R eye, [place], +I&Os, BLE +1 edema, abd soft, +BS      Wounds:  Multiple,Full Thickness       Current Nutrition Therapies:    Diet NPO  ADULT DIET; Regular;  Low Sodium (2 gm)    Anthropometric Measures:  · Height: 6' (182.9 cm)  · Current Body Weight: 180 lb (81.6 kg) (3/28 bed)   · Admission Body Weight: 176 lb (79.8 kg) (3/26 bed)    · Usual Body Weight: 221 lb (100.2 kg) (5/2021 actual per EMR)     · Ideal Body Weight: 178 lbs; % Ideal Body Weight 101.1 %   · BMI: 24.4  · BMI Categories: Normal Weight (BMI 22.0 to 24.9) age over 72       Nutrition Diagnosis:   · Increased nutrient needs related to increase demand for energy/nutrients as evidenced by wounds    Nutrition Interventions:   Nutrition Education/Counseling:  Education not indicated   Coordination of Nutrition Care:  Continue to monitor while inpatient    Goals:  Pt to consume >75% of meals/ONS       Nutrition Monitoring and Evaluation:   Food/Nutrient Intake Outcomes:  Food and Nutrient Intake,Supplement Intake  Physical Signs/Symptoms Outcomes:  Biochemical Data,GI Status,Fluid Status or Edema,Nutrition Focused Physical Findings,Skin,Weight     Discharge Planning:     Too soon to determine     Electronically signed by Nancy Rowe RD, MARY ANNE on 3/28/22 at 9:21 AM EDT  Contact: [phone]

## 2022-03-29 VITALS
DIASTOLIC BLOOD PRESSURE: 58 MMHG | SYSTOLIC BLOOD PRESSURE: 102 MMHG | OXYGEN SATURATION: 97 % | RESPIRATION RATE: 18 BRPM

## 2022-03-29 LAB
ANION GAP SERPL CALCULATED.3IONS-SCNC: 8 MMOL/L (ref 7–16)
BASOPHILS ABSOLUTE: 0.04 E9/L (ref 0–0.2)
BASOPHILS RELATIVE PERCENT: 0.5 % (ref 0–2)
BUN BLDV-MCNC: 28 MG/DL (ref 6–23)
CALCIUM SERPL-MCNC: 9.1 MG/DL (ref 8.6–10.2)
CHLORIDE BLD-SCNC: 106 MMOL/L (ref 98–107)
CO2: 24 MMOL/L (ref 22–29)
CREAT SERPL-MCNC: 1.3 MG/DL (ref 0.7–1.2)
EOSINOPHILS ABSOLUTE: 0.47 E9/L (ref 0.05–0.5)
EOSINOPHILS RELATIVE PERCENT: 6.4 % (ref 0–6)
GFR AFRICAN AMERICAN: >60
GFR NON-AFRICAN AMERICAN: 52 ML/MIN/1.73
GLUCOSE BLD-MCNC: 97 MG/DL (ref 74–99)
HCT VFR BLD CALC: 41.6 % (ref 37–54)
HEMOGLOBIN: 12.8 G/DL (ref 12.5–16.5)
IMMATURE GRANULOCYTES #: 0.01 E9/L
IMMATURE GRANULOCYTES %: 0.1 % (ref 0–5)
LYMPHOCYTES ABSOLUTE: 2.34 E9/L (ref 1.5–4)
LYMPHOCYTES RELATIVE PERCENT: 31.9 % (ref 20–42)
MCH RBC QN AUTO: 28.8 PG (ref 26–35)
MCHC RBC AUTO-ENTMCNC: 30.8 % (ref 32–34.5)
MCV RBC AUTO: 93.7 FL (ref 80–99.9)
MONOCYTES ABSOLUTE: 0.61 E9/L (ref 0.1–0.95)
MONOCYTES RELATIVE PERCENT: 8.3 % (ref 2–12)
NEUTROPHILS ABSOLUTE: 3.86 E9/L (ref 1.8–7.3)
NEUTROPHILS RELATIVE PERCENT: 52.8 % (ref 43–80)
PDW BLD-RTO: 14.5 FL (ref 11.5–15)
PLATELET # BLD: 206 E9/L (ref 130–450)
PMV BLD AUTO: 10 FL (ref 7–12)
POTASSIUM REFLEX MAGNESIUM: 4.4 MMOL/L (ref 3.5–5)
RBC # BLD: 4.44 E12/L (ref 3.8–5.8)
SODIUM BLD-SCNC: 138 MMOL/L (ref 132–146)
WBC # BLD: 7.3 E9/L (ref 4.5–11.5)

## 2022-03-29 PROCEDURE — 85025 COMPLETE CBC W/AUTO DIFF WBC: CPT

## 2022-03-29 PROCEDURE — 2500000003 HC RX 250 WO HCPCS: Performed by: PODIATRIST

## 2022-03-29 PROCEDURE — 3700000001 HC ADD 15 MINUTES (ANESTHESIA): Performed by: PODIATRIST

## 2022-03-29 PROCEDURE — 97535 SELF CARE MNGMENT TRAINING: CPT

## 2022-03-29 PROCEDURE — 87081 CULTURE SCREEN ONLY: CPT

## 2022-03-29 PROCEDURE — 3600000002 HC SURGERY LEVEL 2 BASE: Performed by: PODIATRIST

## 2022-03-29 PROCEDURE — 97164 PT RE-EVAL EST PLAN CARE: CPT

## 2022-03-29 PROCEDURE — 2580000003 HC RX 258: Performed by: INTERNAL MEDICINE

## 2022-03-29 PROCEDURE — 87205 SMEAR GRAM STAIN: CPT

## 2022-03-29 PROCEDURE — 87186 SC STD MICRODIL/AGAR DIL: CPT

## 2022-03-29 PROCEDURE — 3600000012 HC SURGERY LEVEL 2 ADDTL 15MIN: Performed by: PODIATRIST

## 2022-03-29 PROCEDURE — 6370000000 HC RX 637 (ALT 250 FOR IP): Performed by: INTERNAL MEDICINE

## 2022-03-29 PROCEDURE — 87070 CULTURE OTHR SPECIMN AEROBIC: CPT

## 2022-03-29 PROCEDURE — 0Y6R0Z0 DETACHMENT AT RIGHT 2ND TOE, COMPLETE, OPEN APPROACH: ICD-10-PCS | Performed by: PODIATRIST

## 2022-03-29 PROCEDURE — 7100000010 HC PHASE II RECOVERY - FIRST 15 MIN: Performed by: PODIATRIST

## 2022-03-29 PROCEDURE — 1200000000 HC SEMI PRIVATE

## 2022-03-29 PROCEDURE — 88311 DECALCIFY TISSUE: CPT

## 2022-03-29 PROCEDURE — 87077 CULTURE AEROBIC IDENTIFY: CPT

## 2022-03-29 PROCEDURE — 7100000011 HC PHASE II RECOVERY - ADDTL 15 MIN: Performed by: PODIATRIST

## 2022-03-29 PROCEDURE — 80048 BASIC METABOLIC PNL TOTAL CA: CPT

## 2022-03-29 PROCEDURE — 6360000002 HC RX W HCPCS: Performed by: ANESTHESIOLOGY

## 2022-03-29 PROCEDURE — 94640 AIRWAY INHALATION TREATMENT: CPT

## 2022-03-29 PROCEDURE — 6360000002 HC RX W HCPCS: Performed by: NURSE ANESTHETIST, CERTIFIED REGISTERED

## 2022-03-29 PROCEDURE — 36415 COLL VENOUS BLD VENIPUNCTURE: CPT

## 2022-03-29 PROCEDURE — 88305 TISSUE EXAM BY PATHOLOGIST: CPT

## 2022-03-29 PROCEDURE — 2500000003 HC RX 250 WO HCPCS: Performed by: NURSE ANESTHETIST, CERTIFIED REGISTERED

## 2022-03-29 PROCEDURE — 6360000002 HC RX W HCPCS: Performed by: INTERNAL MEDICINE

## 2022-03-29 PROCEDURE — 3700000000 HC ANESTHESIA ATTENDED CARE: Performed by: PODIATRIST

## 2022-03-29 PROCEDURE — 97165 OT EVAL LOW COMPLEX 30 MIN: CPT

## 2022-03-29 PROCEDURE — 2709999900 HC NON-CHARGEABLE SUPPLY: Performed by: PODIATRIST

## 2022-03-29 PROCEDURE — 2580000003 HC RX 258: Performed by: NURSE ANESTHETIST, CERTIFIED REGISTERED

## 2022-03-29 PROCEDURE — 6370000000 HC RX 637 (ALT 250 FOR IP): Performed by: SPECIALIST

## 2022-03-29 RX ORDER — BUPIVACAINE HYDROCHLORIDE 5 MG/ML
INJECTION, SOLUTION EPIDURAL; INTRACAUDAL PRN
Status: DISCONTINUED | OUTPATIENT
Start: 2022-03-29 | End: 2022-03-29 | Stop reason: ALTCHOICE

## 2022-03-29 RX ORDER — FENTANYL CITRATE 50 UG/ML
INJECTION, SOLUTION INTRAMUSCULAR; INTRAVENOUS PRN
Status: DISCONTINUED | OUTPATIENT
Start: 2022-03-29 | End: 2022-03-29 | Stop reason: SDUPTHER

## 2022-03-29 RX ORDER — SODIUM CHLORIDE 9 MG/ML
INJECTION, SOLUTION INTRAVENOUS CONTINUOUS PRN
Status: DISCONTINUED | OUTPATIENT
Start: 2022-03-29 | End: 2022-03-29 | Stop reason: SDUPTHER

## 2022-03-29 RX ORDER — SODIUM CHLORIDE 0.9 % (FLUSH) 0.9 %
5-40 SYRINGE (ML) INJECTION EVERY 12 HOURS SCHEDULED
Status: DISCONTINUED | OUTPATIENT
Start: 2022-03-29 | End: 2022-03-29 | Stop reason: HOSPADM

## 2022-03-29 RX ORDER — MORPHINE SULFATE 2 MG/ML
1 INJECTION, SOLUTION INTRAMUSCULAR; INTRAVENOUS EVERY 5 MIN PRN
Status: DISCONTINUED | OUTPATIENT
Start: 2022-03-29 | End: 2022-03-29 | Stop reason: HOSPADM

## 2022-03-29 RX ORDER — MEPERIDINE HYDROCHLORIDE 25 MG/ML
12.5 INJECTION INTRAMUSCULAR; INTRAVENOUS; SUBCUTANEOUS EVERY 5 MIN PRN
Status: DISCONTINUED | OUTPATIENT
Start: 2022-03-29 | End: 2022-03-29 | Stop reason: HOSPADM

## 2022-03-29 RX ORDER — LIDOCAINE HYDROCHLORIDE 20 MG/ML
INJECTION, SOLUTION INFILTRATION; PERINEURAL PRN
Status: DISCONTINUED | OUTPATIENT
Start: 2022-03-29 | End: 2022-03-29 | Stop reason: SDUPTHER

## 2022-03-29 RX ORDER — SODIUM CHLORIDE 0.9 % (FLUSH) 0.9 %
5-40 SYRINGE (ML) INJECTION PRN
Status: DISCONTINUED | OUTPATIENT
Start: 2022-03-29 | End: 2022-03-29 | Stop reason: HOSPADM

## 2022-03-29 RX ORDER — SODIUM CHLORIDE 9 MG/ML
INJECTION, SOLUTION INTRAVENOUS PRN
Status: DISCONTINUED | OUTPATIENT
Start: 2022-03-29 | End: 2022-03-29 | Stop reason: HOSPADM

## 2022-03-29 RX ORDER — MORPHINE SULFATE 2 MG/ML
2 INJECTION, SOLUTION INTRAMUSCULAR; INTRAVENOUS EVERY 5 MIN PRN
Status: DISCONTINUED | OUTPATIENT
Start: 2022-03-29 | End: 2022-03-29 | Stop reason: HOSPADM

## 2022-03-29 RX ORDER — PROPOFOL 10 MG/ML
INJECTION, EMULSION INTRAVENOUS PRN
Status: DISCONTINUED | OUTPATIENT
Start: 2022-03-29 | End: 2022-03-29 | Stop reason: SDUPTHER

## 2022-03-29 RX ADMIN — SODIUM CHLORIDE: 9 INJECTION, SOLUTION INTRAVENOUS at 07:03

## 2022-03-29 RX ADMIN — ACETAMINOPHEN 650 MG: 325 TABLET ORAL at 21:07

## 2022-03-29 RX ADMIN — MONTELUKAST SODIUM 10 MG: 10 TABLET ORAL at 21:07

## 2022-03-29 RX ADMIN — Medication 1 TABLET: at 21:07

## 2022-03-29 RX ADMIN — DIGOXIN 125 MCG: 125 TABLET ORAL at 08:58

## 2022-03-29 RX ADMIN — IPRATROPIUM BROMIDE AND ALBUTEROL SULFATE 3 ML: 2.5; .5 SOLUTION RESPIRATORY (INHALATION) at 19:33

## 2022-03-29 RX ADMIN — TAMSULOSIN HYDROCHLORIDE 0.4 MG: 0.4 CAPSULE ORAL at 08:58

## 2022-03-29 RX ADMIN — IPRATROPIUM BROMIDE AND ALBUTEROL SULFATE 3 ML: 2.5; .5 SOLUTION RESPIRATORY (INHALATION) at 16:37

## 2022-03-29 RX ADMIN — PROPOFOL 50 MCG/KG/MIN: 10 INJECTION, EMULSION INTRAVENOUS at 07:17

## 2022-03-29 RX ADMIN — MORPHINE SULFATE 2 MG: 2 INJECTION, SOLUTION INTRAMUSCULAR; INTRAVENOUS at 08:08

## 2022-03-29 RX ADMIN — PROPOFOL 100 MG: 10 INJECTION, EMULSION INTRAVENOUS at 07:10

## 2022-03-29 RX ADMIN — IPRATROPIUM BROMIDE AND ALBUTEROL SULFATE 3 ML: 2.5; .5 SOLUTION RESPIRATORY (INHALATION) at 12:37

## 2022-03-29 RX ADMIN — BRIMONIDINE TARTRATE 1 DROP: 2 SOLUTION OPHTHALMIC at 21:08

## 2022-03-29 RX ADMIN — BUDESONIDE 250 MCG: 0.25 SUSPENSION RESPIRATORY (INHALATION) at 19:34

## 2022-03-29 RX ADMIN — APIXABAN 2.5 MG: 2.5 TABLET, FILM COATED ORAL at 21:07

## 2022-03-29 RX ADMIN — BRIMONIDINE TARTRATE 1 DROP: 2 SOLUTION OPHTHALMIC at 08:59

## 2022-03-29 RX ADMIN — LIDOCAINE HYDROCHLORIDE 40 MG: 20 INJECTION, SOLUTION INFILTRATION; PERINEURAL at 07:10

## 2022-03-29 RX ADMIN — FENTANYL CITRATE 25 MCG: 50 INJECTION, SOLUTION INTRAMUSCULAR; INTRAVENOUS at 07:07

## 2022-03-29 RX ADMIN — Medication 1 TABLET: at 08:58

## 2022-03-29 RX ADMIN — DOXYCYCLINE HYCLATE 100 MG: 100 CAPSULE ORAL at 08:59

## 2022-03-29 RX ADMIN — ACETAMINOPHEN 650 MG: 325 TABLET ORAL at 00:09

## 2022-03-29 RX ADMIN — SODIUM CHLORIDE, PRESERVATIVE FREE 10 ML: 5 INJECTION INTRAVENOUS at 21:11

## 2022-03-29 RX ADMIN — DOCUSATE SODIUM 200 MG: 100 CAPSULE, LIQUID FILLED ORAL at 21:07

## 2022-03-29 RX ADMIN — SODIUM CHLORIDE, PRESERVATIVE FREE 10 ML: 5 INJECTION INTRAVENOUS at 08:59

## 2022-03-29 RX ADMIN — ARFORMOTEROL TARTRATE 15 MCG: 15 SOLUTION RESPIRATORY (INHALATION) at 19:33

## 2022-03-29 RX ADMIN — ACETAMINOPHEN 650 MG: 325 TABLET ORAL at 04:56

## 2022-03-29 ASSESSMENT — PULMONARY FUNCTION TESTS
PIF_VALUE: 1
PIF_VALUE: 0
PIF_VALUE: 1
PIF_VALUE: 1
PIF_VALUE: 0
PIF_VALUE: 1
PIF_VALUE: 0
PIF_VALUE: 1

## 2022-03-29 ASSESSMENT — PAIN SCALES - GENERAL
PAINLEVEL_OUTOF10: 7
PAINLEVEL_OUTOF10: 3
PAINLEVEL_OUTOF10: 1
PAINLEVEL_OUTOF10: 0
PAINLEVEL_OUTOF10: 7
PAINLEVEL_OUTOF10: 7
PAINLEVEL_OUTOF10: 2
PAINLEVEL_OUTOF10: 2
PAINLEVEL_OUTOF10: 5

## 2022-03-29 ASSESSMENT — PAIN DESCRIPTION - PAIN TYPE
TYPE: SURGICAL PAIN
TYPE: ACUTE PAIN
TYPE: ACUTE PAIN
TYPE: SURGICAL PAIN
TYPE: ACUTE PAIN

## 2022-03-29 ASSESSMENT — PAIN DESCRIPTION - LOCATION
LOCATION: FOOT

## 2022-03-29 ASSESSMENT — PAIN DESCRIPTION - ORIENTATION
ORIENTATION: RIGHT

## 2022-03-29 ASSESSMENT — PAIN - FUNCTIONAL ASSESSMENT
PAIN_FUNCTIONAL_ASSESSMENT: PREVENTS OR INTERFERES SOME ACTIVE ACTIVITIES AND ADLS
PAIN_FUNCTIONAL_ASSESSMENT: 0-10
PAIN_FUNCTIONAL_ASSESSMENT: PREVENTS OR INTERFERES SOME ACTIVE ACTIVITIES AND ADLS

## 2022-03-29 ASSESSMENT — PAIN DESCRIPTION - ONSET
ONSET: GRADUAL
ONSET: ON-GOING

## 2022-03-29 ASSESSMENT — PAIN DESCRIPTION - FREQUENCY
FREQUENCY: INTERMITTENT
FREQUENCY: INTERMITTENT

## 2022-03-29 ASSESSMENT — PAIN DESCRIPTION - PROGRESSION
CLINICAL_PROGRESSION: GRADUALLY WORSENING
CLINICAL_PROGRESSION: NOT CHANGED

## 2022-03-29 ASSESSMENT — PAIN DESCRIPTION - DESCRIPTORS
DESCRIPTORS: ACHING;DISCOMFORT
DESCRIPTORS: ACHING

## 2022-03-29 NOTE — PROGRESS NOTES
Dr. Rosetta Powell stated we will wait for PT/OT to work with him.  Electronically signed by Rudolph Ash RN on 3/29/2022 at 1:20 PM

## 2022-03-29 NOTE — PROGRESS NOTES
Subjective: The patient is awake and alert. No problems overnight. Denies chest pain, angina, and dyspnea. Denies abdominal pain. Tolerating diet. No nausea or vomiting. Objective:    BP (!) 110/55   Pulse 67   Temp 97.9 °F (36.6 °C) (Oral)   Resp 16   Ht 6' (1.829 m)   Wt 169 lb 12.1 oz (77 kg)   SpO2 93%   BMI 23.02 kg/m²     Heart:  RRR, no murmurs, gallops, or rubs.   Lungs:  CTA bilaterally, no wheeze, rales or rhonchi  Abd: bowel sounds present, nontender, nondistended, no masses  Extrem:  No clubbing, cyanosis, or edema    CBC with Differential:    Lab Results   Component Value Date    WBC 7.3 03/29/2022    RBC 4.44 03/29/2022    HGB 12.8 03/29/2022    HCT 41.6 03/29/2022     03/29/2022    MCV 93.7 03/29/2022    MCH 28.8 03/29/2022    MCHC 30.8 03/29/2022    RDW 14.5 03/29/2022    SEGSPCT 89 03/16/2013    LYMPHOPCT 31.9 03/29/2022    MONOPCT 8.3 03/29/2022    BASOPCT 0.5 03/29/2022    MONOSABS 0.61 03/29/2022    LYMPHSABS 2.34 03/29/2022    EOSABS 0.47 03/29/2022    BASOSABS 0.04 03/29/2022     CMP:    Lab Results   Component Value Date     03/28/2022    K 4.1 03/28/2022     03/28/2022    CO2 25 03/28/2022    BUN 27 03/28/2022    CREATININE 1.5 03/28/2022    GFRAA 53 03/28/2022    LABGLOM 44 03/28/2022    GLUCOSE 120 03/28/2022    GLUCOSE 98 10/14/2010    PROT 7.1 03/25/2022    LABALBU 4.0 03/25/2022    LABALBU 3.6 10/14/2010    CALCIUM 8.9 03/28/2022    BILITOT 0.6 03/25/2022    ALKPHOS 83 03/25/2022    AST 13 03/25/2022    ALT 10 03/25/2022     PT/INR:    Lab Results   Component Value Date    PROTIME 26.3 07/14/2021    PROTIME 20.5 10/14/2010    INR 2.4 07/14/2021     @cktotal:3,ckmb:3,ckmbindex:3,troponini:3@  U/A:    Lab Results   Component Value Date    NITRU Negative 05/07/2021    COLORU Yellow 05/07/2021    PHUR 6.0 05/07/2021    WBCUA 2-5 05/07/2021    RBCUA 10-20 05/07/2021    RBCUA NONE 03/15/2013    BACTERIA RARE 05/07/2021    CLARITYU Clear 05/07/2021 SPECGRAV 1.020 05/07/2021    UROBILINOGEN 0.2 05/07/2021    BILIRUBINUR Negative 05/07/2021    BLOODU MODERATE 05/07/2021    GLUCOSEU Negative 05/07/2021    KETUA Negative 05/07/2021        Assessment:    Patient Active Problem List   Diagnosis    Atrial fibrillation ([facility] [address]    COPD with exacerbation ([address]    Chest pain    Asthma    CAP (community acquired pneumonia)    GI bleeding    COPD exacerbation ([address]    Atrial fibrillation with RVR ([address]    Atherosclerosis of native arteries of right leg with ulceration of other part of foot ([address]    PVD (peripheral vascular disease) ([address]    Cellulitis of second toe of right foot    Gangrene of toe of right foot ([address]    Wound infection    Ulcer of right foot, with unspecified severity ([address]       Plan:    OM 2nd toe  Eliquis on hold  For amputation today  Patient states going home post op      Autumn Walton DO  6:12 AM  3/29/2022OM 2nd toe

## 2022-03-29 NOTE — PATIENT CARE CONFERENCE
P Quality Flow/Interdisciplinary Rounds Progress Note        Quality Flow Rounds held on March 29, 2022    Disciplines Attending:  Bedside Nurse, ,  and Nursing Unit Leadership    Arley Bence was admitted on 3/25/2022  6:10 PM    Anticipated Discharge Date:  Expected Discharge Date: 03/29/22    Disposition:    Dwayne Score:  Dwayne Scale Score: 19    Readmission Risk              Risk of Unplanned Readmission:  15           Discussed patient goal for the day, patient clinical progression, and barriers to discharge.   The following Goal(s) of the Day/Commitment(s) have been identified:  Diagnostics - Report Results Pt received surgery today      Radha Rodriguez RN  March 29, 2022

## 2022-03-29 NOTE — PROGRESS NOTES
Occupational Therapy  OCCUPATIONAL THERAPY INITIAL EVALUATION  [address]    Date: 3/29/2022     Patient Name: Jazlyn Robins  MRN: [id number]  : 1929  Room: [address] [place] [address]    Evaluating OT: Jarod Amin November, OTR/L - [id number]    Referring Provider: Zandra Foreman MD; Kinsey Lyles MD  Specific Provider Orders/Date: \"OT eval and treat\" - 3/26/2022 and 3/29/2022    Diagnosis: Wound infection [T14. 8XXA, L08.9], Ulcer of right foot, with unspecified severity ([facility] [address] [L97.519]   Surgery: Patient underwent RIGHT FOOT SECOND DIGIT AMPUTATION on 3/29/2022. Pertinent Medical History: COPD    Precautions: fall risk, NWB R foot, bed alarm, visual impairment, hearing impairment    Assessment of Current Deficits:    [x] Functional mobility   [x]ADLs  [x] Strength               [x]Cognition   [x] Functional transfers   [x] IADLs         [x] Safety Awareness   [x]Endurance   [] Fine Coordination              [x] Balance      [] Vision/perception   [x]Sensation    []Gross Motor Coordination  [] ROM  [] Delirium                   [] Motor Control     OT PLAN OF CARE   OT POC is based on physician orders, patient diagnosis, and results of clinical assessment.   Frequency/Duration 2-5 days/week for 2 weeks PRN   Specific OT Treatment Interventions to Include:   * Instruction/training on adapted ADL techniques and AE recommendations to increase functional independence within precautions       * Training on energy conservation strategies, correct breathing pattern and techniques to improve independence/tolerance for self-care routine  * Functional transfer/mobility training/DME recommendations for increased independence, safety, and fall prevention  * Patient/Family education to increase follow through with safety techniques and functional independence  * Recommendation of environmental modifications for increased safety with functional transfers/mobility and ADLs  * Therapeutic exercise to improve motor endurance, ROM, and functional strength for ADLs/functional transfers  * Therapeutic activities to facilitate/challenge dynamic balance, stand tolerance for increased safety and independence with ADLs  * Neuro-muscular re-education: facilitation of righting/equilibrium reactions, midline orientation, scapular stability/mobility, normalization of muscle tone, and facilitation of volitional active controled movement  * Positioning to improve skin integrity, interaction with environment and functional independence    Recommended Adaptive Equipment: TBD     Home Living: Patient lives with his wife in a one-floor home (stairglide available between the main living level and the basement); patient's preferred bathroom is in the basement. Bathroom Setup: walk-in shower (with seat and grab bars) in the basement  Equipment Owned: walker    Prior Level of Function (PLOF): Per patient and patient's wife, patient was independent with ADLs. Patient's wife is responsible for completion of IADLs. Patient had been Mod I with functional mobility with use of walker. Driving: No - patient's wife has been unable to drive recently, too; friends/family have been assisting with transportation    Pain Level: No complaints of pain. Cognition: Patient alert and oriented grossly. Fair command follow demonstrated. Decreased insight to current abilities/limitations demonstrated, specifically regarding the implications of NWB status of R foot on ADLs and functional transfers/mobility. Memory: Fair  Sequencing: Fair  Problem Solving: Fair  Judgement/Safety: Impaired   Impulsivity demonstrated. Functional Assessment:  -PAC Daily Activity Raw Score: 14/24   Initial Eval Status  Date: 3/29/2022 Treatment Status  Date:  Short Term Goals = Long Term Goals   Feeding Setup provided with items of dinner tray.   N/A   Grooming Mod A  SBA  (seated/standing at sink)   UB Dressing Min A  Setup   LB Dressing Max A  Min A - with use of AE, as needed/appropriate - while demonstrating Good adherence to weight-bearing status of R foot. Bathing Max A  Min A - with use of AE/DME, as needed/appropriate - while demonstrating Good adherence to weight-bearing status of R foot. Toileting Max A for use of handheld urinal while standing with walker at EOB. Consistent cues needed to maximize adherence to NWB status of R foot. Max A needed with management of urinal and clothing management. Min A - while demonstrating Good adherence to weight-bearing status of R foot. Bed Mobility  Supine-to-Sit: SBA  Sit-to-Supine: SBA   Independent / Mod I in order to maximize patient's independence with ADLs, re-positioning, and other functional tasks. Functional Transfers Sit-to-Stand: Mod A   from EOB  Consistent cues needed to maximize patient's adherence to NWB status of R foot with all functional transfers. Supervision - while demonstrating Good adherence to weight-bearing status of R foot. Functional Mobility Mod A needed to take one small hop forward on L foot. Patient demonstrated difficulty adhering to NWB status of R foot during attempts to hop sideways toward HOB. SBA with functional mobility (with device, as needed/appropriate) - while demonstrating Good adherence to weight-bearing status of R foot - in order to maximize independence with ADLs and other functional tasks. Balance Sitting: Good  (at EOB)  Standing: Fair-  (with walker)  Fair+dynamic standing balance - while demonstrating Good adherence to weight-bearing status of R foot - during completion of ADLs/IADLs and other functional tasks. Activity Tolerance Fair  Patient will demonstrate Good understanding and consistent implementation of energy conservation techniques and work simplification techniques into ADL routines. 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    N/A   B UE Strength 3+/5 to 4-/5  Patient will demonstrate 4+/5 B UE strength in order to maximize independence with ADLs and functional transfers. Additional Long-Term Goal: Patient will increase functional independence to PLOF in order to allow patient to live in least restrictive environment. ROM: Additional Information:    R UE  WFL Tremors noted in R hand. L UE WFL      Hand Dominance: Right, however patient reported that he uses his Left hand for ADLs, as needed, secondary to tremors in R hand. Hearing: Impaired  Sensation: No complaints of numbness/tingling in B UEs. Tone: WFL  Edema: No    Comments: RN approved patient's participation in [address] [place] [address] activities. Upon arrival, patient seated at EOB with patient's RN present. At end of session, patient seated at EOB with call light and phone within reach, patient's wife present, dinner tray items within reach on tray table, bed alarm activated, and all lines and tubes intact. Patient would benefit from continued skilled OT to increase safety and independence with completion of ADL/IADL tasks for functional independence and quality of life. Treatment: OT treatment provided this date included:    Instruction/training on safety and adapted techniques for completion of ADLs.   Instruction/training on safe functional mobility/transfer techniques and implications of NWB status of R foot on these activities. Patient/family education provided regardin) implications of NWB status of R foot on ADLs and functional transfers/mobility, 2) importance of having staff assistance with ADLs and other OOB activities to prevent falls/injury during hospitalization, 3) techniques to maximize safety with management of walker, 4) potential benefits of continued therapy services in SNF/[facility] setting. Patient indicated [address] understanding.     Further skilled OT treatment indicated to increase patient's safety and independence with completion of ADL/IADL tasks in order to maximize patient's functional independence and quality of life. Rehab Potential: Good for established goals. Patient / Family Goal: Patient indicated that he wants to be able to return to his home environment. Patient and/or family were instructed on functional diagnosis, prognosis/goals, and OT plan of care. Demonstrated Fair understanding. Eval Complexity: Low    Time In: 1615  Time Out: 1640  Total Treatment Time: 10 minutes      Minutes Units   OT Eval Low 38767 15 1   OT Eval Medium 41504     OT Eval High 01837     OT Re-Eval [id number]     Therapeutic Ex 94103     Therapeutic Activities 59366     ADL/Self Care 76178 10 1   Orthotic Management 52728     Neuro Re-Ed 92468     Non-Billable Time N/A ---     Evaluation time includes thorough review of current medical information, gathering information on past medical history/social history and prior level of function, completion of standardized testing/informal observation of tasks, assessment of data, and education on plan of care and goals. Jessica Brink OTR/L  License Number: [id number]

## 2022-03-29 NOTE — BRIEF OP NOTE
Brief Postoperative Note      Patient: Laura Crowe  YOB: 1929  MRN: [id number]    Date of Procedure: 3/29/2022    Pre-Op Diagnosis: Right second toe acute OM    Post-Op Diagnosis: Same       Procedure(s):  RIGHT FOOT SECOND DIGIT AMPUTATION    Surgeon(s):  Tim Whittaker DPM    Assistant:  Resident: Rober Crigler, MD    Anesthesia: Monitor Anesthesia Care    Estimated Blood Loss (mL): Minimal    Complications: None    Specimens:   ID Type Source Tests Collected by Time Destination   1 : Second toe culture  Tissue Tissue CULTURE, ANAEROBIC, CULTURE, FUNGUS, GRAM STAIN, CULTURE, SURGICAL, CULTURE WITH SMEAR, ACID FAST BACILLIUS Tim Ibrahim DPM 3/29/2022 0650    A : 2nd toe Bone Bone SURGICAL PATHOLOGY Tim Ibrahim DPM 3/29/2022 0650        Implants:  * No implants in log *      Drains: * No LDAs found *    Findings: Necrosis of right second toe.     Electronically signed by Danielle Rutledge DPM on 3/29/2022 at 7:33 AM

## 2022-03-29 NOTE — OP NOTE
[address] [address]                                OPERATIVE REPORT    PATIENT NAME: Aki Grimm                         :        1929  MED REC NO:   [id number]                            ROOM:       0505  ACCOUNT NO:   [de-identified]                           ADMIT DATE: 2022  PROVIDER:     Aleida Blunt DPM    DATE OF PROCEDURE:  2022    SURGEON:  Aleida Blunt DPM.    ASSISTANT:  Cyn Mares DPM, PGY-2.    PREOPERATIVE DIAGNOSIS:  Acute osteomyelitis of the right second toe. POSTOPERATIVE DIAGNOSIS:  Acute osteomyelitis of the right second toe. PROCEDURE PERFORMED:  Right second toe amputation at the level of the  metatarsophalangeal joint. ANESTHESIA:  Monitored anesthesia care. INJECTABLES:  20 mL of 0.5% Marcaine plain. HEMOSTASIS:  On the field. ESTIMATED BLOOD LOSS:  Minimal.    COMPLICATIONS:  None. SPECIMENS:  Right second toe and right second toe culture. INTRAOPERATIVE FINDINGS:  Necrosis of the right second toe. Very mild  bleeding observed throughout the procedure. MATERIALS USED:  3-0 nylon suture. INDICATION FOR PROCEDURE:  This is a pleasant 72-year-old male who  presents today for a right second toe amputation. I counseled the  patient as to the nature of the problem, proposed course of the  procedure, and potential benefits, risks, complications, and  convalescence in detail. All questions were answered to the patient's  apparent satisfaction. No guarantees were given as to the outcome of  the procedure. DESCRIPTION OF PROCEDURE:  Under monitored sedation, the patient was  brought to the operating room and placed on the operating table in the  supine position. Right lower extremity was scrubbed, prepped, and  draped in the usual sterile fashion.   At this time, using a #15 blade, I  performed a linear incision in the dorsal aspect of the right second  metatarsophalangeal joint area. I then extended the incision medially  and laterally to _____ the medial and lateral aspects of the toe,  connecting in the plantar aspect of the toe and then extending it  another 2 cm proximally. At this point, I was able to perform  full-thickness dissection and disarticulation of the digit from the  metatarsophalangeal joint in all quadrants. In doing so, I was able to  then disarticulate the toe from its soft tissue attachments. The toe  was sent off for pathological and microbiological examination. Next, I  irrigated the area with copious amount of normal saline. Post  irrigation, I was successfully able to close that incision using 3-0  nylon suture without any tension on the incision site. Excellent  closure was noted at this time. Capillary refill was brisk to the flap. Postoperative bandages were then applied. The patient tolerated the procedure and anesthesia well in apparent  satisfactory condition with vital signs stable and vascular status  intact to the right lower extremity. The patient was then transferred  to the PACU for further monitoring prior to readmission to the nursing  floor.         Kathy Cintron DPM    D: 03/29/2022 7:36:39       T: 03/29/2022 10:50:17     [email or web address]  Job#: [id number]     Doc#: [id number]    CC:

## 2022-03-29 NOTE — PROGRESS NOTES
[address] [place] [address] [facility]  [facility]  Progress Note    SUBJECTIVE:  Chief Complaint   Patient presents with    Wound Check     pt is to have surgery on foot to prevent osteomyelitis. was sent in from the wound center. Patient is complaining of some itching today, no rashes noted  Had surgery this morning  No fevers  Sitting up on edge of bed     Review of systems:  As stated above in the chief complaint, otherwise negative. Medications:  Scheduled Meds:   levoFLOXacin  500 mg Oral Every Other Day    doxycycline hyclate  100 mg Oral 2 times per day    apixaban  2.5 mg Oral BID    brimonidine  1 drop Both Eyes BID    digoxin  125 mcg Oral Daily    docusate sodium  200 mg Oral Nightly    ipratropium-albuterol  3 mL Inhalation Q4H WA    montelukast  10 mg Oral Nightly    tamsulosin  0.4 mg Oral Daily    sodium chloride flush  5-40 mL IntraVENous 2 times per day    Arformoterol Tartrate  15 mcg Nebulization BID    And    budesonide  0.25 mg Nebulization BID    ocuvite-lutein  1 tablet Oral BID     Continuous Infusions:   sodium chloride       PRN Meds:sodium chloride flush, sodium chloride, ondansetron **OR** ondansetron, polyethylene glycol, acetaminophen **OR** acetaminophen, polyvinyl alcohol    OBJECTIVE:  BP (!) 119/56   Pulse 65   Temp 98 °F (36.7 °C) (Oral)   Resp 17   Ht 6' (1.829 m)   Wt 169 lb 12.1 oz (77 kg)   SpO2 97%   BMI 23.02 kg/m²   Temp  Av.6 °F (36.4 °C)  Min: 97.2 °F (36.2 °C)  Max: 98 °F (36.7 °C)  Constitutional: The patient is awake, alert, and oriented. [place]. Sitting up on the edge of the bed. Family present   Skin: Warm and dry. No rashes were noted. HEENT: Round and reactive pupils. Moist mucous membranes. No ulcerations or thrush. Neck: Supple to movements. Chest: No use of accessory muscles to breathe. Symmetrical expansion. No wheezing, crackles or rhonchi. Cardiovascular: S1 and S2 are rhythmic and regular. No murmurs appreciated. and corrections were done as deemed appropriate. My exam and plan include: The patient had an amputation. He will no longer need antibiotics. Levofloxacin will be discontinued. He can be discharged from ID standpoint.     Fidelina Guillen MD  3/29/2022  12:36 PM

## 2022-03-29 NOTE — PROGRESS NOTES
Physical Therapy    Facility/Department: [facility]  Re-evaluation  NAME: Gita Chaparro  : 1929  MRN: [id number]    Date of Service: 3/29/2022      Attending Provider:  Moe Neal DO    Evaluating PT:  Pedro Hopson, P.T. Room #:  0505/0505-A  Diagnosis:  Wound infection [T14. 8XXA, L08.9]  Ulcer of right foot, with unspecified severity ([facility] [address] [L97.519]  Procedure/Surgery:  R 2nd toe amp 3/29/22 am  Precautions:  Falls, bed/chair alarm if someone is not with pt, [place], and impaired vision, s/p surgery pt is now NWB R foot. SUBJECTIVE:    Pt lives with his wife in a 1 story home, son today 3/29 states there are 4 or 2 stairs to enter. Pt ambulated with ww PTA. OBJECTIVE:   Initial Evaluation  Date: 3/28/22 Re-evaluation  Date: 3/29/22 Treatment Short Term/ Long Term   Goals   Was pt agreeable to Eval/treatment? yes yes     Does pt have pain? No c/o pain No c/o pain     Bed Mobility  Rolling: Independent  Supine to sit: Independent  Sit to supine: NA  Scooting: Independent Rolling: supervision  Supine to sit: supervision  Sit to supine: supervision  Scooting: supervision  Independent    Transfers Sit to stand: SBA  Stand to sit: SBA  Stand pivot: SBA with ww Sit to stand: MOD A  Stand to sit: MIN A  Stand pivot: NA   SBA while maintaining NWB RLE   Ambulation   200 feet with ww SBA NA, pt unable to maintain NWB RLE with just standing     3 feet with ww NWB RLE SBA   Stair negotiation: ascended and descended NA NA  2 steps NWB RLE with MIN A   AM-PAC 6 Clicks  86/87       BLE ROM is WFL. BLE strength is grossly 4/5 to 4+/5. Balance: sitting is Independent and standing with ww is MIN A  Endurance: fair    Patient education  Pt educated on hand placement during transfers and NWB RLE.      Patient response to education:   Pt verbalized understanding Pt demonstrated skill Pt requires further education in this area   yes yes yes     ASSESSMENT:    Conditions Requiring Skilled Therapeutic Intervention:    [x]Decreased strength     []Decreased ROM  [x]Decreased functional mobility  [x]Decreased balance   [x]Decreased endurance   []Decreased posture  []Decreased sensation  []Decreased coordination   [x]Decreased vision  []Decreased safety awareness   []Increased pain    Comments:  Pt is now s/p  R 2nd toe amp and podiatry has new WB order of NWB RLE for pt. He and family were educated on this and they verbalized understanding. Pt was unable to maintain NWB RLE with VCs and assist while attempting to stand up and while standing with ww. He would lift R foot off floor for a few seconds and then would set it back down. Pt did not attempt to hop on LLE as it is felt it would be unsafe and pt would place weight on RLE. Due to change in pt's WB and now his functional performance PT goals above were updated to reflect these changes. Pt was left supine in bed with RLE elevated on pillow under his calf and with call light left by patient. Pt's wife and son were present in the room with pt. Bed alarm was activated. Pt's/ family goals   1. To go home. Patient and or family understand(s) diagnosis, prognosis, and plan of care. PHYSICAL THERAPY PLAN OF CARE:    PT POC is established based on physician order and patient diagnosis     Referring provider/PT Order:  PT eval and treat  Diagnosis:  Wound infection [T14. 8XXA, L08.9]  Ulcer of right foot, with unspecified severity ([facility] [address] [L97.519]  Specific instructions for next treatment:  Work on [employer] RLE    Current Treatment Recommendations:     [x] Strengthening to improve independence with functional mobility   [] ROM to improve ROM and decrease spasm and pain which will help promote independence with functional mobility   [x] Balance Training to improve static/dynamic balance and to reduce fall risk  [x] Endurance Training to improve activity tolerance during functional mobility   [x] Transfer Training to improve safety and independence with all functional transfers   [x] Gait Training to improve gait mechanics, endurance and assess need for appropriate assistive device  [x] Stair Training in preparation for safe discharge home and/or into the community   [] Positioning to prevent skin breakdown and contractures  [] Safety and Education Training   [x] Patient/Caregiver Education   [] HEP  [] Other     PT long term treatment goals are located in above grid    Frequency of treatments: 2-5x/week x 1-2 weeks. Time in  13:40  Time out  13:55    Evaluation Time includes thorough review of current medical information, gathering information on past medical history/social history and prior level of function, completion of standardized testing/informal observation of tasks, assessment of data and education on plan of care and goals. CPT codes:  [] Low Complexity PT evaluation 36348  [] Moderate Complexity PT evaluation 73473  [] High Complexity PT evaluation 93348  [x] PT Re-evaluation 92277  [] Gait training 31107 ** minutes  [] Manual therapy 18843 ** minutes  [] Therapeutic activities 72866 10 minutes  [] Therapeutic exercises 49405 ** minutes  [] Neuromuscular reeducation 15110 ** minutes     Efren Moulton., P.T.   License Number: [id number]

## 2022-03-29 NOTE — ANESTHESIA POSTPROCEDURE EVALUATION
Department of Anesthesiology  Postprocedure Note    Patient: Nestor Burn  MRN: [id number]  YOB: 1929  Date of evaluation: 3/29/2022  Time:  11:00 AM     Procedure Summary     Date: 03/29/22 Room / Location: [place] [address] / [address] [place] [address]    Anesthesia Start:  Anesthesia Stop:     Procedures:       RIGHT FOOT SECOND DIGIT AMPUTATION (Right Toes)      Procedure Not Yet Scheduled Diagnosis: (/)    Surgeons: Oliva Antoine DPM Responsible Provider:     Anesthesia Type: MAC, general ASA Status: 3          Anesthesia Type: MAC, general    Dominga Phase I:      Dominga Phase II: Dominga Score: 10    Last vitals: Reviewed and per EMR flowsheets.        Anesthesia Post Evaluation    Patient location during evaluation: PACU  Patient participation: complete - patient participated  Level of consciousness: awake  Pain score: 3  Airway patency: patent  Nausea & Vomiting: no nausea and no vomiting  Complications: no  Cardiovascular status: blood pressure returned to baseline  Respiratory status: acceptable  Hydration status: euvolemic

## 2022-03-29 NOTE — CARE COORDINATION
Met with patient, wife and son Summer Turner at bedside again today. Patient indicates his desire for a return to home. Son is hesitant and expresses concern regarding home going. Both parties would like to revisit plan following PT eval.  Weightbearing/activity status has been clarified and PT has been asked to re-eval.    Of note, it is discovered that patient is currently active with [facility]. Spoke with Sharmaine with [facility], she is following. She requests notice at time of discharge. Will follow post repeat therapy session for decision [facility] vs SNF. Hannah Mckeon. Chucky, MSN, RN  [facility] Case Management  [phone]    Follow up post PT notations  Patient and wife agreeable to rehab stay post hospital discharge. Choices of facilities reviewed. Selection of [facility] was made. Referral called to Emmanuelle Mason with same. Will await her review and response regarding bed availability/acceptance. Await OT eval as well. Hannah Mckeon.  Chucky, MSN, RN  [facility] Case Management  [phone]

## 2022-03-30 VITALS
WEIGHT: 167 LBS | SYSTOLIC BLOOD PRESSURE: 129 MMHG | BODY MASS INDEX: 22.62 KG/M2 | OXYGEN SATURATION: 95 % | RESPIRATION RATE: 16 BRPM | HEIGHT: 72 IN | HEART RATE: 72 BPM | DIASTOLIC BLOOD PRESSURE: 60 MMHG | TEMPERATURE: 97.6 F

## 2022-03-30 PROBLEM — R07.9 CHEST PAIN: Status: RESOLVED | Noted: 2017-09-20 | Resolved: 2022-03-30

## 2022-03-30 PROBLEM — J44.1 COPD EXACERBATION (HCC): Status: RESOLVED | Noted: 2021-05-07 | Resolved: 2022-03-30

## 2022-03-30 PROBLEM — J18.9 CAP (COMMUNITY ACQUIRED PNEUMONIA): Status: RESOLVED | Noted: 2019-03-23 | Resolved: 2022-03-30

## 2022-03-30 LAB
ANION GAP SERPL CALCULATED.3IONS-SCNC: 6 MMOL/L (ref 7–16)
BASOPHILS ABSOLUTE: 0.04 E9/L (ref 0–0.2)
BASOPHILS RELATIVE PERCENT: 0.5 % (ref 0–2)
BLOOD CULTURE, ROUTINE: NORMAL
BUN BLDV-MCNC: 30 MG/DL (ref 6–23)
CALCIUM SERPL-MCNC: 8.9 MG/DL (ref 8.6–10.2)
CHLORIDE BLD-SCNC: 108 MMOL/L (ref 98–107)
CO2: 24 MMOL/L (ref 22–29)
CREAT SERPL-MCNC: 1.5 MG/DL (ref 0.7–1.2)
CULTURE, BLOOD 2: NORMAL
EOSINOPHILS ABSOLUTE: 0.46 E9/L (ref 0.05–0.5)
EOSINOPHILS RELATIVE PERCENT: 5.7 % (ref 0–6)
GFR AFRICAN AMERICAN: 53
GFR NON-AFRICAN AMERICAN: 44 ML/MIN/1.73
GLUCOSE BLD-MCNC: 101 MG/DL (ref 74–99)
GRAM STAIN ORDERABLE: NORMAL
HCT VFR BLD CALC: 38.1 % (ref 37–54)
HEMOGLOBIN: 12.1 G/DL (ref 12.5–16.5)
IMMATURE GRANULOCYTES #: 0.03 E9/L
IMMATURE GRANULOCYTES %: 0.4 % (ref 0–5)
LYMPHOCYTES ABSOLUTE: 2.33 E9/L (ref 1.5–4)
LYMPHOCYTES RELATIVE PERCENT: 28.9 % (ref 20–42)
MCH RBC QN AUTO: 29.7 PG (ref 26–35)
MCHC RBC AUTO-ENTMCNC: 31.8 % (ref 32–34.5)
MCV RBC AUTO: 93.4 FL (ref 80–99.9)
MONOCYTES ABSOLUTE: 0.68 E9/L (ref 0.1–0.95)
MONOCYTES RELATIVE PERCENT: 8.4 % (ref 2–12)
MRSA CULTURE ONLY: NORMAL
NEUTROPHILS ABSOLUTE: 4.52 E9/L (ref 1.8–7.3)
NEUTROPHILS RELATIVE PERCENT: 56.1 % (ref 43–80)
PDW BLD-RTO: 14.5 FL (ref 11.5–15)
PLATELET # BLD: 173 E9/L (ref 130–450)
PMV BLD AUTO: 9.1 FL (ref 7–12)
POTASSIUM REFLEX MAGNESIUM: 4.5 MMOL/L (ref 3.5–5)
RBC # BLD: 4.08 E12/L (ref 3.8–5.8)
SARS-COV-2, NAAT: NOT DETECTED
SODIUM BLD-SCNC: 138 MMOL/L (ref 132–146)
WBC # BLD: 8.1 E9/L (ref 4.5–11.5)

## 2022-03-30 PROCEDURE — 80048 BASIC METABOLIC PNL TOTAL CA: CPT

## 2022-03-30 PROCEDURE — 6370000000 HC RX 637 (ALT 250 FOR IP): Performed by: INTERNAL MEDICINE

## 2022-03-30 PROCEDURE — 36415 COLL VENOUS BLD VENIPUNCTURE: CPT

## 2022-03-30 PROCEDURE — 2580000003 HC RX 258: Performed by: INTERNAL MEDICINE

## 2022-03-30 PROCEDURE — 6360000002 HC RX W HCPCS: Performed by: INTERNAL MEDICINE

## 2022-03-30 PROCEDURE — 85025 COMPLETE CBC W/AUTO DIFF WBC: CPT

## 2022-03-30 PROCEDURE — 87635 SARS-COV-2 COVID-19 AMP PRB: CPT

## 2022-03-30 PROCEDURE — 94640 AIRWAY INHALATION TREATMENT: CPT

## 2022-03-30 RX ORDER — TRAMADOL HYDROCHLORIDE 50 MG/1
50 TABLET ORAL EVERY 6 HOURS PRN
Status: DISCONTINUED | OUTPATIENT
Start: 2022-03-30 | End: 2022-03-30 | Stop reason: HOSPADM

## 2022-03-30 RX ADMIN — ARFORMOTEROL TARTRATE 15 MCG: 15 SOLUTION RESPIRATORY (INHALATION) at 08:22

## 2022-03-30 RX ADMIN — DIGOXIN 125 MCG: 125 TABLET ORAL at 07:59

## 2022-03-30 RX ADMIN — BUDESONIDE 250 MCG: 0.25 SUSPENSION RESPIRATORY (INHALATION) at 08:22

## 2022-03-30 RX ADMIN — APIXABAN 2.5 MG: 2.5 TABLET, FILM COATED ORAL at 07:59

## 2022-03-30 RX ADMIN — IPRATROPIUM BROMIDE AND ALBUTEROL SULFATE 3 ML: 2.5; .5 SOLUTION RESPIRATORY (INHALATION) at 08:22

## 2022-03-30 RX ADMIN — Medication 1 TABLET: at 08:00

## 2022-03-30 RX ADMIN — TAMSULOSIN HYDROCHLORIDE 0.4 MG: 0.4 CAPSULE ORAL at 08:00

## 2022-03-30 RX ADMIN — ACETAMINOPHEN 650 MG: 325 TABLET ORAL at 04:28

## 2022-03-30 RX ADMIN — SODIUM CHLORIDE, PRESERVATIVE FREE 10 ML: 5 INJECTION INTRAVENOUS at 07:59

## 2022-03-30 RX ADMIN — BRIMONIDINE TARTRATE 1 DROP: 2 SOLUTION OPHTHALMIC at 07:59

## 2022-03-30 RX ADMIN — ACETAMINOPHEN 650 MG: 325 TABLET ORAL at 10:31

## 2022-03-30 RX ADMIN — TRAMADOL HYDROCHLORIDE 50 MG: 50 TABLET, COATED ORAL at 01:03

## 2022-03-30 ASSESSMENT — PAIN DESCRIPTION - PAIN TYPE
TYPE: ACUTE PAIN

## 2022-03-30 ASSESSMENT — PAIN SCALES - GENERAL
PAINLEVEL_OUTOF10: 5
PAINLEVEL_OUTOF10: 7
PAINLEVEL_OUTOF10: 3
PAINLEVEL_OUTOF10: 0
PAINLEVEL_OUTOF10: 7
PAINLEVEL_OUTOF10: 0
PAINLEVEL_OUTOF10: 6

## 2022-03-30 ASSESSMENT — PAIN DESCRIPTION - FREQUENCY
FREQUENCY: INTERMITTENT
FREQUENCY: CONTINUOUS
FREQUENCY: CONTINUOUS

## 2022-03-30 ASSESSMENT — PAIN DESCRIPTION - LOCATION
LOCATION: TOE (COMMENT WHICH ONE)
LOCATION: TOE (COMMENT WHICH ONE)
LOCATION: FOOT

## 2022-03-30 ASSESSMENT — PAIN DESCRIPTION - ORIENTATION
ORIENTATION: RIGHT

## 2022-03-30 ASSESSMENT — PAIN DESCRIPTION - PROGRESSION
CLINICAL_PROGRESSION: GRADUALLY WORSENING

## 2022-03-30 ASSESSMENT — PAIN DESCRIPTION - ONSET
ONSET: GRADUAL
ONSET: ON-GOING
ONSET: ON-GOING

## 2022-03-30 ASSESSMENT — PAIN - FUNCTIONAL ASSESSMENT
PAIN_FUNCTIONAL_ASSESSMENT: ACTIVITIES ARE NOT PREVENTED
PAIN_FUNCTIONAL_ASSESSMENT: PREVENTS OR INTERFERES SOME ACTIVE ACTIVITIES AND ADLS
PAIN_FUNCTIONAL_ASSESSMENT: PREVENTS OR INTERFERES SOME ACTIVE ACTIVITIES AND ADLS

## 2022-03-30 ASSESSMENT — PAIN DESCRIPTION - DESCRIPTORS
DESCRIPTORS: ACHING
DESCRIPTORS: ACHING
DESCRIPTORS: JABBING;SHOOTING

## 2022-03-30 NOTE — DISCHARGE INSTR - COC
Continuity of Care Form    Patient Name: Blanche King   :  1929  MRN:  [id number]    Admit date:  3/25/2022  Discharge date:  ***    Code Status Order: Full Code   Advance Directives:   Advance Care Flowsheet Documentation       Date/Time Healthcare Directive Type of Healthcare Directive Copy in [address] Agent's Name Healthcare Agent's Phone Number    [phone] No, patient does not have an advance directive for healthcare treatment -- -- -- -- --            Admitting Physician:  Jonathon Wagner DO  PCP: Jonathon Wagner DO    Discharging Nurse: [facility] Unit/Room#: 0489/0773-F  Discharging Unit Phone Number: ***    Emergency Contact:   Extended Emergency Contact Information  Primary Emergency Contact: [facility]  Address: [address] [place] [address] Phone: [phone]  Relation: Spouse  Secondary Emergency Contact: [address] [place] [address] Phone: [phone]  Mobile Phone: [phone]  Relation: Child   needed?  No    Past Surgical History:  Past Surgical History:   Procedure Laterality Date    ABDOMEN SURGERY      multiple; intestinal abscess; hernia repair;     APPENDECTOMY      ECHO COMPL W DOP COLOR FLOW  3/16/2013         HERNIA REPAIR      JOINT REPLACEMENT  12    TOE AMPUTATION Right 3/29/2022    RIGHT FOOT SECOND DIGIT AMPUTATION performed by Tawanna Leon DPM at [facility] OR       Immunization History:   Immunization History   Administered Date(s) Administered    COVID-19, Pfizer Purple top, DILUTE for use, 12+ yrs, 30mcg/0.3mL dose 2021, 02/10/2021, 12/10/2021    Influenza Virus Vaccine 2016, 2020    Influenza, High Dose (Fluzone 65 yrs and older) 2019    Influenza, High-dose, Quadv, 65 yrs +, IM (Fluzone) 2020    Pneumococcal Conjugate Vaccine 2013    Tdap (Boostrix, Adacel) 2017, 2018       Active Problems:  Patient Active Problem List Diagnosis Code    Atrial fibrillation (HCC) I48.91    COPD with exacerbation (HCC) J44.1    Chest pain R07.9    Asthma J45.909    CAP (community acquired pneumonia) J18.9    GI bleeding K92.2    COPD exacerbation (HCC) J44.1    Atrial fibrillation with RVR (HCC) I48.91    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 8XXA, L08.9    Ulcer of right foot, with unspecified severity ([facility] [address] L97.519       Isolation/Infection:   Isolation            No Isolation          Patient Infection Status       Infection Onset Added Last Indicated Last Indicated By Review Planned Expiration Resolved Resolved By    None active    Resolved    COVID-19 (Rule Out) 05/11/21 05/11/21 05/11/21 Respiratory Panel, Molecular, with COVID-19 (Restricted: peds pts or suitable admitted adults) (Ordered)   05/11/21 Rule-Out Test Resulted    COVID-19 (Rule Out) 05/07/21 05/07/21 05/07/21 COVID-19, Rapid (Ordered)   05/07/21 Rule-Out Test Resulted            Nurse Assessment:  Last Vital Signs: BP (!) 121/59   Pulse 67   Temp 98.7 °F (37.1 °C) (Oral)   Resp 18   Ht 6' (1.829 m)   Wt 167 lb (75.8 kg)   SpO2 91%   BMI 22.65 kg/m²     Last documented pain score (0-10 scale): Pain Level: 0  Last Weight:   Wt Readings from Last 1 Encounters:   03/30/22 167 lb (75.8 kg)     Mental Status:  oriented, alert, coherent, logical, and thought processes intact    IV Access:  - None    Nursing Mobility/ADLs:  Walking   Assisted  Transfer  Assisted  Bathing  Assisted  Dressing  Assisted  Toileting  Assisted  Feeding  Independent  Med Admin  Independent  Med Delivery   whole    Wound Care Documentation and Therapy:  Wound 03/23/19 Buttocks Right; Inner 1.0 x 1.0 white area surrounded by reddened periwound that is blanchable (Active)   Number of days: 1102       Wound 04/30/21 Brachial Anterior; Left (Active)   Number of days: 333       Wound 05/07/21 Arm Right (Active)   Number of days: 326       Wound 02/11/22 Toe (Comment  which one) Right;Medial #1 second toe (Active)   Dressing Status Clean;Dry; Intact 03/29/22 0957   Wound Cleansed Cleansed with saline 03/28/22 1356   Dressing/Treatment Xeroform;Dry dressing 03/28/22 1356   Wound Length (cm) 2 cm 03/25/22 2302   Wound Width (cm) 0.3 cm 03/25/22 2302   Wound Depth (cm) 0.2 cm 03/25/22 1558   Wound Surface Area (cm^2) 0.6 cm^2 03/25/22 2302   Change in Wound Size % (l*w) 84.62 03/25/22 2302   Wound Volume (cm^3) 1.008 cm^3 03/25/22 1558   Wound Healing % -29 03/25/22 1558   Wound Assessment Eschar moist;Pink/red 03/28/22 1356   Drainage Amount None 03/29/22 0957   Drainage Description Brown 03/28/22 1356   Odor None 03/28/22 1356   Rand-wound Assessment Maceration;Dry/flaky 03/25/22 1558   Number of days: 46       Wound 02/11/22 Toe (Comment  which one) Right;Lateral #2 third  toe (Active)   Dressing Status New dressing applied 03/28/22 1356   Wound Cleansed Cleansed with saline 03/28/22 1356   Dressing/Treatment Dry dressing;Xeroform 03/28/22 1356   Wound Length (cm) 0.5 cm 03/25/22 2302   Wound Width (cm) 0.5 cm 03/25/22 2302   Wound Depth (cm) 0.3 cm 03/25/22 1558   Wound Surface Area (cm^2) 0.25 cm^2 03/25/22 2302   Change in Wound Size % (l*w) 77.27 03/25/22 2302   Wound Volume (cm^3) 0.147 cm^3 03/25/22 1558   Wound Healing % -34 03/25/22 1558   Wound Assessment [place]/red;Slough 03/28/22 1356   Drainage Amount None 03/28/22 1356   Drainage Description Serosanguinous 03/25/22 1558   Odor None 03/28/22 1356   Rand-wound Assessment Maceration 03/25/22 1558   Number of days: 46       Wound 03/25/22 Heel Right #3 (Active)   Wound Image   03/25/22 1558   Dressing Status Clean;Dry; Intact 03/29/22 2058   Wound Length (cm) 1 cm 03/25/22 2302   Wound Width (cm) 0.3 cm 03/25/22 2302   Wound Depth (cm) 0.2 cm 03/25/22 1558   Wound Surface Area (cm^2) 0.3 cm^2 03/25/22 2302   Change in Wound Size % (l*w) 0 03/25/22 2302   Wound Volume (cm^3) 0.06 cm^3 03/25/22 1558   Wound Assessment Dry;Pink/red 03/28/22 0735   Drainage Amount None 03/29/22 0957   Odor None 03/28/22 0735   Rand-wound Assessment Hyperkeratosis (callous) 03/28/22 0735   Number of days: 4        Elimination:  Continence: Bowel: Yes  Bladder: Yes  Urinary Catheter: None   Colostomy/Ileostomy/Ileal Conduit: No       Date of Last BM: Unknown    Intake/Output Summary (Last 24 hours) at 3/30/2022 0625  Last data filed at 3/30/2022 0434  Gross per 24 hour   Intake 420 ml   Output 650 ml   Net -230 ml     I/O last 3 completed shifts: In: 670 [P.O.:360; I.V.:310]  Out: 525 [Urine:525]    Safety Concerns: At Risk for Falls    Impairments/Disabilities:      Vision and Amputation - Right second toe    Nutrition Therapy:  Current Nutrition Therapy:   - Oral Diet:  General    Routes of Feeding: Oral  Liquids: Thin Liquids  Daily Fluid Restriction: no  Last Modified Barium Swallow with Video (Video Swallowing Test): not done    Treatments at the Time of Hospital Discharge:   Respiratory Treatments: Nebulizers  Oxygen Therapy:  is not on home oxygen therapy. Ventilator:    - No ventilator support    Rehab Therapies: Physical Therapy  Weight Bearing Status/Restrictions: Non-weight bearing on right leg  Other Medical Equipment (for information only, NOT a DME order):  walker  Other Treatments: ***    Patient's personal belongings (please select all that are sent with patient):   Wife packed personal items    RN SIGNATURE:  Electronically signed by William Marlow RN on 3/30/22 at 11:06 AM EDT    CASE MANAGEMENT/SOCIAL WORK SECTION    Inpatient Status Date: ***    Readmission Risk Assessment Score:  Readmission Risk              Risk of Unplanned Readmission:  15           Discharging to Facility/ Agency   Name:   Address:  Phone:  Fax:    Dialysis Facility (if applicable)   Name:  Address:  Dialysis Schedule:  Phone:  Fax:    / signature: {Esignature:[id number]}    PHYSICIAN SECTION    Prognosis: {Prognosis:4291812251}    Condition at Discharge: [facility] Patient Condition:147914954}    Rehab Potential (if transferring to Rehab): {Prognosis:5096305625}    Recommended Labs or Other Treatments After Discharge: ***    Physician Certification: I certify the above information and transfer of Marianna Deleon  is necessary for the continuing treatment of the diagnosis listed and that he requires {Admit to Appropriate Level of Care:23358} for {GREATER/LESS:887303132} 30 days.      Update Admission H&P: {CHP DME Changes in TJQQN:943107226}    PHYSICIAN SIGNATURE:  Electronically signed by Enrique Patel DO on 3/30/2022 at 6:25 AM

## 2022-03-30 NOTE — PATIENT CARE CONFERENCE
[facility] Quality Flow/Interdisciplinary Rounds Progress Note        Quality Flow Rounds held on March 30, 2022    Disciplines Attending:  Bedside Nurse, ,  and Nursing Unit Leadership    Jordyn Paulino was admitted on 3/25/2022  6:10 PM    Anticipated Discharge Date:  Expected Discharge Date: 03/29/22    Disposition:    Dwayne Score:  Dwayne Scale Score: 19    Readmission Risk              Risk of Unplanned Readmission:  15           Discussed patient goal for the day, patient clinical progression, and barriers to discharge. The following Goal(s) of the Day/Commitment(s) have been identified:  Discharge - Obtain Order      Migdalia Hernadez RN  March 30, 2022        [facility] Quality Flow/Interdisciplinary Rounds Progress Note        Quality Flow Rounds held on March 30, 2022    Disciplines Attending:  Bedside Nurse, ,  and Nursing Unit Leadership    Jordyn Paulino was admitted on 3/25/2022  6:10 PM    Anticipated Discharge Date:  Expected Discharge Date: 03/29/22    Disposition:    Dwayne Score:  Dwayne Scale Score: 19    Readmission Risk              Risk of Unplanned Readmission:  15           Discussed patient goal for the day, patient clinical progression, and barriers to discharge.   The following Goal(s) of the Day/Commitment(s) have been identified:  Discharge - Obtain Order      Migdalia Hernadez RN  March 30, 2022

## 2022-03-30 NOTE — PROGRESS NOTES
Nurse to nurse called to [facility] papers faxed to [phone] Electronically signed by Demond Cox RN on 3/30/2022 at 11:25 AM

## 2022-03-30 NOTE — CARE COORDINATION
Received notification from Swathi Benson at [facility] that facility   can accept patient for SNF stay today. Discharge order is noted  Non emergency transportation has been arranged (by Swathi Benson) with Nadeen Coulter w/tamara Piedra,  time 12 Noon. Patient, patient wife and bedside nurse notified of scheduled  time. Jeovany Patterson.  Chucky, MSN, RN  [facility] Case Management  [phone]

## 2022-03-30 NOTE — PROGRESS NOTES
Subjective: The patient is sleeping  Pain overnight    Objective:    BP (!) 121/59   Pulse 67   Temp 98.7 °F (37.1 °C) (Oral)   Resp 18   Ht 6' (1.829 m)   Wt 167 lb (75.8 kg)   SpO2 91%   BMI 22.65 kg/m²     Heart:  RRR, no murmurs, gallops, or rubs.   Lungs:  CTA bilaterally, no wheeze, rales or rhonchi  Abd: bowel sounds present, nontender, nondistended, no masses  Extrem:  No clubbing, cyanosis, or edema    CBC with Differential:    Lab Results   Component Value Date    WBC 8.1 03/30/2022    RBC 4.08 03/30/2022    HGB 12.1 03/30/2022    HCT 38.1 03/30/2022     03/30/2022    MCV 93.4 03/30/2022    MCH 29.7 03/30/2022    MCHC 31.8 03/30/2022    RDW 14.5 03/30/2022    SEGSPCT 89 03/16/2013    LYMPHOPCT 28.9 03/30/2022    MONOPCT 8.4 03/30/2022    BASOPCT 0.5 03/30/2022    MONOSABS 0.68 03/30/2022    LYMPHSABS 2.33 03/30/2022    EOSABS 0.46 03/30/2022    BASOSABS 0.04 03/30/2022     CMP:    Lab Results   Component Value Date     03/30/2022    K 4.5 03/30/2022     03/30/2022    CO2 24 03/30/2022    BUN 30 03/30/2022    CREATININE 1.5 03/30/2022    GFRAA 53 03/30/2022    LABGLOM 44 03/30/2022    GLUCOSE 101 03/30/2022    GLUCOSE 98 10/14/2010    PROT 7.1 03/25/2022    LABALBU 4.0 03/25/2022    LABALBU 3.6 10/14/2010    CALCIUM 8.9 03/30/2022    BILITOT 0.6 03/25/2022    ALKPHOS 83 03/25/2022    AST 13 03/25/2022    ALT 10 03/25/2022     PT/INR:    Lab Results   Component Value Date    PROTIME 26.3 07/14/2021    PROTIME 20.5 10/14/2010    INR 2.4 07/14/2021     @cktotal:3,ckmb:3,ckmbindex:3,troponini:3@  U/A:    Lab Results   Component Value Date    NITRU Negative 05/07/2021    COLORU Yellow 05/07/2021    PHUR 6.0 05/07/2021    WBCUA 2-5 05/07/2021    RBCUA 10-20 05/07/2021    RBCUA NONE 03/15/2013    BACTERIA RARE 05/07/2021    CLARITYU Clear 05/07/2021    SPECGRAV 1.020 05/07/2021    UROBILINOGEN 0.2 05/07/2021    BILIRUBINUR Negative 05/07/2021    BLOODU MODERATE 05/07/2021    GLUCOSEU Negative 05/07/2021    ANA LAURA Negative 05/07/2021        Assessment:    Principal Problem:    Wound infection  Active Problems:    Atherosclerosis of native arteries of right leg with ulceration of other part of foot ([address]    Ulcer of right foot, with unspecified severity ([address]  Resolved Problems:    * No resolved hospital problems.  *      Plan:    Osteomyelitis of toe-  S/p amputation  Sleeping as receive ultram this am  Home vs [facility]  Discharge planning  Pain meds per podiatry on Gracie Powers DO  6:23 AM  3/30/2022

## 2022-03-31 LAB
CULTURE SURGICAL: ABNORMAL
CULTURE SURGICAL: ABNORMAL
ORGANISM: ABNORMAL
ORGANISM: ABNORMAL

## 2022-04-01 ENCOUNTER — HOSPITAL ENCOUNTER (OUTPATIENT)
Dept: WOUND CARE | Age: 87
Discharge: HOME | End: 2022-04-01

## 2022-04-07 NOTE — PROGRESS NOTES
Physician Progress Note      PATIENT:               Sherley Brice  CSN #:                  [id number]  :                       1929  ADMIT DATE:       3/25/2022 6:10 PM  [address] DATE:        3/30/2022 1:17 PM  RESPONDING  PROVIDER #:        Felice Andrade DO          QUERY TEXT:    Dear Attending Physician,    Patient admitted with Acute osteomyelitis of the right second toe . Pt noted   to have\" Diabetes \"  documented in Podiatry note on 3/25 and PCP notes   3/26-3/27. Please document in progress notes and discharge summary the type of   DM or if Diabetes was ruled out after study: The medical record reflects the following:  Risk Factors: Acute osteomyelitis of the right second toe  Clinical Indicators: Per Podiatry 3/25,\". . Diabetic/Pressure/Non Pressure   Ulcers only: Ulcer: Non-Pressure ulcer, muscle necrosis . Heddy  \" Per ED,\". . Has not   been diabetic. Heddy  Heddy  Concern that the cause may be due to peripheral vascular   disease as he is not a diabetic . Heddy  \"   Per PCP 3/26,\". .diabetes? .. NIDDM? Heddy  Heddy  \"     120-  Treatment: monitor labwork, no diabetic meds nor diet    Thank you,  Michelle Bourgeois RN [facility]  Clinical Documentation Improvement Specialist  [phone]  Options provided:  -- DM type I  -- DM type II  -- Diabetes ruled out after study  -- Other - I will add my own diagnosis  -- Disagree - Not applicable / Not valid  -- Disagree - Clinically unable to determine / Unknown  -- Refer to Clinical Documentation Reviewer    PROVIDER RESPONSE TEXT:    This patient has DM type II.     Query created by: Ania Camejo on 2022 10:44 AM      Electronically signed by:  Felice Andrade DO 2022 7:12 AM

## 2022-04-08 ENCOUNTER — HOSPITAL ENCOUNTER (OUTPATIENT)
Dept: WOUND CARE | Age: 87
Discharge: HOME | End: 2022-04-08

## 2022-04-15 NOTE — DISCHARGE SUMMARY
Patient ID:  Deysi Willard  [id number]  85 y.o.  2/17/1929    Admit date: 3/25/2022    Discharge date and time: 3/30/2022  1:17 PM     Admission Diagnoses: Wound infection [T14. 8XXA, L08.9]  Ulcer of right foot, with unspecified severity ([facility] [address] [L97.519]    Discharge Diagnoses: Principal Problem:    Wound infection  Active Problems:    Atherosclerosis of native arteries of right leg with ulceration of other part of foot ([facility] [address]    Ulcer of right foot, with unspecified severity ([facility] [address]  Resolved Problems:    * No resolved hospital problems. *        Consults: vascular surgery and podiatry    Procedures: toe amputation  Hospital Course: Patient admitted to hospital with non healing toe wound. He was seen by vascular surgery. He was cleared by surgery. He underwent a amputation of his toe. He tolerated procedure well. 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 He was discharged to [facility] in fair condition.     Discharge Exam:  See progress note from today    Disposition: SNF    Patient Instructions:   Discharge Medication List as of 3/30/2022 11:07 AM      CONTINUE these medications which have NOT CHANGED    Details   bisacodyl (DULCOLAX) 5 MG EC tablet Take 5 mg by mouth dailyHistorical Med      apixaban (ELIQUIS) 2.5 MG TABS tablet Take by mouth 2 times dailyHistorical Med      digoxin (LANOXIN) 125 MCG tablet Take 1 tablet by mouth daily, Disp-30 tablet, R-3Normal      ipratropium-albuterol (DUONEB) 0.5-2.5 (3) MG/3ML SOLN nebulizer solution Inhale 3 mLs into the lungs every 4 hours (while awake), Disp-360 mL, R-0Normal      brimonidine (ALPHAGAN) 0.2 % ophthalmic solution Place 1 drop into both eyes 2 times daily, Disp-1 Bottle, R-3Normal      potassium chloride (KLOR-CON M) 10 MEQ extended release tablet Take 20 mEq by mouth daily as needed If takes lasix Historical Med      mometasone-formoterol (DULERA) 200-5 MCG/ACT inhaler Inhale 1 puff into the lungs dailyHistorical Med      fluticasone (FLOVENT HFA) 110 MCG/ACT inhaler Inhale 1 puff into the lungs daily as neededHistorical Med      glycerin-hypromellose- (ARTIFICIAL TEARS) 0.2-0.2-1 % SOLN opthalmic solution Place 1 drop into both eyes 2 times daily as needed REFRESH PMHistorical Med      Multiple Vitamins-Minerals (PRESERVISION AREDS 2) CAPS Take 1 capsule by mouth 2 times dailyHistorical Med      docusate sodium (COLACE) 100 MG capsule Take 200 mg by mouth nightlyHistorical Med      tamsulosin (FLOMAX) 0.4 MG capsule Take 0.4 mg by mouth daily      montelukast (SINGULAIR) 10 MG tablet Take 10 mg by mouth nightly. STOP taking these medications       doxycycline hyclate (VIBRAMYCIN) 100 MG capsule Comments:   Reason for Stopping:         cephALEXin (KEFLEX) 500 MG capsule Comments:   Reason for Stopping:         clindamycin (CLEOCIN) 300 MG capsule Comments:   Reason for Stopping:             Activity: activity as tolerated  Diet: cardiac diet    Follow-up with [facility] hernan in 2 days.     Note that over 30 minutes was spent in preparing discharge papers, discussing discharge with patient, medication review, etc.    Signed:  Chester Page DO  4/15/2022  3:31 PM

## 2022-04-29 ENCOUNTER — APPOINTMENT (OUTPATIENT)
Dept: GENERAL RADIOLOGY | Age: 87
End: 2022-04-29
Payer: MEDICARE

## 2022-04-29 ENCOUNTER — HOSPITAL ENCOUNTER (EMERGENCY)
Age: 87
Discharge: HOME | End: 2022-04-30
Attending: EMERGENCY MEDICINE
Payer: MEDICARE

## 2022-04-29 ENCOUNTER — APPOINTMENT (OUTPATIENT)
Dept: CT IMAGING | Age: 87
End: 2022-04-29
Payer: MEDICARE

## 2022-04-29 DIAGNOSIS — Z79.01 ON ANTICOAGULANT THERAPY: ICD-10-CM

## 2022-04-29 DIAGNOSIS — R55 SYNCOPE AND COLLAPSE: Primary | ICD-10-CM

## 2022-04-29 LAB
ALBUMIN SERPL-MCNC: 3.9 G/DL (ref 3.5–5.2)
ALP BLD-CCNC: 94 U/L (ref 40–129)
ALT SERPL-CCNC: 17 U/L (ref 0–40)
ANION GAP SERPL CALCULATED.3IONS-SCNC: 10 MMOL/L (ref 7–16)
APTT: 33.2 SEC (ref 24.5–35.1)
AST SERPL-CCNC: 21 U/L (ref 0–39)
BACTERIA: ABNORMAL /HPF
BASOPHILS ABSOLUTE: 0.04 E9/L (ref 0–0.2)
BASOPHILS RELATIVE PERCENT: 0.4 % (ref 0–2)
BILIRUB SERPL-MCNC: 0.6 MG/DL (ref 0–1.2)
BILIRUBIN URINE: NEGATIVE
BLOOD, URINE: NEGATIVE
BUN BLDV-MCNC: 29 MG/DL (ref 6–23)
CALCIUM SERPL-MCNC: 8.9 MG/DL (ref 8.6–10.2)
CHLORIDE BLD-SCNC: 105 MMOL/L (ref 98–107)
CLARITY: CLEAR
CO2: 24 MMOL/L (ref 22–29)
COLOR: YELLOW
CREAT SERPL-MCNC: 1.2 MG/DL (ref 0.7–1.2)
EOSINOPHILS ABSOLUTE: 0.53 E9/L (ref 0.05–0.5)
EOSINOPHILS RELATIVE PERCENT: 5.2 % (ref 0–6)
GFR AFRICAN AMERICAN: >60
GFR NON-AFRICAN AMERICAN: 56 ML/MIN/1.73
GLUCOSE BLD-MCNC: 85 MG/DL (ref 74–99)
GLUCOSE URINE: NEGATIVE MG/DL
HCT VFR BLD CALC: 42.3 % (ref 37–54)
HEMOGLOBIN: 13.3 G/DL (ref 12.5–16.5)
IMMATURE GRANULOCYTES #: 0.04 E9/L
IMMATURE GRANULOCYTES %: 0.4 % (ref 0–5)
INR BLD: 1.4
KETONES, URINE: NEGATIVE MG/DL
LEUKOCYTE ESTERASE, URINE: ABNORMAL
LYMPHOCYTES ABSOLUTE: 2.48 E9/L (ref 1.5–4)
LYMPHOCYTES RELATIVE PERCENT: 24.4 % (ref 20–42)
MCH RBC QN AUTO: 29 PG (ref 26–35)
MCHC RBC AUTO-ENTMCNC: 31.4 % (ref 32–34.5)
MCV RBC AUTO: 92.4 FL (ref 80–99.9)
MONOCYTES ABSOLUTE: 0.94 E9/L (ref 0.1–0.95)
MONOCYTES RELATIVE PERCENT: 9.3 % (ref 2–12)
NEUTROPHILS ABSOLUTE: 6.12 E9/L (ref 1.8–7.3)
NEUTROPHILS RELATIVE PERCENT: 60.3 % (ref 43–80)
NITRITE, URINE: NEGATIVE
PDW BLD-RTO: 15.5 FL (ref 11.5–15)
PH UA: 5.5 (ref 5–9)
PLATELET # BLD: 182 E9/L (ref 130–450)
PMV BLD AUTO: 10.1 FL (ref 7–12)
POTASSIUM REFLEX MAGNESIUM: 4.4 MMOL/L (ref 3.5–5)
PROTEIN UA: NEGATIVE MG/DL
PROTHROMBIN TIME: 14.9 SEC (ref 9.3–12.4)
RBC # BLD: 4.58 E12/L (ref 3.8–5.8)
RBC UA: ABNORMAL /HPF (ref 0–2)
REASON FOR REJECTION: NORMAL
REJECTED TEST: NORMAL
SODIUM BLD-SCNC: 139 MMOL/L (ref 132–146)
SPECIFIC GRAVITY UA: 1.01 (ref 1–1.03)
TOTAL PROTEIN: 6.7 G/DL (ref 6.4–8.3)
TROPONIN, HIGH SENSITIVITY: 39 NG/L (ref 0–11)
UROBILINOGEN, URINE: 0.2 E.U./DL
WBC # BLD: 10.2 E9/L (ref 4.5–11.5)
WBC UA: ABNORMAL /HPF (ref 0–5)

## 2022-04-29 PROCEDURE — 93005 ELECTROCARDIOGRAM TRACING: CPT | Performed by: EMERGENCY MEDICINE

## 2022-04-29 PROCEDURE — 71045 X-RAY EXAM CHEST 1 VIEW: CPT

## 2022-04-29 PROCEDURE — 81001 URINALYSIS AUTO W/SCOPE: CPT

## 2022-04-29 PROCEDURE — 99285 EMERGENCY DEPT VISIT HI MDM: CPT

## 2022-04-29 PROCEDURE — 84484 ASSAY OF TROPONIN QUANT: CPT

## 2022-04-29 PROCEDURE — 80053 COMPREHEN METABOLIC PANEL: CPT

## 2022-04-29 PROCEDURE — 70450 CT HEAD/BRAIN W/O DYE: CPT

## 2022-04-29 PROCEDURE — 72125 CT NECK SPINE W/O DYE: CPT

## 2022-04-29 PROCEDURE — 72170 X-RAY EXAM OF PELVIS: CPT

## 2022-04-29 PROCEDURE — 85025 COMPLETE CBC W/AUTO DIFF WBC: CPT

## 2022-04-29 PROCEDURE — 85730 THROMBOPLASTIN TIME PARTIAL: CPT

## 2022-04-29 PROCEDURE — 85610 PROTHROMBIN TIME: CPT

## 2022-04-29 ASSESSMENT — ENCOUNTER SYMPTOMS
SHORTNESS OF BREATH: 0
RHINORRHEA: 0
NAUSEA: 0
DIARRHEA: 0
BACK PAIN: 0
VOMITING: 0
ABDOMINAL PAIN: 0

## 2022-04-29 ASSESSMENT — PAIN - FUNCTIONAL ASSESSMENT
PAIN_FUNCTIONAL_ASSESSMENT: NONE - DENIES PAIN
PAIN_FUNCTIONAL_ASSESSMENT: NONE - DENIES PAIN

## 2022-04-29 NOTE — ED PROVIDER NOTES
Patient is a 55-year-old male with history of A. fib on anticoagulation, COPD, right eye blind presents to the emergency department after syncopal episode at home. Patient is amnestic to the events, denies fall. EMS brought patient into the emergency department stating that patient was noted to have a syncopal episode by wife. On EMS arrival patient was \"unresponsive\" for approximately 10 minutes. Patient regained consciousness, no groggy. ,  No postictal period, no seizure activity was noted by EMS prehospital glucose was normal.  Patient is A. fib on the monitor. Patient does take Eliquis. No other information available. ROS is limited. Review of Systems   Constitutional: Negative for chills and fever. HENT: Negative for congestion and rhinorrhea. Eyes: Negative for visual disturbance. Respiratory: Negative for shortness of breath. Cardiovascular: Negative for chest pain and palpitations. Gastrointestinal: Negative for abdominal pain, diarrhea, nausea and vomiting. Endocrine: Negative for polyuria. Genitourinary: Negative for dysuria. Musculoskeletal: Negative for arthralgias, back pain, myalgias and neck pain. Skin: Negative for rash and wound. Allergic/Immunologic: Negative for immunocompromised state. Neurological: Positive for syncope. Negative for dizziness, weakness, light-headedness, numbness and headaches. Psychiatric/Behavioral: Negative for confusion. The patient is not nervous/anxious. Physical Exam  Vitals and nursing note reviewed. Constitutional:       General: He is awake. He is not in acute distress. Appearance: He is normal weight. He is not ill-appearing or toxic-appearing. HENT:      Head: Normocephalic and atraumatic. Nose: Nose normal.      Mouth/Throat:      Mouth: Mucous membranes are moist.   Eyes:      Pupils: Pupils are equal, round, and reactive to light.       Comments: Blindness to right eye at baseline   Cardiovascular: Rate and Rhythm: Normal rate and regular rhythm. Pulses: Normal pulses. Heart sounds: Normal heart sounds. Pulmonary:      Effort: Pulmonary effort is normal.      Breath sounds: Normal breath sounds. Abdominal:      Palpations: Abdomen is soft. Tenderness: There is no abdominal tenderness. Musculoskeletal:         General: Normal range of motion. Cervical back: Normal range of motion. Skin:     General: Skin is warm and dry. Capillary Refill: Capillary refill takes less than 2 seconds. Neurological:      General: No focal deficit present. Mental Status: He is alert and oriented to person, place, and time. GCS: GCS eye subscore is 4. GCS verbal subscore is 4. GCS motor subscore is 6. Sensory: Sensation is intact. Motor: Motor function is intact. Psychiatric:         Behavior: Behavior is cooperative. MDM  Number of Diagnoses or Management Options  On anticoagulant therapy  Syncope and collapse  Diagnosis management comments: Patient is a 25-year-old male presents to the emergency department after syncopal episode. Patient present to the emergency department awake, alert, poor historian however answering questions. Patient states he was leaning over when he had a syncopal episode patient presented in no apparent distress, nontoxic. Vital signs were noted. Physical exam shows no signs of trauma, no significant findings. Present in cervical collar. Work-up shows EKG without signs of ectopy or ischemia. Troponin was mildly elevated, and delta was 2. Other labs are reassuring. CT scan showed no fracture dislocation, no intracranial process. X-ray showed no fracture or dislocation. Patient is able to ambulate in the emergency department, is stable and conscious alert and oriented x3 on multiple reevaluations work-up was discussed with patient as well as shared decision making.   Patient given option to be admitted to the hospital for syncopal episode, he declines, wishes to go home. Patient was advised of the risks. I agree with plan to go home with close follow-up and given return instructions for the emergency department. All questions were answered. He was able to ambulate without difficulty. He was monitored and discharged in stable condition. Amount and/or Complexity of Data Reviewed  Clinical lab tests: ordered and reviewed  Tests in the radiology section of CPT®: ordered and reviewed         ED Course as of 04/30/22 0155   Fri Apr 29, 2022 2113 Cervical Spine Clearance    Patient's CT cervical spine imaging without acute findings. Patient does not complain of midline cervical spine tenderness upon palpation. Patient's cervical spine ranged. Cervical spine cleared. No need for cervical collar. Tegan Crystal MD  4/29/22  9:13 PM EDT     [QC]      ED Course User Index  [QC] Tegan Crystal MD     EKG: This EKG is signed and interpreted by me. Rate: 57  Rhythm: Atrial fibrillation  Interpretation: Atrial fibrillation, normal QRS, normal QT interval, no acute ST or T wave changes  Comparison: stable as compared to patient's most recent EKG       ED Course as of 04/30/22 0355   Fri Apr 29, 2022 2113 Cervical Spine Clearance    Patient's CT cervical spine imaging without acute findings. Patient does not complain of midline cervical spine tenderness upon palpation. Patient's cervical spine ranged. Cervical spine cleared. No need for cervical collar. Tegan Crystal MD  4/29/22  9:13 PM EDT     [QC]      ED Course User Index  [QC] Tegan Crystal MD       --------------------------------------------- PAST HISTORY ---------------------------------------------  Past Medical History:  has a past medical history of Asthma, COPD (chronic obstructive pulmonary disease) ([facility] [address] and Unspecified cerebral artery occlusion with cerebral infarction.     Past Surgical History:  has a past surgical history that includes Appendectomy; hernia repair; joint replacement (6-11-12); ECHO Compl W Dop Color Flow (3/16/2013); Abdomen surgery; and Toe amputation (Right, 3/29/2022). Social History:  reports that he quit smoking about 40 years ago. His smoking use included cigarettes. He started smoking about 76 years ago. He smoked 1.00 pack per day. He has never used smokeless tobacco. He reports that he does not drink alcohol and does not use drugs. Family History: family history includes Cancer in his father and mother; No Known Problems in his sister. The patients home medications have been reviewed. Allergies: Patient has no known allergies.     -------------------------------------------------- RESULTS -------------------------------------------------  Labs:  Results for orders placed or performed during the hospital encounter of 04/29/22   CBC with Auto Differential   Result Value Ref Range    WBC 10.2 4.5 - 11.5 E9/L    RBC 4.58 3.80 - 5.80 E12/L    Hemoglobin 13.3 12.5 - 16.5 g/dL    Hematocrit 42.3 37.0 - 54.0 %    MCV 92.4 80.0 - 99.9 fL    MCH 29.0 26.0 - 35.0 pg    MCHC 31.4 (L) 32.0 - 34.5 %    RDW 15.5 (H) 11.5 - 15.0 fL    Platelets 454 632 - 611 E9/L    MPV 10.1 7.0 - 12.0 fL    Neutrophils % 60.3 43.0 - 80.0 %    Immature Granulocytes % 0.4 0.0 - 5.0 %    Lymphocytes % 24.4 20.0 - 42.0 %    Monocytes % 9.3 2.0 - 12.0 %    Eosinophils % 5.2 0.0 - 6.0 %    Basophils % 0.4 0.0 - 2.0 %    Neutrophils Absolute 6.12 1.80 - 7.30 E9/L    Immature Granulocytes # 0.04 E9/L    Lymphocytes Absolute 2.48 1.50 - 4.00 E9/L    Monocytes Absolute 0.94 0.10 - 0.95 E9/L    Eosinophils Absolute 0.53 (H) 0.05 - 0.50 E9/L    Basophils Absolute 0.04 0.00 - 0.20 E9/L   Comprehensive Metabolic Panel w/ Reflex to MG   Result Value Ref Range    Sodium 139 132 - 146 mmol/L    Potassium reflex Magnesium 4.4 3.5 - 5.0 mmol/L    Chloride 105 98 - 107 mmol/L    CO2 24 22 - 29 mmol/L    Anion Gap 10 7 - 16 mmol/L    Glucose 85 74 - 99 mg/dL    BUN 29 (H) 6 - 23 mg/dL    CREATININE 1.2 0.7 - 1.2 mg/dL    GFR Non-African American 56 >=60 mL/min/1.73    GFR African American >60     Calcium 8.9 8.6 - 10.2 mg/dL    Total Protein 6.7 6.4 - 8.3 g/dL    Albumin 3.9 3.5 - 5.2 g/dL    Total Bilirubin 0.6 0.0 - 1.2 mg/dL    Alkaline Phosphatase 94 40 - 129 U/L    ALT 17 0 - 40 U/L    AST 21 0 - 39 U/L   Troponin   Result Value Ref Range    Troponin, High Sensitivity 39 (H) 0 - 11 ng/L   Protime-INR   Result Value Ref Range    Protime 14.9 (H) 9.3 - 12.4 sec    INR 1.4    APTT   Result Value Ref Range    aPTT 33.2 24.5 - 35.1 sec   Urinalysis with Microscopic   Result Value Ref Range    Color, UA Yellow Straw/Yellow    Clarity, UA Clear Clear    Glucose, Ur Negative Negative mg/dL    Bilirubin Urine Negative Negative    Ketones, Urine Negative Negative mg/dL    Specific Gravity, UA 1.010 1.005 - 1.030    Blood, Urine Negative Negative    pH, UA 5.5 5.0 - 9.0    Protein, UA Negative Negative mg/dL    Urobilinogen, Urine 0.2 <2.0 E.U./dL    Nitrite, Urine Negative Negative    Leukocyte Esterase, Urine TRACE (A) Negative    WBC, UA 1-3 0 - 5 /HPF    RBC, UA 1-3 0 - 2 /HPF    Bacteria, UA RARE (A) None Seen /HPF   SPECIMEN REJECTION   Result Value Ref Range    Rejected Test TRP5     Reason for Rejection see below    Troponin   Result Value Ref Range    Troponin, High Sensitivity 37 (H) 0 - 11 ng/L   EKG 12 Lead   Result Value Ref Range    Ventricular Rate 57 BPM    Atrial Rate 54 BPM    QRS Duration 146 ms    Q-T Interval 424 ms    QTc Calculation (Bazett) 412 ms    R Axis -64 degrees    T Axis 65 degrees       Radiology:  CT Head WO Contrast   Final Result   No acute intracranial abnormality. Pansinus mucosal thickening         CT Cervical Spine WO Contrast   Final Result   No acute abnormality of the cervical spine.       Multilevel degenerative changes with grade 1 anterolisthesis C2 over C3         XR CHEST PORTABLE   Final Result   Chronic basilar interstitial opacities and cardiomegaly. No acute process. XR PELVIS (1-2 VIEWS)   Final Result   Limited examination with bony demineralization and no gross evidence of acute   fracture.           ------------------------- NURSING NOTES AND VITALS REVIEWED ---------------------------  Date / Time Roomed:  4/29/2022  4:06 PM  ED Bed Assignment:  14B/14B-14    The nursing notes within the ED encounter and vital signs as below have been reviewed. /62   Pulse 70   Temp 97 °F (36.1 °C)   Resp 16   Wt 167 lb (75.8 kg)   SpO2 100%   BMI 22.65 kg/m²   Oxygen Saturation Interpretation: Normal      ------------------------------------------ PROGRESS NOTES ------------------------------------------  3:55 AM EDT  I have spoken with the patient and discussed todays results, in addition to providing specific details for the plan of care and counseling regarding the diagnosis and prognosis. Their questions are answered at this time and they are agreeable with the plan. I discussed at length with them reasons for immediate return here for re evaluation. They will followup with their primary care physician by calling their office tomorrow. --------------------------------- ADDITIONAL PROVIDER NOTES ---------------------------------  At this time the patient is without objective evidence of an acute process requiring hospitalization or inpatient management. They have remained hemodynamically stable throughout their entire ED visit and are stable for discharge with outpatient follow-up. The plan has been discussed in detail and they are aware of the specific conditions for emergent return, as well as the importance of follow-up. Discharge Medication List as of 4/30/2022  1:27 AM        Diagnosis:  1. Syncope and collapse    2. On anticoagulant therapy      Disposition:  Patient's disposition: Discharge to home  Patient's condition is stable. 4/30/22, 3:55 AM EDT.     This note is prepared by Rebecca Norris MD -PGY- [id number] Evangelist White MD  Resident  04/30/22 4141

## 2022-04-30 VITALS
OXYGEN SATURATION: 100 % | BODY MASS INDEX: 22.65 KG/M2 | DIASTOLIC BLOOD PRESSURE: 62 MMHG | WEIGHT: 167 LBS | SYSTOLIC BLOOD PRESSURE: 118 MMHG | HEART RATE: 70 BPM | RESPIRATION RATE: 16 BRPM | TEMPERATURE: 97 F

## 2022-04-30 LAB
EKG ATRIAL RATE: 54 BPM
EKG Q-T INTERVAL: 424 MS
EKG QRS DURATION: 146 MS
EKG QTC CALCULATION (BAZETT): 412 MS
EKG R AXIS: -64 DEGREES
EKG T AXIS: 65 DEGREES
EKG VENTRICULAR RATE: 57 BPM
TROPONIN, HIGH SENSITIVITY: 37 NG/L (ref 0–11)

## 2022-04-30 PROCEDURE — 93010 ELECTROCARDIOGRAM REPORT: CPT | Performed by: INTERNAL MEDICINE

## 2022-04-30 PROCEDURE — 84484 ASSAY OF TROPONIN QUANT: CPT

## 2022-04-30 ASSESSMENT — PAIN - FUNCTIONAL ASSESSMENT: PAIN_FUNCTIONAL_ASSESSMENT: NONE - DENIES PAIN

## 2022-11-16 NOTE — PROGRESS NOTES
[address] [address] PROGRESS NOTE    Patient: Jazlyn Robins  MRN: [id number]  : 1929    Encounter Date: 2021  Encounter Time: 8:02 AM     Date of Admission: .2021  1:22 PM    Consulting Physician:  Primary Care Physician:      Mariaelena Wong [place]     [phone]    PROBLEM LIST:  Patient Active Problem List   Diagnosis    [facility]    COPD with exacerbation ([facility]    Chest pain    Asthma    CAP (community acquired pneumonia)    GI bleeding    COPD exacerbation ([facility]     SUBJECTIVE:   Feels symptoms are improving, still with mild intermittent cough but he feels this is baseline. Remains on 2L nc - not on home O2, pox recorded as 94-97% in past 24 hrs.       PAST MEDICAL HISTORY:     Past Medical History:   Diagnosis Date    Asthma     COPD (chronic obstructive [facility] disease) [facility]     Unspecified cerebral artery occlusion with cerebral infarction        PAST SURGICAL HISTORY:   Past Surgical History:   Procedure Laterality Date    ABDOMEN SURGERY      multiple; intestinal abscess; hernia repair;     APPENDECTOMY      ECHO COMPL W DOP COLOR FLOW  3/16/2013         HERNIA REPAIR      JOINT REPLACEMENT  12       CURRENT MEDICATIONS:  Current Facility-Administered Medications: methylPREDNISolone sodium (SOLU-MEDROL) injection 40 mg, 40 mg, Intravenous, Q8H  warfarin (COUMADIN) daily dosing (placeholder), , Other, Daily  white petrolatum ointment, , Topical, BID  aspirin EC tablet 81 mg, 81 mg, Oral, Daily  insulin lispro (HUMALOG) injection vial 0-6 Units, 0-6 Units, Subcutaneous, TID WC  insulin lispro (HUMALOG) injection vial 0-3 Units, 0-3 Units, Subcutaneous, Nightly  glucose (GLUTOSE) 40 % oral gel 15 g, 15 g, Oral, PRN  dextrose 50 % IV solution, 12.5 g, Intravenous, PRN  glucagon (rDNA) injection 1 mg, 1 mg, Intramuscular, PRN  dextrose 5 % solution, 100 mL/hr, Intravenous, PRN  docusate sodium (COLACE) capsule 200 Encounters:   21 68   21 73   10/11/20 76       CURRENT PULSE OXIMETRY: SpO2: 95 %  24HR PULSE OXIMETRY RANGE: SpO2  Av.5 %  Min: 94 %  Max: 97 %    Intake/Output Summary (Last 24 hours) at 2021 0802  Last data filed at 2021 0423  Gross per 24 hour   Intake --   Output 1828 ml   Net -1828 ml     General Appearance: alert and oriented to person, place and time, well-developed and well-nourished, in no acute distress   Eyes: L pupil distorted  Neck: neck supple   Pulmonary/Chest: non-labored with equal air entry, mildly diminished without wheezing/rhonchi  Cardiovascular: normal rate, regular rhythm, no murmurs and no JVD   Abdomen: soft, non-tender, non-distended  Extremities: no cyanosis, no clubbing, BLE edema 1-2+  Musculoskeletal:  no joint swelling, deformity or tenderness   Neurologic: appropriate      LABS/IMAGING:    CBC:  Lab Results   Component Value Date    WBC 8.3 2021    HGB 12.7 2021    HCT 40.6 2021    MCV 91.9 2021     2021    LYMPHOPCT 34.0 2021    RBC 4.42 2021    MCH 28.7 2021    MCHC 31.3 (L) 2021    RDW 16.6 (H) 2021    NEUTOPHILPCT 50.4 2021    MONOPCT 9.5 2021    BASOPCT 0.6 2021    NEUTROABS 4.11 2021    LYMPHSABS 2.78 2021    MONOSABS 0.78 2021    EOSABS 0.42 2021    BASOSABS 0.05 2021       Recent Labs     21  0350 21  0425 21  0410   WBC 8.3 8.6 9.1   HGB 12.7 12.7 12.2*   HCT 40.6 40.7 39.2   MCV 91.9 91.1 92.0    181 192       BMP:   Recent Labs     210 21  0425 21  0350    137 137   K 4.5 5.2* 5.5*    104 103   CO2 29 28 30*   BUN 34* 34* 40*   CREATININE 1.1 1.0 1.1       MG: No results found for: MG  Ca/Phos:   Lab Results   Component Value Date    CALCIUM 8.7 2021         PT/INR:   Recent Labs     21  0350   PROTIME 23.9* 16.5* 16.3*   INR 2.2 1.5 1.5     APTT: No results for input(s): APTT in the last 72 hours. Cardiac Enzymes:  Lab Results   Component Value Date    CKTOTAL 59 09/20/2017    CKMB 2.2 09/20/2017    TROPONINI 0.01 05/07/2021     D Dimer:   Lab Results   Component Value Date    DDIMER <250 10/13/2010     Folate and B12: No results found for: Brettsimi Luisitojosefina, No results found for: FOLATE    Lactic Acid:   Lab Results   Component Value Date    LACTA 3.2 (H) 10/07/2020     Ammonia:   Cortisol:  Thyroid Studies:  Lab Results   Component Value Date    TSH 1.310 03/24/2019    [id number] 4.7 03/24/2019     Results for Rody Machado (MRN [id number]) as of 5/12/2021 12:31   Ref.  Range 5/11/2021 16:10   Influenza A by PCR Latest Ref Range: Not Detected  Not Detected   Influenza B by PCR Latest Ref Range: Not Detected  Not Detected   Adenovirus by PCR Latest Ref Range: Not Detected  Not Detected   Coronavirus 229E by PCR Latest Ref Range: Not Detected  Not Detected   Coronavirus HKU1 by PCR Latest Ref Range: Not Detected  Not Detected   Coronavirus NL63 by PCR Latest Ref Range: Not Detected  Not Detected   Coronavirus OC43 by PCR Latest Ref Range: Not Detected  Not Detected   Human Metapneumovirus by PCR Latest Ref Range: Not Detected  Not Detected   Human Rhinovirus/Enterovirus by PCR Latest Ref Range: Not Detected  Not Detected   Parainfluenza Virus 1 by PCR Latest Ref Range: Not Detected  Not Detected   Parainfluenza Virus 2 by PCR Latest Ref Range: Not Detected  Not Detected   Parainfluenza Virus 3 by PCR Latest Ref Range: Not Detected  Not Detected   Parainfluenza Virus 4 by PCR Latest Ref Range: Not Detected  Not Detected   Respiratory Syncytial Virus by PCR Latest Ref Range: Not Detected  Not Detected   Bordetella parapertussis by PCR Latest Ref Range: Not Detected  Not Detected   Chlamydophilia pneumoniae by PCR Latest Ref Range: Not Detected  Not Detected   Mycoplasma pneumoniae by PCR Latest Ref Range: Not Detected  Not Detected   SARS-CoV-2, PCR Latest Ref Range: Not Detected  Not Detected   Bordetella pertussis by PCR Latest Ref Range: Not Detected  Not Detected         XR CHEST (2 VW)   Final Result   Cardiomegaly. Bibasilar atelectasis and/or infiltrate. US DUP LOWER EXTREMITIES BILATERAL VENOUS   Final Result   No evidence of DVT in either lower extremity. XR ELBOW LEFT (2 VIEWS)   Final Result   1. There is no fracture or dislocation of the left elbow   2. No soft tissue foreign bodies noted. CT Head WO Contrast   Final Result   No acute intracranial abnormality. Bilateral maxillary and ethmoid sinusitis. Stable old lacunar infarct, right thalamus. CT Cervical Spine WO Contrast   Final Result   No acute abnormality of the cervical spine. Severe multilevel degenerative changes. CTA PULMONARY W CONTRAST   Final Result   No evidence of pulmonary embolism or acute pulmonary abnormality. CT ABDOMEN PELVIS W IV CONTRAST Additional Contrast? None   Final Result   No evidence of acute intra-abdominal or pelvic injury. XR CHEST PORTABLE   Final Result   Bibasilar airspace opacities which may be on the basis of chronic fibrotic   changes, similar appearance to the examination from October 2020. ASSESSMENT:  1.) Acute Exacerbation of COPD - improved  2.) Fall without Syncope    PLAN:  1.) Wean O2, currently on 2L nc, not on home O2.   2.) Make IV steroids PO  3.) Continue pulmicort, brovana, duoneb   4.) supportive care  5.) respiratory panel negative      Ok from pulmonary standpoint to discharge to SNF. To follow up with [facility] once he returns home.     [address] , [facility]  5/13/2021  8:02 AM Adult

## 2023-03-26 ENCOUNTER — APPOINTMENT (OUTPATIENT)
Dept: GENERAL RADIOLOGY | Age: 88
End: 2023-03-26
Payer: MEDICARE

## 2023-03-26 ENCOUNTER — HOSPITAL ENCOUNTER (EMERGENCY)
Age: 88
Discharge: HOME | End: 2023-03-27
Attending: EMERGENCY MEDICINE
Payer: MEDICARE

## 2023-03-26 ENCOUNTER — APPOINTMENT (OUTPATIENT)
Dept: CT IMAGING | Age: 88
End: 2023-03-26
Payer: MEDICARE

## 2023-03-26 VITALS
SYSTOLIC BLOOD PRESSURE: 137 MMHG | BODY MASS INDEX: 23.7 KG/M2 | HEIGHT: 72 IN | HEART RATE: 58 BPM | WEIGHT: 175 LBS | RESPIRATION RATE: 19 BRPM | DIASTOLIC BLOOD PRESSURE: 75 MMHG | OXYGEN SATURATION: 96 % | TEMPERATURE: 97.4 F

## 2023-03-26 DIAGNOSIS — S69.92XA FINGER INJURY, LEFT, INITIAL ENCOUNTER: ICD-10-CM
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
## 2023-03-26 PROCEDURE — 72125 CT NECK SPINE W/O DYE: CPT

## 2023-03-26 PROCEDURE — 72170 X-RAY EXAM OF PELVIS: CPT

## 2023-03-26 PROCEDURE — 12002 RPR S/N/AX/GEN/TRNK2.6-7.5CM: CPT

## 2023-03-26 PROCEDURE — 93005 ELECTROCARDIOGRAM TRACING: CPT | Performed by: STUDENT IN AN ORGANIZED HEALTH CARE EDUCATION/TRAINING PROGRAM

## 2023-03-26 PROCEDURE — 70450 CT HEAD/BRAIN W/O DYE: CPT

## 2023-03-26 PROCEDURE — 73130 X-RAY EXAM OF HAND: CPT

## 2023-03-26 PROCEDURE — 71045 X-RAY EXAM CHEST 1 VIEW: CPT

## 2023-03-26 PROCEDURE — 2500000003 HC RX 250 WO HCPCS: Performed by: STUDENT IN AN ORGANIZED HEALTH CARE EDUCATION/TRAINING PROGRAM

## 2023-03-26 PROCEDURE — 99284 EMERGENCY DEPT VISIT MOD MDM: CPT

## 2023-03-26 PROCEDURE — 6370000000 HC RX 637 (ALT 250 FOR IP): Performed by: EMERGENCY MEDICINE

## 2023-03-26 RX ORDER — CEPHALEXIN 500 MG/1
500 CAPSULE ORAL 3 TIMES DAILY
Qty: 21 CAPSULE | Refills: 0 | Status: SHIPPED | OUTPATIENT
Start: 2023-03-26 | End: 2023-03-27 | Stop reason: SDUPTHER

## 2023-03-26 RX ORDER — ACETAMINOPHEN 500 MG
1000 TABLET ORAL ONCE
Status: COMPLETED | OUTPATIENT
Start: 2023-03-26 | End: 2023-03-26

## 2023-03-26 RX ORDER — LIDOCAINE HYDROCHLORIDE 10 MG/ML
10 INJECTION, SOLUTION INFILTRATION; PERINEURAL ONCE
Status: COMPLETED | OUTPATIENT
Start: 2023-03-26 | End: 2023-03-26

## 2023-03-26 RX ORDER — CEPHALEXIN 500 MG/1
500 CAPSULE ORAL ONCE
Status: COMPLETED | OUTPATIENT
Start: 2023-03-26 | End: 2023-03-26

## 2023-03-26 RX ADMIN — LIDOCAINE HYDROCHLORIDE 10 ML: 10 INJECTION, SOLUTION INFILTRATION; PERINEURAL at 23:49

## 2023-03-26 RX ADMIN — ACETAMINOPHEN 1000 MG: 500 TABLET ORAL at 21:11

## 2023-03-26 RX ADMIN — CEPHALEXIN 500 MG: 500 CAPSULE ORAL at 21:55

## 2023-03-26 ASSESSMENT — LIFESTYLE VARIABLES
HOW MANY STANDARD DRINKS CONTAINING ALCOHOL DO YOU HAVE ON A TYPICAL DAY: PATIENT DOES NOT DRINK
HOW OFTEN DO YOU HAVE A DRINK CONTAINING ALCOHOL: NEVER

## 2023-03-26 ASSESSMENT — PAIN SCALES - GENERAL: PAINLEVEL_OUTOF10: 6

## 2023-03-26 ASSESSMENT — PAIN DESCRIPTION - ORIENTATION: ORIENTATION: LEFT

## 2023-03-26 ASSESSMENT — PAIN DESCRIPTION - LOCATION: LOCATION: HAND

## 2023-03-26 ASSESSMENT — PAIN - FUNCTIONAL ASSESSMENT: PAIN_FUNCTIONAL_ASSESSMENT: 0-10

## 2023-03-27 LAB
EKG ATRIAL RATE: 90 BPM
EKG Q-T INTERVAL: 448 MS
EKG QRS DURATION: 146 MS
EKG QTC CALCULATION (BAZETT): 462 MS
EKG R AXIS: -65 DEGREES
EKG T AXIS: 51 DEGREES
EKG VENTRICULAR RATE: 64 BPM

## 2023-03-27 PROCEDURE — 93010 ELECTROCARDIOGRAM REPORT: CPT | Performed by: INTERNAL MEDICINE

## 2023-03-27 RX ORDER — CEPHALEXIN 500 MG/1
500 CAPSULE ORAL 3 TIMES DAILY
Qty: 21 CAPSULE | Refills: 0 | Status: SHIPPED | OUTPATIENT
Start: 2023-03-27 | End: 2023-04-03

## 2023-03-27 NOTE — ED NOTES
Wound care completed per order. D/c info reviewed with patient and family, no questions at this time. This RN helped ambulated pt to wheelchair and into son's car for ride home. Pt tolerated well.       Dejuan Smith RN  03/27/23 [id number]

## 2023-03-27 NOTE — ED NOTES
Patient ambulated in room to use urinal. Patient tolerated well. States no dizziness upon getting out of bed. Dr. Rose Stauffer notified.       Silva Salinas RN  03/26/23 [id number]

## 2023-03-27 NOTE — DISCHARGE INSTRUCTIONS
Keep wound clean and dry  Keep finger splint intact until he follows up with orthopedic surgery  Take all antibiotics as prescribed  If you notice any new worrisome symptoms please return to emergency department for evaluation

## 2023-03-27 NOTE — ED NOTES
Patient's wound on left middle finger irrigated and cleaned thoroughly. Patient tolerated well.       Mariella Mccollum RN  03/26/23 2120

## 2023-03-28 NOTE — ED PROVIDER NOTES
Affect    DIAGNOSTIC RESULTS   LABS:    Labs Reviewed - No data to display    As interpreted by me, the above displayed labs are abnormal. All other labs obtained during this visit were within normal range or not returned as of this dictation. RADIOLOGY:   Non-plain film images such as CT, Ultrasound and MRI are read by the radiologist. Plain radiographic images are visualized and preliminarily interpreted by the ED Provider with the below findings:      Interpretation per the Radiologist below, if available at the time of this note:    CT HEAD WO CONTRAST   Final Result   No acute intracranial abnormality. Stable diffuse volume loss and chronic small vessel ischemic white matter   change. CT CERVICAL SPINE WO CONTRAST   Final Result   No acute abnormality of the cervical spine. Stable severe cervical spondylosis, as described above. XR CHEST PORTABLE   Final Result   No acute process. XR PELVIS (1-2 VIEWS)   Final Result   No acute abnormality of the pelvis. XR HAND LEFT (MIN 3 VIEWS)   Final Result   No acute fracture identified. Hyperextension of the 3rd DIP joint concerning   for ligamentous or tendinous injury. XR HAND RIGHT (MIN 3 VIEWS)   Final Result   No acute osseous abnormality. Severe right 1st CMC and MCP degenerative   change. XR PELVIS (1-2 VIEWS)    Result Date: 3/26/2023  EXAMINATION: ONE XRAY VIEW OF THE PELVIS 3/26/2023 7:10 pm COMPARISON: 04/29/2022 HISTORY: ORDERING SYSTEM PROVIDED HISTORY: fall TECHNOLOGIST PROVIDED HISTORY: Reason for exam:->fall FINDINGS: No evidence of pelvic fracture. Bilateral hips demonstrate normal alignment. No focal osseous lesion. SI joints are symmetric. No acute abnormality of the pelvis. XR HAND LEFT (MIN 3 VIEWS)    Result Date: 3/26/2023  EXAMINATION: THREE XRAY VIEWS OF THE LEFT HAND 3/26/2023 7:10 pm COMPARISON: None.  HISTORY: ORDERING SYSTEM PROVIDED HISTORY: fall, left middle finger

## 2023-10-08 ENCOUNTER — APPOINTMENT (OUTPATIENT)
Dept: GENERAL RADIOLOGY | Age: 88
End: 2023-10-08
Payer: MEDICARE

## 2023-10-08 ENCOUNTER — APPOINTMENT (OUTPATIENT)
Dept: CT IMAGING | Age: 88
End: 2023-10-08
Payer: MEDICARE

## 2023-10-08 ENCOUNTER — HOSPITAL ENCOUNTER (EMERGENCY)
Age: 88
Discharge: HOME | End: 2023-10-08
Attending: EMERGENCY MEDICINE
Payer: MEDICARE

## 2023-10-08 VITALS
HEART RATE: 63 BPM | RESPIRATION RATE: 24 BRPM | OXYGEN SATURATION: 96 % | WEIGHT: 170 LBS | TEMPERATURE: 97.4 F | SYSTOLIC BLOOD PRESSURE: 148 MMHG | DIASTOLIC BLOOD PRESSURE: 79 MMHG | BODY MASS INDEX: 23.06 KG/M2

## 2023-10-08 DIAGNOSIS — M53.3 SACRAL BACK PAIN: ICD-10-CM
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
## 2023-10-08 LAB
ANION GAP SERPL CALCULATED.3IONS-SCNC: 9 MMOL/L (ref 7–16)
BASOPHILS # BLD: 0.03 K/UL (ref 0–0.2)
BASOPHILS NFR BLD: 0 % (ref 0–2)
BILIRUB UR QL STRIP: NEGATIVE
BUN SERPL-MCNC: 36 MG/DL (ref 6–23)
CALCIUM SERPL-MCNC: 9.4 MG/DL (ref 8.6–10.2)
CHLORIDE SERPL-SCNC: 105 MMOL/L (ref 98–107)
CLARITY UR: CLEAR
CO2 SERPL-SCNC: 27 MMOL/L (ref 22–29)
COLOR UR: YELLOW
CREAT SERPL-MCNC: 1.6 MG/DL (ref 0.7–1.2)
EOSINOPHIL # BLD: 0.22 K/UL (ref 0.05–0.5)
EOSINOPHILS RELATIVE PERCENT: 3 % (ref 0–6)
ERYTHROCYTE [DISTWIDTH] IN BLOOD BY AUTOMATED COUNT: 15.8 % (ref 11.5–15)
GFR SERPL CREATININE-BSD FRML MDRD: 41 ML/MIN/1.73M2
GLUCOSE BLD-MCNC: 104 MG/DL (ref 74–99)
GLUCOSE SERPL-MCNC: 103 MG/DL (ref 74–99)
GLUCOSE UR STRIP-MCNC: NEGATIVE MG/DL
HCT VFR BLD AUTO: 39.3 % (ref 37–54)
HGB BLD-MCNC: 12.5 G/DL (ref 12.5–16.5)
HGB UR QL STRIP.AUTO: NEGATIVE
IMM GRANULOCYTES # BLD AUTO: 0.03 K/UL (ref 0–0.58)
IMM GRANULOCYTES NFR BLD: 0 % (ref 0–5)
KETONES UR STRIP-MCNC: NEGATIVE MG/DL
LEUKOCYTE ESTERASE UR QL STRIP: NEGATIVE
LYMPHOCYTES NFR BLD: 2.25 K/UL (ref 1.5–4)
LYMPHOCYTES RELATIVE PERCENT: 32 % (ref 20–42)
MCH RBC QN AUTO: 29.8 PG (ref 26–35)
MCHC RBC AUTO-ENTMCNC: 31.8 G/DL (ref 32–34.5)
MCV RBC AUTO: 93.6 FL (ref 80–99.9)
MONOCYTES NFR BLD: 0.63 K/UL (ref 0.1–0.95)
MONOCYTES NFR BLD: 9 % (ref 2–12)
NEUTROPHILS NFR BLD: 56 % (ref 43–80)
NEUTS SEG NFR BLD: 3.95 K/UL (ref 1.8–7.3)
NITRITE UR QL STRIP: NEGATIVE
PH UR STRIP: 6 [PH] (ref 5–9)
PLATELET # BLD AUTO: 176 K/UL (ref 130–450)
PMV BLD AUTO: 9.3 FL (ref 7–12)
POTASSIUM SERPL-SCNC: 4.9 MMOL/L (ref 3.5–5)
PROT UR STRIP-MCNC: NEGATIVE MG/DL
RBC # BLD AUTO: 4.2 M/UL (ref 3.8–5.8)
SODIUM SERPL-SCNC: 141 MMOL/L (ref 132–146)
SP GR UR STRIP: 1.01 (ref 1–1.03)
TROPONIN I SERPL HS-MCNC: 37 NG/L (ref 0–11)
TROPONIN I SERPL HS-MCNC: 38 NG/L (ref 0–11)
UROBILINOGEN UR STRIP-ACNC: 0.2 EU/DL (ref 0–1)
WBC OTHER # BLD: 7.1 K/UL (ref 4.5–11.5)

## 2023-10-08 PROCEDURE — 72131 CT LUMBAR SPINE W/O DYE: CPT

## 2023-10-08 PROCEDURE — 82962 GLUCOSE BLOOD TEST: CPT

## 2023-10-08 PROCEDURE — 6370000000 HC RX 637 (ALT 250 FOR IP)

## 2023-10-08 PROCEDURE — 84484 ASSAY OF TROPONIN QUANT: CPT

## 2023-10-08 PROCEDURE — 85025 COMPLETE CBC W/AUTO DIFF WBC: CPT

## 2023-10-08 PROCEDURE — 2580000003 HC RX 258

## 2023-10-08 PROCEDURE — 93005 ELECTROCARDIOGRAM TRACING: CPT

## 2023-10-08 PROCEDURE — 71045 X-RAY EXAM CHEST 1 VIEW: CPT

## 2023-10-08 PROCEDURE — 72170 X-RAY EXAM OF PELVIS: CPT

## 2023-10-08 PROCEDURE — 99285 EMERGENCY DEPT VISIT HI MDM: CPT

## 2023-10-08 PROCEDURE — 80048 BASIC METABOLIC PNL TOTAL CA: CPT

## 2023-10-08 PROCEDURE — 72125 CT NECK SPINE W/O DYE: CPT

## 2023-10-08 PROCEDURE — 70450 CT HEAD/BRAIN W/O DYE: CPT

## 2023-10-08 RX ORDER — 0.9 % SODIUM CHLORIDE 0.9 %
500 INTRAVENOUS SOLUTION INTRAVENOUS ONCE
Status: COMPLETED | OUTPATIENT
Start: 2023-10-08 | End: 2023-10-08

## 2023-10-08 RX ORDER — SODIUM CHLORIDE 0.9 % (FLUSH) 0.9 %
SYRINGE (ML) INJECTION
Status: DISCONTINUED
Start: 2023-10-08 | End: 2023-10-09 | Stop reason: HOSPADM

## 2023-10-08 RX ORDER — IBUPROFEN 600 MG/1
600 TABLET ORAL ONCE
Status: COMPLETED | OUTPATIENT
Start: 2023-10-08 | End: 2023-10-08

## 2023-10-08 RX ADMIN — SODIUM CHLORIDE 500 ML: 9 INJECTION, SOLUTION INTRAVENOUS at 21:23

## 2023-10-08 RX ADMIN — IBUPROFEN 600 MG: 600 TABLET, FILM COATED ORAL at 22:50

## 2023-10-08 ASSESSMENT — LIFESTYLE VARIABLES
HOW OFTEN DO YOU HAVE A DRINK CONTAINING ALCOHOL: NEVER
HOW MANY STANDARD DRINKS CONTAINING ALCOHOL DO YOU HAVE ON A TYPICAL DAY: PATIENT DOES NOT DRINK

## 2023-10-08 ASSESSMENT — PAIN SCALES - GENERAL
PAINLEVEL_OUTOF10: 9
PAINLEVEL_OUTOF10: 6

## 2023-10-08 ASSESSMENT — PAIN DESCRIPTION - DESCRIPTORS: DESCRIPTORS: SHOOTING;THROBBING

## 2023-10-08 ASSESSMENT — PAIN DESCRIPTION - LOCATION
LOCATION: BUTTOCKS
LOCATION: BUTTOCKS;BACK

## 2023-10-08 ASSESSMENT — PAIN - FUNCTIONAL ASSESSMENT: PAIN_FUNCTIONAL_ASSESSMENT: 0-10

## 2023-10-08 ASSESSMENT — PAIN DESCRIPTION - ORIENTATION: ORIENTATION: LOWER

## 2023-10-08 NOTE — ED PROVIDER NOTES
[address] ENCOUNTER        Pt Name: Sanna Galloway  MRN: [id number]  [address] [facility] 2/17/1929  Date of evaluation: 10/8/2023  Provider: Yfn Lucero MD  PCP: Alberto Conte DO  Note Started: 8:23 PM EDT 10/8/23    CHIEF COMPLAINT       Chief Complaint   Patient presents with    Fall     States was at kitchen sink when fell onto butt. Denies hitting head. -LOC, +eliquis, hx of afib. A/Ox4. HISTORY OF PRESENT ILLNESS: 1 or more Elements   History From: Patient    Limitations to history : None    Sanna Galloway is a 80 y.o. male with past medical history of asthma, A-fib, GI bleeding, PVD, COPD, previous CVA who presents status post fall from standing. Patient states he was at the kitchen sink and suffered a mechanical fall falling backwards onto buttocks. Patient denies hitting head or any LOC. Patient is a poor historian and is unsure if he had any dizziness or lightheadedness prior to fall. Patient states he is on Eliquis due to history of A-fib. Patient complains of lumbar/sacral pain secondary to fall. Of note, patient had continued pain and wife gave patient an additional dose of aspirin for pain relief. Patient denies any chest pain, dizziness, lightheadedness, shortness of breath, numbness or tingling extremities, headache, neck pain or neck stiffness, abdominal pain, nausea, vomiting, diarrhea, urinary changes, fevers, chills. Patient denies any EtOH, tobacco, illicit drug use. Of note, patient's son arrived and states patient has been suffering from generalized weakness for the past week.     Nursing Notes were all reviewed and agreed with or any disagreements were addressed in the HPI.    ROS:   Pertinent positives and negatives are stated within HPI, all other systems reviewed and are negative.    --------------------------------------------- PAST HISTORY ---------------------------------------------  Past Medical

## 2023-10-09 LAB
EKG ATRIAL RATE: 214 BPM
EKG Q-T INTERVAL: 440 MS
EKG QRS DURATION: 144 MS
EKG QTC CALCULATION (BAZETT): 446 MS
EKG R AXIS: -59 DEGREES
EKG VENTRICULAR RATE: 62 BPM

## 2023-10-09 PROCEDURE — 93010 ELECTROCARDIOGRAM REPORT: CPT | Performed by: INTERNAL MEDICINE

## 2023-11-27 ENCOUNTER — HOSPITAL ENCOUNTER (INPATIENT)
Age: 88
LOS: 4 days | Discharge: HOSPICE | DRG: 871 | End: 2023-12-01
Attending: EMERGENCY MEDICINE | Admitting: INTERNAL MEDICINE
Payer: MEDICARE

## 2023-11-27 ENCOUNTER — APPOINTMENT (OUTPATIENT)
Dept: GENERAL RADIOLOGY | Age: 88
DRG: 871 | End: 2023-11-27
Payer: MEDICARE

## 2023-11-27 ENCOUNTER — APPOINTMENT (OUTPATIENT)
Dept: CT IMAGING | Age: 88
DRG: 871 | End: 2023-11-27
Payer: MEDICARE

## 2023-11-27 DIAGNOSIS — I50.43 ACUTE ON CHRONIC COMBINED SYSTOLIC AND DIASTOLIC CHF (CONGESTIVE HEART FAILURE) (HCC): ICD-10-CM

## 2023-11-27 DIAGNOSIS — I50.9 ACUTE ON CHRONIC CONGESTIVE HEART FAILURE, UNSPECIFIED HEART FAILURE TYPE (HCC): ICD-10-CM

## 2023-11-27 DIAGNOSIS — I48.91 ATRIAL FIBRILLATION WITH RVR (HCC): Primary | ICD-10-CM

## 2023-11-27 DIAGNOSIS — N28.9 ACUTE ON CHRONIC RENAL INSUFFICIENCY: ICD-10-CM

## 2023-11-27 DIAGNOSIS — Z71.89 GOALS OF CARE, COUNSELING/DISCUSSION: ICD-10-CM

## 2023-11-27 DIAGNOSIS — N18.9 ACUTE ON CHRONIC RENAL INSUFFICIENCY: ICD-10-CM

## 2023-11-27 DIAGNOSIS — J90 PLEURAL EFFUSION, LEFT: ICD-10-CM

## 2023-11-27 LAB
ALBUMIN SERPL-MCNC: 2.6 G/DL (ref 3.5–5.2)
ALP SERPL-CCNC: 160 U/L (ref 40–129)
ALT SERPL-CCNC: 28 U/L (ref 0–40)
ANION GAP SERPL CALCULATED.3IONS-SCNC: 16 MMOL/L (ref 7–16)
AST SERPL-CCNC: 33 U/L (ref 0–39)
B PARAP IS1001 DNA NPH QL NAA+NON-PROBE: NOT DETECTED
B PERT DNA SPEC QL NAA+PROBE: NOT DETECTED
B.E.: -1.9 MMOL/L (ref -3–3)
BASOPHILS # BLD: 0.1 K/UL (ref 0–0.2)
BASOPHILS NFR BLD: 0 % (ref 0–2)
BILIRUB SERPL-MCNC: 1 MG/DL (ref 0–1.2)
BILIRUB UR QL STRIP: NEGATIVE
BNP SERPL-MCNC: ABNORMAL PG/ML (ref 0–450)
BUN SERPL-MCNC: 55 MG/DL (ref 6–23)
C PNEUM DNA NPH QL NAA+NON-PROBE: NOT DETECTED
CALCIUM SERPL-MCNC: 9 MG/DL (ref 8.6–10.2)
CASTS #/AREA URNS LPF: ABNORMAL /LPF
CHLORIDE SERPL-SCNC: 100 MMOL/L (ref 98–107)
CLARITY UR: CLEAR
CO2 SERPL-SCNC: 21 MMOL/L (ref 22–29)
COHB: 1.2 % (ref 0–1.5)
COLOR UR: YELLOW
CREAT SERPL-MCNC: 2.3 MG/DL (ref 0.7–1.2)
CRITICAL: ABNORMAL
DATE ANALYZED: ABNORMAL
DATE OF COLLECTION: ABNORMAL
EOSINOPHIL # BLD: 0.01 K/UL (ref 0.05–0.5)
EOSINOPHILS RELATIVE PERCENT: 0 % (ref 0–6)
ERYTHROCYTE [DISTWIDTH] IN BLOOD BY AUTOMATED COUNT: 16.2 % (ref 11.5–15)
FLUAV RNA NPH QL NAA+NON-PROBE: NOT DETECTED
FLUBV RNA NPH QL NAA+NON-PROBE: NOT DETECTED
GFR SERPL CREATININE-BSD FRML MDRD: 26 ML/MIN/1.73M2
GLUCOSE SERPL-MCNC: 135 MG/DL (ref 74–99)
GLUCOSE UR STRIP-MCNC: NEGATIVE MG/DL
HADV DNA NPH QL NAA+NON-PROBE: NOT DETECTED
HCO3: 21.2 MMOL/L (ref 22–26)
HCOV 229E RNA NPH QL NAA+NON-PROBE: NOT DETECTED
HCOV HKU1 RNA NPH QL NAA+NON-PROBE: NOT DETECTED
HCOV NL63 RNA NPH QL NAA+NON-PROBE: NOT DETECTED
HCOV OC43 RNA NPH QL NAA+NON-PROBE: NOT DETECTED
HCT VFR BLD AUTO: 40.8 % (ref 37–54)
HGB BLD-MCNC: 13.1 G/DL (ref 12.5–16.5)
HGB UR QL STRIP.AUTO: ABNORMAL
HHB: 4.7 % (ref 0–5)
HMPV RNA NPH QL NAA+NON-PROBE: NOT DETECTED
HPIV1 RNA NPH QL NAA+NON-PROBE: NOT DETECTED
HPIV2 RNA NPH QL NAA+NON-PROBE: NOT DETECTED
HPIV3 RNA NPH QL NAA+NON-PROBE: NOT DETECTED
HPIV4 RNA NPH QL NAA+NON-PROBE: NOT DETECTED
IMM GRANULOCYTES # BLD AUTO: 0.95 K/UL (ref 0–0.58)
IMM GRANULOCYTES NFR BLD: 3 % (ref 0–5)
KETONES UR STRIP-MCNC: NEGATIVE MG/DL
LAB: ABNORMAL
LACTATE BLDV-SCNC: 2.7 MMOL/L (ref 0.5–2.2)
LACTATE BLDV-SCNC: 4.7 MMOL/L (ref 0.5–2.2)
LEUKOCYTE ESTERASE UR QL STRIP: NEGATIVE
LYMPHOCYTES NFR BLD: 1.48 K/UL (ref 1.5–4)
LYMPHOCYTES RELATIVE PERCENT: 4 % (ref 20–42)
Lab: 1755
M PNEUMO DNA NPH QL NAA+NON-PROBE: NOT DETECTED
MCH RBC QN AUTO: 29.9 PG (ref 26–35)
MCHC RBC AUTO-ENTMCNC: 32.1 G/DL (ref 32–34.5)
MCV RBC AUTO: 93.2 FL (ref 80–99.9)
METHB: 0.3 % (ref 0–1.5)
MODE: ABNORMAL
MONOCYTES NFR BLD: 1.21 K/UL (ref 0.1–0.95)
MONOCYTES NFR BLD: 4 % (ref 2–12)
NEUTROPHILS NFR BLD: 89 % (ref 43–80)
NEUTS SEG NFR BLD: 31.3 K/UL (ref 1.8–7.3)
NITRITE UR QL STRIP: NEGATIVE
O2 CONTENT: 17.3 ML/DL
O2 SATURATION: 95.2 % (ref 92–98.5)
O2HB: 93.8 % (ref 94–97)
OPERATOR ID: 166
PATIENT TEMP: 37 C
PCO2: 31.4 MMHG (ref 35–45)
PH BLOOD GAS: 7.45 (ref 7.35–7.45)
PH UR STRIP: 5 [PH] (ref 5–9)
PLATELET # BLD AUTO: 437 K/UL (ref 130–450)
PMV BLD AUTO: 9.6 FL (ref 7–12)
PO2: 71.5 MMHG (ref 75–100)
POTASSIUM SERPL-SCNC: 4.4 MMOL/L (ref 3.5–5)
PROCALCITONIN SERPL-MCNC: 13.85 NG/ML (ref 0–0.08)
PROT SERPL-MCNC: 6.8 G/DL (ref 6.4–8.3)
PROT UR STRIP-MCNC: NEGATIVE MG/DL
RBC # BLD AUTO: 4.38 M/UL (ref 3.8–5.8)
RBC # BLD: ABNORMAL 10*6/UL
RBC #/AREA URNS HPF: ABNORMAL /HPF
RSV RNA NPH QL NAA+NON-PROBE: NOT DETECTED
RV+EV RNA NPH QL NAA+NON-PROBE: NOT DETECTED
SARS-COV-2 RNA NPH QL NAA+NON-PROBE: NOT DETECTED
SODIUM SERPL-SCNC: 137 MMOL/L (ref 132–146)
SOURCE, BLOOD GAS: ABNORMAL
SP GR UR STRIP: 1.02 (ref 1–1.03)
SPECIMEN DESCRIPTION: NORMAL
THB: 13.1 G/DL (ref 11.5–16.5)
TIME ANALYZED: 1804
TROPONIN I SERPL HS-MCNC: 32 NG/L (ref 0–11)
TROPONIN I SERPL HS-MCNC: 36 NG/L (ref 0–11)
UROBILINOGEN UR STRIP-ACNC: 0.2 EU/DL (ref 0–1)
WBC # BLD: ABNORMAL 10*3/UL
WBC #/AREA URNS HPF: ABNORMAL /HPF
WBC OTHER # BLD: 35.1 K/UL (ref 4.5–11.5)

## 2023-11-27 PROCEDURE — 85025 COMPLETE CBC W/AUTO DIFF WBC: CPT

## 2023-11-27 PROCEDURE — 71250 CT THORAX DX C-: CPT

## 2023-11-27 PROCEDURE — 2580000003 HC RX 258: Performed by: STUDENT IN AN ORGANIZED HEALTH CARE EDUCATION/TRAINING PROGRAM

## 2023-11-27 PROCEDURE — 83880 ASSAY OF NATRIURETIC PEPTIDE: CPT

## 2023-11-27 PROCEDURE — 6370000000 HC RX 637 (ALT 250 FOR IP): Performed by: INTERNAL MEDICINE

## 2023-11-27 PROCEDURE — 82805 BLOOD GASES W/O2 SATURATION: CPT

## 2023-11-27 PROCEDURE — 71045 X-RAY EXAM CHEST 1 VIEW: CPT

## 2023-11-27 PROCEDURE — 81001 URINALYSIS AUTO W/SCOPE: CPT

## 2023-11-27 PROCEDURE — 93005 ELECTROCARDIOGRAM TRACING: CPT | Performed by: EMERGENCY MEDICINE

## 2023-11-27 PROCEDURE — 87040 BLOOD CULTURE FOR BACTERIA: CPT

## 2023-11-27 PROCEDURE — 80053 COMPREHEN METABOLIC PANEL: CPT

## 2023-11-27 PROCEDURE — 84484 ASSAY OF TROPONIN QUANT: CPT

## 2023-11-27 PROCEDURE — 84145 PROCALCITONIN (PCT): CPT

## 2023-11-27 PROCEDURE — 99285 EMERGENCY DEPT VISIT HI MDM: CPT

## 2023-11-27 PROCEDURE — 0202U NFCT DS 22 TRGT SARS-COV-2: CPT

## 2023-11-27 PROCEDURE — 2060000000 HC ICU INTERMEDIATE R&B

## 2023-11-27 PROCEDURE — 96374 THER/PROPH/DIAG INJ IV PUSH: CPT

## 2023-11-27 PROCEDURE — 6360000002 HC RX W HCPCS: Performed by: STUDENT IN AN ORGANIZED HEALTH CARE EDUCATION/TRAINING PROGRAM

## 2023-11-27 PROCEDURE — 83605 ASSAY OF LACTIC ACID: CPT

## 2023-11-27 PROCEDURE — 2500000003 HC RX 250 WO HCPCS: Performed by: EMERGENCY MEDICINE

## 2023-11-27 RX ORDER — BUMETANIDE 2 MG/1
2 TABLET ORAL DAILY
Status: ON HOLD | COMMUNITY
Start: 2023-10-16

## 2023-11-27 RX ORDER — BISACODYL 5 MG/1
5 TABLET, DELAYED RELEASE ORAL DAILY
Status: DISCONTINUED | OUTPATIENT
Start: 2023-11-28 | End: 2023-12-02 | Stop reason: HOSPADM

## 2023-11-27 RX ORDER — DOCUSATE SODIUM 100 MG/1
200 CAPSULE, LIQUID FILLED ORAL NIGHTLY
Status: DISCONTINUED | OUTPATIENT
Start: 2023-11-27 | End: 2023-12-02 | Stop reason: HOSPADM

## 2023-11-27 RX ORDER — BUMETANIDE 0.25 MG/ML
1 INJECTION INTRAMUSCULAR; INTRAVENOUS ONCE
Status: COMPLETED | OUTPATIENT
Start: 2023-11-27 | End: 2023-11-27

## 2023-11-27 RX ORDER — BUMETANIDE 0.25 MG/ML
1 INJECTION INTRAMUSCULAR; INTRAVENOUS EVERY 12 HOURS
Status: DISCONTINUED | OUTPATIENT
Start: 2023-11-28 | End: 2023-11-28

## 2023-11-27 RX ORDER — 0.9 % SODIUM CHLORIDE 0.9 %
1000 INTRAVENOUS SOLUTION INTRAVENOUS ONCE
Status: COMPLETED | OUTPATIENT
Start: 2023-11-27 | End: 2023-11-28

## 2023-11-27 RX ORDER — POTASSIUM CHLORIDE 20 MEQ/1
20 TABLET, EXTENDED RELEASE ORAL DAILY
Status: DISCONTINUED | OUTPATIENT
Start: 2023-11-28 | End: 2023-12-02 | Stop reason: HOSPADM

## 2023-11-27 RX ORDER — 0.9 % SODIUM CHLORIDE 0.9 %
1000 INTRAVENOUS SOLUTION INTRAVENOUS ONCE
Status: COMPLETED | OUTPATIENT
Start: 2023-11-27 | End: 2023-11-27

## 2023-11-27 RX ORDER — MONTELUKAST SODIUM 10 MG/1
10 TABLET ORAL NIGHTLY
Status: DISCONTINUED | OUTPATIENT
Start: 2023-11-27 | End: 2023-12-02 | Stop reason: HOSPADM

## 2023-11-27 RX ORDER — VITS A,C,E/LUTEIN/MINERALS 300MCG-200
1 TABLET ORAL 2 TIMES DAILY
Status: DISCONTINUED | OUTPATIENT
Start: 2023-11-28 | End: 2023-12-02 | Stop reason: HOSPADM

## 2023-11-27 RX ORDER — DIGOXIN 125 MCG
125 TABLET ORAL DAILY
Status: DISCONTINUED | OUTPATIENT
Start: 2023-11-28 | End: 2023-12-02 | Stop reason: HOSPADM

## 2023-11-27 RX ORDER — BRIMONIDINE TARTRATE 2 MG/ML
1 SOLUTION/ DROPS OPHTHALMIC 2 TIMES DAILY
Status: DISCONTINUED | OUTPATIENT
Start: 2023-11-27 | End: 2023-12-02 | Stop reason: HOSPADM

## 2023-11-27 RX ORDER — IPRATROPIUM BROMIDE AND ALBUTEROL SULFATE 2.5; .5 MG/3ML; MG/3ML
1 SOLUTION RESPIRATORY (INHALATION)
Status: DISCONTINUED | OUTPATIENT
Start: 2023-11-27 | End: 2023-12-02 | Stop reason: HOSPADM

## 2023-11-27 RX ORDER — 0.9 % SODIUM CHLORIDE 0.9 %
1000 INTRAVENOUS SOLUTION INTRAVENOUS ONCE
Status: DISCONTINUED | OUTPATIENT
Start: 2023-11-27 | End: 2023-11-27

## 2023-11-27 RX ORDER — BUMETANIDE 0.25 MG/ML
2 INJECTION INTRAMUSCULAR; INTRAVENOUS ONCE
Status: DISCONTINUED | OUTPATIENT
Start: 2023-11-27 | End: 2023-11-27

## 2023-11-27 RX ORDER — TAMSULOSIN HYDROCHLORIDE 0.4 MG/1
0.4 CAPSULE ORAL DAILY
Status: DISCONTINUED | OUTPATIENT
Start: 2023-11-28 | End: 2023-12-02 | Stop reason: HOSPADM

## 2023-11-27 RX ADMIN — SODIUM CHLORIDE 1000 ML: 9 INJECTION, SOLUTION INTRAVENOUS at 19:33

## 2023-11-27 RX ADMIN — BUMETANIDE 1 MG: 0.25 INJECTION INTRAMUSCULAR; INTRAVENOUS at 18:35

## 2023-11-27 RX ADMIN — VANCOMYCIN HYDROCHLORIDE 1750 MG: 1 INJECTION, POWDER, LYOPHILIZED, FOR SOLUTION INTRAVENOUS at 21:42

## 2023-11-27 RX ADMIN — SODIUM CHLORIDE 1000 ML: 9 INJECTION, SOLUTION INTRAVENOUS at 18:26

## 2023-11-27 RX ADMIN — CEFEPIME 2000 MG: 2 INJECTION, POWDER, FOR SOLUTION INTRAVENOUS at 20:22

## 2023-11-27 RX ADMIN — IPRATROPIUM BROMIDE AND ALBUTEROL SULFATE 1 DOSE: .5; 2.5 SOLUTION RESPIRATORY (INHALATION) at 22:17

## 2023-11-27 ASSESSMENT — PAIN - FUNCTIONAL ASSESSMENT: PAIN_FUNCTIONAL_ASSESSMENT: NONE - DENIES PAIN

## 2023-11-27 NOTE — PROGRESS NOTES
Database initiated. Patient is A&O (slow to respond when asked questions) comes in from home with wife. States he uses a walker and is RA at baseline. He has fallen recently.

## 2023-11-28 LAB
ALBUMIN SERPL-MCNC: 2.4 G/DL (ref 3.5–5.2)
ALP SERPL-CCNC: 141 U/L (ref 40–129)
ALT SERPL-CCNC: 22 U/L (ref 0–40)
ANION GAP SERPL CALCULATED.3IONS-SCNC: 14 MMOL/L (ref 7–16)
AST SERPL-CCNC: 20 U/L (ref 0–39)
BASOPHILS # BLD: 0.11 K/UL (ref 0–0.2)
BASOPHILS NFR BLD: 0 % (ref 0–2)
BILIRUB SERPL-MCNC: 0.7 MG/DL (ref 0–1.2)
BUN SERPL-MCNC: 58 MG/DL (ref 6–23)
CALCIUM SERPL-MCNC: 8.8 MG/DL (ref 8.6–10.2)
CHLORIDE SERPL-SCNC: 102 MMOL/L (ref 98–107)
CO2 SERPL-SCNC: 21 MMOL/L (ref 22–29)
CREAT SERPL-MCNC: 2.2 MG/DL (ref 0.7–1.2)
EKG ATRIAL RATE: 357 BPM
EKG Q-T INTERVAL: 370 MS
EKG QRS DURATION: 134 MS
EKG QTC CALCULATION (BAZETT): 457 MS
EKG R AXIS: -37 DEGREES
EKG T AXIS: 3 DEGREES
EKG VENTRICULAR RATE: 92 BPM
EOSINOPHIL # BLD: 0.05 K/UL (ref 0.05–0.5)
EOSINOPHILS RELATIVE PERCENT: 0 % (ref 0–6)
ERYTHROCYTE [DISTWIDTH] IN BLOOD BY AUTOMATED COUNT: 16.3 % (ref 11.5–15)
GFR SERPL CREATININE-BSD FRML MDRD: 26 ML/MIN/1.73M2
GLUCOSE SERPL-MCNC: 122 MG/DL (ref 74–99)
HCT VFR BLD AUTO: 36.1 % (ref 37–54)
HGB BLD-MCNC: 12 G/DL (ref 12.5–16.5)
IMM GRANULOCYTES # BLD AUTO: 0.72 K/UL (ref 0–0.58)
IMM GRANULOCYTES NFR BLD: 2 % (ref 0–5)
LYMPHOCYTES NFR BLD: 1.32 K/UL (ref 1.5–4)
LYMPHOCYTES RELATIVE PERCENT: 4 % (ref 20–42)
MCH RBC QN AUTO: 30.3 PG (ref 26–35)
MCHC RBC AUTO-ENTMCNC: 33.2 G/DL (ref 32–34.5)
MCV RBC AUTO: 91.2 FL (ref 80–99.9)
MONOCYTES NFR BLD: 1.08 K/UL (ref 0.1–0.95)
MONOCYTES NFR BLD: 3 % (ref 2–12)
NEUTROPHILS NFR BLD: 91 % (ref 43–80)
NEUTS SEG NFR BLD: 33.61 K/UL (ref 1.8–7.3)
PLATELET # BLD AUTO: 438 K/UL (ref 130–450)
PMV BLD AUTO: 9.1 FL (ref 7–12)
POTASSIUM SERPL-SCNC: 4 MMOL/L (ref 3.5–5)
PROT SERPL-MCNC: 6.1 G/DL (ref 6.4–8.3)
RBC # BLD AUTO: 3.96 M/UL (ref 3.8–5.8)
RBC # BLD: ABNORMAL 10*6/UL
SODIUM SERPL-SCNC: 137 MMOL/L (ref 132–146)
WBC OTHER # BLD: 36.9 K/UL (ref 4.5–11.5)

## 2023-11-28 PROCEDURE — 6370000000 HC RX 637 (ALT 250 FOR IP): Performed by: INTERNAL MEDICINE

## 2023-11-28 PROCEDURE — 87899 AGENT NOS ASSAY W/OPTIC: CPT

## 2023-11-28 PROCEDURE — 80053 COMPREHEN METABOLIC PANEL: CPT

## 2023-11-28 PROCEDURE — 87081 CULTURE SCREEN ONLY: CPT

## 2023-11-28 PROCEDURE — 2500000003 HC RX 250 WO HCPCS: Performed by: INTERNAL MEDICINE

## 2023-11-28 PROCEDURE — 2700000000 HC OXYGEN THERAPY PER DAY

## 2023-11-28 PROCEDURE — 94640 AIRWAY INHALATION TREATMENT: CPT

## 2023-11-28 PROCEDURE — 87449 NOS EACH ORGANISM AG IA: CPT

## 2023-11-28 PROCEDURE — 2060000000 HC ICU INTERMEDIATE R&B

## 2023-11-28 PROCEDURE — 6360000002 HC RX W HCPCS: Performed by: SPECIALIST

## 2023-11-28 PROCEDURE — 85025 COMPLETE CBC W/AUTO DIFF WBC: CPT

## 2023-11-28 PROCEDURE — 2580000003 HC RX 258: Performed by: SPECIALIST

## 2023-11-28 PROCEDURE — 99223 1ST HOSP IP/OBS HIGH 75: CPT

## 2023-11-28 RX ORDER — ACETAMINOPHEN 325 MG/1
650 TABLET ORAL EVERY 6 HOURS PRN
Status: DISCONTINUED | OUTPATIENT
Start: 2023-11-28 | End: 2023-12-02 | Stop reason: HOSPADM

## 2023-11-28 RX ORDER — DIGOXIN 125 MCG
125 TABLET ORAL EVERY MORNING
Status: ON HOLD | COMMUNITY

## 2023-11-28 RX ORDER — BUMETANIDE 0.25 MG/ML
1 INJECTION INTRAMUSCULAR; INTRAVENOUS DAILY
Status: DISCONTINUED | OUTPATIENT
Start: 2023-11-29 | End: 2023-11-29

## 2023-11-28 RX ORDER — ENOXAPARIN SODIUM 100 MG/ML
1 INJECTION SUBCUTANEOUS DAILY
Status: DISCONTINUED | OUTPATIENT
Start: 2023-11-29 | End: 2023-12-01

## 2023-11-28 RX ORDER — ASPIRIN 81 MG/1
81 TABLET ORAL DAILY
Status: ON HOLD | COMMUNITY

## 2023-11-28 RX ORDER — ACETAMINOPHEN 325 MG/1
650 TABLET ORAL EVERY 4 HOURS PRN
Status: DISCONTINUED | OUTPATIENT
Start: 2023-11-28 | End: 2023-11-28

## 2023-11-28 RX ADMIN — IPRATROPIUM BROMIDE AND ALBUTEROL SULFATE 1 DOSE: .5; 2.5 SOLUTION RESPIRATORY (INHALATION) at 19:30

## 2023-11-28 RX ADMIN — Medication 1 TABLET: at 00:30

## 2023-11-28 RX ADMIN — BRIMONIDINE TARTRATE 1 DROP: 2 SOLUTION OPHTHALMIC at 08:27

## 2023-11-28 RX ADMIN — APIXABAN 2.5 MG: 5 TABLET, FILM COATED ORAL at 00:29

## 2023-11-28 RX ADMIN — WATER 2000 MG: 1 INJECTION INTRAMUSCULAR; INTRAVENOUS; SUBCUTANEOUS at 18:15

## 2023-11-28 RX ADMIN — POTASSIUM CHLORIDE 20 MEQ: 1500 TABLET, EXTENDED RELEASE ORAL at 08:24

## 2023-11-28 RX ADMIN — DOCUSATE SODIUM 200 MG: 100 CAPSULE, LIQUID FILLED ORAL at 00:30

## 2023-11-28 RX ADMIN — MONTELUKAST SODIUM 10 MG: 10 TABLET ORAL at 00:31

## 2023-11-28 RX ADMIN — BRIMONIDINE TARTRATE 1 DROP: 2 SOLUTION OPHTHALMIC at 00:31

## 2023-11-28 RX ADMIN — IPRATROPIUM BROMIDE AND ALBUTEROL SULFATE 1 DOSE: .5; 2.5 SOLUTION RESPIRATORY (INHALATION) at 13:02

## 2023-11-28 RX ADMIN — IPRATROPIUM BROMIDE AND ALBUTEROL SULFATE 1 DOSE: .5; 2.5 SOLUTION RESPIRATORY (INHALATION) at 06:26

## 2023-11-28 RX ADMIN — IPRATROPIUM BROMIDE AND ALBUTEROL SULFATE 1 DOSE: .5; 2.5 SOLUTION RESPIRATORY (INHALATION) at 15:44

## 2023-11-28 RX ADMIN — MONTELUKAST SODIUM 10 MG: 10 TABLET ORAL at 20:07

## 2023-11-28 RX ADMIN — BISACODYL 5 MG: 5 TABLET, COATED ORAL at 08:25

## 2023-11-28 RX ADMIN — BUMETANIDE 1 MG: 0.25 INJECTION INTRAMUSCULAR; INTRAVENOUS at 05:35

## 2023-11-28 RX ADMIN — TAMSULOSIN HYDROCHLORIDE 0.4 MG: 0.4 CAPSULE ORAL at 08:25

## 2023-11-28 RX ADMIN — APIXABAN 2.5 MG: 5 TABLET, FILM COATED ORAL at 08:24

## 2023-11-28 RX ADMIN — Medication 1 TABLET: at 20:07

## 2023-11-28 RX ADMIN — DIGOXIN 125 MCG: 0.12 TABLET ORAL at 08:24

## 2023-11-28 RX ADMIN — BRIMONIDINE TARTRATE 1 DROP: 2 SOLUTION OPHTHALMIC at 22:42

## 2023-11-28 RX ADMIN — DOCUSATE SODIUM 200 MG: 100 CAPSULE, LIQUID FILLED ORAL at 20:07

## 2023-11-28 NOTE — CONSULTS
INPATIENT CONSULT-[facility] CARDIOLOGY    Name: Ellie Arnett    Age: 80 y.o. Date of Admission: 2023  4:22 PM    Date of Service: 2023    Reason for Consultation: Worsening shortness of breath, pulmonary edema, hypoxia    Referring Physician: Dr. Majo Burr    History of Present Illness: The patient is a 80y.o. year old male who presents to [facility] today after a nonsyncopal fall at home off of his couch. Upon initial evaluation by EMS he was found to be hypoxic and hypotensive and was brought here where he was placed on a nonrebreather mask. A chest x-ray showed a large left pleural effusion with a mid and lower left opacity and a CT of the chest showed a dilated ascending aorta with a moderate size left pleural effusion and loculation but no pneumonia. Not able to give much of a cogent history and laying in bed although no distress. Wife present in the room. Past Medical History:   Hyperlipidemia, paroxysmal atrial fibrillation, stage IIIb chronic kidney disease with GFR 45 cc/min. 2 previous strokes with subsequent blindness. Last echocardiogram from  showed normal left ventricular systolic function, moderate mitral regurgitation, decreased RVEF, moderate to severe pulmonary hypertension. Review of Systems: Unable to assess due to mental status.     Family History:  Family History   Problem Relation Age of Onset    Cancer Mother         stomach    Cancer Father         lung    No Known Problems Sister        Social History:  Social History     Socioeconomic History    Marital status:      Spouse name: Not on file    Number of children: Not on file    Years of education: Not on file    Highest education level: Not on file   Occupational History    Not on file   Tobacco Use    Smoking status: Former     Packs/day: 1     Types: Cigarettes     Start date: 1945     Quit date: 1982     Years since quittin.6    Smokeless tobacco: Never   Vaping Use    Vaping Use: Never mg at 11/28/23 0031    ipratropium 0.5 mg-albuterol 2.5 mg (DUONEB) nebulizer solution 1 Dose  1 Dose Inhalation 4x Daily RT Lupe Stover, DO   1 Dose at 11/27/23 2217    antioxidant multivitamin (OCUVITE) tablet  1 tablet Oral BID Lupe Stover, DO   1 tablet at 11/28/23 0030    potassium chloride (KLOR-CON M) extended release tablet 20 mEq  20 mEq Oral Daily Shola Velázquez F, DO        tamsulosin (FLOMAX) capsule 0.4 mg  0.4 mg Oral Daily Jeaneen Elm F, DO        bumetanide (BUMEX) injection 1 mg  1 mg IntraVENous Q12H Sohla FLANNERY, DO   1 mg at 11/28/23 0535     Current Outpatient Medications   Medication Sig Dispense Refill    bumetanide (BUMEX) 2 MG tablet       bisacodyl (DULCOLAX) 5 MG EC tablet Take 1 tablet by mouth daily      apixaban (ELIQUIS) 2.5 MG TABS tablet Take by mouth 2 times daily      digoxin (LANOXIN) 125 MCG tablet Take 1 tablet by mouth daily 30 tablet 3    ipratropium-albuterol (DUONEB) 0.5-2.5 (3) MG/3ML SOLN nebulizer solution Inhale 3 mLs into the lungs every 4 hours (while awake) 360 mL 0    brimonidine (ALPHAGAN) 0.2 % ophthalmic solution Place 1 drop into both eyes 2 times daily 1 Bottle 3    potassium chloride (KLOR-CON M) 10 MEQ extended release tablet Take 2 tablets by mouth daily as needed If takes lasix      mometasone-formoterol (DULERA) 200-5 MCG/ACT inhaler Inhale 1 puff into the lungs daily      fluticasone (FLOVENT HFA) 110 MCG/ACT inhaler Inhale 1 puff into the lungs daily as needed      glycerin-hypromellose- (ARTIFICIAL TEARS) 0.2-0.2-1 % SOLN opthalmic solution Place 1 drop into both eyes 2 times daily as needed REFRESH PM      Multiple Vitamins-Minerals (PRESERVISION AREDS 2) CAPS Take 1 capsule by mouth 2 times daily      docusate sodium (COLACE) 100 MG capsule Take 2 capsules by mouth nightly      tamsulosin (FLOMAX) 0.4 MG capsule Take 1 capsule by mouth daily      montelukast (SINGULAIR) 10 MG tablet Take 1 tablet by mouth nightly           Physical

## 2023-11-28 NOTE — CONSULTS
Received Seizure Plan of Care to be completed.  Form saved to the R drive.  Encounter routed.   last 72 hours. APTT: No results for input(s): \"APTT\" in the last 72 hours. Cardiac Enzymes:  Lab Results   Component Value Date    CKTOTAL 59 09/20/2017    CKMB 2.2 09/20/2017    TROPONINI 0.01 05/07/2021       Hgb A1C:   Lab Results   Component Value Date    LABA1C 6.1 (H) 03/26/2022     No results found for: \"EAG\"  DIMITRIS: No results found for: \"DIMITRIS\"  ESR:   Lab Results   Component Value Date    SEDRATE 20 (H) 03/25/2022     CRP:   Lab Results   Component Value Date    CRP 0.7 (H) 03/25/2022     D Dimer:   Lab Results   Component Value Date    DDIMER <250 10/13/2010     Folate and B12: No results found for: \"VWYEPHBS54\", No results found for: \"FOLATE\"    Lactic Acid:   Lab Results   Component Value Date    LACTA 2.7 (H) 11/27/2023     Ammonia:   Cortisol:  Thyroid Studies:  Lab Results   Component Value Date    TSH 1.310 03/24/2019    C9OCOQU 4.7 03/24/2019     CT CHEST WO CONTRAST   Final Result   Moderate volume left pleural effusion with possible loculation. Cholelithiasis. Cardiomegaly. Dilated ascending aorta up to 4.6 cm. XR CHEST PORTABLE   Final Result   1. Large left pleural effusion with associated left lung opacity   predominantly involving the mid and lower lung zone. 2. Enlarged cardiac silhouette. 3. ASVD. 4. Some atelectasis/scar in the right lung base, unchanged. ASSESSMENT:  Large Left Loculated Pleural Effusion   Left Multi-Lobar Pneumonia   Hypoxia   Multi-Factorial Dyspnea   Atrial Fibrillation  JUAN J on CKD    PLAN:  Vancomycin, Cefepime, ID consultation   O2, IS  Hold Eliquis, plan for diagnostic tap for patient   Lovenox 1 mg/kg daily while Eliquis held (lovenox to initiate in AM 11/29/2023)    Thank you Darien Lambert, DO very much for allowing me to see this patient in consultation and follow up. Care reviewed with nursing staff, medical and surgical specialty care, primary care and the patient's family as available.  Restraints are ordered when

## 2023-11-28 NOTE — PROGRESS NOTES
Occupational Therapy  OT consult received to eval/treat and chart review complete. Attempted OT evaluation, patient's family present, requesting to allow patient to rest this date after spending the night in the emergency room, requesting OT evaluation tomorrow. OT to re-attempt at a later date/time as able and appropriate.      Rebeca Robertson OTR/L  License Number: [id number]

## 2023-11-28 NOTE — CONSULTS
Palliative Care Department  [phone]  Palliative Care Initial Consult  Provider Tami Ruiz, APRN - DARIUSZ     Kunal Milan  [id number]  Hospital Day: 2  Date of Initial Consult: 23  Referring Provider: Dr. Rebeca Cason was consulted for assistance with: goals of care    HPI:   Kunal Milan is a 80 y. o. with a medical history of [place], blindness, COPD, CVA, CKD, asthma who was admitted on 2023 from home with a CHIEF COMPLAINT of SOB. ED work up significant for: BUN/creatinine 55/2.3, lactic acid 4.7 procalcitonin 13.85, proBNP 88999, albumin 2.6, WBC 35.1, blood cultures pending. AB.44/31.4/71.5/21.2/95.2% on 4 L nasal cannula. CT chest showed moderate volume left pleural effusion with possible loculation, cholelithiasis, cardiomegaly, dilated ascending aorta up to 4.6 m.  UA negative. Patient is a DNR CCA. Palliative medicine consulted for care. ASSESSMENT/PLAN:     Pertinent Hospital Diagnoses     Acute HFpEF  JUAN J on CKD  Lactic acidosis    Palliative Care Encounter / Counseling Regarding Goals of Care  Please see detailed goals of care discussion as below  At this time, Kunal Milan, Does Not have capacity for medical decision-making. Capacity is time limited and situation/question specific  During encounter son, Jones Eckert was surrogate medical decision-maker  Outcome of goals of care meeting:   Sons to discuss CODE STATUS and goals of care further.   Continue current medical care  Code status DNR-CCA  Advanced Directives: POA and living will in [facility]  Surrogate/Legal NOK:  Norberto Valencia (POA/son): [phone]  Jones Eckert (first alternate/son): [phone]    Spiritual assessment: no spiritual distress identified  Bereavement and grief: to be determined  Referrals to: none today  SUBJECTIVE:     Current medical issues leading to Palliative Medicine involvement include   Active Hospital Problems    Diagnosis Date Noted    Acute on chronic combined systolic and diastolic CHF (congestive heart failure)

## 2023-11-28 NOTE — PROGRESS NOTES
4 Eyes Skin Assessment     NAME:  Guanakito Westbrook  YOB: 1929  MEDICAL RECORD NUMBER:  [id number]    The patient is being assessed for  Admission    I agree that at least one RN has performed a thorough Head to Toe Skin Assessment on the patient. ALL assessment sites listed below have been assessed. Areas assessed by both nurses:    Head, Face, Ears, Shoulders, Back, Chest, Arms, Elbows, Hands, Sacrum. Buttock, Coccyx, Ischium, and Legs. Feet and Heels        Does the Patient have a Wound? Yes wound(s) were present on assessment.  LDA wound assessment was Initiated and completed by RN   -heels red but blanchable, boggy  -bilat buttocks red, blanchable  -excoriated/eroded/red penis       Dwayne Prevention initiated by RN: Yes  Wound Care Orders initiated by RN: No    Pressure Injury (Stage 3,4, Unstageable, DTI, NWPT, and Complex wounds) if present, place Wound referral order by RN under : No    New Ostomies, if present place, Ostomy referral order under : No     Nurse 1 eSignature: Electronically signed by Ruben Hargrove RN on 11/28/23 at 2:49 PM EST    **SHARE this note so that the co-signing nurse can place an eSignature**    Nurse 2 eSignature: Electronically signed by Suzi Walker RN on 11/28/23 at 6:54 PM EST

## 2023-11-28 NOTE — CONSULTS
[facility]  [facility]  Consultation Note     Admit Date: 11/27/2023  4:22 PM    Reason for Consult:   Leukocytosis unknown cause    Attending Physician:  Darien Lambert DO    HISTORY OF PRESENT ILLNESS:             The history is obtained from extensive review of available past medical records. The patient is a 80 y.o. male who is previously known to the ID service. The patient presented to the ED at [facility] on 11/27/2023 after a fall off the couch. He was short of breath and EMS reported hypotension. On presentation vital signs were normal.  Later on he had an episode of hypotension and tachycardia. Rapid SARS-CoV-2, rapid influenza and respiratory panel were negative. White count was 35.1. Creatinine was 2.3 lactic acid was 2.7. Procalcitonin was 13.85. CPK was not done. CT of the chest showed a large left loculated pleural effusion. He received a dose of Vancomycin and Cefepime. Past Medical History:        Diagnosis Date    Asthma     COPD (chronic obstructive pulmonary disease) ([address])     Unspecified cerebral artery occlusion with cerebral infarction      March 2022. Admitted to [facility] with chronic ulcers in the right foot. Treated with Clindamycin, followed by Vancomycin and Zosyn. Seen by ID for chronic osteomyelitis of the right second toe. Antibiotics were changed to Doxycycline and Cefazolin. He underwent amputation of the right second toe and was discharged off antibiotics.     Past Surgical History:        Procedure Laterality Date    ABDOMINAL SURGERY      multiple; intestinal abscess; hernia repair;     APPENDECTOMY      ECHO COMPL W DOP COLOR FLOW  3/16/2013         HERNIA REPAIR      JOINT REPLACEMENT  6-11-12    TOE AMPUTATION Right 3/29/2022    RIGHT FOOT SECOND DIGIT AMPUTATION performed by Hussain Adam DPM at [facility] OR     Current Medications:   Scheduled Meds:   apixaban  2.5 mg Oral BID    bisacodyl  5 mg Oral Daily    brimonidine

## 2023-11-29 LAB
ALBUMIN SERPL-MCNC: 2.2 G/DL (ref 3.5–5.2)
ALP SERPL-CCNC: 152 U/L (ref 40–129)
ALT SERPL-CCNC: 20 U/L (ref 0–40)
ANION GAP SERPL CALCULATED.3IONS-SCNC: 15 MMOL/L (ref 7–16)
AST SERPL-CCNC: 20 U/L (ref 0–39)
BASOPHILS # BLD: 0 K/UL (ref 0–0.2)
BASOPHILS NFR BLD: 0 % (ref 0–2)
BILIRUB SERPL-MCNC: 0.4 MG/DL (ref 0–1.2)
BUN SERPL-MCNC: 62 MG/DL (ref 6–23)
CALCIUM SERPL-MCNC: 8.9 MG/DL (ref 8.6–10.2)
CHLORIDE SERPL-SCNC: 104 MMOL/L (ref 98–107)
CO2 SERPL-SCNC: 21 MMOL/L (ref 22–29)
CREAT SERPL-MCNC: 2.1 MG/DL (ref 0.7–1.2)
EOSINOPHIL # BLD: 0 K/UL (ref 0.05–0.5)
EOSINOPHILS RELATIVE PERCENT: 0 % (ref 0–6)
ERYTHROCYTE [DISTWIDTH] IN BLOOD BY AUTOMATED COUNT: 16.2 % (ref 11.5–15)
GFR SERPL CREATININE-BSD FRML MDRD: 28 ML/MIN/1.73M2
GLUCOSE SERPL-MCNC: 174 MG/DL (ref 74–99)
HCT VFR BLD AUTO: 35.7 % (ref 37–54)
HGB BLD-MCNC: 12.1 G/DL (ref 12.5–16.5)
L PNEUMO1 AG UR QL IA.RAPID: NEGATIVE
LYMPHOCYTES NFR BLD: 0.61 K/UL (ref 1.5–4)
LYMPHOCYTES RELATIVE PERCENT: 2 % (ref 20–42)
MCH RBC QN AUTO: 30.5 PG (ref 26–35)
MCHC RBC AUTO-ENTMCNC: 33.9 G/DL (ref 32–34.5)
MCV RBC AUTO: 89.9 FL (ref 80–99.9)
MONOCYTES NFR BLD: 0.61 K/UL (ref 0.1–0.95)
MONOCYTES NFR BLD: 2 % (ref 2–12)
NEUTROPHILS NFR BLD: 97 % (ref 43–80)
NEUTS SEG NFR BLD: 33.69 K/UL (ref 1.8–7.3)
PLATELET # BLD AUTO: 427 K/UL (ref 130–450)
PMV BLD AUTO: 9.3 FL (ref 7–12)
POTASSIUM SERPL-SCNC: 3.7 MMOL/L (ref 3.5–5)
PROT SERPL-MCNC: 6 G/DL (ref 6.4–8.3)
RBC # BLD AUTO: 3.97 M/UL (ref 3.8–5.8)
RBC # BLD: ABNORMAL 10*6/UL
S PNEUM AG SPEC QL: NEGATIVE
SODIUM SERPL-SCNC: 140 MMOL/L (ref 132–146)
SPECIMEN SOURCE: NORMAL
WBC # BLD: ABNORMAL 10*3/UL
WBC OTHER # BLD: 34.9 K/UL (ref 4.5–11.5)

## 2023-11-29 PROCEDURE — 2500000003 HC RX 250 WO HCPCS: Performed by: INTERNAL MEDICINE

## 2023-11-29 PROCEDURE — 99231 SBSQ HOSP IP/OBS SF/LOW 25: CPT | Performed by: NURSE PRACTITIONER

## 2023-11-29 PROCEDURE — 6360000002 HC RX W HCPCS: Performed by: SPECIALIST

## 2023-11-29 PROCEDURE — 6370000000 HC RX 637 (ALT 250 FOR IP): Performed by: INTERNAL MEDICINE

## 2023-11-29 PROCEDURE — 2700000000 HC OXYGEN THERAPY PER DAY

## 2023-11-29 PROCEDURE — 36415 COLL VENOUS BLD VENIPUNCTURE: CPT

## 2023-11-29 PROCEDURE — 97161 PT EVAL LOW COMPLEX 20 MIN: CPT

## 2023-11-29 PROCEDURE — 80053 COMPREHEN METABOLIC PANEL: CPT

## 2023-11-29 PROCEDURE — 2580000003 HC RX 258: Performed by: SPECIALIST

## 2023-11-29 PROCEDURE — 6360000002 HC RX W HCPCS: Performed by: INTERNAL MEDICINE

## 2023-11-29 PROCEDURE — 85025 COMPLETE CBC W/AUTO DIFF WBC: CPT

## 2023-11-29 PROCEDURE — 94640 AIRWAY INHALATION TREATMENT: CPT

## 2023-11-29 PROCEDURE — 97165 OT EVAL LOW COMPLEX 30 MIN: CPT

## 2023-11-29 PROCEDURE — 2060000000 HC ICU INTERMEDIATE R&B

## 2023-11-29 RX ORDER — METOPROLOL SUCCINATE 25 MG/1
25 TABLET, EXTENDED RELEASE ORAL DAILY
Status: DISCONTINUED | OUTPATIENT
Start: 2023-11-29 | End: 2023-11-30

## 2023-11-29 RX ORDER — BUMETANIDE 1 MG/1
1 TABLET ORAL DAILY
Status: DISCONTINUED | OUTPATIENT
Start: 2023-11-30 | End: 2023-12-02 | Stop reason: HOSPADM

## 2023-11-29 RX ADMIN — IPRATROPIUM BROMIDE AND ALBUTEROL SULFATE 1 DOSE: .5; 2.5 SOLUTION RESPIRATORY (INHALATION) at 08:19

## 2023-11-29 RX ADMIN — BRIMONIDINE TARTRATE 1 DROP: 2 SOLUTION OPHTHALMIC at 20:33

## 2023-11-29 RX ADMIN — DOCUSATE SODIUM 200 MG: 100 CAPSULE, LIQUID FILLED ORAL at 20:33

## 2023-11-29 RX ADMIN — Medication 1 TABLET: at 20:33

## 2023-11-29 RX ADMIN — BUMETANIDE 1 MG: 0.25 INJECTION INTRAMUSCULAR; INTRAVENOUS at 09:11

## 2023-11-29 RX ADMIN — IPRATROPIUM BROMIDE AND ALBUTEROL SULFATE 1 DOSE: .5; 2.5 SOLUTION RESPIRATORY (INHALATION) at 12:59

## 2023-11-29 RX ADMIN — ENOXAPARIN SODIUM 70 MG: 100 INJECTION SUBCUTANEOUS at 09:12

## 2023-11-29 RX ADMIN — IPRATROPIUM BROMIDE AND ALBUTEROL SULFATE 1 DOSE: .5; 2.5 SOLUTION RESPIRATORY (INHALATION) at 16:17

## 2023-11-29 RX ADMIN — METOPROLOL SUCCINATE 25 MG: 25 TABLET, EXTENDED RELEASE ORAL at 11:44

## 2023-11-29 RX ADMIN — MONTELUKAST SODIUM 10 MG: 10 TABLET ORAL at 20:33

## 2023-11-29 RX ADMIN — DIGOXIN 125 MCG: 0.12 TABLET ORAL at 09:12

## 2023-11-29 RX ADMIN — BISACODYL 5 MG: 5 TABLET, COATED ORAL at 09:12

## 2023-11-29 RX ADMIN — TAMSULOSIN HYDROCHLORIDE 0.4 MG: 0.4 CAPSULE ORAL at 09:11

## 2023-11-29 RX ADMIN — POTASSIUM CHLORIDE 20 MEQ: 1500 TABLET, EXTENDED RELEASE ORAL at 09:12

## 2023-11-29 RX ADMIN — Medication 1 TABLET: at 09:12

## 2023-11-29 RX ADMIN — BRIMONIDINE TARTRATE 1 DROP: 2 SOLUTION OPHTHALMIC at 09:11

## 2023-11-29 RX ADMIN — WATER 2000 MG: 1 INJECTION INTRAMUSCULAR; INTRAVENOUS; SUBCUTANEOUS at 16:46

## 2023-11-29 NOTE — CONSULTS
[address] [facility]   Inpatient CHF Nurse Navigator Consult      Cardiologist: Dr Roopa Lazo is a 80 y.o. (2/17/1929) male with a history of HFpEF, most recent EF:  Lab Results   Component Value Date    LVEF 60 07/11/2021       Patient was awake and alert, laying in bed during the consultation. His son was in the room and is agreeable to heart failure education. He was engaged and asked appropriate questions throughout the education session. Martha Solano is going to rehab after discharge as he is very weak right now. Barriers identified during consult contributing to HF Hospitalization:  [] Limited medication adherence   [] Poor health literacy, education regarding HF medications provided   [] Pill box provided to patient  [] Difficulty affording medications  [] Prescription assistance information given     [] Not weighing themselves daily  [x] Weight log provided for easy monitoring  [] Scale provided     [] Not following low sodium diet  [] Food insecurity   [x] 2 gram sodium diet education provided   [] Low sodium recipes provided  [] Sodium free seasoning provided   [] Low sodium meal delivery options given to patient  [] Dietician consulted     [] Lack of transportation to appointments     [] Depression, given chronic illness  [] Primary team notified     [] Goals of care need addressed  [] Palliative care consulted     [] CHF CHW consulted, to assist with     Chart Reviewed:  Diet: ADULT DIET;  Regular   Daily Weights: Patient Vitals for the past 96 hrs (Last 3 readings):   Weight   11/29/23 0018 72.8 kg (160 lb 7.9 oz)   11/27/23 1634 68 kg (150 lb)     I/O:   Intake/Output Summary (Last 24 hours) at 11/29/2023 1342  Last data filed at 11/29/2023 1207  Gross per 24 hour   Intake 360 ml   Output 1350 ml   Net -990 ml       [] Nursing staff/manager notified of inaccurate ramos weights or I/O    Discharge Plan:  Above identified barriers reviewed and needs

## 2023-11-29 NOTE — PROGRESS NOTES
Palliative Care Department  [phone]  Palliative Care Progress Note  Provider Vamsi Parsons, APRN - CNP     Christopher Benson  [id number]  Hospital Day: 3  Date of Initial Consult: 23  Referring Provider: Dr. Mary Lane was consulted for assistance with: Goals of Care    HPI:   Christopher Benson is a 80 y. o. with a medical history of [place], blindness, COPD, CVA, CKD, asthma who was admitted on 2023 from home with a CHIEF COMPLAINT of SOB. ED work up significant for: BUN/creatinine 55/2.3, lactic acid 4.7 procalcitonin 13.85, proBNP 83030, albumin 2.6, WBC 35.1, blood cultures pending. AB.44/31.4/71.5/21.2/95.2% on 4 L nasal cannula. CT chest showed moderate volume left pleural effusion with possible loculation, cholelithiasis, cardiomegaly, dilated ascending aorta up to 4.6 m.  UA negative. Patient is a DNR CCA. Palliative medicine consulted for care. ASSESSMENT/PLAN:     Pertinent Hospital Diagnoses     Acute HFpEF  JUAN J on CKD  Lactic acidosis    Palliative Care Encounter / Counseling Regarding Goals of Care  Please see detailed goals of care discussion as below  At this time, Christopher Benson, Does Not have capacity for medical decision-making.   Capacity is time limited and situation/question specific  During encounter sonGabrielle was surrogate medical decision-maker  Outcome of goals of care meeting:   Continue current management  Code status DNR-CCA  Advanced Directives: POA and living will in [facility]  Surrogate/Legal NOK:  Jeffrey Marquez (POA/son): [phone]  Gabrielle Wasserman (first alternate/son): [phone]    Spiritual assessment: no spiritual distress identified  Bereavement and grief: to be determined  Referrals to: none today  SUBJECTIVE:     Current medical issues leading to Palliative Medicine involvement include   Active Hospital Problems    Diagnosis Date Noted    Acute on chronic combined systolic and diastolic CHF (congestive heart failure) ([address]) [I50.43] 2023       Details of Conversation:

## 2023-11-29 NOTE — CARE COORDINATION
Transition of Care-Met with patient and his wife at the bedside, introduced myself and CM role in discharge planning. Patient was very sleepy so assessment was deferred to wife. Patient and wife live in a one story one, patient uses a walker to ambulate. He has been to [facility], [facility] and most recently [facility]. PT score was 12/24, discussed need for skilled nursing rehab, wife agreed, prefers [facility]. Call to son Gabrielle Wasserman, his wife answered, she confirmed plan for [facility], referral made, asked to submit pre-cert if they can accept. Patient is currently requiring oxygen-this is new. Cardiology following.     Elzbieta URBANN, RN  [facility]

## 2023-11-29 NOTE — PROGRESS NOTES
Occupational Therapy    OCCUPATIONAL THERAPY INITIAL EVALUATION    STEVEN Lozoya [address]   [address] [place] [address]         UPMT:                                                  Patient Name: Dariel Mccarthy    MRN: [id number]    : 1929    Room: [address] [place] [address]      Evaluating OT: Rosaura Graham OTR/L   [id number]      Referring Risa Vargas DO    Specific Provider Orders/Date:OT eval and treat 2023      Diagnosis:  Acute on chronic renal insufficiency [N28.9, N18.9]  Pleural effusion, left [J90]  Goals of care, counseling/discussion [Z71.89]  Acute on chronic combined systolic and diastolic CHF (congestive heart failure) ([facility]) [I50.43]  Acute on chronic congestive heart failure, unspecified heart failure type ([facility]) [I50.9]     Pertinent Medical History: asthma, COPD, CVA,      Precautions:  Fall Risk, Native, limited vision      Assessment of current deficits    [x] Functional mobility  [x]ADLs  [x] Strength               [x]Cognition    [x] Functional transfers   [x] IADLs         [x] Safety Awareness   [x]Endurance    [] Fine Coordination              [x] Balance      [] Vision/perception   []Sensation     []Gross Motor Coordination  [] ROM  [] Delirium                   [] Motor Control     OT PLAN OF CARE   OT POC based on physician orders, patient diagnosis and results of clinical assessment    Frequency/Duration  2-3 days/wk for 2 weeks PRN   Specific OT Treatment Interventions to include:   ADL retraining/adapted techniques and AE recommendations to increase functional independence within precautions                    Energy conservation techniques to improve tolerance for selfcare routine   Functional transfer/mobility training/DME recommendations for increased independence, safety and fall prevention         Patient/family education to increase safety and functional independence             Environmental modifications for safe mobility

## 2023-11-29 NOTE — PROGRESS NOTES
Physical Therapy  Facility/Department: [facility] MED SURG  Physical Therapy Initial Assessment    Name: Guanakito Westbrook  : 1929  MRN: [id number]  Date of Service: 2023          Patient Diagnosis(es): The primary encounter diagnosis was Acute on chronic congestive heart failure, unspecified heart failure type ([address] Diagnoses of Pleural effusion, left, Acute on chronic renal insufficiency, and Goals of care, counseling/discussion were also pertinent to this visit. Past Medical History:  has a past medical history of Asthma, COPD (chronic obstructive pulmonary disease) ([address]), and Unspecified cerebral artery occlusion with cerebral infarction. 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 Requires PT Follow-Up: Yes       Evaluating Therapist: Pushpa Bains PT     Referring Provider:  Kait Mathias DO    PT order : PT eval and treat     Room #: 1  DIAGNOSIS: The primary encounter diagnosis was Acute on chronic congestive heart failure, unspecified heart failure type ([address] Diagnoses of Pleural effusion, left, Acute on chronic renal insufficiency, and Goals of care, counseling/discussion were also pertinent to this visit. Slipped off  couch     PRECAUTIONS: falls, O2 , [place] , decreased vision     Social:  Pt lives with  wife  in a  1 floor plan   Prior to admission pt walked with ww   Info per pt      Initial Evaluation  Date:  2023 Treatment      Short Term/ Long Term   Goals   Was pt agreeable to Eval/treatment? Yes      Does pt have pain?   None reported      Bed Mobility  Rolling:  min assist   Supine to sit:  mod assist   Sit to supine:  mod assist   Scooting:  max assist in supine    Min assist     Transfers Sit to stand:  mod assist   Stand to sit: mod assist   Stand pivot:  NT    Min assist    Ambulation     3 feet

## 2023-11-29 NOTE — DISCHARGE INSTRUCTIONS
HEART FAILURE  / CONGESTIVE HEART FAILURE  DISCHARGE INSTRUCTIONS:  GUIDELINES TO FOLLOW AT HOME    Self- Managed Care:     MEDICATIONS:  Take your medication as directed. If you are experiencing any side effects, inform your doctor, Do not stop taking any of your medications without letting your doctor know. Check with your doctor before taking any over-the-counter medications / herbal / or dietary supplements. They may interfere with your other medications. Do not take ibuprofen (Advil or Motrin) and naproxen (Aleve) without talking to your doctor first. They could make your heart failure worse. WEIGHT MONITORING:   Weigh yourself everyday (with the same scale) around the same time of the day and write it down. (you can chart them on a calendar or keep track of them on paper. Notify your doctor of a weight gain of 3 pounds or more in 1 day   OR a total of 5 pounds or more in 1 week    Take your weight record to your doctor visits  Also, the same goes if you loose more than 3# in one day, let your heart doctor know. DIET:   Cardiac heart healthy diet- Low saturated / low trans fat, no added salt, caffeine restricted, Low sodium diet-   No more than 2,000mg (2 grams) of salt / sodium per day (which equals to a little less than  a teaspoon of salt)  If your doctor wants you on a fluid restriction. ..it is usually recommended a fluid limit of 2,000cc -  Fluid restriction- 2,000 ml (milliliters) = 64 ounces = you can have 8 glasses of fluid per day (each glass 8 ounces)    Follow a low salt diet - avoid using salt at the table, avoid / limit use of canned soups, processed / packaged foods, salted snacks, olives and pickles. Do not use a salt substitute without checking with your doctor, they may contain a high amount of potassioum. (Mrs. Felice Schafer is safe to use).     Limit the use of alcohol       CALL YOUR DOCTOR THE FIRST DAY YOU NOTICE ANY OF THESE   SYMPTOMS:  You have a

## 2023-11-30 ENCOUNTER — APPOINTMENT (OUTPATIENT)
Age: 88
DRG: 871 | End: 2023-11-30
Attending: INTERNAL MEDICINE
Payer: MEDICARE

## 2023-11-30 ENCOUNTER — APPOINTMENT (OUTPATIENT)
Dept: ULTRASOUND IMAGING | Age: 88
DRG: 871 | End: 2023-11-30
Payer: MEDICARE

## 2023-11-30 ENCOUNTER — APPOINTMENT (OUTPATIENT)
Dept: GENERAL RADIOLOGY | Age: 88
DRG: 871 | End: 2023-11-30
Payer: MEDICARE

## 2023-11-30 LAB
ALBUMIN SERPL-MCNC: 2 G/DL (ref 3.5–5.2)
ALP SERPL-CCNC: 218 U/L (ref 40–129)
ALT SERPL-CCNC: 57 U/L (ref 0–40)
ANION GAP SERPL CALCULATED.3IONS-SCNC: 12 MMOL/L (ref 7–16)
APPEARANCE FLD: NORMAL
AST SERPL-CCNC: 124 U/L (ref 0–39)
BASOPHILS # BLD: 0 K/UL (ref 0–0.2)
BASOPHILS NFR BLD: 0 % (ref 0–2)
BILIRUB SERPL-MCNC: 0.4 MG/DL (ref 0–1.2)
BODY FLD TYPE: NORMAL
BUN SERPL-MCNC: 62 MG/DL (ref 6–23)
CALCIUM SERPL-MCNC: 9.2 MG/DL (ref 8.6–10.2)
CHLORIDE SERPL-SCNC: 107 MMOL/L (ref 98–107)
CLOT CHECK: NORMAL
CO2 SERPL-SCNC: 21 MMOL/L (ref 22–29)
COLOR FLD: YELLOW
CREAT SERPL-MCNC: 1.9 MG/DL (ref 0.7–1.2)
EOSINOPHIL # BLD: 0 K/UL (ref 0.05–0.5)
EOSINOPHILS RELATIVE PERCENT: 0 % (ref 0–6)
ERYTHROCYTE [DISTWIDTH] IN BLOOD BY AUTOMATED COUNT: 16.3 % (ref 11.5–15)
GFR SERPL CREATININE-BSD FRML MDRD: 32 ML/MIN/1.73M2
GLUCOSE SERPL-MCNC: 152 MG/DL (ref 74–99)
HCT VFR BLD AUTO: 35.6 % (ref 37–54)
HGB BLD-MCNC: 11.9 G/DL (ref 12.5–16.5)
LDH FLD L TO P-CCNC: 2449 U/L
LDH SERPL-CCNC: 827 U/L (ref 135–225)
LYMPHOCYTES NFR BLD: 1.7 K/UL (ref 1.5–4)
LYMPHOCYTES RELATIVE PERCENT: 5 % (ref 20–42)
MCH RBC QN AUTO: 30.3 PG (ref 26–35)
MCHC RBC AUTO-ENTMCNC: 33.4 G/DL (ref 32–34.5)
MCV RBC AUTO: 90.6 FL (ref 80–99.9)
MICROORGANISM SPEC CULT: ABNORMAL
MONOCYTES NFR BLD: 1.7 K/UL (ref 0.1–0.95)
MONOCYTES NFR BLD: 5 % (ref 2–12)
MONOCYTES NFR FLD: 25 %
NEUTROPHILS NFR BLD: 90 % (ref 43–80)
NEUTROPHILS NFR FLD: 75 %
NEUTS SEG NFR BLD: 29.11 K/UL (ref 1.8–7.3)
PLATELET # BLD AUTO: 373 K/UL (ref 130–450)
PMV BLD AUTO: 9.5 FL (ref 7–12)
POTASSIUM SERPL-SCNC: 4.1 MMOL/L (ref 3.5–5)
PROT FLD-MCNC: 3.5 G/DL
PROT SERPL-MCNC: 6.3 G/DL (ref 6.4–8.3)
RBC # BLD AUTO: 3.93 M/UL (ref 3.8–5.8)
RBC # BLD: ABNORMAL 10*6/UL
RBC # FLD: 2000 CELLS/UL
SODIUM SERPL-SCNC: 140 MMOL/L (ref 132–146)
SPECIMEN DESCRIPTION: ABNORMAL
SPECIMEN TYPE: NORMAL
SPECIMEN TYPE: NORMAL
WBC # FLD: 359 CELLS/UL
WBC OTHER # BLD: 32.5 K/UL (ref 4.5–11.5)

## 2023-11-30 PROCEDURE — 87205 SMEAR GRAM STAIN: CPT

## 2023-11-30 PROCEDURE — 80053 COMPREHEN METABOLIC PANEL: CPT

## 2023-11-30 PROCEDURE — 32555 ASPIRATE PLEURA W/ IMAGING: CPT

## 2023-11-30 PROCEDURE — 84157 ASSAY OF PROTEIN OTHER: CPT

## 2023-11-30 PROCEDURE — 92526 ORAL FUNCTION THERAPY: CPT

## 2023-11-30 PROCEDURE — 94640 AIRWAY INHALATION TREATMENT: CPT

## 2023-11-30 PROCEDURE — 71045 X-RAY EXAM CHEST 1 VIEW: CPT

## 2023-11-30 PROCEDURE — 6360000002 HC RX W HCPCS: Performed by: SPECIALIST

## 2023-11-30 PROCEDURE — 83986 ASSAY PH BODY FLUID NOS: CPT

## 2023-11-30 PROCEDURE — 2580000003 HC RX 258: Performed by: SPECIALIST

## 2023-11-30 PROCEDURE — 6370000000 HC RX 637 (ALT 250 FOR IP): Performed by: INTERNAL MEDICINE

## 2023-11-30 PROCEDURE — 97530 THERAPEUTIC ACTIVITIES: CPT

## 2023-11-30 PROCEDURE — 97535 SELF CARE MNGMENT TRAINING: CPT

## 2023-11-30 PROCEDURE — 92610 EVALUATE SWALLOWING FUNCTION: CPT

## 2023-11-30 PROCEDURE — 2700000000 HC OXYGEN THERAPY PER DAY

## 2023-11-30 PROCEDURE — 85025 COMPLETE CBC W/AUTO DIFF WBC: CPT

## 2023-11-30 PROCEDURE — 83615 LACTATE (LD) (LDH) ENZYME: CPT

## 2023-11-30 PROCEDURE — 0W9B3ZZ DRAINAGE OF LEFT PLEURAL CAVITY, PERCUTANEOUS APPROACH: ICD-10-PCS | Performed by: INTERNAL MEDICINE

## 2023-11-30 PROCEDURE — 88305 TISSUE EXAM BY PATHOLOGIST: CPT

## 2023-11-30 PROCEDURE — 93306 TTE W/DOPPLER COMPLETE: CPT

## 2023-11-30 PROCEDURE — 89051 BODY FLUID CELL COUNT: CPT

## 2023-11-30 PROCEDURE — 88112 CYTOPATH CELL ENHANCE TECH: CPT

## 2023-11-30 PROCEDURE — 87070 CULTURE OTHR SPECIMN AEROBIC: CPT

## 2023-11-30 PROCEDURE — 2060000000 HC ICU INTERMEDIATE R&B

## 2023-11-30 RX ORDER — METOPROLOL SUCCINATE 25 MG/1
25 TABLET, EXTENDED RELEASE ORAL 2 TIMES DAILY
Status: DISCONTINUED | OUTPATIENT
Start: 2023-11-30 | End: 2023-11-30

## 2023-11-30 RX ORDER — ACETYLCYSTEINE 100 MG/ML
600 SOLUTION ORAL; RESPIRATORY (INHALATION) EVERY 4 HOURS
Status: DISCONTINUED | OUTPATIENT
Start: 2023-11-30 | End: 2023-11-30

## 2023-11-30 RX ORDER — LINEZOLID 600 MG/1
600 TABLET, FILM COATED ORAL EVERY 12 HOURS SCHEDULED
Status: DISCONTINUED | OUTPATIENT
Start: 2023-11-30 | End: 2023-11-30

## 2023-11-30 RX ORDER — ALBUTEROL SULFATE 2.5 MG/3ML
2.5 SOLUTION RESPIRATORY (INHALATION)
Status: DISCONTINUED | OUTPATIENT
Start: 2023-11-30 | End: 2023-11-30

## 2023-11-30 RX ORDER — METOPROLOL SUCCINATE 25 MG/1
25 TABLET, EXTENDED RELEASE ORAL 2 TIMES DAILY
Status: DISCONTINUED | OUTPATIENT
Start: 2023-11-30 | End: 2023-12-02 | Stop reason: HOSPADM

## 2023-11-30 RX ADMIN — TAMSULOSIN HYDROCHLORIDE 0.4 MG: 0.4 CAPSULE ORAL at 12:11

## 2023-11-30 RX ADMIN — WATER 2000 MG: 1 INJECTION INTRAMUSCULAR; INTRAVENOUS; SUBCUTANEOUS at 17:46

## 2023-11-30 RX ADMIN — DIGOXIN 125 MCG: 0.12 TABLET ORAL at 12:11

## 2023-11-30 RX ADMIN — IPRATROPIUM BROMIDE AND ALBUTEROL SULFATE 1 DOSE: .5; 2.5 SOLUTION RESPIRATORY (INHALATION) at 13:01

## 2023-11-30 RX ADMIN — BISACODYL 5 MG: 5 TABLET, COATED ORAL at 12:12

## 2023-11-30 RX ADMIN — IPRATROPIUM BROMIDE AND ALBUTEROL SULFATE 1 DOSE: .5; 2.5 SOLUTION RESPIRATORY (INHALATION) at 15:51

## 2023-11-30 RX ADMIN — IPRATROPIUM BROMIDE AND ALBUTEROL SULFATE 1 DOSE: .5; 2.5 SOLUTION RESPIRATORY (INHALATION) at 08:52

## 2023-11-30 RX ADMIN — POTASSIUM CHLORIDE 20 MEQ: 1500 TABLET, EXTENDED RELEASE ORAL at 12:11

## 2023-11-30 RX ADMIN — METOPROLOL SUCCINATE 25 MG: 25 TABLET, EXTENDED RELEASE ORAL at 12:16

## 2023-11-30 RX ADMIN — BUMETANIDE 1 MG: 1 TABLET ORAL at 12:11

## 2023-11-30 RX ADMIN — BRIMONIDINE TARTRATE 1 DROP: 2 SOLUTION OPHTHALMIC at 12:12

## 2023-11-30 RX ADMIN — Medication 1 TABLET: at 12:11

## 2023-11-30 NOTE — PROGRESS NOTES
Occupational Therapy  OT BEDSIDE TREATMENT NOTE      Date:2023  Patient Name: Christopher Benson  MRN: [id number]  : 1929  Room: [address] [place] [address]     Evaluating OT: Joan Krishnamurthy OTR/L   [id number]       Referring Lili Zapien DO    Specific Provider Orders/Date:OT eval and treat 2023       Diagnosis:  Acute on chronic renal insufficiency [N28.9, N18.9]  Pleural effusion, left [J90]  Goals of care, counseling/discussion [Z71.89]  Acute on chronic combined systolic and diastolic CHF (congestive heart failure) ([facility]) [I50.43]  Acute on chronic congestive heart failure, unspecified heart failure type ([address]) [I50.9]     Pertinent Medical History: asthma, COPD, CVA,      Precautions:  Fall Risk, O2       Assessment of current deficits    [x] Functional mobility            [x]ADLs           [x] Strength                  [x]Cognition    [x] Functional transfers          [x] IADLs         [x] Safety Awareness   [x]Endurance    [] Fine Coordination                         [x] Balance      [] Vision/perception   []Sensation      []Gross Motor Coordination             [] ROM           [] Delirium                   [] Motor Control      OT PLAN OF CARE   OT POC based on physician orders, patient diagnosis and results of clinical assessment     Frequency/Duration  2-3 days/wk for 2 weeks PRN   Specific OT Treatment Interventions to include:   ADL retraining/adapted techniques and AE recommendations to increase functional independence within precautions                    Energy conservation techniques to improve tolerance for selfcare routine   Functional transfer/mobility training/DME recommendations for increased independence, safety and fall prevention         Patient/family education to increase safety and functional independence             Environmental modifications for safe mobility and completion of ADLs                             Therapeutic activity to improve functional performance during ADLs. Therapeutic exercise to improve tolerance and functional strength for ADLs    Balance retraining/tolerance tasks for facilitation of postural control with dynamic challenges during ADLs . Positioning to improve functional independence        Recommended Adaptive Equipment: TBD      Home Living: Pt lives with wife, 1 story     Equipment owned: walker      Prior Level of Function: assist  with ADLs , and  with IADLs; ambulated with walker      Pain Level: Pt did not report pain. Cognition: Pt lethargic. Limited participation in movement. Limited verbalizations. Functional Assessment:  AM-PAC Daily Activity Raw Score: 11/24    Initial Eval Status  Date: 11/29/2023 Treatment Status  Date:11/30/23  STGs = LTGs  Time frame: 10-14 days   Feeding Min A    Supervision    Grooming Mod A ,seated  Max A   Min A    UB Dressing Mod A  Max A to change gown. Min A    LB Dressing Max A/dependent  Max A   Mod A    Bathing Max A    Mod A    Toileting Max A Dependent   Mod A    Bed Mobility  Mod A  Supine <> sit  Max A supine to sit   Dependent sit to supine. Max A rolling for positioning. Min/CGA   Functional Transfers Mod A,   Sit-stand from bed  Not completed due to lethargy and poor sit balance. Min/CGA   Functional Mobility Mod A, w/walker   Side steps to [facility]   Min/CGA with good tolerance    Balance Sitting:     Static:  Mod A- lateral lean - progressed to [facility]     Dynamic:Mod A   Standing: Mod A  Mod A sit balance on the EOB. SBA- sitting   Min/CGA- standing    Activity Tolerance No SOB observed  Poor   Fair + with ADL activity      Comments:  Pt okay for therapy per nursing however not to sit in the chair. Wife present during this session and attempting to help pt. Pt was agreeable to therapy however limited active participation in activity. Poor sit tolerance noted. Increased posterior lean with fatigue when sitting on the EOB.  Returned to bed and

## 2023-11-30 NOTE — PLAN OF CARE
Problem: ABCDS Injury Assessment  Goal: Absence of physical injury  11/29/2023 2218 by Live Baptiste RN  Outcome: Progressing  11/29/2023 1251 by Magdalena Akbar RN  Outcome: Progressing     Problem: Discharge Planning  Goal: Discharge to home or other facility with appropriate resources  11/29/2023 2218 by Live Baptiste RN  Outcome: Progressing  11/29/2023 1251 by Magdalena Akbar RN  Outcome: Progressing  Flowsheets (Taken 11/29/2023 0800)  Discharge to home or other facility with appropriate resources:   Identify barriers to discharge with patient and caregiver   Arrange for needed discharge resources and transportation as appropriate   Identify discharge learning needs (meds, wound care, etc)   Arrange for interpreters to assist at discharge as needed   Refer to discharge planning if patient needs post-hospital services based on physician order or complex needs related to functional status, cognitive ability or social support system     Problem: Safety - Adult  Goal: Free from fall injury  11/29/2023 2218 by Live Baptiste RN  Outcome: Progressing  11/29/2023 1251 by Magdalena Akbar RN  Outcome: Progressing     Problem: Chronic Conditions and Co-morbidities  Goal: Patient's chronic conditions and co-morbidity symptoms are monitored and maintained or improved  11/29/2023 2218 by Live Baptiste RN  Outcome: Progressing  11/29/2023 1251 by Magdalena Akbar RN  Outcome: Progressing  Flowsheets (Taken 11/29/2023 0800)  Care Plan - Patient's Chronic Conditions and Co-Morbidity Symptoms are Monitored and Maintained or Improved:   Monitor and assess patient's chronic conditions and comorbid symptoms for stability, deterioration, or improvement   Collaborate with multidisciplinary team to address chronic and comorbid conditions and prevent exacerbation or deterioration   Update acute care plan with appropriate goals if chronic or comorbid symptoms are exacerbated and prevent overall improvement and discharge
Problem: ABCDS Injury Assessment  Goal: Absence of physical injury  Outcome: Progressing     Problem: Discharge Planning  Goal: Discharge to home or other facility with appropriate resources  Outcome: Progressing  Flowsheets (Taken 11/28/2023 1100 by Fanny Jasso RN)  Discharge to home or other facility with appropriate resources:   Identify barriers to discharge with patient and caregiver   Arrange for needed discharge resources and transportation as appropriate   Identify discharge learning needs (meds, wound care, etc)   Arrange for interpreters to assist at discharge as needed   Refer to discharge planning if patient needs post-hospital services based on physician order or complex needs related to functional status, cognitive ability or social support system     Problem: Safety - Adult  Goal: Free from fall injury  Outcome: Progressing     Problem: Chronic Conditions and Co-morbidities  Goal: Patient's chronic conditions and co-morbidity symptoms are monitored and maintained or improved  Outcome: Progressing  Flowsheets (Taken 11/28/2023 1100 by Fanny Jasso RN)  Care Plan - Patient's Chronic Conditions and Co-Morbidity Symptoms are Monitored and Maintained or Improved:   Monitor and assess patient's chronic conditions and comorbid symptoms for stability, deterioration, or improvement   Collaborate with multidisciplinary team to address chronic and comorbid conditions and prevent exacerbation or deterioration   Update acute care plan with appropriate goals if chronic or comorbid symptoms are exacerbated and prevent overall improvement and discharge     Problem: Skin/Tissue Integrity  Goal: Absence of new skin breakdown  Description: 1. Monitor for areas of redness and/or skin breakdown  2. Assess vascular access sites hourly  3. Every 4-6 hours minimum:  Change oxygen saturation probe site  4.   Every 4-6 hours:  If on nasal continuous positive airway pressure, respiratory therapy assess nares and
0800)  Care Plan - Patient's Chronic Conditions and Co-Morbidity Symptoms are Monitored and Maintained or Improved:   Monitor and assess patient's chronic conditions and comorbid symptoms for stability, deterioration, or improvement   Collaborate with multidisciplinary team to address chronic and comorbid conditions and prevent exacerbation or deterioration   Update acute care plan with appropriate goals if chronic or comorbid symptoms are exacerbated and prevent overall improvement and discharge  11/28/2023 2348 by Marj Zuluaga RN  Outcome: Progressing  Flowsheets (Taken 11/28/2023 1100 by Maria Luisa Dalal RN)  Care Plan - Patient's Chronic Conditions and Co-Morbidity Symptoms are Monitored and Maintained or Improved:   Monitor and assess patient's chronic conditions and comorbid symptoms for stability, deterioration, or improvement   Collaborate with multidisciplinary team to address chronic and comorbid conditions and prevent exacerbation or deterioration   Update acute care plan with appropriate goals if chronic or comorbid symptoms are exacerbated and prevent overall improvement and discharge     Problem: Skin/Tissue Integrity  Goal: Absence of new skin breakdown  Description: 1. Monitor for areas of redness and/or skin breakdown  2. Assess vascular access sites hourly  3. Every 4-6 hours minimum:  Change oxygen saturation probe site  4. Every 4-6 hours:  If on nasal continuous positive airway pressure, respiratory therapy assess nares and determine need for appliance change or resting period.   11/29/2023 1251 by Maria Luisa Daall RN  Outcome: Progressing  11/28/2023 2348 by Marj Zuluaga RN  Outcome: Progressing

## 2023-11-30 NOTE — CARE COORDINATION
Transition of Care-Patient accepted at [facility], no pre-cert needed, just needed updated PT/OT. Plan for thoracentesis today, check Pulmonary plan. Anticipate discharge in the next 24/48 hrs.      Sylvia URBANN, RN  [facility]

## 2023-11-30 NOTE — PROGRESS NOTES
D/W son and POA  Son Virgilio Trivedi is primary decision maker and wife unable to make decisions  Discussed pulm recommednations  Hospice and comfort care requested would stop ATB and labs

## 2023-11-30 NOTE — PROGRESS NOTES
SPEECH/LANGUAGE PATHOLOGY  CLINICAL ASSESSMENT OF SWALLOWING FUNCTION   and PLAN OF CARE    PATIENT NAME:  Kim Davis  (male)     MRN:  [id number]    :  1929  (80 y.o.)  STATUS:  Inpatient: Room -A    TODAY'S DATE:  2023  REFERRING PROVIDER:   Dr. Tita Yoder: SLP eval and treat Date of order:  23  REASON FOR REFERRAL: Assess swallow function    EVALUATING THERAPIST: NIC Levy                 RESULTS:    DYSPHAGIA DIAGNOSIS:   Clinical indicators of mild-moderate oropharyngeal phase dysphagia       DIET RECOMMENDATIONS:  Minced and moist consistency solids (IDDSI level 5) with  nectar consistency (mildly thick - IDDSI level 2) liquids     FEEDING RECOMMENDATIONS:     Assistance level:  Full assistance is needed during all oral intake      Compensatory strategies recommended: Upright in bed/ chair as tolerated, Fully alert for all PO, Small bites/sips, and Alternate solids and liquids      Discussed recommendations with nursing and/or faxed report to referring provider: Yes    SPEECH THERAPY  PLAN OF CARE   The dysphagia POC is established based on physician order, dysphagia diagnosis and results of clinical assessment     Skilled SLP intervention for dysphagia management up to 6 x per week until goals met, pt plateaus in function and/or discharged from hospital    Conditions Requiring Skilled Therapeutic Intervention for dysphagia:    impaired mastication   decreased buccal strength and sensation resulting in oral cavity retention of food on the tongue  Throat clearing during PO intake   Coughing during PO intake    audible crackle/moist respirations that increased with PO intake   patient unable to follow 1 step directions which places them at Higher risk of aspiration Deepika Liu., ALEXANDRU Hinojosa., & Sofia Watkins HHieuL. 2009.)    Specific dysphagia interventions to include:     compensatory swallowing strategies to improve airway protection and swallow function. Training in positioning for improved integrity of swallow  ongoing mealtime assessment to provide diet modification and compensatory strategy implementation to minimize risk of aspiration associated with PO intake  PO trials of upgraded diet textures with SLP only to determine the least restrictive PO diet     Specific instructions for next treatment:  development and training of compensatory swallow strategies to improve airway protection and swallow function  Patient Treatment Goals:    Short Term Goals:  Pt will implement identified compensatory swallowing strategies on 90% of opportunities or greater to improve airway protection and swallow function. During trials of solids patient will masticate solids fully and recollect bolus leaving only minimal oral residue post swallow on  90% of opportunities or greater  Pt will improve bolus prep/control and mastication with  minimal verbal prompts to advance diet grade by 1 IDDSI level . Pt will decrease clinical indicators of airway compromise to 2 or less during swallowing of 8 ounces of liquid  minimal verbal prompts    Long Term Goals:   Pt will maintain adequate nutrition/hydration via PO intake of the least restrictive oral diet with implementation of safe swallow/ compensatory strategies and decrease signs/symptoms of aspiration to less than 1 x/day.    OTHER RECOMMENDATIONS:  A Video Swallow Study (MBSS) is recommended and requires a physician order IF silent aspiration is suspected based on medical presentation     Patient/family Goal:    To be able to eat/drink     Plan of care discussed with Patient   The Patient did not demonstrate complete understanding of the diagnosis, prognosis and plan of care     Rehabilitation Potential/Prognosis: fair                    ADMITTING DIAGNOSIS: Acute on chronic renal insufficiency [N28.9, N18.9]  Pleural effusion, left [J90]  Goals of care, counseling/discussion [Z71.89]  Acute on chronic combined systolic

## 2023-11-30 NOTE — PROCEDURES
US GUIDED LEFT THORACENTESIS- Left loculated pleural effusion  Informed consent from son. Time out performed. Sterile prep and drape after us localization. 1% lidocaine sc given for anesthesia. 18 gauge Angiocath used but only able to obtain 5 cc cloudy yellow fluid with difficulty due to loculated appearance of fluid. Fluid sent for analysis. Tolerated well without immediate complication. I performed.

## 2023-12-01 VITALS
HEART RATE: 94 BPM | DIASTOLIC BLOOD PRESSURE: 58 MMHG | OXYGEN SATURATION: 92 % | WEIGHT: 155.87 LBS | SYSTOLIC BLOOD PRESSURE: 102 MMHG | TEMPERATURE: 102.3 F | RESPIRATION RATE: 20 BRPM | HEIGHT: 71 IN | BODY MASS INDEX: 21.82 KG/M2

## 2023-12-01 LAB
ECHO AO ASC DIAM: 4.6 CM
ECHO AO ASCENDING AORTA INDEX: 2.41 CM/M2
ECHO AV AREA PEAK VELOCITY: 2.8 CM2
ECHO AV AREA VTI: 2.7 CM2
ECHO AV AREA/BSA PEAK VELOCITY: 1.5 CM2/M2
ECHO AV AREA/BSA VTI: 1.4 CM2/M2
ECHO AV CUSP MM: 2.3 CM
ECHO AV MEAN GRADIENT: 3 MMHG
ECHO AV MEAN VELOCITY: 0.7 M/S
ECHO AV PEAK GRADIENT: 6 MMHG
ECHO AV PEAK VELOCITY: 1.3 M/S
ECHO AV VELOCITY RATIO: 0.77
ECHO AV VTI: 14.8 CM
ECHO BSA: 1.85 M2
ECHO LA DIAMETER INDEX: 2.51 CM/M2
ECHO LA DIAMETER: 4.8 CM
ECHO LA VOL A-L A2C: 124 ML (ref 18–58)
ECHO LA VOL A-L A4C: 95 ML (ref 18–58)
ECHO LA VOL MOD A2C: 118 ML (ref 18–58)
ECHO LA VOL MOD A4C: 85 ML (ref 18–58)
ECHO LA VOLUME INDEX A-L A2C: 65 ML/M2 (ref 16–34)
ECHO LA VOLUME INDEX A-L A4C: 50 ML/M2 (ref 16–34)
ECHO LA VOLUME INDEX MOD A2C: 62 ML/M2 (ref 16–34)
ECHO LA VOLUME INDEX MOD A4C: 45 ML/M2 (ref 16–34)
ECHO LV FRACTIONAL SHORTENING: 34 % (ref 28–44)
ECHO LV INTERNAL DIMENSION DIASTOLE INDEX: 2.3 CM/M2
ECHO LV INTERNAL DIMENSION DIASTOLIC: 4.4 CM (ref 4.2–5.9)
ECHO LV INTERNAL DIMENSION SYSTOLIC INDEX: 1.52 CM/M2
ECHO LV INTERNAL DIMENSION SYSTOLIC: 2.9 CM
ECHO LV IVSD: 1.2 CM (ref 0.6–1)
ECHO LV IVSS: 1.6 CM
ECHO LV MASS 2D: 168.9 G (ref 88–224)
ECHO LV MASS INDEX 2D: 88.4 G/M2 (ref 49–115)
ECHO LV POSTERIOR WALL DIASTOLIC: 1 CM (ref 0.6–1)
ECHO LV POSTERIOR WALL SYSTOLIC: 2 CM
ECHO LV RELATIVE WALL THICKNESS RATIO: 0.45
ECHO LVOT AREA: 3.8 CM2
ECHO LVOT AV VTI INDEX: 0.73
ECHO LVOT DIAM: 2.2 CM
ECHO LVOT MEAN GRADIENT: 2 MMHG
ECHO LVOT PEAK GRADIENT: 4 MMHG
ECHO LVOT PEAK VELOCITY: 1 M/S
ECHO LVOT STROKE VOLUME INDEX: 21.5 ML/M2
ECHO LVOT SV: 41 ML
ECHO LVOT VTI: 10.8 CM
ECHO MV AREA PHT: 4.1 CM2
ECHO MV AREA VTI: 2.1 CM2
ECHO MV LVOT VTI INDEX: 1.8
ECHO MV MAX VELOCITY: 1.4 M/S
ECHO MV MEAN GRADIENT: 3 MMHG
ECHO MV MEAN VELOCITY: 0.7 M/S
ECHO MV PEAK GRADIENT: 7 MMHG
ECHO MV PRESSURE HALF TIME (PHT): 54 MS
ECHO MV REGURGITANT ALIASING (NYQUIST) VELOCITY: 37 CM/S
ECHO MV REGURGITANT RADIUS PISA: 1.27 CM
ECHO MV REGURGITANT VELOCITY PISA: 4.9 M/S
ECHO MV REGURGITANT VTIA: 99.3 CM
ECHO MV VTI: 19.4 CM
ECHO PV MAX VELOCITY: 0.8 M/S
ECHO PV MEAN GRADIENT: 1 MMHG
ECHO PV MEAN VELOCITY: 0.5 M/S
ECHO PV PEAK GRADIENT: 2 MMHG
ECHO PV VTI: 11.9 CM
ECHO TV REGURGITANT MAX VELOCITY: 2.89 M/S
ECHO TV REGURGITANT PEAK GRADIENT: 34 MMHG

## 2023-12-01 PROCEDURE — 6360000002 HC RX W HCPCS: Performed by: INTERNAL MEDICINE

## 2023-12-01 PROCEDURE — 6370000000 HC RX 637 (ALT 250 FOR IP): Performed by: INTERNAL MEDICINE

## 2023-12-01 PROCEDURE — 94640 AIRWAY INHALATION TREATMENT: CPT

## 2023-12-01 PROCEDURE — 2700000000 HC OXYGEN THERAPY PER DAY

## 2023-12-01 RX ORDER — ACETAMINOPHEN 650 MG/1
650 SUPPOSITORY RECTAL EVERY 4 HOURS PRN
Status: DISCONTINUED | OUTPATIENT
Start: 2023-12-01 | End: 2023-12-02 | Stop reason: HOSPADM

## 2023-12-01 RX ORDER — MORPHINE SULFATE 2 MG/ML
2 INJECTION, SOLUTION INTRAMUSCULAR; INTRAVENOUS
Status: DISCONTINUED | OUTPATIENT
Start: 2023-12-01 | End: 2023-12-02 | Stop reason: HOSPADM

## 2023-12-01 RX ADMIN — MORPHINE SULFATE 2 MG: 2 INJECTION, SOLUTION INTRAMUSCULAR; INTRAVENOUS at 12:25

## 2023-12-01 RX ADMIN — IPRATROPIUM BROMIDE AND ALBUTEROL SULFATE 1 DOSE: .5; 2.5 SOLUTION RESPIRATORY (INHALATION) at 18:59

## 2023-12-01 RX ADMIN — MORPHINE SULFATE 2 MG: 2 INJECTION, SOLUTION INTRAMUSCULAR; INTRAVENOUS at 09:47

## 2023-12-01 RX ADMIN — MORPHINE SULFATE 2 MG: 2 INJECTION, SOLUTION INTRAMUSCULAR; INTRAVENOUS at 06:51

## 2023-12-01 RX ADMIN — IPRATROPIUM BROMIDE AND ALBUTEROL SULFATE 1 DOSE: .5; 2.5 SOLUTION RESPIRATORY (INHALATION) at 16:09

## 2023-12-01 RX ADMIN — IPRATROPIUM BROMIDE AND ALBUTEROL SULFATE 1 DOSE: .5; 2.5 SOLUTION RESPIRATORY (INHALATION) at 09:33

## 2023-12-01 RX ADMIN — ACETAMINOPHEN 650 MG: 650 SUPPOSITORY RECTAL at 12:50

## 2023-12-01 RX ADMIN — MORPHINE SULFATE 2 MG: 2 INJECTION, SOLUTION INTRAMUSCULAR; INTRAVENOUS at 15:10

## 2023-12-01 ASSESSMENT — PAIN SCALES - GENERAL: PAINLEVEL_OUTOF10: 0

## 2023-12-01 NOTE — PROGRESS NOTES
Occupational Therapy  Pt with declined status. Hospice consulted. Will monitor for hospice decision. Hold therapy at this time.    Lisy FARRAR/YOANDY [id number]

## 2023-12-01 NOTE — PROGRESS NOTES
Call placed to shahab Grant, choiced for hospice company, chose [facility] [place], referral called.

## 2023-12-01 NOTE — PROGRESS NOTES
HOSPICE [facility]    Consult received. Chart reviewed. Wife present. Called son, Mark Kimball. He would like a meeting at 1 pm. Met Mark Kimball and other son Tamra Mccallum outside of room. The hospice benefit and philosophy were explained including that hospice is end of life care in which, per Medicare, a patient has a terminal diagnosis that life expectancy would be 6 months or less. Explained that once in hospice care, all aggressive treatments would be stopped and allow nature to takes its course with focus on comfort care for the patient. Explained hospice services at home, at [facility] with room/board private pay unless patient has Medicaid and the [facility] for short-term symptom management and respite. All questions answered. Family understand that patient prognosis is poor. They would like patient to go to [facility] for symptom control. Spoke with Cheryl Howard, MSN-APRN-CNP, AGACNP-BC who accepted patient for inpatient level of care hospice. Received bed at [facility] with a requested transport of 1700. [facility] able to transport at 1830. Ambulance forms placed in soft chart. Updated CM and charge nurse. Requested discharge order be obtained. Please leave IV in place. Gave nurse to nurse report to [facility].     Signed consents with Joao Young, son and POA    Electronically signed by Meena Thorpe RN on 12/1/2023 at 8:15 PM  [phone]; [phone]       Electronically signed by Meena Thorpe RN on 12/1/2023 at 2:55 PM  [phone]; [phone]

## 2023-12-01 NOTE — PROGRESS NOTES
Patient restless, non responsive at present. Responds to pain stimuli/speech. Resp 29 per minute. Medicated with PRN morphine IVP per order.

## 2023-12-01 NOTE — CARE COORDINATION
Transition of Care-[facility], will see today.     Mark URBANN, RN  [facility]

## 2023-12-02 LAB
MICROORGANISM SPEC CULT: NORMAL
MICROORGANISM SPEC CULT: NORMAL
REPORT STATUS: NORMAL
SERVICE CMNT-IMP: NORMAL
SERVICE CMNT-IMP: NORMAL
SPECIMEN DESCRIPTION: NORMAL
SPECIMEN DESCRIPTION: NORMAL

## 2023-12-02 NOTE — PROGRESS NOTES
Entered pt's room to check on pt and speak with family regarding transportation to hospice house. [facility] to transport pt to hospice house, report given. Emotional support provided to family.

## 2023-12-02 NOTE — DISCHARGE SUMMARY
Patient ID:  Cortez Sadler  [id number]  80 y.o.  2/17/1929    Admit date: 11/27/2023    Discharge date and time: 12/1/2023 10:33 PM     Admission Diagnoses: Acute on chronic renal insufficiency [N28.9, N18.9]  Pleural effusion, left [J90]  Goals of care, counseling/discussion [Z71.89]  Acute on chronic combined systolic and diastolic CHF (congestive heart failure) ([facility]) [I50.43]  Acute on chronic congestive heart failure, unspecified heart failure type ([address]) [I50.9]    Discharge Diagnoses:empyema  Pneumonia  HFpEF     Consults: cardiology, pulmonary/intensive care, and ID    Procedures: thoracentesis    Hospital Course: Patient admitted with shortness of breath. he had profound leukocytosis. He was found to have pneumonia with complicated effusion. He had delayed thoracentesis with pus and evidence of abscess. It was discussed with POA and pulmonary. The decision made to make him comfort care. He was transferred to hospice house in terminal condition.     Discharge Exam:  See progress note from today    Disposition: hospice    Patient Instructions:   Discharge Medication List as of 12/1/2023 10:33 PM        CONTINUE these medications which have NOT CHANGED    Details   aspirin 81 MG EC tablet Take 1 tablet by mouth dailyHistorical Med      digoxin (LANOXIN) 125 MCG tablet Take 1 tablet by mouth every morningHistorical Med      bumetanide (BUMEX) 2 MG tablet Take 1 tablet by mouth dailyHistorical Med      apixaban (ELIQUIS) 2.5 MG TABS tablet Take 1 tablet by mouth 2 times dailyHistorical Med      potassium chloride (KLOR-CON M) 20 MEQ extended release tablet Take 1 tablet by mouth every morning If takes lasixHistorical Med      glycerin-hypromellose- (ARTIFICIAL TEARS) 0.2-0.2-1 % SOLN opthalmic solution Place 1 drop into both eyes 2 times daily as needed (DRY EYES)Historical Med      Multiple Vitamins-Minerals (PRESERVISION AREDS 2) CAPS Take 1 capsule by mouth 2 times dailyHistorical Med      docusate sodium

## 2023-12-03 LAB
MICROORGANISM SPEC CULT: NO GROWTH
MICROORGANISM/AGENT SPEC: NORMAL
SPECIMEN DESCRIPTION: NORMAL

## 2023-12-04 LAB — NON-GYN CYTOLOGY REPORT: NORMAL

## 2023-12-04 NOTE — PROGRESS NOTES
Physician Progress Note      PATIENT:               Isael Torres  [facility] #:                  [id number]  :                       1929  ADMIT DATE:       2023 4:22 PM  [address] [facility] DATE:        2023 10:33 PM  RESPONDING  PROVIDER #:        Villa Franc DO          QUERY TEXT:    Pt admitted with Pneumonia. Pt noted to have Sepsis. If possible, please   document in progress notes and discharge summary the present on admission   status of Sepsis:    The medical record reflects the following:  Risk Factors: advanced age, empyema  Clinical Indicators: WBC 35.1, respirations 18-33, lactic 4.7, procal 13.85,   CRP 0.7, per IM  \". Cecil Number Cecil Number Pneumonia with emphysema and sepsis. Cecil Number Cecil Number \"  Treatment: IV Maxipime, Rocephin, Vanc    Thank you,  April Maggy Tinoco RN, BSN, CDIS  Clinical Documentation Integrity  [email or web address]  Options provided:  -- Yes, Sepsis was present at the time of the order to admit to the hospital  -- No, Sepsis was not present on admission and developed during the inpatient   stay  -- Other - I will add my own diagnosis  -- Disagree - Not applicable / Not valid  -- Disagree - Clinically unable to determine / Unknown  -- Refer to Clinical Documentation Reviewer    PROVIDER RESPONSE TEXT:    Yes, Sepsis was present at the time of the order to admit to the hospital.    Query created by: Maggy Tinoco April on 2023 10:51 AM      QUERY TEXT:    Pt admitted with Pneumonia. Pt noted to have elevated respirations and shallow   breathing. If possible, please document in the progress notes and discharge   summary if you are evaluating and/or treating any of the following: The medical record reflects the following:  Risk Factors: empyema, COPD  Clinical Indicators: respirations 18-33, 97% 4L, tachypnea/shallow breathing   per nursing, per pulm \". Cecil Number Cecil Number Acute hypoxia. On 4L NC, 99%. Wean to maintain pulse   ox > 90%. Cecil Number Cecil Number Fluid is markedly exudative and cxr shows white out of left chest.   Suspect empyema and that at

## (undated) DEVICE — TRAY DRILL SYSTEM 4 REUSABLE

## (undated) DEVICE — SET LAMBOTTE

## (undated) DEVICE — GAUZE,SPONGE,4"X4",8PLY,STRL,LF,10/TRAY: Brand: MEDLINE

## (undated) DEVICE — ELECTRODE PT RET AD L9FT HI MOIST COND ADH HYDRGEL CORDED

## (undated) DEVICE — RACK TUBE CURETTE

## (undated) DEVICE — GOWN,SIRUS,FABRNF,XL,20/CS: Brand: MEDLINE

## (undated) DEVICE — STERILE PVP: Brand: MEDLINE INDUSTRIES, INC.

## (undated) DEVICE — BNDG,ELSTC,MATRIX,STRL,3"X5YD,LF,HOOK&LP: Brand: MEDLINE

## (undated) DEVICE — INTENDED FOR TISSUE SEPARATION, AND OTHER PROCEDURES THAT REQUIRE A SHARP SURGICAL BLADE TO PUNCTURE OR CUT.: Brand: BARD-PARKER ® STAINLESS STEEL BLADES

## (undated) DEVICE — NEEDLE HYPO 22GA L1.5IN BLK POLYPR HUB S STL REG BVL STR

## (undated) DEVICE — 4-PORT MANIFOLD: Brand: NEPTUNE 2

## (undated) DEVICE — SOLUTION IV IRRIG POUR BRL 0.9% SODIUM CHL 2F7124

## (undated) DEVICE — PACK PROCEDURE SURG GEN CUST

## (undated) DEVICE — SYRINGE MED 30ML STD CLR PLAS LUERLOCK TIP N CTRL DISP

## (undated) DEVICE — GLOVE ORANGE PI 7   MSG9070

## (undated) DEVICE — INSTRUMENT SYSTEM 4 BATTERY REUSABLE

## (undated) DEVICE — BANDAGE,GAUZE,BULKEE II,4.5"X4.1YD,STRL: Brand: MEDLINE

## (undated) DEVICE — DOUBLE BASIN SET: Brand: MEDLINE INDUSTRIES, INC.

## (undated) DEVICE — CHLORAPREP 26ML ORANGE

## (undated) DEVICE — DRESSING,GAUZE,XEROFORM,CURAD,1"X8",ST: Brand: CURAD

## (undated) DEVICE — DRAPE,EXTREMITY,89X128,STERILE: Brand: MEDLINE

## (undated) DEVICE — SWAB SPEC COLL SHFT L5.25IN POLYUR FOAM TIP SFT DBL MEDIA